# Patient Record
Sex: FEMALE | Race: WHITE | NOT HISPANIC OR LATINO | Employment: OTHER | URBAN - METROPOLITAN AREA
[De-identification: names, ages, dates, MRNs, and addresses within clinical notes are randomized per-mention and may not be internally consistent; named-entity substitution may affect disease eponyms.]

---

## 2020-01-24 ENCOUNTER — OFFICE VISIT (OUTPATIENT)
Dept: ENDOCRINOLOGY | Facility: CLINIC | Age: 70
End: 2020-01-24

## 2020-01-24 VITALS
SYSTOLIC BLOOD PRESSURE: 110 MMHG | HEIGHT: 61 IN | BODY MASS INDEX: 34.25 KG/M2 | HEART RATE: 72 BPM | OXYGEN SATURATION: 100 % | WEIGHT: 181.4 LBS | DIASTOLIC BLOOD PRESSURE: 74 MMHG

## 2020-01-24 DIAGNOSIS — D35.02 PHEOCHROMOCYTOMA OF LEFT ADRENAL GLAND: ICD-10-CM

## 2020-01-24 DIAGNOSIS — E03.9 HYPOTHYROIDISM, UNSPECIFIED TYPE: ICD-10-CM

## 2020-01-24 DIAGNOSIS — E27.40 ADRENAL INSUFFICIENCY (HCC): Primary | ICD-10-CM

## 2020-01-24 DIAGNOSIS — M81.0 OSTEOPOROSIS, UNSPECIFIED OSTEOPOROSIS TYPE, UNSPECIFIED PATHOLOGICAL FRACTURE PRESENCE: ICD-10-CM

## 2020-01-24 PROCEDURE — 99204 OFFICE O/P NEW MOD 45 MIN: CPT | Performed by: INTERNAL MEDICINE

## 2020-01-24 RX ORDER — LEVOTHYROXINE SODIUM 0.07 MG/1
75 TABLET ORAL DAILY
COMMUNITY
Start: 2020-01-03 | End: 2020-01-24

## 2020-01-24 RX ORDER — PINDOLOL 5 MG/1
TABLET ORAL
COMMUNITY
Start: 2019-11-18 | End: 2020-03-03 | Stop reason: SDUPTHER

## 2020-01-24 RX ORDER — NORTRIPTYLINE HYDROCHLORIDE 50 MG/1
50 CAPSULE ORAL NIGHTLY
COMMUNITY
End: 2021-06-21

## 2020-01-24 RX ORDER — QUINAPRIL 5 1/1
TABLET ORAL
COMMUNITY
Start: 2019-12-27 | End: 2020-03-03 | Stop reason: SDUPTHER

## 2020-01-24 RX ORDER — MORPHINE SULFATE 30 MG/1
30 TABLET, FILM COATED, EXTENDED RELEASE ORAL 2 TIMES DAILY
COMMUNITY
Start: 2020-01-21

## 2020-01-24 RX ORDER — POTASSIUM CHLORIDE 750 MG/1
TABLET, FILM COATED, EXTENDED RELEASE ORAL DAILY
COMMUNITY
Start: 2019-12-27 | End: 2020-05-01

## 2020-01-24 RX ORDER — HYDROCORTISONE 10 MG/1
15 TABLET ORAL 2 TIMES DAILY
Qty: 100 TABLET | Refills: 5 | Status: SHIPPED | OUTPATIENT
Start: 2020-01-24 | End: 2020-05-01 | Stop reason: SDUPTHER

## 2020-01-24 RX ORDER — GABAPENTIN 800 MG/1
600 TABLET ORAL 4 TIMES DAILY
COMMUNITY
Start: 2020-01-21 | End: 2021-12-06 | Stop reason: SDUPTHER

## 2020-01-24 RX ORDER — DOXYCYCLINE HYCLATE 100 MG/1
CAPSULE ORAL
COMMUNITY
Start: 2020-01-20 | End: 2020-03-03

## 2020-01-24 RX ORDER — HYDROCORTISONE 20 MG/1
TABLET ORAL
COMMUNITY
Start: 2019-12-27 | End: 2020-01-24

## 2020-01-24 RX ORDER — LEVOTHYROXINE SODIUM 0.07 MG/1
75 TABLET ORAL DAILY
Qty: 30 TABLET | Refills: 5 | Status: SHIPPED | OUTPATIENT
Start: 2020-01-24 | End: 2020-05-01 | Stop reason: SDUPTHER

## 2020-01-24 NOTE — PROGRESS NOTES
Chief Complaint   Patient presents with   • Adrenal Problem     New patient here today for adrenal insufficiency         New patient who is being seen in consultation regarding adrenal insufficiency at the request of Carolina Espinosa APRN HPI   Altagracia Rothman is a 69 y.o. female who presents for evaluation of adrenal insufficiency.    Patient reports that she had a left adrenalectomy for pheochromocytoma in 2003 and developed adrenal insufficiency following that. Prior to surgery she had uncontrolled hypertension for approximately 30 years.  She was previously Dr. Lauren Scott in [place], [place] for the past 3 years. Prior to that she had seen Dr. Blanco and Merlene, both of whom are now retired.  She had surgery at [facility] in [place]. Patient reports that her average dose of hydrocortisone has been approximately 30 mg. Patient reports that she is taking hydrocortisone 15 mg BID.  She reports that prior attempts to wean her hydrocortisone do a total of less than 25 mg daily have been unsuccessful due to hypotension.  Of note, patient is taking antihypertensive medications.  Patient reports that she was following up approximately every 6 months with her endocrine provider in [place]. They have been routinely doing 24-hour urinary testing every 6 months to 1 year and she does report that she believes catecholamines were abnormal on most recent testing.  Of note, she does take nortriptyline but reports that she typically weans off of medication prior to doing her urinary testing.    Patient has a history of hypothyroidism on stable dose of levothyroxine 75 mcg daily.  She reports he takes this first thing in the morning on an empty stomach and denies any concerns regarding this medication.    Patient also reports a history of osteoporosis.Patient reports that she previously took Actonel which was stopped approximately 1 year ago due to dead bone in her jaw.  She states that her  endocrinologist in [place] had stated she wanted her to be off this medication for at least a year and she is curious whether she should restart it patient is not currently taking calcium or vitamin D supplementation.. She had taken this for approximately 12 years prior to that. She has also taken fosamax in the past. Patient reports that she had DXA last month. Patient reports a history of fall with multiple fractures in 2015. No falls or fractures since then.  Patient not currently taking calcium or vitamin D.    Past Medical History:   Diagnosis Date   • Adrenal insufficiency (CMS/HCC)    • Fibromyalgia    • Hypertension    • Hypothyroidism    • Skin cancer      Past Surgical History:   Procedure Laterality Date   • ADRENAL GLAND SURGERY     • BREAST BIOPSY      x4   • GALLBLADDER SURGERY     • HYSTERECTOMY     • TONSILLECTOMY        Family History   Problem Relation Age of Onset   • Thyroid disease Mother    • Hypertension Mother    • Heart disease Father       Social History     Socioeconomic History   • Marital status:      Spouse name: Not on file   • Number of children: Not on file   • Years of education: Not on file   • Highest education level: Not on file   Tobacco Use   • Smoking status: Current Every Day Smoker     Packs/day: 0.50     Types: Cigarettes   • Smokeless tobacco: Never Used   Substance and Sexual Activity   • Alcohol use: Never     Frequency: Never   • Drug use: Never   • Sexual activity: Defer      Allergies   Allergen Reactions   • Daypro [Oxaprozin] Anaphylaxis   • Fentanyl Other (See Comments)     sweating   • Iodine Swelling   • Keflex [Cephalexin] Hives   • Levaquin [Levofloxacin] Hives   • Lyrica [Pregabalin] Other (See Comments)     Sweating   • Other Swelling     Tranormin   • Penicillins Rash   • Sulfa Antibiotics Hives and Rash      Current Outpatient Medications on File Prior to Visit   Medication Sig Dispense Refill   • doxycycline (VIBRAMYCIN) 100 MG capsule TK 1 C PO BID  "    • gabapentin (NEURONTIN) 800 MG tablet TK 5 TS PO D     • Morphine (MS CONTIN) 30 MG 12 hr tablet Take 30 mg by mouth 2 (Two) Times a Day.     • mupirocin (BACTROBAN) 2 % ointment APPLY TID     • nortriptyline (PAMELOR) 50 MG capsule Take 50 mg by mouth Every Night.     • pindolol (VISKEN) 5 MG tablet TK 1.5 TS PO BID     • potassium chloride (K-DUR) 10 MEQ CR tablet Take  by mouth Daily.     • PROAIR  (90 Base) MCG/ACT inhaler INL 2 PFS PO Q 6 H PRN     • quinapril (ACCUPRIL) 5 MG tablet TK 1.5 TS PO D     • WIXELA INHUB 250-50 MCG/DOSE DISKUS INL 1 PUFF PO Q 12 H     • [DISCONTINUED] hydrocortisone (CORTEF) 20 MG tablet TK UP TO 3 TS PO D INCLUDING STRESS DOSING     • [DISCONTINUED] levothyroxine (SYNTHROID, LEVOTHROID) 75 MCG tablet Take 75 mcg by mouth Daily.       No current facility-administered medications on file prior to visit.         Review of Systems   Constitutional: Negative for fatigue and unexpected weight gain.   HENT: Negative for trouble swallowing and voice change.    Eyes: Negative for itching and visual disturbance.   Cardiovascular: Positive for palpitations. Negative for chest pain.   Gastrointestinal: Negative for abdominal pain and constipation.   Endocrine: Negative for cold intolerance and heat intolerance.   Musculoskeletal: Positive for arthralgias and myalgias.   Skin: Negative for dry skin and rash.   Neurological: Negative for tremors and headache.   Psychiatric/Behavioral: Negative for sleep disturbance. The patient is not nervous/anxious.           Vitals:    01/24/20 1400   BP: 110/74   BP Location: Left arm   Patient Position: Sitting   Cuff Size: Adult   Pulse: 72   SpO2: 100%   Weight: 82.3 kg (181 lb 6.4 oz)   Height: 154.9 cm (61\")   Body mass index is 34.28 kg/m².     Physical Exam   Constitutional: Vital signs are normal. She appears well-developed and well-nourished. She is cooperative. She is obese.  HENT:   Head: Normocephalic and atraumatic.   Mouth/Throat: " Oropharynx is clear and moist and mucous membranes are normal.   Eyes: Conjunctivae and EOM are normal.   Neck: Trachea normal. No thyromegaly present.   Cardiovascular: Normal rate and regular rhythm.   No murmur heard.  Pulmonary/Chest: Effort normal and breath sounds normal. No respiratory distress.   Abdominal: Soft. Bowel sounds are normal. She exhibits no distension. There is no hepatosplenomegaly. There is no tenderness.   Lymphadenopathy:     She has no cervical adenopathy.   Neurological: She is alert. She has normal strength. Gait abnormal.   Skin: Skin is warm, dry and intact. No rash noted.   Psychiatric: She has a normal mood and affect. Her behavior is normal.   Vitals reviewed.       Labs/Imaging  Labs dated 11/21/2019  TSH 1.63, reference range 0.3-4.31  Free T4 1.34, reference range 0.71-1.5    Assessment and Plan    Altagracia was seen today for adrenal problem.    Diagnoses and all orders for this visit:    Adrenal insufficiency (CMS/HCC)  -Due to history of unilateral adrenalectomy for pheochromocytoma  -Currently taking hydrocortisone 15 mg twice daily  -Patient reports she is recovering from bronchitis, but that recent in recent years 15 mg twice daily has been her baseline steroid dose discussed with patient that physiologic dose of replacement hydrocortisone is approximately 15 mg total daily.  Reviewed possible complications of long-term supraphysiologic steroid dosing.  Discussed that once patient finishes her current antibiotics for bronchitis, she should reduce her afternoon dose of hydrocortisone to 10 mg.  We will plan for a slow taper as tolerated to physiologic hydrocortisone.  -Stress dosing and sick day rules were reviewed with the patient, she reports that she has emergency injection kit at home  -Reviewed the need for wearing emergency alert bracelet or necklace  -Reviewed signs and symptoms of adrenal crisis and when to seek care.  -     hydrocortisone (CORTEF) 10 MG tablet; Take 1.5  tablets by mouth 2 (Two) Times a Day.    Hypothyroidism, unspecified type  -Recent TFTs with PCP with normal TSH and free T4  -Continue current dose of levothyroxine  -     levothyroxine (SYNTHROID, LEVOTHROID) 75 MCG tablet; Take 1 tablet by mouth Daily.    Pheochromocytoma of left adrenal gland patient reports   -status post left adrenalectomy  -Some elevation in catecholamines on most recent 24-hour urinary testing, these records are not available  -We will request surgical pathology as well as recent laboratory records from patient's prior endocrinologist.  -Likely due for repeat urinary testing, will determine timing based on records    Osteoporosis, unspecified osteoporosis type, unspecified pathological fracture presence  Patient reports she was previously treated with Actonel for approximately 12 years,stopped 1 year ago due to dead bone in the jaw.  Patient reports recent DEXA scan with PCP, will request these records-.  We will also request records of prior DEXA scans and treatments from patient's prior endocrinologist.    Received most recent labs from pateint's prior endocrinologist. Obtained May 2019 with mild elevation of normetanephrine. Patient will need repeat testing, awaiting further records.    DEXA scan dated 5/9/2013  L1-L4 T score 1.6  Left femoral neck T score 0.6  Left total hip T score 1.4  Right femoral neck T score 0.3  Right total hip T score 1.3   radius T score -0.7    24-hour urine collection dated 5/20/2019  Total volume 1250 mL  Total creatinine 912, reference range 12 100-14 100  Metanephrine 65, reference range   Normetanephrine 509, reference range 109-393  Plasma metanephrine less than 0.2  Plasma normetanephrine 1.5, reference range less than 0.9    Per physician note, patient had elevated norepinephrine levels in 2001, resection of pheochromocytoma in 2003.  Levels richar in 2016 with various imaging obtained with nonspecific physiologic uptake in the right adrenals.   Patient then had normal urine metanephrines after coming off TCA in April 2017.    Received results of DEXA scan dated 11/22/2019  L1-L4 T score 1.4  Right femoral neck T score 1.4  Right total femur T score 1.4  BMD within normal limits, will readdress at next visit, no need for therapy at this time.    Return in about 3 months (around 4/24/2020) for Next scheduled follow up. The patient was instructed to contact the clinic with any interval questions or concerns.    Heather Nelson MD     Please note that portions of this document were completed using a voice recognition program. Efforts were made to edit the dictations, but occasionally words are mis-transcribed.

## 2020-01-27 ENCOUNTER — TELEPHONE (OUTPATIENT)
Dept: ENDOCRINOLOGY | Facility: CLINIC | Age: 70
End: 2020-01-27

## 2020-01-27 NOTE — TELEPHONE ENCOUNTER
Left message on [employer] (from [facility])  voice mail, from the medical records department.  Their phone number is [phone].    Requesting copy of DXA scan records from December 2019.

## 2020-01-27 NOTE — TELEPHONE ENCOUNTER
----- Message from Heather Nelson MD sent at 1/24/2020  3:26 PM EST -----  Please request patient's previous endocrinology records from Dr. Lauren Scott in [facility] [place] associated with [facility][1].  Please request prior surgical reports, surgical pathology from pheochromocytoma surgery, recent laboratory evaluations, recent DEXA scan and most recent clinic note.    Please request copy of patients DXA from Dec 2019 from Dr. Delgado at [facility].    Thank you

## 2020-01-27 NOTE — TELEPHONE ENCOUNTER
----- Message from Heather Nelson MD sent at 1/24/2020  3:26 PM EST -----  Please request patient's previous endocrinology records from Dr. Lauren Scott in [facility], associated with [facility].  Please request prior surgical reports, surgical pathology from pheochromocytoma surgery, recent laboratory evaluations, recent DEXA scan and most recent clinic note.    Please request copy of patients DXA from Dec 2019 from [place]. Delgado [place] [facility].    Thank you

## 2020-01-27 NOTE — TELEPHONE ENCOUNTER
Spoke with Velma, from Dr. Scott's office.  She transferred me to the medical records department at the [facility].    Fax request for records to:  [phone].    Requested the following:    prior surgical reports, surgical pathology from pheochromocytoma surgery  recent laboratory evaluations  recent DEXA scan  most recent clinic note.

## 2020-02-11 ENCOUNTER — TELEPHONE (OUTPATIENT)
Dept: ENDOCRINOLOGY | Facility: CLINIC | Age: 70
End: 2020-02-11

## 2020-02-11 NOTE — TELEPHONE ENCOUNTER
Patient states she went to see her pain doctor (01/20/20), Dr. Garrido in [place], [place].     Dr. Garrido is suggesting to the patient to have a pain pump put in, for the morphin the patient takes.    Patient would like to know what Dr. Nelson thinks of her having a pain pump, because of her adrenal insufficiency.    Patient has a f/u appointment with her Dr. Garrido on 02/17/20.      Dr. Nelson, please advice.  Thank you.

## 2020-02-11 NOTE — TELEPHONE ENCOUNTER
PATIENT IS CALLING WANTING TO SPEAK TO YOU ABOUT POSSIBLY GETTING A PAIN PUMP THAT ANOTHER DOCTOR IS WANTING TO SEE IF PATIENT CAN TOLERATE IT. SHE NEEDS TO SEE IF THIS IS SOMETHING SHE CAN USE AS SHE HAS ADRENAL ISSUES. PATIENTS NUMBER [phone]

## 2020-02-12 NOTE — TELEPHONE ENCOUNTER
I am not aware of any way that the transition to a pain pump would effect the patient's adrenal insufficiency.    However, should she decide to pursue this, she may need stress dosing of steroids for the placement of the pump. If the placement were to require general anesthesia, she should have hydrocortisone 50 mg IV once immediately prior to the procedure. I would also recommend she double her oral steroids the day before, day of, and day after procedure.

## 2020-03-03 ENCOUNTER — OFFICE VISIT (OUTPATIENT)
Dept: INTERNAL MEDICINE | Facility: CLINIC | Age: 70
End: 2020-03-03

## 2020-03-03 VITALS
HEIGHT: 60 IN | HEART RATE: 64 BPM | DIASTOLIC BLOOD PRESSURE: 90 MMHG | WEIGHT: 183 LBS | BODY MASS INDEX: 35.93 KG/M2 | TEMPERATURE: 97.2 F | SYSTOLIC BLOOD PRESSURE: 124 MMHG

## 2020-03-03 DIAGNOSIS — E03.9 ACQUIRED HYPOTHYROIDISM: ICD-10-CM

## 2020-03-03 DIAGNOSIS — E27.40 ADRENAL INSUFFICIENCY (HCC): ICD-10-CM

## 2020-03-03 DIAGNOSIS — G89.29 ENCOUNTER FOR CHRONIC PAIN MANAGEMENT: ICD-10-CM

## 2020-03-03 DIAGNOSIS — I10 ESSENTIAL HYPERTENSION: Primary | ICD-10-CM

## 2020-03-03 DIAGNOSIS — M81.0 OSTEOPOROSIS, UNSPECIFIED OSTEOPOROSIS TYPE, UNSPECIFIED PATHOLOGICAL FRACTURE PRESENCE: ICD-10-CM

## 2020-03-03 DIAGNOSIS — M54.50 CHRONIC BILATERAL LOW BACK PAIN WITHOUT SCIATICA: ICD-10-CM

## 2020-03-03 DIAGNOSIS — G89.29 CHRONIC BILATERAL LOW BACK PAIN WITHOUT SCIATICA: ICD-10-CM

## 2020-03-03 RX ORDER — QUINAPRIL 5 1/1
2.5 TABLET ORAL 3 TIMES DAILY
Qty: 45 TABLET | Refills: 5 | Status: SHIPPED | OUTPATIENT
Start: 2020-03-03 | End: 2020-08-28

## 2020-03-03 RX ORDER — PINDOLOL 5 MG/1
2.5 TABLET ORAL 3 TIMES DAILY
Qty: 45 TABLET | Refills: 5 | Status: SHIPPED | OUTPATIENT
Start: 2020-03-03 | End: 2021-03-30 | Stop reason: SDUPTHER

## 2020-03-03 RX ORDER — OXYCODONE AND ACETAMINOPHEN 10; 325 MG/1; MG/1
1 TABLET ORAL EVERY 8 HOURS
COMMUNITY
End: 2022-10-05 | Stop reason: HOSPADM

## 2020-03-03 NOTE — PROGRESS NOTES
Patient Care Team:  Alvina Lloyd PA-C as PCP - General (Internal Medicine)    Chief Complaint::   Chief Complaint   Patient presents with   • Establish Care     needs RX for Quinipril        Subjective     HPI  Altagracia is a 70 year old, , new patient here to establish care. Recently moved   from [place]- Moved to [place] this past fall to be near daughter.  PMH significant for history of left adrenalectomy due to pheochromocytoma with subsequent adrenal insufficiency- 2003. Prior to this, she had history of uncontrolled HTN for ~ 30 years.   PCP-Dr. Asim Ashton internal medicine. She also seen by Dr. Lauren Scott in [place], [place] for the past 3 years. Prior to that she had seen Dr. Blanco and Merlene, both of whom are now retired.  She had surgery at [facility] in [place]. Since moving to [place], she has been seen Dr. Nelson in January 2020 for treatment of adrenal insufficiency,hypothyroidism, and osteoporosis.    Per patient, multiple fractures of the lumbar spine due to severe osteoporosis.   Was seeing pain management in [place] and has been driving back for her pain medications-currently taking gabapentin 800mg qid,  percocet 10-325mg qid, MS Contin 30mg BID.  Patient reports that she previously took Actonel which was stopped approximately 1 year ago due to dead bone in her jaw.  Patient reports that she had DEXA last month. Patient reports a history of fall with multiple fractures in 2015. No falls or fractures since then.  Patient not currently taking calcium or vitamin D.                       The following portions of the patient's history were reviewed and updated as appropriate: active problem list, medication list, allergies, family history, social history    Review of Systems:   Review of Systems   Constitutional: Negative for activity change, appetite change, chills, fatigue and fever.   HENT: Positive for ear pain. Negative for congestion and sinus  "pressure.         R   Respiratory: Positive for cough. Negative for chest tightness, shortness of breath and wheezing.    Cardiovascular: Negative for chest pain and palpitations.   Gastrointestinal: Negative for abdominal pain, blood in stool and constipation.   Endocrine: Negative for cold intolerance and heat intolerance.   Musculoskeletal: Positive for arthralgias, back pain, gait problem and myalgias.   Skin: Negative for color change.   Allergic/Immunologic: Negative for environmental allergies.   Neurological: Negative for dizziness, speech difficulty and headache.   Psychiatric/Behavioral: Positive for decreased concentration. The patient is nervous/anxious.        Vital Signs  Vitals:    03/03/20 1434   BP: 124/90   BP Location: Left arm   Patient Position: Sitting   Cuff Size: Adult   Pulse: 64   Temp: 97.2 °F (36.2 °C)   TempSrc: Temporal   Weight: 83 kg (183 lb)   Height: 152 cm (59.84\")   PainSc:   3   PainLoc: Back  Comment: legs     Body mass index is 35.93 kg/m².    Labs  No visits with results within 3 Month(s) from this visit.   Latest known visit with results is:   No results found for any previous visit.       Imaging  No radiology results for the last 30 days.      Current Outpatient Medications:   •  gabapentin (NEURONTIN) 800 MG tablet, TK 5 TS PO D, Disp: , Rfl:   •  hydrocortisone (CORTEF) 10 MG tablet, Take 1.5 tablets by mouth 2 (Two) Times a Day., Disp: 100 tablet, Rfl: 5  •  levothyroxine (SYNTHROID, LEVOTHROID) 75 MCG tablet, Take 1 tablet by mouth Daily., Disp: 30 tablet, Rfl: 5  •  Morphine (MS CONTIN) 30 MG 12 hr tablet, Take 30 mg by mouth 2 (Two) Times a Day., Disp: , Rfl:   •  mupirocin (BACTROBAN) 2 % ointment, APPLY TID, Disp: , Rfl:   •  nortriptyline (PAMELOR) 50 MG capsule, Take 50 mg by mouth Every Night., Disp: , Rfl:   •  oxyCODONE-acetaminophen (PERCOCET)  MG per tablet, Take 1 tablet by mouth Every 6 (Six) Hours As Needed for Moderate Pain ., Disp: , Rfl:   •  " pindolol (VISKEN) 5 MG tablet, Take 0.5 tablets by mouth 3 (Three) Times a Day., Disp: 45 tablet, Rfl: 5  •  potassium chloride (K-DUR) 10 MEQ CR tablet, Take  by mouth Daily., Disp: , Rfl:   •  PROAIR  (90 Base) MCG/ACT inhaler, INL 2 PFS PO Q 6 H PRN, Disp: , Rfl:   •  quinapril (ACCUPRIL) 5 MG tablet, Take 0.5 tablets by mouth 3 (Three) Times a Day., Disp: 45 tablet, Rfl: 5  •  WIXELA INHUB 250-50 MCG/DOSE DISKUS, INL 1 PUFF PO Q 12 H, Disp: , Rfl:     Physical Exam:    Physical Exam   Constitutional: She appears well-developed and well-nourished. No distress.   Able to climb onto exam table without difficulty   HENT:   Head: Normocephalic and atraumatic.   Right Ear: External ear normal.   Left Ear: External ear normal.   Nose: Nose normal.   Mouth/Throat: Oropharynx is clear and moist.   Eyes: Pupils are equal, round, and reactive to light. Conjunctivae and EOM are normal.   Neck: Normal range of motion. Neck supple. No JVD present. No thyromegaly present.   Cardiovascular: Normal rate, regular rhythm, normal heart sounds and intact distal pulses.   No murmur heard.  Pulmonary/Chest: Effort normal and breath sounds normal. No respiratory distress. She exhibits no tenderness.   Abdominal: Soft. She exhibits no distension. There is no tenderness.   Musculoskeletal: She exhibits tenderness. She exhibits no edema or deformity.        Right hip: Normal. She exhibits normal range of motion and no tenderness.        Left hip: Normal. She exhibits normal range of motion and no tenderness.        Lumbar back: She exhibits normal range of motion, no tenderness and no bony tenderness.   Lymphadenopathy:     She has no cervical adenopathy.   Neurological: She is alert. She has normal strength. No sensory deficit. She exhibits normal muscle tone. Coordination and gait normal.   Reflex Scores:       Brachioradialis reflexes are 2+ on the right side and 2+ on the left side.       Patellar reflexes are 2+ on the right  side and 2+ on the left side.       Achilles reflexes are 2+ on the right side and 2+ on the left side.  Skin: Skin is warm and dry. No rash noted. She is not diaphoretic. No erythema. No pallor.   Psychiatric: She has a normal mood and affect. Her behavior is normal. Judgment and thought content normal.   Nursing note and vitals reviewed.      Procedures        Assessment/Plan   Problem List Items Addressed This Visit        Cardiovascular and Mediastinum    Essential hypertension - Primary    Current Assessment & Plan     Hypertension is improving with treatment.  Continue current treatment regimen.  Dietary sodium restriction.  Weight loss.  Stop smoking.  Continue current medications.  Ambulatory blood pressure monitoring.  Blood pressure will be reassessed at the next regular appointment.         Relevant Medications    quinapril (ACCUPRIL) 5 MG tablet    pindolol (VISKEN) 5 MG tablet       Endocrine    Adrenal insufficiency (CMS/HCC)    Current Assessment & Plan     Managed by Dr. Nelson.  She is currently taking hydrocortisone 10mg tablet-1.5 tablets BID.         Relevant Medications    hydrocortisone (CORTEF) 10 MG tablet    Hypothyroidism    Current Assessment & Plan     Managed by Dr. Nelson.  She is currently taking levothyroxine 75mcg daily.         Relevant Medications    levothyroxine (SYNTHROID, LEVOTHROID) 75 MCG tablet    hydrocortisone (CORTEF) 10 MG tablet    pindolol (VISKEN) 5 MG tablet       Nervous and Auditory    Chronic bilateral low back pain without sciatica    Current Assessment & Plan     I have no records on this patient.  Medical record request has been sent so that we can assess severity of disease.  Will await referral to pain management until records have been reviewed.  She is encouraged to refill at current physician in the interim.            Musculoskeletal and Integument    Osteoporosis    Overview     Will need records of recent DEXA          Current Assessment & Plan     Will  check Vitamin D at next lab draw.  Will review Dr. Nelson's records also.            Other    Encounter for chronic pain management    Current Assessment & Plan     Request for records have been sent.         BMI 35.0-35.9,adult    Current Assessment & Plan     Labs at next appt.  She is encouraged to cut back on soda, processed foods, and fast foods.               Return in about 6 weeks (around 4/14/2020) for Recheck, Annual physical.    Plan of care reviewed with patient at the conclusion of today's visit. Education was provided regarding diagnosis, management, and any prescribed or recommended OTC medications.Patient verbalizes understanding of and agreement with management plan.       Alvina Lloyd PA-C    Please note that portions of this note were completed with a voice recognition program. Efforts were made to edit the dictations, but occasionally words are mistranscribed.

## 2020-03-04 PROBLEM — E03.9 HYPOTHYROIDISM: Status: ACTIVE | Noted: 2020-03-04

## 2020-03-04 PROBLEM — G89.29 CHRONIC BILATERAL LOW BACK PAIN WITHOUT SCIATICA: Status: ACTIVE | Noted: 2020-03-04

## 2020-03-04 PROBLEM — E27.40 ADRENAL INSUFFICIENCY: Status: ACTIVE | Noted: 2020-03-04

## 2020-03-04 PROBLEM — Z86.018 H/O PHEOCHROMOCYTOMA: Status: ACTIVE | Noted: 2020-03-04

## 2020-03-04 PROBLEM — I10 ESSENTIAL HYPERTENSION: Status: ACTIVE | Noted: 2020-03-04

## 2020-03-04 PROBLEM — M54.50 CHRONIC BILATERAL LOW BACK PAIN WITHOUT SCIATICA: Status: ACTIVE | Noted: 2020-03-04

## 2020-03-04 PROBLEM — G89.29 ENCOUNTER FOR CHRONIC PAIN MANAGEMENT: Status: ACTIVE | Noted: 2020-03-04

## 2020-03-06 ENCOUNTER — TELEPHONE (OUTPATIENT)
Dept: INTERNAL MEDICINE | Facility: CLINIC | Age: 70
End: 2020-03-06

## 2020-03-06 PROBLEM — M81.0 OSTEOPOROSIS: Status: ACTIVE | Noted: 2020-03-06

## 2020-03-06 PROBLEM — Z72.0 TOBACCO ABUSE: Status: ACTIVE | Noted: 2020-03-06

## 2020-03-06 NOTE — ASSESSMENT & PLAN NOTE
I have no records on this patient.  Medical record request has been sent so that we can assess severity of disease.  Will await referral to pain management until records have been reviewed.  She is encouraged to refill at current physician in the interim.

## 2020-03-06 NOTE — ASSESSMENT & PLAN NOTE
Hypertension is improving with treatment.  Continue current treatment regimen.  Dietary sodium restriction.  Weight loss.  Stop smoking.  Continue current medications.  Ambulatory blood pressure monitoring.  Blood pressure will be reassessed at the next regular appointment.

## 2020-03-06 NOTE — PATIENT INSTRUCTIONS

## 2020-03-09 NOTE — TELEPHONE ENCOUNTER
Is there a certain record you need right away I can request, otherwise if you need her whole record I will need a signed release to send. I see she has an appointment in April I can add to her appointment note to sign a release then

## 2020-03-12 ENCOUNTER — OFFICE VISIT (OUTPATIENT)
Dept: INTERNAL MEDICINE | Facility: CLINIC | Age: 70
End: 2020-03-12

## 2020-03-12 VITALS — DIASTOLIC BLOOD PRESSURE: 74 MMHG | SYSTOLIC BLOOD PRESSURE: 126 MMHG | TEMPERATURE: 96.7 F

## 2020-03-12 DIAGNOSIS — I10 ESSENTIAL HYPERTENSION: ICD-10-CM

## 2020-03-12 DIAGNOSIS — E03.9 ACQUIRED HYPOTHYROIDISM: ICD-10-CM

## 2020-03-12 DIAGNOSIS — R51.9 NONINTRACTABLE HEADACHE, UNSPECIFIED CHRONICITY PATTERN, UNSPECIFIED HEADACHE TYPE: Primary | ICD-10-CM

## 2020-03-12 DIAGNOSIS — J40 BRONCHITIS: ICD-10-CM

## 2020-03-12 LAB
EXPIRATION DATE: NORMAL
FLUAV RNA RESP QL NAA+PROBE: NEGATIVE
FLUBV RNA RESP QL NAA+PROBE: NEGATIVE
INTERNAL CONTROL: NORMAL
Lab: NORMAL

## 2020-03-12 PROCEDURE — 99214 OFFICE O/P EST MOD 30 MIN: CPT | Performed by: INTERNAL MEDICINE

## 2020-03-12 PROCEDURE — 87502 INFLUENZA DNA AMP PROBE: CPT | Performed by: INTERNAL MEDICINE

## 2020-03-12 RX ORDER — DOXYCYCLINE HYCLATE 100 MG/1
100 TABLET, DELAYED RELEASE ORAL 2 TIMES DAILY
Qty: 16 TABLET | Refills: 1 | Status: SHIPPED | OUTPATIENT
Start: 2020-03-12 | End: 2020-05-01

## 2020-03-12 NOTE — PROGRESS NOTES
"[facility]     Altagracia Rothman  1950   [id number]      Patient Care Team:  Alvina Lloyd PA-C as PCP - General (Internal Medicine)    Chief Complaint::   Chief Complaint   Patient presents with   • Headache     with insomnia.  \"Pretty sure I had a fever last night\".              HPI  Patient is 70-year-old female relatively new patient in this office complaining of mild headache cough with wheeze fatigue with low-grade temp last p.m. with a history of COPD quit smoking 4 days ago cough is nonproductive does have low-grade wheeze denies chest wall pain history of COPD hypothyroidism chronic degenerative arthritic pain history of essential hypertension neuropathy and COPD.  The patient is unsure of flu exposure but was recently in a local store where the employee was coughing and appeared quite ill.      Patient Active Problem List   Diagnosis   • Adrenal insufficiency (CMS/HCC)   • Hypothyroidism   • Essential hypertension   • H/O pheochromocytoma   • Chronic bilateral low back pain without sciatica   • Encounter for chronic pain management   • Osteoporosis   • Tobacco abuse   • BMI 35.0-35.9,adult   • Bronchitis        Past Medical History:   Diagnosis Date   • Adrenal insufficiency (CMS/HCC)    • Fibromyalgia    • Hypertension    • Hypothyroidism    • Skin cancer        Past Surgical History:   Procedure Laterality Date   • ADRENAL GLAND SURGERY     • BREAST BIOPSY      x4   • GALLBLADDER SURGERY     • HYSTERECTOMY     • TONSILLECTOMY         Family History   Problem Relation Age of Onset   • Thyroid disease Mother    • Hypertension Mother    • Heart disease Father        Social History     Socioeconomic History   • Marital status:      Spouse name: Not on file   • Number of children: Not on file   • Years of education: Not on file   • Highest education level: Not on file   Tobacco Use   • Smoking status: Current Every Day Smoker     Packs/day: 0.50     Types: Cigarettes   • Smokeless " tobacco: Never Used   Substance and Sexual Activity   • Alcohol use: Never     Frequency: Never   • Drug use: Never   • Sexual activity: Defer       Allergies   Allergen Reactions   • Daypro [Oxaprozin] Anaphylaxis   • Fentanyl Other (See Comments)     sweating   • Iodine Swelling   • Keflex [Cephalexin] Hives   • Levaquin [Levofloxacin] Hives   • Lyrica [Pregabalin] Other (See Comments)     Sweating   • Other Swelling     Tranormin   • Penicillins Rash   • Sulfa Antibiotics Hives and Rash       Review of Systems     HEENT: Complains of headache denies vision or hearing change  NECK: Complains of some soreness in her neck denies dysphasia  CHEST: History of smoking quit 4 days ago does have mild cough mild wheeze does not believe she is short of breath denies hemoptysis or significant sputum production  CARDIAC: Denies chest pain or palpitations history of essential hypertension  ABD: Denies nausea vomiting complains of some mild abdominal discomfort  : Denies dysuria frequency  NEURO: Denies syncope concussion  PSYCH: Mild stress anxiety  EXTREM: Moderate severe degenerative changes on MS Contin and Percocet    Vital Signs  Vitals:    03/12/20 1710 03/12/20 1711   BP: 126/74    Temp:  96.7 °F (35.9 °C)     There is no height or weight on file to calculate BMI.  Patient's There is no height or weight on file to calculate BMI.      Advance Care Planning       Current Outpatient Medications:   •  doxycycline (DORYX) 100 MG enteric coated tablet, Take 1 tablet by mouth 2 (Two) Times a Day., Disp: 16 tablet, Rfl: 1  •  gabapentin (NEURONTIN) 800 MG tablet, TK 5 TS PO D, Disp: , Rfl:   •  hydrocortisone (CORTEF) 10 MG tablet, Take 1.5 tablets by mouth 2 (Two) Times a Day., Disp: 100 tablet, Rfl: 5  •  levothyroxine (SYNTHROID, LEVOTHROID) 75 MCG tablet, Take 1 tablet by mouth Daily., Disp: 30 tablet, Rfl: 5  •  Morphine (MS CONTIN) 30 MG 12 hr tablet, Take 30 mg by mouth 2 (Two) Times a Day., Disp: , Rfl:   •   mupirocin (BACTROBAN) 2 % ointment, APPLY TID, Disp: , Rfl:   •  nortriptyline (PAMELOR) 50 MG capsule, Take 50 mg by mouth Every Night., Disp: , Rfl:   •  oxyCODONE-acetaminophen (PERCOCET)  MG per tablet, Take 1 tablet by mouth Every 6 (Six) Hours As Needed for Moderate Pain ., Disp: , Rfl:   •  pindolol (VISKEN) 5 MG tablet, Take 0.5 tablets by mouth 3 (Three) Times a Day., Disp: 45 tablet, Rfl: 5  •  potassium chloride (K-DUR) 10 MEQ CR tablet, Take  by mouth Daily., Disp: , Rfl:   •  PROAIR  (90 Base) MCG/ACT inhaler, INL 2 PFS PO Q 6 H PRN, Disp: , Rfl:   •  quinapril (ACCUPRIL) 5 MG tablet, Take 0.5 tablets by mouth 3 (Three) Times a Day., Disp: 45 tablet, Rfl: 5  •  WIXELA INHUB 250-50 MCG/DOSE DISKUS, INL 1 PUFF PO Q 12 H, Disp: , Rfl:     Physical Exam     ACE III MINI         HEENT: Conjunctiva clear no facial asymmetry  NECK: No masses bruit or thyromegaly  CHEST: Distant breath sounds with few base crackles without significant wheeze  CARDIAC: Regular rhythm without gallop or rub blood pressure stable heart rate stable  ABD: Liver and spleen are normal positive bowel sounds  : Deferred  NEURO: Intact  PSYCH: Mild stress anxiety  EXTREM: Moderate severe degenerative changes  Skin: Clear     Results Review:    Recent Results (from the past 672 hour(s))   POCT Flu A&B, Molecular    Collection Time: 03/12/20  5:16 PM   Result Value Ref Range    POC Influenza A, Molecular Negative Negative    POC Influenza B, Molecular Negative Negative    Internal Control Passed Passed    Lot Number [id number]     Expiration Date 01/17/2021      Procedures    Medication Review: Medications reviewed and noted    Patient wellness counseling  Exercise: Encouraged rest  Diet: Encouraged healthy cardiac diet with reduce carbs  Smoking: Quit smoking 4 days ago  Alcohol:  Screening: Flu A and B- negative    Assessment/Plan:    Problem List Items Addressed This Visit        Cardiovascular and Mediastinum    Essential  hypertension    Overview     History of essential hypertension on Visken 5 mg tablets half tablet 3 times a day Accupril 5 mg tablet half tablet 3 times a day with blood pressure currently stable continue therapy         Current Assessment & Plan     Continue Accupril and visken with current blood pressure 126/74 and heart rate of 66         Relevant Medications    quinapril (ACCUPRIL) 5 MG tablet    pindolol (VISKEN) 5 MG tablet       Respiratory    Bronchitis    Overview     History of COPD and smoking quit smoking 4 days ago chest has a few basilar crackles and rhonchi no significant wheeze         Current Assessment & Plan     Flu a and B are negative, suggest doxycycline 100 twice daily for 8 days and Delsym expectorant 5 mL twice daily and Tylenol as needed  Would like to get the coronavirus 19 but it is not available at this time the patient is in no respiratory distress suggest the patient return home with self quarantine for the next 4 days,  We will try to obtain rhinovirus 19 assessment and will notify patient when and where to go for that test.  In the meantime if the patient develops respiratory distress she is to proceed to [facility] or [facility] ER.         Relevant Medications    PROAIR  (90 Base) MCG/ACT inhaler    WIXELA INHUB 250-50 MCG/DOSE DISKUS       Endocrine    Hypothyroidism    Overview                                                   History of hypothyroidism on levothyroxine 75 MCG daily                           Current Assessment & Plan     Continue levothyroxine 75 mg daily         Relevant Medications    levothyroxine (SYNTHROID, LEVOTHROID) 75 MCG tablet    hydrocortisone (CORTEF) 10 MG tablet    pindolol (VISKEN) 5 MG tablet      Other Visit Diagnoses     Nonintractable headache, unspecified chronicity pattern, unspecified headache type    -  Primary    Relevant Orders    POCT Flu A&B, Molecular (Completed)           Patient Instructions   Flu a and  B-  Treat the bronchitis with doxycycline 100 mg twice daily for 8 days, Delsym expectorant 5 mL twice daily and Tylenol twice daily  Continue all other medications  Encouraged continue discontinuing smoking which he did for the past 4 days  Patient is to quarantine self at home for the next 4 days however if she develops respiratory distress she needs to proceed to  or [facility] ER  We will attempt to obtain coronavirus 19 assessment and will notify patient when that is available.  Return visit April 14 or PRN       Plan of care reviewed with patient at the conclusion of today's visit. Education was provided regarding diagnosis, management, and any prescribed or recommended OTC medications.Patient verbalizes understanding of and agreement with management plan.         Nirmal Paige MD      Note: Part of this note may be an electronic transcription/translation of spoken language to printed text using the Dragon Dictation system.

## 2020-03-12 NOTE — PATIENT INSTRUCTIONS
Flu a and B-  Treat the bronchitis with doxycycline 100 mg twice daily for 8 days, Delsym expectorant 5 mL twice daily and Tylenol twice daily  Continue all other medications  Encouraged continue discontinuing smoking which he did for the past 4 days  Patient is to quarantine self at home for the next 4 days however if she develops respiratory distress she needs to proceed to  or [facility] ER  We will attempt to obtain coronavirus 19 assessment and will notify patient when that is available.  Return visit April 14 or PRN

## 2020-03-12 NOTE — ASSESSMENT & PLAN NOTE
Flu a and B are negative, suggest doxycycline 100 twice daily for 8 days and Delsym expectorant 5 mL twice daily and Tylenol as needed  Would like to get the coronavirus 19 but it is not available at this time the patient is in no respiratory distress suggest the patient return home with self quarantine for the next 4 days,  We will try to obtain rhinovirus 19 assessment and will notify patient when and where to go for that test.  In the meantime if the patient develops respiratory distress she is to proceed to [facility] or [facility].

## 2020-03-13 ENCOUNTER — TELEPHONE (OUTPATIENT)
Dept: INTERNAL MEDICINE | Facility: CLINIC | Age: 70
End: 2020-03-13

## 2020-03-13 NOTE — TELEPHONE ENCOUNTER
PT RETURNING CALL. PT STATES SHE IS FEELING MUCH BETTER AND HER COUGH IS 98% BETTER. SHE IS REQUESTING A CALL BACK.

## 2020-03-17 ENCOUNTER — TELEPHONE (OUTPATIENT)
Dept: INTERNAL MEDICINE | Facility: CLINIC | Age: 70
End: 2020-03-17

## 2020-03-17 NOTE — TELEPHONE ENCOUNTER
Message noted I think it would be safe for the patient to go ahead and schedule her appointment at any time because the 4-day quarantine has passed, with improvement in symptoms.

## 2020-03-17 NOTE — TELEPHONE ENCOUNTER
Patient called stating that she was told to stay in isolation.She was seen 3/12/20/ Your note says 4 day, but she is under the impression that it was 14 days  She missed her pain management appointment yesterday in [place] and needs to reschedule that.  She only has a cough in the morning after waking up. No shortness of breath or fever.   When can she make her appointment?    Please note message sent through [employer]. It appears the patient saw Dr. Nobles back in 2017.

## 2020-05-01 ENCOUNTER — OFFICE VISIT (OUTPATIENT)
Dept: ENDOCRINOLOGY | Facility: CLINIC | Age: 70
End: 2020-05-01

## 2020-05-01 VITALS — BODY MASS INDEX: 35.98 KG/M2 | HEIGHT: 60 IN

## 2020-05-01 DIAGNOSIS — E27.40 ADRENAL INSUFFICIENCY (HCC): Primary | ICD-10-CM

## 2020-05-01 DIAGNOSIS — E03.9 ACQUIRED HYPOTHYROIDISM: ICD-10-CM

## 2020-05-01 DIAGNOSIS — E03.9 HYPOTHYROIDISM, UNSPECIFIED TYPE: ICD-10-CM

## 2020-05-01 DIAGNOSIS — D35.02 PHEOCHROMOCYTOMA OF LEFT ADRENAL GLAND: ICD-10-CM

## 2020-05-01 DIAGNOSIS — M81.0 OSTEOPOROSIS, UNSPECIFIED OSTEOPOROSIS TYPE, UNSPECIFIED PATHOLOGICAL FRACTURE PRESENCE: ICD-10-CM

## 2020-05-01 PROCEDURE — 99442 PR PHYS/QHP TELEPHONE EVALUATION 11-20 MIN: CPT | Performed by: INTERNAL MEDICINE

## 2020-05-01 RX ORDER — HYDROCORTISONE 10 MG/1
15 TABLET ORAL 2 TIMES DAILY
Qty: 100 TABLET | Refills: 5 | Status: SHIPPED | OUTPATIENT
Start: 2020-05-01 | End: 2021-02-09

## 2020-05-01 RX ORDER — LEVOTHYROXINE SODIUM 0.07 MG/1
75 TABLET ORAL DAILY
Qty: 30 TABLET | Refills: 5 | Status: SHIPPED | OUTPATIENT
Start: 2020-05-01 | End: 2020-08-31

## 2020-05-01 NOTE — PROGRESS NOTES
Chief Complaint   Patient presents with   • Follow-up   • Adrenal insufficiency        HPI   Altagracia Rothman is a 70 y.o. female had concerns including Follow-up and Adrenal insufficiency.      This patient has consented to a telehealth visit via telephone. The visit was scheduled as a routine visit to comply with patient safety concerns in accordance with CDC recommendations.  All vitals recorded within this visit are reported by the patient.  I spent 20 minutes in direct conversation with this patient.      Patient reports that she has been doing okay since her last visit. She has a respiratory infection in March from which she has now recovered.      Regarding her adrenal insufficiency she reports she is attempted to decrease hydrocortisone to 15 mg morning and 10 in the afternoon but reports that she developed fatigue when she did this and thus was given 15 mg twice daily.  She reports that she does have an emergency injection kit at home but thinks that it may be expiring soon and requests a refill.    Regarding her history of pheochromocytoma.  Discussed that she is due for repeat laboratory evaluation.  She denies any palpitations, flushing.    Regarding patient's history of osteoporosis.  Patient has not been on therapy for approximately 18 months.  She denies any interval falls or fractures.  She is taking vitamin D 1000 IU daily.  She is not currently taking any calcium supplementation.    Patient continues on levothyroxine 75 mg daily for hypothyroidism.  She takes this for single morning on empty stomach.  Patient denies any changes in weight, changes in bowel habits.    The following portions of the patient's history were reviewed and updated as appropriate: allergies, current medications and past social history.    Review of Systems   Constitutional: Negative for unexpected weight gain and unexpected weight loss.   Gastrointestinal: Negative for abdominal pain, constipation and diarrhea.   Skin: Negative  "for color change.       Ht 151.9 cm (59.8\")   BMI 35.98 kg/m²      Physical Exam     Unable to complete physical exam this visit was completed via telephone.      Labs/Imaging  24-hour urine collection dated 5/20/2019  Total volume 1250 mL  Total creatinine 912, reference range 12 100-14 100  Metanephrine 65, reference range   Normetanephrine 509, reference range 109-393  Plasma metanephrine less than 0.2  Plasma normetanephrine 1.5, reference range less than 0.9    DEXA scan dated 11/22/2019  L1-L4 T score 1.4  Right femoral neck T score 1.4  Right total femur T score 1.4  BMD within normal limits, will readdress at next visit, no need for therapy at this time.    Altagracia was seen today for follow-up and adrenal insufficiency.    Diagnoses and all orders for this visit:    Adrenal insufficiency (CMS/[facility])  -Currently taking hydrocortisone 15 mg twice daily  - reviewed the risks of supraphysiologic steroids  -Discussed need for gradual tapering of steroids.  Patient to continue 15 mg of hydrocortisone every morning.  She will start alternating afternoon dose between 10 and 15 mg.  -Reviewed signs and symptoms of adrenal insufficiency and adrenal crisis  -Reviewed sick day rules and stress dosing  -Reviewed need to wear emergency alert bracelet or necklace.  Patient reports she will obtain a replacement.  -We will send emergency injection kit to pharmacy.    Pheochromocytoma of left adrenal gland  -Patient due for repeat screening catecholamines and metanephrine  -Discussed with patient that she will need to discontinue TCA for a few weeks prior to this testing.  Discussed that she should take medicine for the supervision of her prescribing provider  -We will plan to obtain both 24-hour urine collection and plasma studies.    Acquired hypothyroidism  -Currently taking levothyroxine 75 mcg daily, TSH within normal limits end of the 2019  -Patient clinically euthyroid.    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  -DEXA scan in 2019 with normal BMD.  Would not advise restarting testosterone at this time.  Discussed fall prevention.  Discussed that patient should take approximately 1000 mg of calcium daily and 400-800 IU of vitamin D.         Return in about 6 months (around 11/1/2020) for Next scheduled follow up. The patient was instructed to contact the clinic with any interval questions or concerns.    Heather Nelson MD   Endocrinologist    Please note that portions of this document were completed with a voice recognition program. Efforts were made to edit the dictations, but occasionally words are mis-transcribed.

## 2020-05-04 ENCOUNTER — TELEPHONE (OUTPATIENT)
Dept: INTERNAL MEDICINE | Facility: CLINIC | Age: 70
End: 2020-05-04

## 2020-05-04 NOTE — TELEPHONE ENCOUNTER
----- Message from Heather Nelson MD sent at 5/4/2020 10:18 AM EDT -----  Please contact patient and let her know that our office dose not have the 24 hour urine collection jugs in stock. However, the  office on [facility] dose. Please let patient know that she may  the collection containers at that office.

## 2020-05-04 NOTE — TELEPHONE ENCOUNTER
----- Message from Heather Nelson MD sent at 5/4/2020 10:18 AM EDT -----  Please contact patient and let her know that our office dose not have the 24 hour urine collection jugs in stock. However, the  office on [facility] dose. Please let patient know that she may  the collection containers at that office.

## 2020-05-04 NOTE — TELEPHONE ENCOUNTER
Provided patient with address and phone number for [facility] on [facility].    Patient will go and  urin jug at the above location.

## 2020-05-12 ENCOUNTER — OFFICE VISIT (OUTPATIENT)
Dept: INTERNAL MEDICINE | Facility: CLINIC | Age: 70
End: 2020-05-12

## 2020-05-12 DIAGNOSIS — J45.909 MODERATE ASTHMA WITHOUT COMPLICATION, UNSPECIFIED WHETHER PERSISTENT: ICD-10-CM

## 2020-05-12 DIAGNOSIS — J40 BRONCHITIS: Primary | ICD-10-CM

## 2020-05-12 PROCEDURE — 99443 PR PHYS/QHP TELEPHONE EVALUATION 21-30 MIN: CPT | Performed by: PHYSICIAN ASSISTANT

## 2020-05-12 RX ORDER — DOXYCYCLINE 100 MG/1
100 TABLET ORAL 2 TIMES DAILY
Qty: 20 TABLET | Refills: 0 | Status: SHIPPED | OUTPATIENT
Start: 2020-05-12 | End: 2020-06-02

## 2020-05-12 NOTE — PROGRESS NOTES
Patient Care Team:  Alvina Lloyd PA-C as PCP - General (Internal Medicine)    Chief Complaint::   Chief Complaint   Patient presents with   • Bronchitis   • COPD      You have chosen to receive care through a telephone visit. Do you consent to use a telephone visit for your medical care today? YES    Subjective     HPI  70 year old female complains of productive cough x 3 weeks.  She has history of asthma and regularly uses Wixela (Advair) diskus twice daily and Proair inhaler every 4-6 hours as needed.  She did have ever and body aches ~ 1 week ago, but this has resolved.  She continues to wheeze. Using delsym for cough with relief.        The following portions of the patient's history were reviewed and updated as appropriate: active problem list, medication list, allergies, social history    Review of Systems:   Review of Systems   Constitutional: Negative for activity change, appetite change, chills, diaphoresis, fatigue, fever, unexpected weight gain and unexpected weight loss.   HENT: Negative for hearing loss.    Eyes: Negative for visual disturbance.   Respiratory: Positive for cough, chest tightness and wheezing. Negative for shortness of breath.    Cardiovascular: Negative for chest pain, palpitations and leg swelling.   Gastrointestinal: Negative for abdominal pain, blood in stool, nausea, GERD and indigestion.   Endocrine: Negative for cold intolerance and heat intolerance.   Genitourinary: Negative for dysuria and hematuria.   Musculoskeletal: Negative for arthralgias and myalgias.   Skin: Negative for skin lesions.   Allergic/Immunologic: Negative for environmental allergies and food allergies.   Neurological: Negative for tremors, seizures, syncope, speech difficulty, weakness, headache, memory problem and confusion.   Hematological: Does not bruise/bleed easily.   Psychiatric/Behavioral: Negative for sleep disturbance and depressed mood. The patient is not nervous/anxious.        Vital  Signs  There were no vitals filed for this visit.  There is no height or weight on file to calculate BMI.    Labs  Office Visit on 03/12/2020   Component Date Value Ref Range Status   • POC Influenza A, Molecular 03/12/2020 Negative  Negative Final   • POC Influenza B, Molecular 03/12/2020 Negative  Negative Final   • Internal Control 03/12/2020 Passed  Passed Final   • Lot Number 03/12/2020 [id number]   Final   • Expiration Date 03/12/2020 01/17/2021   Final       Imaging  No radiology results for the last 30 days.      Current Outpatient Medications:   •  doxycycline (ADOXA) 100 MG tablet, Take 1 tablet by mouth 2 (Two) Times a Day., Disp: 20 tablet, Rfl: 0  •  gabapentin (NEURONTIN) 800 MG tablet, TK 5 TS PO D, Disp: , Rfl:   •  hydrocortisone (CORTEF) 10 MG tablet, Take 1.5 tablets by mouth 2 (Two) Times a Day., Disp: 100 tablet, Rfl: 5  •  hydrocortisone sodium succinate (Solu-CORTEF) 100 MG injection, Inject 100 mg into the appropriate muscle as directed by prescriber As Needed (for adrenal crisis)., Disp: 100 mg, Rfl: 0  •  levothyroxine (SYNTHROID, LEVOTHROID) 75 MCG tablet, Take 1 tablet by mouth Daily., Disp: 30 tablet, Rfl: 5  •  Morphine (MS CONTIN) 30 MG 12 hr tablet, Take 30 mg by mouth 2 (Two) Times a Day., Disp: , Rfl:   •  mupirocin (BACTROBAN) 2 % ointment, APPLY TID, Disp: , Rfl:   •  nortriptyline (PAMELOR) 50 MG capsule, Take 50 mg by mouth Every Night., Disp: , Rfl:   •  oxyCODONE-acetaminophen (PERCOCET)  MG per tablet, Take 1 tablet by mouth Every 6 (Six) Hours As Needed for Moderate Pain ., Disp: , Rfl:   •  pindolol (VISKEN) 5 MG tablet, Take 0.5 tablets by mouth 3 (Three) Times a Day., Disp: 45 tablet, Rfl: 5  •  PROAIR  (90 Base) MCG/ACT inhaler, INL 2 PFS PO Q 6 H PRN, Disp: , Rfl:   •  quinapril (ACCUPRIL) 5 MG tablet, Take 0.5 tablets by mouth 3 (Three) Times a Day., Disp: 45 tablet, Rfl: 5  •  WIXELA INHUB 250-50 MCG/DOSE DISKUS, INL 1 PUFF PO Q 12 H, Disp: , Rfl:      Physical Exam:    Physical Exam   Psychiatric: She has a normal mood and affect. Her behavior is normal. Judgment and thought content normal.       Procedures        Assessment/Plan   Problem List Items Addressed This Visit        Respiratory    Bronchitis - Primary    Overview     Pt has quit smoking at the onset of coronavirus pandemic.         Current Assessment & Plan     Continue to use your Proair inhaler as needed.  Continue Wixela inhaler twice daily.  Add Doxycycline 100mg BID x 10 days.  Use Delsym for cough. Continue to drink plenty of water.  Call if symptoms worsen.         Relevant Medications    PROAIR  (90 Base) MCG/ACT inhaler    WIXELA INHUB 250-50 MCG/DOSE DISKUS    doxycycline (ADOXA) 100 MG tablet    Moderate asthma without complication    Overview     Continue Wixella inhaler as directed.  Continue Proair inhaler as directed.           Relevant Medications    PROAIR  (90 Base) MCG/ACT inhaler    WIXELA INHUB 250-50 MCG/DOSE DISKUS        This visit has been rescheduled as a phone visit to comply with patient safety concerns in accordance with CDC recommendations. Total time of discussion was 21 minutes.      Return if symptoms worsen or fail to improve.    Plan of care reviewed with patient at the conclusion of today's visit. Education was provided regarding diagnosis, management, and any prescribed or recommended OTC medications.Patient verbalizes understanding of and agreement with management plan.       Alvina Lloyd PA-C    Please note that portions of this note were completed with a voice recognition program. Efforts were made to edit the dictations, but occasionally words are mistranscribed.

## 2020-05-12 NOTE — ASSESSMENT & PLAN NOTE
Continue to use your Proair inhaler as needed.  Continue Wixela inhaler twice daily.  Add Doxycycline 100mg BID x 10 days.  Use Delsym for cough. Continue to drink plenty of water.  Call if symptoms worsen.

## 2020-06-02 ENCOUNTER — OFFICE VISIT (OUTPATIENT)
Dept: INTERNAL MEDICINE | Facility: CLINIC | Age: 70
End: 2020-06-02

## 2020-06-02 VITALS — SYSTOLIC BLOOD PRESSURE: 128 MMHG | DIASTOLIC BLOOD PRESSURE: 70 MMHG

## 2020-06-02 DIAGNOSIS — J40 BRONCHITIS: ICD-10-CM

## 2020-06-02 DIAGNOSIS — I10 ESSENTIAL HYPERTENSION: Primary | ICD-10-CM

## 2020-06-02 PROCEDURE — 99443 PR PHYS/QHP TELEPHONE EVALUATION 21-30 MIN: CPT | Performed by: PHYSICIAN ASSISTANT

## 2020-06-02 RX ORDER — DOXYCYCLINE 100 MG/1
100 TABLET ORAL 2 TIMES DAILY
Qty: 20 TABLET | Refills: 0 | Status: SHIPPED | OUTPATIENT
Start: 2020-06-02 | End: 2021-07-28 | Stop reason: ALTCHOICE

## 2020-06-02 NOTE — PROGRESS NOTES
"Patient Care Team:  Alvina Lloyd PA-C as PCP - General (Internal Medicine)    Chief Complaint::   Chief Complaint   Patient presents with   • Cough   • Bronchitis      You have chosen to receive care through a telephone visit. Do you consent to use a telephone visit for your medical care today? YES  Subjective     HPI  70 year old female presents for telephone visit to discuss cough and congestion.  Pt was treated for bronchitis 5/12/2020 with doxycycline for 10 days.  Her symptoms did improve on the medication.  When she stopped the antibiotic, the cough returned.  She has green sputum. Pt denies fever or body aches. She has been compliant with her inhalers. No known COVID exposure. She reports with her adrenal insufficiency, she \"historically takes her a long time to get over bronchitis.\"        The following portions of the patient's history were reviewed and updated as appropriate: active problem list, medication list, allergies, social history    Review of Systems:   Review of Systems   Constitutional: Negative for activity change, appetite change, chills, fatigue, fever, unexpected weight gain and unexpected weight loss.   HENT: Positive for congestion. Negative for sinus pressure and sore throat.    Eyes: Negative for blurred vision and double vision.   Respiratory: Positive for cough, chest tightness and wheezing. Negative for shortness of breath.    Cardiovascular: Negative for chest pain.   Gastrointestinal: Negative for nausea.   Endocrine: Positive for cold intolerance. Negative for heat intolerance.   Musculoskeletal: Negative for arthralgias.   Allergic/Immunologic: Negative for environmental allergies and food allergies.   Hematological: Negative for adenopathy. Does not bruise/bleed easily.       Vital Signs  Vitals:    06/02/20 0923   BP: 128/70     There is no height or weight on file to calculate BMI.    Labs  Office Visit on 03/12/2020   Component Date Value Ref Range Status   • POC " Influenza A, Molecular 03/12/2020 Negative  Negative Final   • POC Influenza B, Molecular 03/12/2020 Negative  Negative Final   • Internal Control 03/12/2020 Passed  Passed Final   • Lot Number 03/12/2020 [id number]   Final   • Expiration Date 03/12/2020 01/17/2021   Final       Imaging  No radiology results for the last 30 days.      Current Outpatient Medications:   •  doxycycline (ADOXA) 100 MG tablet, Take 1 tablet by mouth 2 (Two) Times a Day., Disp: 20 tablet, Rfl: 0  •  gabapentin (NEURONTIN) 800 MG tablet, TK 5 TS PO D, Disp: , Rfl:   •  hydrocortisone (CORTEF) 10 MG tablet, Take 1.5 tablets by mouth 2 (Two) Times a Day., Disp: 100 tablet, Rfl: 5  •  hydrocortisone sodium succinate (Solu-CORTEF) 100 MG injection, Inject 100 mg into the appropriate muscle as directed by prescriber As Needed (for adrenal crisis)., Disp: 100 mg, Rfl: 0  •  levothyroxine (SYNTHROID, LEVOTHROID) 75 MCG tablet, Take 1 tablet by mouth Daily., Disp: 30 tablet, Rfl: 5  •  Morphine (MS CONTIN) 30 MG 12 hr tablet, Take 30 mg by mouth 2 (Two) Times a Day., Disp: , Rfl:   •  mupirocin (BACTROBAN) 2 % ointment, APPLY TID, Disp: , Rfl:   •  nortriptyline (PAMELOR) 50 MG capsule, Take 50 mg by mouth Every Night., Disp: , Rfl:   •  oxyCODONE-acetaminophen (PERCOCET)  MG per tablet, Take 1 tablet by mouth Every 6 (Six) Hours As Needed for Moderate Pain ., Disp: , Rfl:   •  pindolol (VISKEN) 5 MG tablet, Take 0.5 tablets by mouth 3 (Three) Times a Day., Disp: 45 tablet, Rfl: 5  •  PROAIR  (90 Base) MCG/ACT inhaler, INL 2 PFS PO Q 6 H PRN, Disp: , Rfl:   •  quinapril (ACCUPRIL) 5 MG tablet, Take 0.5 tablets by mouth 3 (Three) Times a Day., Disp: 45 tablet, Rfl: 5  •  WIXELA INHUB 250-50 MCG/DOSE DISKUS, INL 1 PUFF PO Q 12 H, Disp: , Rfl:     Physical Exam:    Physical Exam   Psychiatric: She has a normal mood and affect. Her behavior is normal. Judgment and thought content normal.       Procedures        Assessment/Plan   Problem List  Items Addressed This Visit        Cardiovascular and Mediastinum    Essential hypertension - Primary    Overview     History of essential hypertension on Visken 5 mg tablets half tablet 3 times a day Accupril 5 mg tablet half tablet 3 times a day with blood pressure currently stable continue therapy         Current Assessment & Plan     Patient reports compliance with blood pressure medications.  She is monitoring blood pressure daily.         Relevant Medications    quinapril (ACCUPRIL) 5 MG tablet    pindolol (VISKEN) 5 MG tablet       Respiratory    Bronchitis    Overview     Pt has quit smoking at the onset of coronavirus pandemic.         Current Assessment & Plan     Repeat course of doxycycline, as patient is allergic to all other types of antibiotics.  She is instructed to call in ~10 days with update.         Relevant Medications    PROAIR  (90 Base) MCG/ACT inhaler    WIXELA INHUB 250-50 MCG/DOSE DISKUS    doxycycline (ADOXA) 100 MG tablet      This visit has been rescheduled as a phone visit to comply with patient safety concerns in accordance with CDC recommendations. Total time of discussion was 21 minutes.    This is the second telephone visit for ongoing cough and congestion.  I explained to patient that due to current restrictions, if symptoms continue, she is directed to the Urgent treatment for evaluation.    Return for pt is instructed to call in 10 days with update.    Plan of care reviewed with patient at the conclusion of today's visit. Education was provided regarding diagnosis, management, and any prescribed or recommended OTC medications.Patient verbalizes understanding of and agreement with management plan.       Alvina Lloyd PA-C    Please note that portions of this note were completed with a voice recognition program. Efforts were made to edit the dictations, but occasionally words are mistranscribed.

## 2020-06-02 NOTE — ASSESSMENT & PLAN NOTE
Patient reports compliance with blood pressure medications.  She is monitoring blood pressure daily.

## 2020-06-02 NOTE — ASSESSMENT & PLAN NOTE
Repeat course of doxycycline, as patient is allergic to all other types of antibiotics.  She is instructed to call in ~10 days with update.

## 2020-06-09 RX ORDER — ALBUTEROL SULFATE 90 UG/1
AEROSOL, METERED RESPIRATORY (INHALATION)
Qty: 8.5 G | Refills: 5 | Status: SHIPPED | OUTPATIENT
Start: 2020-06-09 | End: 2021-01-21 | Stop reason: SDUPTHER

## 2020-06-12 ENCOUNTER — TELEPHONE (OUTPATIENT)
Dept: INTERNAL MEDICINE | Facility: CLINIC | Age: 70
End: 2020-06-12

## 2020-06-12 DIAGNOSIS — J40 BRONCHITIS: ICD-10-CM

## 2020-06-12 RX ORDER — BENZONATATE 200 MG/1
200 CAPSULE ORAL 3 TIMES DAILY PRN
Qty: 30 CAPSULE | Refills: 0 | Status: SHIPPED | OUTPATIENT
Start: 2020-06-12 | End: 2020-06-22

## 2020-06-12 RX ORDER — DOXYCYCLINE 100 MG/1
100 TABLET ORAL 2 TIMES DAILY
Qty: 20 TABLET | Refills: 0 | Status: CANCELLED | OUTPATIENT
Start: 2020-06-12

## 2020-06-12 NOTE — TELEPHONE ENCOUNTER
PT CALLED AND SAID SHE WAS STILL COUGHING AND IT WAS STILL COLORED PHLEM; SHE HAS BEEN TAKING DOXYCYCLINE 100 MG    [facility] [address]    PT WOULD LIKE ALSO TO GET  INTO A PAIN CLINIC    PLEASE ADVISE [phone]

## 2020-06-12 NOTE — TELEPHONE ENCOUNTER
It is likely viral and another antibiotic will not likely help. If worsens needs to go to [facility] and will have to forward Alvina march about pain clinic

## 2020-06-12 NOTE — TELEPHONE ENCOUNTER
PATIENT CALLED AND SHE WAS TOLD DR.. MATTA WOULD NOT GIVE ANTIBIOTIC AND ADVISED TO GO TO [place].  PATIENT STATED SHE DID NOT WANT TO GO.      PAIN CLINIC MESSAGE SENT TO [place] ALVARES

## 2020-06-15 ENCOUNTER — TELEPHONE (OUTPATIENT)
Dept: INTERNAL MEDICINE | Facility: CLINIC | Age: 70
End: 2020-06-15

## 2020-06-15 DIAGNOSIS — G89.29 CHRONIC BILATERAL LOW BACK PAIN WITHOUT SCIATICA: Primary | ICD-10-CM

## 2020-06-15 DIAGNOSIS — M54.50 CHRONIC BILATERAL LOW BACK PAIN WITHOUT SCIATICA: Primary | ICD-10-CM

## 2020-06-17 ENCOUNTER — TELEPHONE (OUTPATIENT)
Dept: INTERNAL MEDICINE | Facility: CLINIC | Age: 70
End: 2020-06-17

## 2020-06-17 NOTE — TELEPHONE ENCOUNTER
Pt stated that she would like a referral to pain treatment facility here in [place] she stated that she's been travelling to [place] to be treated     Please call pt to advise   [phone]

## 2020-07-02 ENCOUNTER — TELEPHONE (OUTPATIENT)
Dept: INTERNAL MEDICINE | Facility: CLINIC | Age: 70
End: 2020-07-02

## 2020-07-02 NOTE — TELEPHONE ENCOUNTER
Per Dr Burton referral note:  ATTENDS PAIN CLINIC IN [place]. Explain philosophy, IF OK: GET RECORDS, DIAGNOSTIC STUDIES, SEND TO DR KATHLEEN FOR FULL EVAL AND CLEARANCE. WILL REVIEW AFTER, Dr BURTON

## 2020-07-02 NOTE — TELEPHONE ENCOUNTER
PATIENT CALLING FOR STATUS OF REFERRAL TO PAIN CLINIC.  SENT REFERRAL NOTE TO [place] TO SEE IF SHE NEEDS TO BE REFERRED TO ANOTHER DOCTOR FIRST OR JUST WHAT TO DO.

## 2020-07-03 DIAGNOSIS — M54.50 CHRONIC BILATERAL LOW BACK PAIN WITHOUT SCIATICA: Primary | ICD-10-CM

## 2020-07-03 DIAGNOSIS — G89.29 CHRONIC BILATERAL LOW BACK PAIN WITHOUT SCIATICA: Primary | ICD-10-CM

## 2020-07-16 NOTE — TELEPHONE ENCOUNTER
Unfortunately there is no way around this.  This has been requested by Dr. De La Cruz's office.  She will have to continue pain management in [place] until this is complete.

## 2020-08-28 DIAGNOSIS — I10 ESSENTIAL HYPERTENSION: ICD-10-CM

## 2020-08-28 DIAGNOSIS — E03.9 HYPOTHYROIDISM, UNSPECIFIED TYPE: ICD-10-CM

## 2020-08-28 RX ORDER — QUINAPRIL 5 1/1
TABLET ORAL
Qty: 135 TABLET | Refills: 1 | Status: SHIPPED | OUTPATIENT
Start: 2020-08-28 | End: 2021-03-30

## 2020-08-31 RX ORDER — LEVOTHYROXINE SODIUM 0.07 MG/1
75 TABLET ORAL DAILY
Qty: 30 TABLET | Refills: 5 | Status: SHIPPED | OUTPATIENT
Start: 2020-08-31 | End: 2020-09-24 | Stop reason: SDUPTHER

## 2020-09-02 ENCOUNTER — TELEPHONE (OUTPATIENT)
Dept: INTERNAL MEDICINE | Facility: CLINIC | Age: 70
End: 2020-09-02

## 2020-09-02 NOTE — TELEPHONE ENCOUNTER
KIRAN FROM [facility] IS REQUESTING THE PATIENTS ALLERGY LIST AND TO KNOW WHO SHE WAS REFFERED TO FOR PAIN MANAGEMENT. PLEASE PUT MARIZOL NAME ON FAX    FAX NUMBER [phone]  CONTACT NUMBER [phone]

## 2020-09-09 DIAGNOSIS — Z00.8 PRE-SURGICAL PSYCHOLOGICAL ASSESSMENT, ENCOUNTER FOR: Primary | ICD-10-CM

## 2020-09-24 DIAGNOSIS — E03.9 HYPOTHYROIDISM, UNSPECIFIED TYPE: ICD-10-CM

## 2020-09-24 RX ORDER — LEVOTHYROXINE SODIUM 0.07 MG/1
75 TABLET ORAL DAILY
Qty: 90 TABLET | Refills: 3 | Status: SHIPPED | OUTPATIENT
Start: 2020-09-24 | End: 2021-10-25

## 2020-09-24 NOTE — TELEPHONE ENCOUNTER
Received a fax from [facility], requesting a 90 day supply of Levothyroxine 75 mcg tablet.    Sent refill

## 2020-12-17 ENCOUNTER — TELEPHONE (OUTPATIENT)
Dept: INTERNAL MEDICINE | Facility: CLINIC | Age: 70
End: 2020-12-17

## 2020-12-17 NOTE — TELEPHONE ENCOUNTER
PATIENT HAS HAD COLD SYMPTOMS FOR SEVERAL WEEKS.  PAIN CLINIC WOULD LIKE HER TESTED FOR COVID BEFORE THEY WILL SEE HER.  PATIENT WOULD LIKE ORDER FOR COVID PUT IN Epic.     PLEASE CALL  [phone]

## 2020-12-18 DIAGNOSIS — J40 BRONCHITIS: Primary | ICD-10-CM

## 2020-12-18 NOTE — TELEPHONE ENCOUNTER
PATIENT RETURNED PHONE CALL.  THE NUMBER SHE CAN BE REACHED AT [phone]    DISREGARD OLD  NUMBER IN THE CHART.

## 2021-01-21 NOTE — TELEPHONE ENCOUNTER
Caller: Altagracia Rothman    Relationship: Self    Best call back number: [phone]    Medication needed:   Requested Prescriptions     Pending Prescriptions Disp Refills   • Wixela Inhub 250-50 MCG/DOSE DISKUS 60 each    • albuterol sulfate  (90 Base) MCG/ACT inhaler 8.5 g 5       When do you need the refill by:  FIRST OF WEEK     What details did the patient provide when requesting the medication: PATIENT IS OUT OF ONE MEDICATION AND HAS 5 DAYS LEFT OF OTHER. BUT DOES NOT TAKE THE ONE EVERYDAY THAT SHE IS OUT OF.     Does the patient have less than a 3 day supply:  [x] Yes  [] No    What is the patient's preferred pharmacy:    [facility] #[id number] [place], [place] - [address] - [phone] PH

## 2021-01-24 RX ORDER — ALBUTEROL SULFATE 90 UG/1
1 AEROSOL, METERED RESPIRATORY (INHALATION) EVERY 4 HOURS PRN
Qty: 8.5 G | Refills: 5 | Status: SHIPPED | OUTPATIENT
Start: 2021-01-24 | End: 2021-09-16 | Stop reason: SDUPTHER

## 2021-02-09 DIAGNOSIS — E27.40 ADRENAL INSUFFICIENCY (HCC): ICD-10-CM

## 2021-02-09 RX ORDER — HYDROCORTISONE 10 MG/1
TABLET ORAL
Qty: 100 TABLET | Refills: 5 | Status: SHIPPED | OUTPATIENT
Start: 2021-02-09 | End: 2021-09-27

## 2021-02-09 NOTE — TELEPHONE ENCOUNTER
Refill request.    LOV:  05/01/20  No future visit    Last refill:  05/01/20  Q.  100 tabs  R.  5  Sig:    Take 1.5 tablets by mouth 2 (Two) Times a Day.            Mili.

## 2021-03-30 DIAGNOSIS — I10 ESSENTIAL HYPERTENSION: ICD-10-CM

## 2021-03-30 RX ORDER — PINDOLOL 5 MG/1
2.5 TABLET ORAL 3 TIMES DAILY
Qty: 45 TABLET | Refills: 5 | Status: SHIPPED | OUTPATIENT
Start: 2021-03-30 | End: 2021-08-27 | Stop reason: SDUPTHER

## 2021-03-30 RX ORDER — QUINAPRIL 5 1/1
TABLET ORAL
Qty: 135 TABLET | Refills: 1 | Status: SHIPPED | OUTPATIENT
Start: 2021-03-30 | End: 2021-09-16 | Stop reason: SDUPTHER

## 2021-03-30 NOTE — TELEPHONE ENCOUNTER
Caller: Altagracia Rothman    Relationship: Self    Best call back number: [phone]     Medication needed:   Requested Prescriptions     Pending Prescriptions Disp Refills   • pindolol (VISKEN) 5 MG tablet 45 tablet 5     Sig: Take 0.5 tablets by mouth 3 (Three) Times a Day.       When do you need the refill by: TOMORROW    What additional details did the patient provide when requesting the medication: PATIENT HAS TWO DAYS LEFT    Does the patient have less than a 3 day supply:  [x] Yes  [] No    What is the patient's preferred pharmacy: [facility] #[id number] [place], [place] - [facility] [address] - [phone] [facility] [phone] FX

## 2021-06-21 RX ORDER — NORTRIPTYLINE HYDROCHLORIDE 50 MG/1
CAPSULE ORAL
Qty: 60 CAPSULE | Refills: 0 | Status: SHIPPED | OUTPATIENT
Start: 2021-06-21 | End: 2021-07-28 | Stop reason: SDUPTHER

## 2021-07-27 RX ORDER — NORTRIPTYLINE HYDROCHLORIDE 50 MG/1
CAPSULE ORAL
Qty: 60 CAPSULE | Refills: 0 | OUTPATIENT
Start: 2021-07-27

## 2021-07-28 ENCOUNTER — TELEMEDICINE (OUTPATIENT)
Dept: INTERNAL MEDICINE | Facility: CLINIC | Age: 71
End: 2021-07-28

## 2021-07-28 ENCOUNTER — TELEPHONE (OUTPATIENT)
Dept: INTERNAL MEDICINE | Facility: CLINIC | Age: 71
End: 2021-07-28

## 2021-07-28 DIAGNOSIS — I10 ESSENTIAL HYPERTENSION: Primary | ICD-10-CM

## 2021-07-28 DIAGNOSIS — E27.40 ADRENAL INSUFFICIENCY (HCC): ICD-10-CM

## 2021-07-28 PROCEDURE — 99213 OFFICE O/P EST LOW 20 MIN: CPT | Performed by: NURSE PRACTITIONER

## 2021-07-28 RX ORDER — NORTRIPTYLINE HYDROCHLORIDE 50 MG/1
50 CAPSULE ORAL 2 TIMES DAILY
Qty: 60 CAPSULE | Refills: 0 | Status: SHIPPED | OUTPATIENT
Start: 2021-07-28 | End: 2021-09-16 | Stop reason: SDUPTHER

## 2021-07-28 RX ORDER — TETRACYCLINE HYDROCHLORIDE 500 MG/1
500 CAPSULE ORAL 2 TIMES DAILY
COMMUNITY
End: 2022-08-23

## 2021-08-04 ENCOUNTER — TELEMEDICINE (OUTPATIENT)
Dept: INTERNAL MEDICINE | Facility: CLINIC | Age: 71
End: 2021-08-04

## 2021-08-04 VITALS — SYSTOLIC BLOOD PRESSURE: 124 MMHG | DIASTOLIC BLOOD PRESSURE: 67 MMHG

## 2021-08-04 DIAGNOSIS — I10 ESSENTIAL HYPERTENSION: Primary | ICD-10-CM

## 2021-08-04 DIAGNOSIS — E03.9 ACQUIRED HYPOTHYROIDISM: ICD-10-CM

## 2021-08-04 PROCEDURE — 99213 OFFICE O/P EST LOW 20 MIN: CPT | Performed by: NURSE PRACTITIONER

## 2021-08-04 NOTE — PROGRESS NOTES
Follow Up Office Visit      Patient Name: Altagracia Rothman  : 1950   MRN: [id number]   Care Team: Patient Care Team:  Alvina Lloyd PA-C as PCP - General (Internal Medicine)  Heather Nelson MD as Consulting Physician (Endocrinology)    Chief Complaint:    Chief Complaint   Patient presents with   • Hypertension     follow up BP readings     You have chosen to receive care through a telehealth visit.  Do you consent to use a video/audio connection for your medical care today?     YES    History of Present Illness: Altagracia Rothman is a 71 y.o. female with pertinent medical history significant for hypothyroidism, hypertension, adrenal insufficiency, tobacco abuse and chronic pain syndrome.  She presents today for follow-up on blood pressure readings at home.  This is a video visit.    She was seen via video visit approximately 1 week ago with concerns about her blood pressure being elevated on several occasions to her specialty providers.  However, she also noted some low readings with one noted at 83/48.  Associated feeling of fatigue.    Reports that since last week, she has tried to increase her fluid intake.  Additionally, she has decreased her quinapril dose to 1 tablet daily (was previously 1-1/2 daily).  She feels this has helped very much.  BP readings have been more stable: 124/67, 130/69, 105/54.  Denies any ongoing fatigue, no lightheadedness, dizziness, syncope or near syncopal episodes.    Admits she continues to have some emotional stress which she feels is related to BP going up and down.  Notes that her daughter is ill with Guillain-Barré syndrome, daughter-in-law recently diagnosed with late stage ovarian cancer.    Reports she thinks that TSH was last checked at her rheumatology visit in  (data deficit).    Subjective      Review of Systems:   Review of Systems   Constitutional: Positive for fatigue. Negative for chills and fever.   Eyes: Negative for visual disturbance.   Respiratory:  Negative for chest tightness and shortness of breath.    Neurological: Negative for dizziness, syncope and headache.   Psychiatric/Behavioral: Positive for stress.       I have reviewed and the following portions of the patient's history were updated as appropriate: past family history, past medical history, past social history, past surgical history and problem list.    Medications:     Current Outpatient Medications:   •  albuterol sulfate  (90 Base) MCG/ACT inhaler, Inhale 1 puff Every 4 (Four) Hours As Needed for Wheezing., Disp: 8.5 g, Rfl: 5  •  gabapentin (NEURONTIN) 800 MG tablet, Take 600 mg by mouth 4 (Four) Times a Day., Disp: , Rfl:   •  hydrocortisone (CORTEF) 10 MG tablet, TAKE 1 AND 1/2 TABLETS BY MOUTH TWICE DAILY, Disp: 100 tablet, Rfl: 5  •  hydrocortisone sodium succinate (Solu-CORTEF) 100 MG injection, Inject 100 mg into the appropriate muscle as directed by prescriber As Needed (for adrenal crisis)., Disp: 100 mg, Rfl: 0  •  levothyroxine (SYNTHROID, LEVOTHROID) 75 MCG tablet, Take 1 tablet by mouth Daily., Disp: 90 tablet, Rfl: 3  •  Morphine (MS CONTIN) 30 MG 12 hr tablet, Take 30 mg by mouth 2 (Two) Times a Day., Disp: , Rfl:   •  mupirocin (BACTROBAN) 2 % ointment, APPLY TID, Disp: , Rfl:   •  nortriptyline (PAMELOR) 50 MG capsule, Take 1 capsule by mouth 2 (Two) Times a Day., Disp: 60 capsule, Rfl: 0  •  oxyCODONE-acetaminophen (PERCOCET)  MG per tablet, Take 1 tablet by mouth Every 6 (Six) Hours As Needed for Moderate Pain ., Disp: , Rfl:   •  pindolol (VISKEN) 5 MG tablet, Take 0.5 tablets by mouth 3 (Three) Times a Day., Disp: 45 tablet, Rfl: 5  •  quinapril (ACCUPRIL) 5 MG tablet, TAKE 1/2 TABLET BY MOUTH THREE TIMES DAILY (Patient taking differently: Take 5 mg by mouth Daily.), Disp: 135 tablet, Rfl: 1  •  tetracycline (ACHROMYCIN,SUMYCIN) 500 MG capsule, Take 500 mg by mouth 2 (Two) Times a Day., Disp: , Rfl:   •  Wixela Inhub 250-50 MCG/DOSE DISKUS, INHALE 1 PUFF BY  MOUTH TWICE DAILY, Disp: 60 each, Rfl: 1    Allergies:   Allergies   Allergen Reactions   • Daypro [Oxaprozin] Anaphylaxis   • Fentanyl Other (See Comments)     sweating   • Iodine Swelling   • Keflex [Cephalexin] Hives   • Levaquin [Levofloxacin] Hives   • Lyrica [Pregabalin] Other (See Comments)     Sweating   • Other Swelling     Tranormin   • Penicillins Rash   • Sulfa Antibiotics Hives and Rash       Objective     Physical Exam:  Vital Signs:   Vitals:    08/04/21 1620   BP: 124/67     There is no height or weight on file to calculate BMI.     Physical Exam  With patient's consent, physical exam was conducted via visual telemedicine encounter due to patient's current isolation requirements in the interest of PPE conservation.    Constitutional: No acute distress, awake, alert, nontoxic, normal body habitus  HENT: NCAT, MMM, no conjunctival injection  Respiratory: Good effort, nonlabored respirations   Psychiatric: Appropriate affect, good insight and judgement, cooperative  Neurologic: Oriented x 3, clear and fluent  Skin: No visible rashes, no jaundice seen on exposed skin through video window    Assessment / Plan      Assessment/Plan:   Problems Addressed This Visit    ICD-10-CM ICD-9-CM   1. Essential hypertension  I10 401.9   2. Acquired hypothyroidism  E03.9 244.9       Hypertension  -Continue medications of pindolol 2.5 mg 3 times daily, quinapril 5 mg daily.  -Continue to monitor blood pressure closely, if remains stable will confirm change of quinapril to 5 mg daily  -Advised to hydrate thoroughly each day    Hypothyroidism  -Patient believes TSH was checked with rheumatology recently.  -Advised patient to call for results and report these to our office   -If no TSH check performed, will need to order for further evaluation      Plan of care reviewed with patient at the conclusion of today's visit. Education was provided regarding diagnosis and management.  Patient verbalizes understanding of and  agreement with management plan.    Follow Up:   Return in about 6 months (around 2/4/2022).        MICHELE Ashby  [facility] [address]    Please note that portions of this note may have been completed with a voice recognition program. Efforts were made to edit the dictations, but occasionally words are mistranscribed.

## 2021-08-05 ENCOUNTER — TELEPHONE (OUTPATIENT)
Dept: INTERNAL MEDICINE | Facility: CLINIC | Age: 71
End: 2021-08-05

## 2021-08-05 NOTE — TELEPHONE ENCOUNTER
Provider: [place] ALVARES   Caller: CASSIE STEELE  Relationship to Patient: SELF  Phone Number: [phone]  Reason for Call: PATIENT CALLED AND WANTED  TO GET A CALL TO SPEAK ABOUT THE ISSUES WITH HER HIGH BLOOD PRESSURES POSSIBLY DO TO HER THYROID LEVELS. PLEASE CALL AND LET HER KNOW WHAT YOU THINK OF THESE LEVELS      THYROID LVELS    PTH   42    TSH   1.830

## 2021-08-06 NOTE — TELEPHONE ENCOUNTER
Called pt and advised video visit would not be possible for blood pressure management. Pt is very reluctant to come into the office due to Covid. Pt decided to schedule an appointment but 3 weeks out. I scheduled her at the end of the day and for 30 minute appointment.

## 2021-08-27 DIAGNOSIS — I10 ESSENTIAL HYPERTENSION: ICD-10-CM

## 2021-08-27 RX ORDER — PINDOLOL 5 MG/1
2.5 TABLET ORAL 3 TIMES DAILY
Qty: 45 TABLET | Refills: 5 | Status: SHIPPED | OUTPATIENT
Start: 2021-08-27 | End: 2021-09-16 | Stop reason: SDUPTHER

## 2021-08-27 NOTE — TELEPHONE ENCOUNTER
Caller: Altagracia Rothman    Relationship: Self    Best call back number:     Medication needed:   Requested Prescriptions     Pending Prescriptions Disp Refills   • pindolol (VISKEN) 5 MG tablet 45 tablet 5     Sig: Take 0.5 tablets by mouth 3 (Three) Times a Day.       When do you need the refill by: ASAP    What additional details did the patient provide when requesting the medication: PATIENT WILL BE OUT BEFORE NEXT APPOINTMENT    Does the patient have less than a 3 day supply:  [] Yes  [x] No    What is the patient's preferred pharmacy: [facility] #[id number] [place], [place] - [address] - [phone] [facility] [phone] FX

## 2021-09-09 ENCOUNTER — TELEPHONE (OUTPATIENT)
Dept: INTERNAL MEDICINE | Facility: CLINIC | Age: 71
End: 2021-09-09

## 2021-09-09 NOTE — TELEPHONE ENCOUNTER
THis patient cannot do a video or telephone visit  today.  SHe is on high risk meds and the last time she was physically seen in the office was as a new patient 3/2020.  No video visit or telephone visit- HAS to be here for an in person physical exam before I will refill any medications.

## 2021-09-09 NOTE — TELEPHONE ENCOUNTER
Called pt to advise this has to be an in person visit due to her type of medications. Pt is scared to come into the office. I suggested she calls us when she is here so we could check her in and she could wait in her car until we were ready to start the visit. Pt stated she has to calls us back because she has to make arrangements for her dog. When she calls back, the appointment type needs to be for a subsequent wellness visit.

## 2021-09-16 ENCOUNTER — TELEMEDICINE (OUTPATIENT)
Dept: INTERNAL MEDICINE | Facility: CLINIC | Age: 71
End: 2021-09-16

## 2021-09-16 ENCOUNTER — TELEPHONE (OUTPATIENT)
Dept: INTERNAL MEDICINE | Facility: CLINIC | Age: 71
End: 2021-09-16

## 2021-09-16 VITALS
DIASTOLIC BLOOD PRESSURE: 80 MMHG | HEART RATE: 75 BPM | BODY MASS INDEX: 32.86 KG/M2 | HEIGHT: 60 IN | WEIGHT: 167.4 LBS | SYSTOLIC BLOOD PRESSURE: 130 MMHG | TEMPERATURE: 97.4 F

## 2021-09-16 DIAGNOSIS — Z00.00 MEDICARE ANNUAL WELLNESS VISIT, SUBSEQUENT: Primary | ICD-10-CM

## 2021-09-16 DIAGNOSIS — E27.40 ADRENAL INSUFFICIENCY (HCC): ICD-10-CM

## 2021-09-16 DIAGNOSIS — E03.9 ACQUIRED HYPOTHYROIDISM: ICD-10-CM

## 2021-09-16 DIAGNOSIS — R20.2 PARESTHESIA: ICD-10-CM

## 2021-09-16 DIAGNOSIS — J45.909 MODERATE ASTHMA WITHOUT COMPLICATION, UNSPECIFIED WHETHER PERSISTENT: ICD-10-CM

## 2021-09-16 DIAGNOSIS — M54.50 CHRONIC BILATERAL LOW BACK PAIN WITHOUT SCIATICA: ICD-10-CM

## 2021-09-16 DIAGNOSIS — I10 ESSENTIAL HYPERTENSION: ICD-10-CM

## 2021-09-16 DIAGNOSIS — M81.0 OSTEOPOROSIS, UNSPECIFIED OSTEOPOROSIS TYPE, UNSPECIFIED PATHOLOGICAL FRACTURE PRESENCE: ICD-10-CM

## 2021-09-16 DIAGNOSIS — G89.29 CHRONIC BILATERAL LOW BACK PAIN WITHOUT SCIATICA: ICD-10-CM

## 2021-09-16 DIAGNOSIS — Z00.00 ROUTINE MEDICAL EXAM: ICD-10-CM

## 2021-09-16 PROCEDURE — G0439 PPPS, SUBSEQ VISIT: HCPCS | Performed by: PHYSICIAN ASSISTANT

## 2021-09-16 RX ORDER — PINDOLOL 5 MG/1
2.5 TABLET ORAL 3 TIMES DAILY
Qty: 45 TABLET | Refills: 5 | Status: SHIPPED | OUTPATIENT
Start: 2021-09-16 | End: 2022-07-25

## 2021-09-16 RX ORDER — ALBUTEROL SULFATE 90 UG/1
1 AEROSOL, METERED RESPIRATORY (INHALATION) EVERY 4 HOURS PRN
Qty: 8.5 G | Refills: 5 | Status: SHIPPED | OUTPATIENT
Start: 2021-09-16

## 2021-09-16 RX ORDER — NORTRIPTYLINE HYDROCHLORIDE 50 MG/1
50 CAPSULE ORAL 2 TIMES DAILY
Qty: 60 CAPSULE | Refills: 5 | Status: SHIPPED | OUTPATIENT
Start: 2021-09-16 | End: 2021-10-28

## 2021-09-16 RX ORDER — QUINAPRIL 5 1/1
5 TABLET ORAL DAILY
Qty: 30 TABLET | Refills: 5 | Status: SHIPPED | OUTPATIENT
Start: 2021-09-16 | End: 2021-12-22

## 2021-09-16 NOTE — TELEPHONE ENCOUNTER
Just to make sure we are all on the same page, Altagracia Rothman arelisfadia has to be in person if she wants me to write any medications. It has the little video symbol next to it.    Thanks

## 2021-09-16 NOTE — PATIENT INSTRUCTIONS
"BMI for Adults  What is BMI?  Body mass index (BMI) is a number that is calculated from a person's weight and height. BMI can help estimate how much of a person's weight is composed of fat. BMI does not measure body fat directly. Rather, it is an alternative to procedures that directly measure body fat, which can be difficult and expensive.  BMI can help identify people who may be at higher risk for certain medical problems.  What are BMI measurements used for?  BMI is used as a screening tool to identify possible weight problems. It helps determine whether a person is obese, overweight, a healthy weight, or underweight.  BMI is useful for:  · Identifying a weight problem that may be related to a medical condition or may increase the risk for medical problems.  · Promoting changes, such as changes in diet and exercise, to help reach a healthy weight. BMI screening can be repeated to see if these changes are working.  How is BMI calculated?  BMI involves measuring your weight in relation to your height. Both height and weight are measured, and the BMI is calculated from those numbers. This can be done either in English ([place] or metric measurements. Note that charts and online BMI calculators are available to help you find your BMI quickly and easily without having to do these calculations yourself.  To calculate your BMI in English ([place] measurements:    1. Measure your weight in pounds (lb).  2. Multiply the number of pounds by 703.  ? For example, for a person who weighs 180 lb, multiply that number by 703, which equals 126,540.  3. Measure your height in inches. Then multiply that number by itself to get a measurement called \"inches squared.\"  ? For example, for a person who is 70 inches tall, the \"inches squared\" measurement is 70 inches x 70 inches, which equals 4,900 inches squared.  4. Divide the total from step 2 (number of lb x 703) by the total from step 3 (inches squared): 126,540 ÷ 4,900 = 25.8. This is " "your BMI.    To calculate your BMI in metric measurements:  1. Measure your weight in kilograms (kg).  2. Measure your height in meters (m). Then multiply that number by itself to get a measurement called \"meters squared.\"  ? For example, for a person who is 1.75 m tall, the \"meters squared\" measurement is 1.75 m x 1.75 m, which is equal to 3.1 meters squared.  3. Divide the number of kilograms (your weight) by the meters squared number. In this example: 70 ÷ 3.1 = 22.6. This is your BMI.  What do the results mean?  BMI charts are used to identify whether you are underweight, normal weight, overweight, or obese. The following guidelines will be used:  · Underweight: BMI less than 18.5.  · Normal weight: BMI between 18.5 and 24.9.  · Overweight: BMI between 25 and 29.9.  · Obese: BMI of 30 or above.  Keep these notes in mind:  · Weight includes both fat and muscle, so someone with a muscular build, such as an athlete, may have a BMI that is higher than 24.9. In cases like these, BMI is not an accurate measure of body fat.  · To determine if excess body fat is the cause of a BMI of 25 or higher, further assessments may need to be done by a health care provider.  · BMI is usually interpreted in the same way for men and women.  Where to find more information  For more information about BMI, including tools to quickly calculate your BMI, go to these websites:  · Centers for Disease Control and Prevention: www.cdc.gov  · American Heart Association: www.heart.org  · National Heart, Lung, and Blood [place]: www.nhlbi.nih.gov  Summary  · Body mass index (BMI) is a number that is calculated from a person's weight and height.  · BMI may help estimate how much of a person's weight is composed of fat. BMI can help identify those who may be at higher risk for certain medical problems.  · BMI can be measured using English measurements or metric measurements.  · BMI charts are used to identify whether you are underweight, normal " weight, overweight, or obese.  This information is not intended to replace advice given to you by your health care provider. Make sure you discuss any questions you have with your health care provider.  Document Revised: 2020 Document Reviewed: 2020  Elsevier Patient Education ©  [employer]      Medicare Wellness  Personal Prevention Plan of Service     Date of Office Visit:  2021  Encounter Provider:  Alvina Lloyd PA-C  Place of Service:  [facility]  Patient Name: Altagracia Rothman  :  1950    As part of the Medicare Wellness portion of your visit today, we are providing you with this personalized preventive plan of services (PPPS). This plan is based upon recommendations of the [place] Preventive Services Task Force (USPSTF) and the Advisory Committee on Immunization Practices (ACIP).    This lists the preventive care services that should be considered, and provides dates of when you are due. Items listed as completed are up-to-date and do not require any further intervention.    Health Maintenance   Topic Date Due   • DXA SCAN  Never done   • COLORECTAL CANCER SCREENING  Never done   • Pneumococcal Vaccine 65+ (1 of 2 - PPSV23) Never done   • TDAP/TD VACCINES (1 - Tdap) Never done   • ZOSTER VACCINE (1 of 2) Never done   • MAMMOGRAM  2019   • HEPATITIS C SCREENING  Never done   • ANNUAL WELLNESS VISIT  Never done   • INFLUENZA VACCINE  10/01/2021   • COVID-19 Vaccine  Completed       Orders Placed This Encounter   Procedures   • Ambulatory Referral to Neurology     Referral Priority:   Routine     Referral Type:   Consultation     Referral Reason:   Specialty Services Required     Referred to Provider:   Carter Toure MD     Requested Specialty:   Neurology     Number of Visits Requested:   1       Return in about 6 months (around 3/16/2022).          Advance Directive    Advance directives are legal documents that let you make  choices ahead of time about your health care and medical treatment in case you become unable to communicate for yourself. Advance directives are a way for you to make known your wishes to family, friends, and health care providers. This can let others know about your end-of-life care if you become unable to communicate.  Discussing and writing advance directives should happen over time rather than all at once. Advance directives can be changed depending on your situation and what you want, even after you have signed the advance directives.  There are different types of advance directives, such as:  · Medical power of .  · Living will.  · Do not resuscitate (DNR) or do not attempt resuscitation (DNAR) order.  Health care proxy and medical power of   A health care proxy is also called a health care agent. This is a person who is appointed to make medical decisions for you in cases where you are unable to make the decisions yourself. Generally, people choose someone they know well and trust to represent their preferences. Make sure to ask this person for an agreement to act as your proxy. A proxy may have to exercise judgment in the event of a medical decision for which your wishes are not known.  A medical power of  is a legal document that names your health care proxy. Depending on the laws in your state, after the document is written, it may also need to be:  · Signed.  · Notarized.  · Dated.  · Copied.  · Witnessed.  · Incorporated into your medical record.  You may also want to appoint someone to manage your money in a situation in which you are unable to do so. This is called a durable power of  for finances. It is a separate legal document from the durable power of  for health care. You may choose the same person or someone different from your health care proxy to act as your agent in money matters.  If you do not appoint a proxy, or if there is a concern that the proxy is  not acting in your best interests, a court may appoint a guardian to act on your behalf.  Living will  A living will is a set of instructions that state your wishes about medical care when you cannot express them yourself. Health care providers should keep a copy of your living will in your medical record. You may want to give a copy to family members or friends. To alert caregivers in case of an emergency, you can place a card in your wallet to let them know that you have a living will and where they can find it. A living will is used if you become:  · Terminally ill.  · Disabled.  · Unable to communicate or make decisions.  Items to consider in your living will include:  · To use or not to use life-support equipment, such as dialysis machines and breathing machines (ventilators).  · A DNR or DNAR order. This tells health care providers not to use cardiopulmonary resuscitation (CPR) if breathing or heartbeat stops.  · To use or not to use tube feeding.  · To be given or not to be given food and fluids.  · Comfort (palliative) care when the goal becomes comfort rather than a cure.  · Donation of organs and tissues.  A living will does not give instructions for distributing your money and property if you should pass away.  DNR or DNAR  A DNR or DNAR order is a request not to have CPR in the event that your heart stops beating or you stop breathing. If a DNR or DNAR order has not been made and shared, a health care provider will try to help any patient whose heart has stopped or who has stopped breathing. If you plan to have surgery, talk with your health care provider about how your DNR or DNAR order will be followed if problems occur.  What if I do not have an advance directive?  If you do not have an advance directive, some states assign family decision makers to act on your behalf based on how closely you are related to them. Each state has its own laws about advance directives. You may want to check with your  health care provider, , or state representative about the laws in your state.  Summary  · Advance directives are the legal documents that allow you to make choices ahead of time about your health care and medical treatment in case you become unable to tell others about your care.  · The process of discussing and writing advance directives should happen over time. You can change the advance directives, even after you have signed them.  · Advance directives include DNR or DNAR orders, living gracia, and designating an agent as your medical power of .  This information is not intended to replace advice given to you by your health care provider. Make sure you discuss any questions you have with your health care provider.  Document Revised: 01/28/2021 Document Reviewed: 07/16/2020  Elsevier Patient Education © 2021 [employer]      Fall Prevention in the Home, Adult  Falls can cause injuries. They can happen to people of all ages. There are many things you can do to make your home safe and to help prevent falls. Ask for help when making these changes, if needed.  What actions can I take to prevent falls?  General Instructions  · Use good lighting in all rooms. Replace any light bulbs that burn out.  · Turn on the lights when you go into a dark area. Use night-lights.  · Keep items that you use often in easy-to-reach places. Lower the shelves around your home if necessary.  · Set up your furniture so you have a clear path. Avoid moving your furniture around.  · Do not have throw rugs and other things on the floor that can make you trip.  · Avoid walking on wet floors.  · If any of your floors are uneven, fix them.  · Add color or contrast paint or tape to clearly [place] and help you see:  ? Any grab bars or handrails.  ? First and last steps of stairways.  ? Where the edge of each step is.  · If you use a stepladder:  ? Make sure that it is fully opened. Do not climb a closed stepladder.  ? Make sure that  both sides of the stepladder are locked into place.  ? Ask someone to hold the stepladder for you while you use it.  · If there are any pets around you, be aware of where they are.  What can I do in the bathroom?         · Keep the floor dry. Clean up any water that spills onto the floor as soon as it happens.  · Remove soap buildup in the tub or shower regularly.  · Use non-skid mats or decals on the floor of the tub or shower.  · Attach bath mats securely with double-sided, non-slip rug tape.  · If you need to sit down in the shower, use a plastic, non-slip stool.  · Install grab bars by the toilet and in the tub and shower. Do not use towel bars as grab bars.  What can I do in the bedroom?  · Make sure that you have a light by your bed that is easy to reach.  · Do not use any sheets or blankets that are too big for your bed. They should not hang down onto the floor.  · Have a firm chair that has side arms. You can use this for support while you get dressed.  What can I do in the kitchen?  · Clean up any spills right away.  · If you need to reach something above you, use a strong step stool that has a grab bar.  · Keep electrical cords out of the way.  · Do not use floor polish or wax that makes floors slippery. If you must use wax, use non-skid floor wax.  What can I do with my stairs?  · Do not leave any items on the stairs.  · Make sure that you have a light switch at the top of the stairs and the bottom of the stairs. If you do not have them, ask someone to add them for you.  · Make sure that there are handrails on both sides of the stairs, and use them. Fix handrails that are broken or loose. Make sure that handrails are as long as the stairways.  · Install non-slip stair treads on all stairs in your home.  · Avoid having throw rugs at the top or bottom of the stairs. If you do have throw rugs, attach them to the floor with carpet tape.  · Choose a carpet that does not hide the edge of the steps on the  stairway.  · Check any carpeting to make sure that it is firmly attached to the stairs. Fix any carpet that is loose or worn.  What can I do on the outside of my home?  · Use bright outdoor lighting.  · Regularly fix the edges of walkways and driveways and fix any cracks.  · Remove anything that might make you trip as you walk through a door, such as a raised step or threshold.  · Trim any bushes or trees on the path to your home.  · Regularly check to see if handrails are loose or broken. Make sure that both sides of any steps have handrails.  · Install guardrails along the edges of any raised decks and porches.  · Clear walking paths of anything that might make someone trip, such as tools or rocks.  · Have any leaves, snow, or ice cleared regularly.  · Use sand or salt on walking paths during winter.  · Clean up any spills in your garage right away. This includes grease or oil spills.  What other actions can I take?  · Wear shoes that:  ? Have a low heel. Do not wear high heels.  ? Have rubber bottoms.  ? Are comfortable and fit you well.  ? Are closed at the toe. Do not wear open-toe sandals.  · Use tools that help you move around (mobility aids) if they are needed. These include:  ? Canes.  ? Walkers.  ? Scooters.  ? Crutches.  · Review your medicines with your doctor. Some medicines can make you feel dizzy. This can increase your chance of falling.  Ask your doctor what other things you can do to help prevent falls.  Where to find more information  · Centers for Disease Control and Prevention, [place]: https://cdc.gov  · National [place]: https://fv1ksld.bro.nih.gov  Contact a doctor if:  · You are afraid of falling at home.  · You feel weak, drowsy, or dizzy at home.  · You fall at home.  Summary  · There are many simple things that you can do to make your home safe and to help prevent falls.  · Ways to make your home safe include removing tripping hazards and installing grab bars in the  bathroom.  · Ask for help when making these changes in your home.  This information is not intended to replace advice given to you by your health care provider. Make sure you discuss any questions you have with your health care provider.  Document Revised: 04/09/2020 Document Reviewed: 08/02/2018  Elsevier Patient Education © 2021 [employer]vier Inc.

## 2021-09-16 NOTE — PROGRESS NOTES
QUICK REFERENCE INFORMATION:  The ABCs of the Annual Wellness Visit    Subsequent Medicare Wellness Visit    HEALTH RISK ASSESSMENT    1950    Recent Hospitalizations:  No hospitalization(s) within the last year..        Current Medical Providers:  Patient Care Team:  Alvina Lloyd PA-C as PCP - General (Internal Medicine)  Heather Nelson MD as Consulting Physician (Endocrinology)  Lashawn Michele MD as Consulting Physician (Rheumatology)        Smoking Status:  Social History     Tobacco Use   Smoking Status Current Every Day Smoker   • Packs/day: 0.50   • Types: Cigarettes   Smokeless Tobacco Never Used       Alcohol Consumption:  Social History     Substance and Sexual Activity   Alcohol Use Never       Depression Screen:   PHQ-2/PHQ-9 Depression Screening 9/16/2021   Little interest or pleasure in doing things 0   Feeling down, depressed, or hopeless 0   Total Score 0       Health Habits and Functional and Cognitive Screening:  Functional & Cognitive Status 9/16/2021   Do you have difficulty preparing food and eating? Yes   Do you have difficulty bathing yourself, getting dressed or grooming yourself? Yes   Do you have difficulty using the toilet? Yes   Do you have difficulty moving around from place to place? Yes   Do you have trouble with steps or getting out of a bed or a chair? Yes   Current Diet Unhealthy Diet   Dental Exam Not up to date   Eye Exam Up to date   Exercise (times per week) 3 times per week   Current Exercises Include House Cleaning   Do you need help using the phone?  No   Are you deaf or do you have serious difficulty hearing?  No   Do you need help with transportation? Yes   Do you need help shopping? Yes   Do you need help preparing meals?  Yes   Do you need help with housework?  No   Do you need help with laundry? No   Do you need help taking your medications? No   Do you need help managing money? No   Do you ever drive or ride in a car without wearing a seat belt? No    Have you felt unusual stress, anger or loneliness in the last month? No   Who do you live with? Alone   If you need help, do you have trouble finding someone available to you? No   Have you been bothered in the last four weeks by sexual problems? No   Do you have difficulty concentrating, remembering or making decisions? No       Fall Risk Screen:  JOSE JLUAN Fall Risk Assessment has not been completed.    ACE III MINI   ATTENTION  What is the year: correct  What is the month of the year: incorrect  What is the day of the week?: correct  What is the date?: correct  MEMORY  Repeat address three times, only score third attempt: [address], [place]: 7  HOW MANY ANIMALS DID THE PATIENT NAME  Verbal Fluency -- Animal Names (0-25): 22+  CLOCK DRAWING  Clock Drawing: All Correct  MEMORY RECALL  Tell me what you remember about that name and address we were repeating at the beginnin  ACE TOTAL SCORE  Total ACE Score - <25/30 strongly suggests cognitive impairment; <21/30 almost certainly shows dementia: 29    Does the patient have evidence of cognitive impairment? No    Aspirin use counseling: Does not need ASA (and currently is not on it)    Recent Lab Results:  CMP:     HbA1c:  No results found for: HGBA1C  Microalbumin:  No results found for: MICROALBUR, POCMALB, POCCREAT  Lipid Panel  No results found for: CHOL, TRIG, HDL, LDL, AST, ALT    Visual Acuity:  No exam data present    Age-appropriate Screening Schedule:  Refer to the list below for future screening recommendations based on patient's age, sex and/or medical conditions. Orders for these recommended tests are listed in the plan section. The patient has been provided with a written plan.    Health Maintenance   Topic Date Due   • DXA SCAN  Never done   • TDAP/TD VACCINES (1 - Tdap) Never done   • ZOSTER VACCINE (1 of 2) Never done   • MAMMOGRAM  2019   • INFLUENZA VACCINE  10/01/2021        Subjective   History of Present  Illness    Altagracia Rothman is a 71 y.o. female who presents for a Subsequent Wellness Visit.Altagracia lives alone, with one dog. Her daughter lives in [place].    Altagracia is a 70 year old, .  from [place]- Moved to [place] this past fall to be near daughter.  PMH significant for history of left adrenalectomy due to pheochromocytoma with subsequent adrenal insufficiency- 2003.   Since moving to [place], she has been seen Dr. Nelson in January 2020 for treatment of adrenal insufficiency,hypothyroidism, and osteoporosis.  Dr. Michele is managing her with osteoporosis, has been referred to Dr. Brown.  She is taking tetracycline for management of pain.  She was seeing Dr. Alex for pain management.   Was seeing pain management in [place] and has been driving back for her pain medications-currently taking gabapentin 800mg qid,  percocet 10-325mg qid, MS Contin 30mg BID.  Is now taking 2400 total gabapentin, percocet 40mg day, now percocet 30mg total.  She continues to take this dose.   Patient reports that she previously took Actonel which was stopped approximately 1 year ago due to dead bone in her jaw.  Patient reports that she had DEXA last month. Patient reports a history of fall with multiple fractures in 2015. No falls or fractures since then.  Patient not currently taking calcium or vitamin D.                CHRONIC CONDITIONS    The following portions of the patient's history were reviewed and updated as appropriate: allergies, current medications, past family history, past medical history, past social history, past surgical history and problem list.    Outpatient Medications Prior to Visit   Medication Sig Dispense Refill   • gabapentin (NEURONTIN) 800 MG tablet Take 600 mg by mouth 4 (Four) Times a Day.     • hydrocortisone (CORTEF) 10 MG tablet TAKE 1 AND 1/2 TABLETS BY MOUTH TWICE DAILY 100 tablet 5   • hydrocortisone sodium succinate (Solu-CORTEF) 100 MG injection Inject 100 mg into the appropriate muscle  as directed by prescriber As Needed (for adrenal crisis). 100 mg 0   • levothyroxine (SYNTHROID, LEVOTHROID) 75 MCG tablet Take 1 tablet by mouth Daily. 90 tablet 3   • Morphine (MS CONTIN) 30 MG 12 hr tablet Take 30 mg by mouth 2 (Two) Times a Day.     • mupirocin (BACTROBAN) 2 % ointment APPLY TID     • oxyCODONE-acetaminophen (PERCOCET)  MG per tablet Take 1 tablet by mouth Every 6 (Six) Hours As Needed for Moderate Pain .     • tetracycline (ACHROMYCIN,SUMYCIN) 500 MG capsule Take 500 mg by mouth 2 (Two) Times a Day.     • albuterol sulfate  (90 Base) MCG/ACT inhaler Inhale 1 puff Every 4 (Four) Hours As Needed for Wheezing. 8.5 g 5   • nortriptyline (PAMELOR) 50 MG capsule Take 1 capsule by mouth 2 (Two) Times a Day. 60 capsule 0   • pindolol (VISKEN) 5 MG tablet Take 0.5 tablets by mouth 3 (Three) Times a Day. 45 tablet 5   • quinapril (ACCUPRIL) 5 MG tablet TAKE 1/2 TABLET BY MOUTH THREE TIMES DAILY (Patient taking differently: Take 5 mg by mouth Daily.) 135 tablet 1   • Wixela Inhub 250-50 MCG/DOSE DISKUS INHALE 1 PUFF BY MOUTH TWICE DAILY 60 each 1     No facility-administered medications prior to visit.       Patient Active Problem List   Diagnosis   • Adrenal insufficiency (CMS/HCC)   • Hypothyroidism   • Essential hypertension   • H/O pheochromocytoma   • Chronic bilateral low back pain without sciatica   • Encounter for chronic pain management   • Osteoporosis   • Tobacco abuse   • BMI 35.0-35.9,adult   • Bronchitis   • Moderate asthma without complication   • Paresthesia   • Medicare annual wellness visit, subsequent   • Routine medical exam       Advance Care Planning:  ACP discussion was held with the patient during this visit. Patient does not have an advance directive, information provided.    Identification of Risk Factors:  Risk factors include: Osteoporosis Risk.    Review of Systems   Constitutional: Positive for unexpected weight change. Negative for activity change, chills,  fatigue and fever.   HENT: Negative for congestion, ear pain and sinus pressure.    Eyes: Negative for pain, discharge and itching.   Respiratory: Negative for cough, chest tightness, shortness of breath and wheezing.    Cardiovascular: Negative for chest pain and palpitations.   Gastrointestinal: Negative for abdominal pain, blood in stool and constipation.   Endocrine: Negative for cold intolerance and heat intolerance.   Genitourinary: Negative for difficulty urinating and dysuria.   Musculoskeletal: Positive for back pain and gait problem.   Skin: Negative for color change and pallor.   Allergic/Immunologic: Negative for environmental allergies and food allergies.   Neurological: Positive for numbness. Negative for dizziness, speech difficulty, weakness and headaches.   Hematological: Negative for adenopathy. Does not bruise/bleed easily.   Psychiatric/Behavioral: Negative for confusion. The patient is not nervous/anxious.        Compared to one year ago, the patient feels her physical health is the same.  Compared to one year ago, the patient feels her mental health is the same.    Objective     Physical Exam  Vitals reviewed.   Constitutional:       Appearance: Normal appearance. She is well-developed and normal weight.   HENT:      Head: Normocephalic and atraumatic.      Right Ear: Hearing, tympanic membrane, ear canal and external ear normal.      Left Ear: Hearing, tympanic membrane, ear canal and external ear normal.      Nose: Nose normal.      Mouth/Throat:      Pharynx: Uvula midline.   Eyes:      General: Lids are normal.      Conjunctiva/sclera: Conjunctivae normal.      Pupils: Pupils are equal, round, and reactive to light.   Cardiovascular:      Rate and Rhythm: Normal rate and regular rhythm.      Heart sounds: Normal heart sounds.   Pulmonary:      Effort: Pulmonary effort is normal.      Breath sounds: Normal breath sounds.   Abdominal:      General: Bowel sounds are normal.      Palpations:  "Abdomen is soft.   Musculoskeletal:      Cervical back: Full passive range of motion without pain, normal range of motion and neck supple.      Thoracic back: Decreased range of motion.      Lumbar back: Tenderness present. Decreased range of motion.      Right lower leg: No edema.      Left lower leg: No edema.   Skin:     General: Skin is warm and dry.   Neurological:      General: No focal deficit present.      Mental Status: She is alert and oriented to person, place, and time. Mental status is at baseline.      Deep Tendon Reflexes: Reflexes are normal and symmetric.   Psychiatric:         Speech: Speech normal.         Behavior: Behavior normal.         Thought Content: Thought content normal.         Judgment: Judgment normal.            ECG 12 Lead    Date/Time: 9/19/2021 9:53 AM  Performed by: Alvina Lloyd PA-C  Authorized by: Alvina Lloyd PA-C   Comparison: not compared with previous ECG   Rhythm: sinus rhythm  BPM: 74    Clinical impression: normal ECG  Comments: Normal sinus rhythm              Vitals:    09/16/21 1437   BP: 130/80   BP Location: Right arm   Patient Position: Sitting   Cuff Size: Adult   Pulse: 75   Temp: 97.4 °F (36.3 °C)   TempSrc: Temporal   Weight: 75.9 kg (167 lb 6.4 oz)   Height: 151.9 cm (59.8\")   PainSc: 6  Comment: when moving       Patient's Body mass index is 32.91 kg/m². indicating that she is obese (BMI >30). Obesity-related health conditions include the following: osteoarthritis. Obesity is unchanged. BMI is is above average; BMI management plan is completed. We discussed low calorie, low carb based diet program, portion control and increasing exercise..      Assessment/Plan   Problem List Items Addressed This Visit        Cardiac and Vasculature    Essential hypertension    Overview     History of essential hypertension on Visken 5 mg tablets half tablet 3 times a day Accupril 5 mg tablet half tablet 3 times a day with blood pressure currently stable " continue therapy         Relevant Medications    pindolol (VISKEN) 5 MG tablet    quinapril (ACCUPRIL) 5 MG tablet       Endocrine and Metabolic    Adrenal insufficiency (CMS/HCC)    Overview     Continue hydrocortisone 10mg tablets daily.  Follow up with Dr. Nelson as directed.         Relevant Medications    hydrocortisone sodium succinate (Solu-CORTEF) 100 MG injection    hydrocortisone (CORTEF) 10 MG tablet    Hypothyroidism    Overview       Continue levothyroxine 75 mcg daily.  Follow up with Dr. Nelson         Relevant Medications    hydrocortisone sodium succinate (Solu-CORTEF) 100 MG injection    levothyroxine (SYNTHROID, LEVOTHROID) 75 MCG tablet    hydrocortisone (CORTEF) 10 MG tablet    pindolol (VISKEN) 5 MG tablet       Health Encounters    Medicare annual wellness visit, subsequent - Primary    Overview     Current on immunizations.  Current on screenings.  ACE III MINI 29/30.  Fall risk elevated due to chronic pain.         Routine medical exam    Overview     Weight loss with low fat, low calorie diet, now 167lbs.   Reviewed labs from rheumatology.              Musculoskeletal and Injuries    Chronic bilateral low back pain without sciatica    Overview     She was seeing Dr. Alex for pain management.   Was seeing pain management in [place] and has been driving back for her pain medications-was taking gabapentin 800mg qid,  percocet 10-325mg qid, MS Contin 30mg BID.  Pain management trying to cut her back- now taking 2400 total gabapentin, and percocet 30-325mg tid.           Osteoporosis    Overview     Will need records of recent DEXA   Has been referred to Dr. Brown.            Pulmonary and Pneumonias    Moderate asthma without complication    Overview     Continue Wixella inhaler as directed.  Continue Proair inhaler as directed.           Relevant Medications    albuterol sulfate  (90 Base) MCG/ACT inhaler    fluticasone-salmeterol (Wixela Inhub) 250-50 MCG/DOSE DISKUS        Symptoms and Signs    Paresthesia    Overview     Ongoing in [place].  She would like referral to neurology.  Reviewed recent labs from rheumatology.         Relevant Orders    Ambulatory Referral to Neurology        Patient Wellness Counseling:   Plan of care reviewed with patient at the conclusion of today's visit. Education was provided in regards to diagnosis, diet and exercise,  self breast exams, breast cancer screening, and the importance of yearly mammograms.   Nutrition,  physical activity, healthy weight,ways to reduce stress, adequate sleep, injury prevention, misuse of tobacco,  dental health, mental health, and immunizations.    Management and any prescribed or recommended OTC medications.  Patient verbalizes understanding of and agreement with management plan.      Patient Self-Management and Personalized Health Advice  The patient has been provided with information about: fall prevention and preventive services including:   · Annual Wellness Visit (AWV).    Outpatient Encounter Medications as of 9/16/2021   Medication Sig Dispense Refill   • albuterol sulfate  (90 Base) MCG/ACT inhaler Inhale 1 puff Every 4 (Four) Hours As Needed for Wheezing. 8.5 g 5   • fluticasone-salmeterol (Wixela Inhub) 250-50 MCG/DOSE DISKUS Inhale 1 puff 2 (Two) Times a Day. 60 each 1   • gabapentin (NEURONTIN) 800 MG tablet Take 600 mg by mouth 4 (Four) Times a Day.     • hydrocortisone (CORTEF) 10 MG tablet TAKE 1 AND 1/2 TABLETS BY MOUTH TWICE DAILY 100 tablet 5   • hydrocortisone sodium succinate (Solu-CORTEF) 100 MG injection Inject 100 mg into the appropriate muscle as directed by prescriber As Needed (for adrenal crisis). 100 mg 0   • levothyroxine (SYNTHROID, LEVOTHROID) 75 MCG tablet Take 1 tablet by mouth Daily. 90 tablet 3   • Morphine (MS CONTIN) 30 MG 12 hr tablet Take 30 mg by mouth 2 (Two) Times a Day.     • mupirocin (BACTROBAN) 2 % ointment APPLY TID     • nortriptyline (PAMELOR) 50 MG capsule Take 1  capsule by mouth 2 (Two) Times a Day. 60 capsule 5   • oxyCODONE-acetaminophen (PERCOCET)  MG per tablet Take 1 tablet by mouth Every 6 (Six) Hours As Needed for Moderate Pain .     • pindolol (VISKEN) 5 MG tablet Take 0.5 tablets by mouth 3 (Three) Times a Day. 45 tablet 5   • quinapril (ACCUPRIL) 5 MG tablet Take 1 tablet by mouth Daily. 30 tablet 5   • tetracycline (ACHROMYCIN,SUMYCIN) 500 MG capsule Take 500 mg by mouth 2 (Two) Times a Day.     • [DISCONTINUED] albuterol sulfate  (90 Base) MCG/ACT inhaler Inhale 1 puff Every 4 (Four) Hours As Needed for Wheezing. 8.5 g 5   • [DISCONTINUED] nortriptyline (PAMELOR) 50 MG capsule Take 1 capsule by mouth 2 (Two) Times a Day. 60 capsule 0   • [DISCONTINUED] pindolol (VISKEN) 5 MG tablet Take 0.5 tablets by mouth 3 (Three) Times a Day. 45 tablet 5   • [DISCONTINUED] quinapril (ACCUPRIL) 5 MG tablet TAKE 1/2 TABLET BY MOUTH THREE TIMES DAILY (Patient taking differently: Take 5 mg by mouth Daily.) 135 tablet 1   • [DISCONTINUED] Wixela Inhub 250-50 MCG/DOSE DISKUS INHALE 1 PUFF BY MOUTH TWICE DAILY 60 each 1     No facility-administered encounter medications on file as of 9/16/2021.       Reviewed use of high risk medication in the elderly: yes  Reviewed for potential of harmful drug interactions in the elderly: yes    Follow Up:  Return in about 6 months (around 3/16/2022) for RECHECK 30MIN.     Patient Instructions     BMI for Adults  What is BMI?  Body mass index (BMI) is a number that is calculated from a person's weight and height. BMI can help estimate how much of a person's weight is composed of fat. BMI does not measure body fat directly. Rather, it is an alternative to procedures that directly measure body fat, which can be difficult and expensive.  BMI can help identify people who may be at higher risk for certain medical problems.  What are BMI measurements used for?  BMI is used as a screening tool to identify possible weight problems. It helps  "determine whether a person is obese, overweight, a healthy weight, or underweight.  BMI is useful for:  · Identifying a weight problem that may be related to a medical condition or may increase the risk for medical problems.  · Promoting changes, such as changes in diet and exercise, to help reach a healthy weight. BMI screening can be repeated to see if these changes are working.  How is BMI calculated?  BMI involves measuring your weight in relation to your height. Both height and weight are measured, and the BMI is calculated from those numbers. This can be done either in English (U.S[place] or metric measurements. Note that charts and online BMI calculators are available to help you find your BMI quickly and easily without having to do these calculations yourself.  To calculate your BMI in English (U.S[place] measurements:    1. Measure your weight in pounds (lb).  2. Multiply the number of pounds by 703.  ? For example, for a person who weighs 180 lb, multiply that number by 703, which equals 126,540.  3. Measure your height in inches. Then multiply that number by itself to get a measurement called \"inches squared.\"  ? For example, for a person who is 70 inches tall, the \"inches squared\" measurement is 70 inches x 70 inches, which equals 4,900 inches squared.  4. Divide the total from step 2 (number of lb x 703) by the total from step 3 (inches squared): 126,540 ÷ 4,900 = 25.8. This is your BMI.    To calculate your BMI in metric measurements:  1. Measure your weight in kilograms (kg).  2. Measure your height in meters (m). Then multiply that number by itself to get a measurement called \"meters squared.\"  ? For example, for a person who is 1.75 m tall, the \"meters squared\" measurement is 1.75 m x 1.75 m, which is equal to 3.1 meters squared.  3. Divide the number of kilograms (your weight) by the meters squared number. In this example: 70 ÷ 3.1 = 22.6. This is your BMI.  What do the results mean?  BMI charts are used to " identify whether you are underweight, normal weight, overweight, or obese. The following guidelines will be used:  · Underweight: BMI less than 18.5.  · Normal weight: BMI between 18.5 and 24.9.  · Overweight: BMI between 25 and 29.9.  · Obese: BMI of 30 or above.  Keep these notes in mind:  · Weight includes both fat and muscle, so someone with a muscular build, such as an athlete, may have a BMI that is higher than 24.9. In cases like these, BMI is not an accurate measure of body fat.  · To determine if excess body fat is the cause of a BMI of 25 or higher, further assessments may need to be done by a health care provider.  · BMI is usually interpreted in the same way for men and women.  Where to find more information  For more information about BMI, including tools to quickly calculate your BMI, go to these websites:  · Centers for Disease Control and Prevention: www.cdc.gov  · American Heart Association: www.heart.org  · National Heart, Lung, and Blood [place]: www.nhlbi.nih.gov  Summary  · Body mass index (BMI) is a number that is calculated from a person's weight and height.  · BMI may help estimate how much of a person's weight is composed of fat. BMI can help identify those who may be at higher risk for certain medical problems.  · BMI can be measured using English measurements or metric measurements.  · BMI charts are used to identify whether you are underweight, normal weight, overweight, or obese.  This information is not intended to replace advice given to you by your health care provider. Make sure you discuss any questions you have with your health care provider.  Document Revised: 09/09/2020 Document Reviewed: 07/17/2020  [employer] Patient Education © 2021 [employer]      Medicare Wellness  Personal Prevention Plan of Service     Date of Office Visit:  09/16/2021  Encounter Provider:  Alvina Lloyd PA-C  Place of Service:  [facility] INTERNAL MEDICINE  Patient Name: Altagracia  Rothman  :  1950    As part of the Medicare Wellness portion of your visit today, we are providing you with this personalized preventive plan of services (PPPS). This plan is based upon recommendations of the [place] Preventive Services Task Force (USPSTF) and the Advisory Committee on Immunization Practices (ACIP).    This lists the preventive care services that should be considered, and provides dates of when you are due. Items listed as completed are up-to-date and do not require any further intervention.    Health Maintenance   Topic Date Due   • DXA SCAN  Never done   • COLORECTAL CANCER SCREENING  Never done   • Pneumococcal Vaccine 65+ (1 of 2 - PPSV23) Never done   • TDAP/TD VACCINES (1 - Tdap) Never done   • ZOSTER VACCINE (1 of 2) Never done   • MAMMOGRAM  2019   • HEPATITIS C SCREENING  Never done   • ANNUAL WELLNESS VISIT  Never done   • INFLUENZA VACCINE  10/01/2021   • COVID-19 Vaccine  Completed       Orders Placed This Encounter   Procedures   • Ambulatory Referral to Neurology     Referral Priority:   Routine     Referral Type:   Consultation     Referral Reason:   Specialty Services Required     Referred to Provider:   Carter Toure MD     Requested Specialty:   Neurology     Number of Visits Requested:   1       Return in about 6 months (around 3/16/2022).          Advance Directive    Advance directives are legal documents that let you make choices ahead of time about your health care and medical treatment in case you become unable to communicate for yourself. Advance directives are a way for you to make known your wishes to family, friends, and health care providers. This can let others know about your end-of-life care if you become unable to communicate.  Discussing and writing advance directives should happen over time rather than all at once. Advance directives can be changed depending on your situation and what you want, even after you have signed the advance  directives.  There are different types of advance directives, such as:  · Medical power of .  · Living will.  · Do not resuscitate (DNR) or do not attempt resuscitation (DNAR) order.  Health care proxy and medical power of   A health care proxy is also called a health care agent. This is a person who is appointed to make medical decisions for you in cases where you are unable to make the decisions yourself. Generally, people choose someone they know well and trust to represent their preferences. Make sure to ask this person for an agreement to act as your proxy. A proxy may have to exercise judgment in the event of a medical decision for which your wishes are not known.  A medical power of  is a legal document that names your health care proxy. Depending on the laws in your state, after the document is written, it may also need to be:  · Signed.  · Notarized.  · Dated.  · Copied.  · Witnessed.  · Incorporated into your medical record.  You may also want to appoint someone to manage your money in a situation in which you are unable to do so. This is called a durable power of  for finances. It is a separate legal document from the durable power of  for health care. You may choose the same person or someone different from your health care proxy to act as your agent in money matters.  If you do not appoint a proxy, or if there is a concern that the proxy is not acting in your best interests, a court may appoint a guardian to act on your behalf.  Living will  A living will is a set of instructions that state your wishes about medical care when you cannot express them yourself. Health care providers should keep a copy of your living will in your medical record. You may want to give a copy to family members or friends. To alert caregivers in case of an emergency, you can place a card in your wallet to let them know that you have a living will and where they can find it. A living will  is used if you become:  · Terminally ill.  · Disabled.  · Unable to communicate or make decisions.  Items to consider in your living will include:  · To use or not to use life-support equipment, such as dialysis machines and breathing machines (ventilators).  · A DNR or DNAR order. This tells health care providers not to use cardiopulmonary resuscitation (CPR) if breathing or heartbeat stops.  · To use or not to use tube feeding.  · To be given or not to be given food and fluids.  · Comfort (palliative) care when the goal becomes comfort rather than a cure.  · Donation of organs and tissues.  A living will does not give instructions for distributing your money and property if you should pass away.  DNR or DNAR  A DNR or DNAR order is a request not to have CPR in the event that your heart stops beating or you stop breathing. If a DNR or DNAR order has not been made and shared, a health care provider will try to help any patient whose heart has stopped or who has stopped breathing. If you plan to have surgery, talk with your health care provider about how your DNR or DNAR order will be followed if problems occur.  What if I do not have an advance directive?  If you do not have an advance directive, some states assign family decision makers to act on your behalf based on how closely you are related to them. Each state has its own laws about advance directives. You may want to check with your health care provider, , or state representative about the laws in your state.  Summary  · Advance directives are the legal documents that allow you to make choices ahead of time about your health care and medical treatment in case you become unable to tell others about your care.  · The process of discussing and writing advance directives should happen over time. You can change the advance directives, even after you have signed them.  · Advance directives include DNR or DNAR orders, living gracia, and designating an agent as  your medical power of .  This information is not intended to replace advice given to you by your health care provider. Make sure you discuss any questions you have with your health care provider.  Document Revised: 01/28/2021 Document Reviewed: 07/16/2020  [employer]vier Patient Education © 2021 [employer]      Fall Prevention in the Home, Adult  Falls can cause injuries. They can happen to people of all ages. There are many things you can do to make your home safe and to help prevent falls. Ask for help when making these changes, if needed.  What actions can I take to prevent falls?  General Instructions  · Use good lighting in all rooms. Replace any light bulbs that burn out.  · Turn on the lights when you go into a dark area. Use night-lights.  · Keep items that you use often in easy-to-reach places. Lower the shelves around your home if necessary.  · Set up your furniture so you have a clear path. Avoid moving your furniture around.  · Do not have throw rugs and other things on the floor that can make you trip.  · Avoid walking on wet floors.  · If any of your floors are uneven, fix them.  · Add color or contrast paint or tape to clearly whit[place] and help you see:  ? Any grab bars or handrails.  ? First and last steps of stairways.  ? Where the edge of each step is.  · If you use a stepladder:  ? Make sure that it is fully opened. Do not climb a closed stepladder.  ? Make sure that both sides of the stepladder are locked into place.  ? Ask someone to hold the stepladder for you while you use it.  · If there are any pets around you, be aware of where they are.  What can I do in the bathroom?         · Keep the floor dry. Clean up any water that spills onto the floor as soon as it happens.  · Remove soap buildup in the tub or shower regularly.  · Use non-skid mats or decals on the floor of the tub or shower.  · Attach bath mats securely with double-sided, non-slip rug tape.  · If you need to sit down in the  shower, use a plastic, non-slip stool.  · Install grab bars by the toilet and in the tub and shower. Do not use towel bars as grab bars.  What can I do in the bedroom?  · Make sure that you have a light by your bed that is easy to reach.  · Do not use any sheets or blankets that are too big for your bed. They should not hang down onto the floor.  · Have a firm chair that has side arms. You can use this for support while you get dressed.  What can I do in the kitchen?  · Clean up any spills right away.  · If you need to reach something above you, use a strong step stool that has a grab bar.  · Keep electrical cords out of the way.  · Do not use floor polish or wax that makes floors slippery. If you must use wax, use non-skid floor wax.  What can I do with my stairs?  · Do not leave any items on the stairs.  · Make sure that you have a light switch at the top of the stairs and the bottom of the stairs. If you do not have them, ask someone to add them for you.  · Make sure that there are handrails on both sides of the stairs, and use them. Fix handrails that are broken or loose. Make sure that handrails are as long as the stairways.  · Install non-slip stair treads on all stairs in your home.  · Avoid having throw rugs at the top or bottom of the stairs. If you do have throw rugs, attach them to the floor with carpet tape.  · Choose a carpet that does not hide the edge of the steps on the stairway.  · Check any carpeting to make sure that it is firmly attached to the stairs. Fix any carpet that is loose or worn.  What can I do on the outside of my home?  · Use bright outdoor lighting.  · Regularly fix the edges of walkways and driveways and fix any cracks.  · Remove anything that might make you trip as you walk through a door, such as a raised step or threshold.  · Trim any bushes or trees on the path to your home.  · Regularly check to see if handrails are loose or broken. Make sure that both sides of any steps have  handrails.  · Install guardrails along the edges of any raised decks and porches.  · Clear walking paths of anything that might make someone trip, such as tools or rocks.  · Have any leaves, snow, or ice cleared regularly.  · Use sand or salt on walking paths during winter.  · Clean up any spills in your garage right away. This includes grease or oil spills.  What other actions can I take?  · Wear shoes that:  ? Have a low heel. Do not wear high heels.  ? Have rubber bottoms.  ? Are comfortable and fit you well.  ? Are closed at the toe. Do not wear open-toe sandals.  · Use tools that help you move around (mobility aids) if they are needed. These include:  ? Canes.  ? Walkers.  ? Scooters.  ? Crutches.  · Review your medicines with your doctor. Some medicines can make you feel dizzy. This can increase your chance of falling.  Ask your doctor what other things you can do to help prevent falls.  Where to find more information  · Centers for Disease Control and Prevention, [place]: https://cdc.gov  · National [place] on Aging: https://ko1hmue.bro.nih.gov  Contact a doctor if:  · You are afraid of falling at home.  · You feel weak, drowsy, or dizzy at home.  · You fall at home.  Summary  · There are many simple things that you can do to make your home safe and to help prevent falls.  · Ways to make your home safe include removing tripping hazards and installing grab bars in the bathroom.  · Ask for help when making these changes in your home.  This information is not intended to replace advice given to you by your health care provider. Make sure you discuss any questions you have with your health care provider.  Document Revised: 04/09/2020 Document Reviewed: 08/02/2018  [employer] Patient Education © 2021 [employer]          An After Visit Summary and PPPS with all of these plans were given to the patient.

## 2021-09-19 PROBLEM — R20.2 PARESTHESIA: Status: ACTIVE | Noted: 2021-09-19

## 2021-09-19 PROBLEM — Z00.00 ROUTINE MEDICAL EXAM: Status: ACTIVE | Noted: 2021-09-19

## 2021-09-19 PROBLEM — Z00.00 MEDICARE ANNUAL WELLNESS VISIT, SUBSEQUENT: Status: ACTIVE | Noted: 2021-09-19

## 2021-09-19 PROCEDURE — 93000 ELECTROCARDIOGRAM COMPLETE: CPT | Performed by: PHYSICIAN ASSISTANT

## 2021-09-25 DIAGNOSIS — E27.40 ADRENAL INSUFFICIENCY (HCC): ICD-10-CM

## 2021-09-27 RX ORDER — HYDROCORTISONE 10 MG/1
TABLET ORAL
Qty: 100 TABLET | Refills: 5 | Status: SHIPPED | OUTPATIENT
Start: 2021-09-27 | End: 2021-12-06 | Stop reason: SDUPTHER

## 2021-10-19 ENCOUNTER — TELEPHONE (OUTPATIENT)
Dept: INTERNAL MEDICINE | Facility: CLINIC | Age: 71
End: 2021-10-19

## 2021-10-19 RX ORDER — NORTRIPTYLINE HYDROCHLORIDE 50 MG/1
50 CAPSULE ORAL 2 TIMES DAILY
Qty: 60 CAPSULE | Refills: 5 | Status: CANCELLED | OUTPATIENT
Start: 2021-10-19

## 2021-10-19 NOTE — TELEPHONE ENCOUNTER
Caller: Altagracia Rothman    Relationship: Self    Best call back number: [phone]     What was the call regarding: PATIENT STATES THAT SHE IS HAVING HARD TIMES DUE TO HER DOGS DEATH, SHE WOULD LIKE HER nortriptyline (PAMELOR) 50 MG capsule MEDICATION CHANGED TO A HIGH DOSAGE.    Do you require a callback: YES

## 2021-10-25 DIAGNOSIS — E03.9 HYPOTHYROIDISM, UNSPECIFIED TYPE: ICD-10-CM

## 2021-10-25 RX ORDER — LEVOTHYROXINE SODIUM 0.07 MG/1
75 TABLET ORAL DAILY
Qty: 90 TABLET | Refills: 0 | Status: SHIPPED | OUTPATIENT
Start: 2021-10-25 | End: 2021-12-06

## 2021-10-25 NOTE — TELEPHONE ENCOUNTER
Last seen on 5-1-20, no showed on 11-2-20 and no appt scheduled. Called the patient told her she needs appt for refills. She is scheduled for 12-6-21

## 2021-10-25 NOTE — TELEPHONE ENCOUNTER
Called patient advised per note she wants too know if there is anything else you can do 2 isn't working but 3 helps or if you can change the medication for awhile  she can't eat or sleep

## 2021-10-28 ENCOUNTER — TELEPHONE (OUTPATIENT)
Dept: INTERNAL MEDICINE | Facility: CLINIC | Age: 71
End: 2021-10-28

## 2021-10-28 DIAGNOSIS — Z20.822 CLOSE EXPOSURE TO COVID-19 VIRUS: Primary | ICD-10-CM

## 2021-10-28 RX ORDER — NORTRIPTYLINE HYDROCHLORIDE 50 MG/1
50 CAPSULE ORAL 3 TIMES DAILY
Qty: 90 CAPSULE | Refills: 0 | Status: SHIPPED | OUTPATIENT
Start: 2021-10-28 | End: 2022-03-21

## 2021-10-28 NOTE — TELEPHONE ENCOUNTER
Caller: Altagracia Rothman    Relationship to patient: Self    Best call back number: [phone]    Patient is needing: PATIENT WOULD LIKE TO KNOW HOW TO GET ANTIBODY TEST FOR COVID DONE    PLEASE ADVISE

## 2021-10-28 NOTE — TELEPHONE ENCOUNTER
Ok to change the medication for awhile.  She may take a total of 150mg daily, divided into three doses.  She will need to wean down off of this in a month.  New prescription sent.

## 2021-12-06 ENCOUNTER — TELEPHONE (OUTPATIENT)
Dept: ENDOCRINOLOGY | Facility: CLINIC | Age: 71
End: 2021-12-06

## 2021-12-06 ENCOUNTER — OFFICE VISIT (OUTPATIENT)
Dept: ENDOCRINOLOGY | Facility: CLINIC | Age: 71
End: 2021-12-06

## 2021-12-06 VITALS
OXYGEN SATURATION: 93 % | HEART RATE: 84 BPM | BODY MASS INDEX: 32.2 KG/M2 | HEIGHT: 60 IN | WEIGHT: 164 LBS | SYSTOLIC BLOOD PRESSURE: 128 MMHG | DIASTOLIC BLOOD PRESSURE: 74 MMHG

## 2021-12-06 DIAGNOSIS — E27.40 ADRENAL INSUFFICIENCY (HCC): Primary | ICD-10-CM

## 2021-12-06 DIAGNOSIS — M81.0 OSTEOPOROSIS, UNSPECIFIED OSTEOPOROSIS TYPE, UNSPECIFIED PATHOLOGICAL FRACTURE PRESENCE: ICD-10-CM

## 2021-12-06 DIAGNOSIS — D35.02 PHEOCHROMOCYTOMA OF LEFT ADRENAL GLAND: ICD-10-CM

## 2021-12-06 DIAGNOSIS — E03.9 HYPOTHYROIDISM, UNSPECIFIED TYPE: ICD-10-CM

## 2021-12-06 PROCEDURE — 99214 OFFICE O/P EST MOD 30 MIN: CPT | Performed by: INTERNAL MEDICINE

## 2021-12-06 RX ORDER — HYDROCORTISONE 10 MG/1
15 TABLET ORAL 2 TIMES DAILY
Qty: 100 TABLET | Refills: 5 | Status: SHIPPED | OUTPATIENT
Start: 2021-12-06 | End: 2022-04-29

## 2021-12-06 RX ORDER — LEVOTHYROXINE SODIUM 0.07 MG/1
75 TABLET ORAL DAILY
Qty: 90 TABLET | Refills: 3 | Status: SHIPPED | OUTPATIENT
Start: 2021-12-06 | End: 2022-06-07

## 2021-12-06 RX ORDER — GABAPENTIN 600 MG/1
800 TABLET ORAL EVERY 6 HOURS
COMMUNITY
Start: 2021-11-28 | End: 2022-08-23

## 2021-12-06 NOTE — TELEPHONE ENCOUNTER
----- Message from Heather Nelsno MD sent at 12/6/2021 12:23 PM EST -----  Can you request recent labs (TSH, BMP) and Dxa from Dr. RIO Michele's office (Rheumatology)?

## 2021-12-06 NOTE — PROGRESS NOTES
Chief Complaint   Patient presents with   • Adrenal insufficiency        HPI   Altagracia Rothman is a 71 y.o. female had concerns including Adrenal insufficiency.      Patient denies significant health changes in the interim since her last visit in May 2020 . She does report recent emotional stressors including the death of her daughter-in-law and the loss of a pet.  She does report that she utilize stress dosing of hydrocortisone in the time surrounding the death of her daughter-in-law due to hypotension which resolved with increased dose.  She denies any other need for recent stress dosing.  She is currently taking hydrocortisone 15 mg twice daily.  She reports that she has tried on multiple occasions to reduce this in the past and has developed nausea or hypotension.  Patient does report that dose of nortriptyline was increased around the time of the above events.  She did not complete repeat screening for follow-up of pheochromocytoma following last visit.  She reports that she is working to wean back down nortriptyline under the supervision of her PCP and when able will repeat screening labs.  She denies any palpitations, flushing, hypertension.  She does note that even if she had recurrence of pheochromocytoma she likely would not opt for repeat surgery.    Regarding hypothyroidism, she continues on levothyroxine 75 mcg daily. She denies missed doses of medication.  She reports that Dr. Michele checked a TSH with recent labs but states this was normal.  She reports some fatigue but denies significant weight changes, changes in bowel habits.    Patient does report that she also completed a DEXA scan once Dr. Michele's office and has been referred to the bone clinic at .    The following portions of the patient's history were reviewed and updated as appropriate: allergies, current medications and past social history.    Review of Systems   Constitutional: Positive for fatigue. Negative for unexpected weight gain and  "unexpected weight loss.   Eyes: Negative for visual disturbance.   Gastrointestinal: Negative for abdominal pain, nausea and vomiting.   Skin: Negative for color change.   Neurological: Negative for headache.   Psychiatric/Behavioral: Positive for stress.      /74 (BP Location: Right arm, Patient Position: Sitting, Cuff Size: Adult)   Pulse 84   Ht 152.4 cm (60\")   Wt 74.4 kg (164 lb)   SpO2 93%   BMI 32.03 kg/m²      Physical Exam      Constitutional:  well developed; well nourished  no acute distress  obese - Body mass index is 32.03 kg/m².   ENT/Thyroid: not examined   Eyes: Conjunctiva: clear   Respiratory:  breathing is unlabored  clear to auscultation bilaterally   Cardiovascular:  regular rate and rhythm   Chest:  Not performed.   Abdomen: soft, non-tender; no masses   : Not performed.   Musculoskeletal: Not performed   Skin: dry and warm   Neuro: mental status, speech normal   Psych: mood and affect are within normal limits       Labs/Imaging  No recent labs available, requesting interval labs/DEXA from rheumatology    Diagnoses and all orders for this visit:    1. Adrenal insufficiency (HCC) (Primary)  Currently taking hydrocortisone 15 mg twice daily  Discussed with patient's current dosing is supraphysiologic but patient reports prior to development of nausea and hypertension on multiple previous occasions when she has tried to reduce steroid dose.  She does not wish to taper at this time.  Reviewed signs and symptoms of adrenal insufficiency and adrenal crisis.  Reviewed sick day rules and stress dosing.  Discussed recommendation for wearing emergency alert bracelet/necklace.  -     hydrocortisone (CORTEF) 10 MG tablet; Take 1.5 tablets by mouth 2 (Two) Times a Day.  Dispense: 100 tablet; Refill: 5    2. Pheochromocytoma of left adrenal gland  Patient did not complete repeat screening for catecholamines and metanephrines following last visit.  She is aware of need to taper off TCA for a few " weeks prior to completion of this testing.  She is working to do this under the direction of her PCP.  Repeat lab orders were placed for both serum and urinary studies.  -     Metanephrines, Frac. Free, Plasma; Future  -     Metanephrines, Urine, 24 Hour - Urine, Clean Catch; Future  -     Cortisol, Urine, Free 24Hr - Urine, Clean Catch; Future  -     Catecholamines, Fractionated, Plasma; Future  -     Catecholamines, Fractionated, Urine, 24 Hour - Urine, Clean Catch; Future    3. Hypothyroidism, unspecified type  Currently taking levothyroxine 75 mcg daily, requesting recent labs from rheumatology.  -     levothyroxine (SYNTHROID, LEVOTHROID) 75 MCG tablet; Take 1 tablet by mouth Daily.  Dispense: 90 tablet; Refill: 3    4. 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  DEXA in 2019 with normal BMD, requesting repeat scan from rheumatology.  Patient reports that she has been referred to the bone clinic at  for management, will defer management to the clinic.       Return in about 6 months (around 6/6/2022) for Next scheduled follow up. The patient was instructed to contact the clinic with any interval questions or concerns.    Heather Nelson MD   Endocrinologist    Dictated Utilizing Dragon Dictation

## 2021-12-22 DIAGNOSIS — I10 ESSENTIAL HYPERTENSION: ICD-10-CM

## 2021-12-22 RX ORDER — QUINAPRIL 5 1/1
5 TABLET ORAL DAILY
Qty: 30 TABLET | Refills: 5 | Status: SHIPPED | OUTPATIENT
Start: 2021-12-22 | End: 2022-10-10

## 2022-01-20 NOTE — TELEPHONE ENCOUNTER
Left message and fax number to have labs faxed.   Left msg for patient callback to schedule voice eval.

## 2022-03-21 RX ORDER — NORTRIPTYLINE HYDROCHLORIDE 50 MG/1
CAPSULE ORAL
Qty: 60 CAPSULE | Refills: 1 | Status: SHIPPED | OUTPATIENT
Start: 2022-03-21 | End: 2022-05-09

## 2022-03-21 NOTE — TELEPHONE ENCOUNTER
Rx Refill Note  Requested Prescriptions     Pending Prescriptions Disp Refills   • nortriptyline (PAMELOR) 50 MG capsule [Pharmacy Med Name: NORTRIPTYLINE 50MG CAPSULES] 60 capsule      Sig: TAKE 1 CAPSULE BY MOUTH TWICE DAILY      Last office visit with prescribing clinician: 6/2/2020      Next office visit with prescribing clinician: Visit date not found            Leanna Reddy LPN  03/21/22, 08:41 EDT

## 2022-03-21 NOTE — TELEPHONE ENCOUNTER
Patient verbalized understanding. She said she has been out of it since Saturday. She complains of headache, fatigue, and depression.  Her last appt was 09/16/21.

## 2022-03-22 ENCOUNTER — TELEMEDICINE (OUTPATIENT)
Dept: INTERNAL MEDICINE | Facility: CLINIC | Age: 72
End: 2022-03-22

## 2022-03-22 DIAGNOSIS — E27.40 ADRENAL INSUFFICIENCY: ICD-10-CM

## 2022-03-22 DIAGNOSIS — Z13.220 LIPID SCREENING: ICD-10-CM

## 2022-03-22 DIAGNOSIS — L65.9 HAIR LOSS: ICD-10-CM

## 2022-03-22 DIAGNOSIS — M54.50 CHRONIC BILATERAL LOW BACK PAIN WITHOUT SCIATICA: ICD-10-CM

## 2022-03-22 DIAGNOSIS — I10 ESSENTIAL HYPERTENSION: Primary | ICD-10-CM

## 2022-03-22 DIAGNOSIS — G89.29 CHRONIC BILATERAL LOW BACK PAIN WITHOUT SCIATICA: ICD-10-CM

## 2022-03-22 DIAGNOSIS — J45.909 MODERATE ASTHMA WITHOUT COMPLICATION, UNSPECIFIED WHETHER PERSISTENT: ICD-10-CM

## 2022-03-22 DIAGNOSIS — E03.9 ACQUIRED HYPOTHYROIDISM: ICD-10-CM

## 2022-03-22 PROCEDURE — 99214 OFFICE O/P EST MOD 30 MIN: CPT | Performed by: PHYSICIAN ASSISTANT

## 2022-03-22 NOTE — PROGRESS NOTES
[facility] Internal Medicine    Altagracia Rothman  1950   [id number]      Patient Care Team:  Alvina Lloyd PA-C as PCP - General (Internal Medicine)  Heather Nelson MD as Consulting Physician (Endocrinology)  Lashawn Michele MD as Consulting Physician (Rheumatology)    Chief Complaint::   Chief Complaint   Patient presents with   • Med Refill   • Hypothyroidism   • Depression   You have chosen to receive care through a video visit. Do you consent to use a video visit for your medical care today? Yes  HPI    Altagracia Rothman is a 72 y.o. female who presents for 6-month follow-up.  She is  and lives alone.  Had several deaths this year, noted increase in depression.  She has increased her nortriptyline to 3 times a day, which she states helps.    Continues to see Dr. Lashawn Michele for management of adrenal insufficiency.  Currently taking hydrocortisone daily.    Also sees pain management, was seeing Dr. Alex, but now shuffled to different provider each time she visits.  She is currently taking gabapentin 800 mg 4 times a day, Percocet 10/3/2025 3 times a day, and extended morphine 30 mg twice daily.    She leaves her home rarely.  She is overdue for labs.  History of hypothyroidism, currently taking 75 mcg daily.  She reports hair loss over the past several months.  Would like to have this checked.   Dr. Michele is managing her with osteoporosis, has been referred to Dr. Brown.    History of asthma does take Wixela and albuterol as directed.  She does admit, however she has forgotten to take Wixela for the past couple days and is noted a productive cough.  Otherwise, she denies chest pain, headaches,      Patient Active Problem List   Diagnosis   • Adrenal insufficiency (HCC)   • Hypothyroidism   • Essential hypertension   • H/O pheochromocytoma   • Chronic bilateral low back pain without sciatica   • Encounter for chronic pain management   • Osteoporosis   • Tobacco abuse   • BMI 35.0-35.9,adult   • Bronchitis    • Moderate asthma without complication   • Paresthesia   • Medicare annual wellness visit, subsequent   • Routine medical exam   • Lipid screening   • Hair loss        Past Medical History:   Diagnosis Date   • Adrenal insufficiency (HCC)    • Fibromyalgia    • Hypertension    • Hypothyroidism    • Skin cancer        Past Surgical History:   Procedure Laterality Date   • ADRENAL GLAND SURGERY     • BREAST BIOPSY      x4   • GALLBLADDER SURGERY     • HYSTERECTOMY     • MRI ANGIOGRAM LOWER EXTREMITY UNILATERAL W CONTRAST     • TONSILLECTOMY         Family History   Problem Relation Age of Onset   • Thyroid disease Mother    • Hypertension Mother    • Heart disease Father        Social History     Socioeconomic History   • Marital status:    Tobacco Use   • Smoking status: Current Every Day Smoker     Packs/day: 0.50     Types: Cigarettes   • Smokeless tobacco: Never Used   Vaping Use   • Vaping Use: Never used   Substance and Sexual Activity   • Alcohol use: Never   • Drug use: Never   • Sexual activity: Defer       Allergies   Allergen Reactions   • Daypro [Oxaprozin] Anaphylaxis   • Fentanyl Other (See Comments)     sweating   • Iodine Swelling   • Keflex [Cephalexin] Hives   • Levaquin [Levofloxacin] Hives   • Lyrica [Pregabalin] Other (See Comments)     Sweating   • Other Swelling     Tranormin   • Penicillins Rash   • Sulfa Antibiotics Hives and Rash       Review of Systems   Constitutional: Negative for activity change, appetite change, diaphoresis, fatigue, unexpected weight gain and unexpected weight loss.   HENT: Negative for hearing loss.    Eyes: Negative for visual disturbance.   Respiratory: Positive for cough and shortness of breath. Negative for chest tightness.    Cardiovascular: Negative for chest pain, palpitations and leg swelling.   Gastrointestinal: Negative for abdominal pain, blood in stool, GERD and indigestion.   Endocrine: Negative for cold intolerance and heat intolerance.    Genitourinary: Negative for dysuria and hematuria.   Musculoskeletal: Positive for arthralgias and back pain. Negative for myalgias.   Skin: Negative for skin lesions.   Neurological: Negative for tremors, seizures, syncope, speech difficulty, weakness, headache, memory problem and confusion.   Hematological: Does not bruise/bleed easily.   Psychiatric/Behavioral: Positive for depressed mood. Negative for sleep disturbance. The patient is not nervous/anxious.         Vital Signs        Current Outpatient Medications:   •  albuterol sulfate  (90 Base) MCG/ACT inhaler, Inhale 1 puff Every 4 (Four) Hours As Needed for Wheezing., Disp: 8.5 g, Rfl: 5  •  gabapentin (NEURONTIN) 600 MG tablet, Take 600 mg by mouth Every 6 (Six) Hours., Disp: , Rfl:   •  hydrocortisone (CORTEF) 10 MG tablet, Take 1.5 tablets by mouth 2 (Two) Times a Day., Disp: 100 tablet, Rfl: 5  •  hydrocortisone sodium succinate (Solu-CORTEF) 100 MG injection, Inject 100 mg into the appropriate muscle as directed by prescriber As Needed (for adrenal crisis)., Disp: 100 mg, Rfl: 0  •  levothyroxine (SYNTHROID, LEVOTHROID) 75 MCG tablet, Take 1 tablet by mouth Daily., Disp: 90 tablet, Rfl: 3  •  Morphine (MS CONTIN) 30 MG 12 hr tablet, Take 30 mg by mouth 2 (Two) Times a Day., Disp: , Rfl:   •  mupirocin (BACTROBAN) 2 % ointment, APPLY TID, Disp: , Rfl:   •  nortriptyline (PAMELOR) 50 MG capsule, TAKE 1 CAPSULE BY MOUTH TWICE DAILY, Disp: 60 capsule, Rfl: 1  •  oxyCODONE-acetaminophen (PERCOCET)  MG per tablet, Take 1 tablet by mouth Every 8 (Eight) Hours., Disp: , Rfl:   •  pindolol (VISKEN) 5 MG tablet, Take 0.5 tablets by mouth 3 (Three) Times a Day., Disp: 45 tablet, Rfl: 5  •  quinapril (ACCUPRIL) 5 MG tablet, TAKE 1 TABLET BY MOUTH DAILY, Disp: 30 tablet, Rfl: 5  •  tetracycline (ACHROMYCIN,SUMYCIN) 500 MG capsule, Take 500 mg by mouth 2 (Two) Times a Day., Disp: , Rfl:   •  Wixela Inhub 250-50 MCG/DOSE DISKUS, INHALE 1 PUFF BY MOUTH  TWICE DAILY, Disp: 60 each, Rfl: 1    Physical Exam  Vitals and nursing note reviewed.   Constitutional:       Appearance: Normal appearance.   HENT:      Head: Normocephalic and atraumatic.      Nose: Nose normal.   Eyes:      Conjunctiva/sclera: Conjunctivae normal.      Pupils: Pupils are equal, round, and reactive to light.   Skin:     Coloration: Skin is not jaundiced or pale.   Neurological:      General: No focal deficit present.      Mental Status: Mental status is at baseline.   Psychiatric:         Mood and Affect: Mood normal.         Behavior: Behavior normal.         Thought Content: Thought content normal.         Judgment: Judgment normal.          ACE III MINI            Results Review:    No results found for this or any previous visit (from the past 672 hour(s)).  Procedures    Medication Review: Medications reviewed and noted    Social History     Socioeconomic History   • Marital status:    Tobacco Use   • Smoking status: Current Every Day Smoker     Packs/day: 0.50     Types: Cigarettes   • Smokeless tobacco: Never Used   Vaping Use   • Vaping Use: Never used   Substance and Sexual Activity   • Alcohol use: Never   • Drug use: Never   • Sexual activity: Defer        Assessment/Plan:    Problem List Items Addressed This Visit        Cardiac and Vasculature    Essential hypertension - Primary    Overview     History of essential hypertension on Visken 5 mg tablets half tablet 3 times a day Accupril 5 mg tablet half tablet 3 times a day with blood pressure currently stable continue therapy           Relevant Medications    pindolol (VISKEN) 5 MG tablet    quinapril (ACCUPRIL) 5 MG tablet    Other Relevant Orders    CBC & Differential    Comprehensive Metabolic Panel    Lipid screening    Relevant Orders    Lipid Panel    MicroAlbumin, Urine, Random - Urine, Clean Catch       Endocrine and Metabolic    Adrenal insufficiency (HCC)    Overview     Continue hydrocortisone 10mg tablets  daily.  Follow up with Dr. Nelson as directed.           Relevant Medications    hydrocortisone sodium succinate (Solu-CORTEF) 100 MG injection    hydrocortisone (CORTEF) 10 MG tablet    Hypothyroidism    Overview       Continue levothyroxine 75 mcg daily.  Follow up with Dr. Nelson           Relevant Medications    hydrocortisone sodium succinate (Solu-CORTEF) 100 MG injection    pindolol (VISKEN) 5 MG tablet    hydrocortisone (CORTEF) 10 MG tablet    levothyroxine (SYNTHROID, LEVOTHROID) 75 MCG tablet    Other Relevant Orders    TSH    T4, Free    T3, Free       Musculoskeletal and Injuries    Chronic bilateral low back pain without sciatica    Overview     She is seeing Dr. Alex for pain management. Sees whoever is in the clinic-  Was seeing pain management in [place] and has been driving back for her pain medications-was taking gabapentin 800mg qid,  percocet 10-325mg tid, MS Contin 30mg BID.                   Pulmonary and Pneumonias    Moderate asthma without complication    Overview     Continue Wixella inhaler as directed.  Continue Proair inhaler as directed.             Relevant Medications    albuterol sulfate  (90 Base) MCG/ACT inhaler    Wixela Inhub 250-50 MCG/DOSE DISKUS       Skin    Hair loss    Overview     Check labs.           Relevant Orders    Vitamin B12         6 month follow up IN THE OFFICE FOR MEDICARE WELLNESS    There are no Patient Instructions on file for this visit.     Plan of care reviewed with patient at the conclusion of today's visit. Education was provided regarding diagnosis, management, and any prescribed or recommended OTC medications.Patient verbalizes understanding of and agreement with management plan.       This visit has been rescheduled as a video visit to comply with patient safety concerns in accordance with CDC recommendations. Total time of discussion was 43 minutes.    I spent 43 minutes caring for Altagracia on this date of service. This time includes time  spent by me in the following activities:preparing for the visit, reviewing tests, obtaining and/or reviewing a separately obtained history, performing a medically appropriate examination and/or evaluation , counseling and educating the patient/family/caregiver, ordering medications, tests, or procedures, referring and communicating with other health care professionals  and documenting information in the medical record    Alvina Lloyd PA-C      Note: Part of this note may be an electronic transcription/translation of spoken language to printed text using the Dragon Dictation system.

## 2022-04-29 DIAGNOSIS — E27.40 ADRENAL INSUFFICIENCY: ICD-10-CM

## 2022-04-29 RX ORDER — HYDROCORTISONE 10 MG/1
TABLET ORAL
Qty: 135 TABLET | Refills: 0 | Status: SHIPPED | OUTPATIENT
Start: 2022-04-29 | End: 2022-05-31

## 2022-05-02 ENCOUNTER — TELEPHONE (OUTPATIENT)
Dept: INTERNAL MEDICINE | Facility: CLINIC | Age: 72
End: 2022-05-02

## 2022-05-02 DIAGNOSIS — T63.301A SPIDER BITE WOUND, ACCIDENTAL OR UNINTENTIONAL, INITIAL ENCOUNTER: Primary | ICD-10-CM

## 2022-05-02 NOTE — TELEPHONE ENCOUNTER
Appfadia made tomorrow at 4:15pm. She is going to go complete her blood work you ordered prior to her appt. She wanted to make sure there is nothing else you wanted to add to the existing order?

## 2022-05-02 NOTE — TELEPHONE ENCOUNTER
Caller: Altagracia Rothman    Relationship to patient: Self    Best call back number: [phone]    Patient is needing: PATIENT STATED THAT SHE HAD SPIDER BITE ABOUT 2 WEEKS AGO AND HAS BEEN VERY SICK BECAUSE OF THIS ISSUE    PATIENT STATED THAT SHE HAS BEEN HAVING SOME PAIN IN HER JAW AND DOES NOT KNOW IF THIS IS FROM THE BITE OR BROKEN TOOTH AND ALSO HAS BEEN HAVING SOME SWELLING IN FEET AND LEGS NOT DUE TO THE SPIDER BITE BUT WANTED TO SEE WHAT SHE WOULD NEED TO DO FOR ALL THESE ISSUES GOING ON     PLEASE ADVISE

## 2022-05-03 ENCOUNTER — OFFICE VISIT (OUTPATIENT)
Dept: INTERNAL MEDICINE | Facility: CLINIC | Age: 72
End: 2022-05-03

## 2022-05-03 VITALS
HEART RATE: 73 BPM | TEMPERATURE: 97.3 F | WEIGHT: 186 LBS | HEIGHT: 60 IN | SYSTOLIC BLOOD PRESSURE: 148 MMHG | BODY MASS INDEX: 36.52 KG/M2 | DIASTOLIC BLOOD PRESSURE: 92 MMHG

## 2022-05-03 DIAGNOSIS — R63.5 WEIGHT GAIN: ICD-10-CM

## 2022-05-03 DIAGNOSIS — M81.0 OSTEOPOROSIS, UNSPECIFIED OSTEOPOROSIS TYPE, UNSPECIFIED PATHOLOGICAL FRACTURE PRESENCE: ICD-10-CM

## 2022-05-03 DIAGNOSIS — E27.40 ADRENAL INSUFFICIENCY: ICD-10-CM

## 2022-05-03 DIAGNOSIS — M27.8 MAXILLARY BONE LOSS: ICD-10-CM

## 2022-05-03 DIAGNOSIS — Z13.21 ENCOUNTER FOR VITAMIN DEFICIENCY SCREENING: ICD-10-CM

## 2022-05-03 DIAGNOSIS — R60.0 LOCALIZED EDEMA: ICD-10-CM

## 2022-05-03 DIAGNOSIS — E03.9 ACQUIRED HYPOTHYROIDISM: ICD-10-CM

## 2022-05-03 DIAGNOSIS — K08.409 HISTORY OF TOOTH EXTRACTION, UNSPECIFIED EDENTULISM CLASS: ICD-10-CM

## 2022-05-03 DIAGNOSIS — K08.89 PAIN, DENTAL: ICD-10-CM

## 2022-05-03 DIAGNOSIS — R73.09 ABNORMAL GLUCOSE: ICD-10-CM

## 2022-05-03 DIAGNOSIS — I10 ESSENTIAL HYPERTENSION: Primary | ICD-10-CM

## 2022-05-03 PROCEDURE — 99214 OFFICE O/P EST MOD 30 MIN: CPT | Performed by: PHYSICIAN ASSISTANT

## 2022-05-03 NOTE — PROGRESS NOTES
"[facility] Internal Medicine    Altagracia Rothman  1950   [id number]      Patient Care Team:  Alvina Lloyd PA-C as PCP - General (Internal Medicine)  Heather Nelson MD as Consulting Physician (Endocrinology)  Lashawn Michele MD as Consulting Physician (Rheumatology)    Chief Complaint::   Chief Complaint   Patient presents with   • Jaw Pain      HPI  Altagracia Rothman is a 72-year-old female who presents today for evaluation of jaw pain. She consents to having a student present today.    The patient states that she was in to see me just before her dog and daughter-in-law . She reports they  within 3 weeks of each other. Her daughter-in-law  of ovarian cancer. She confirms that it has been a lot for her to deal with.    The patient recalls that we had a visit during which I confirmed that she could increase her nortriptyline 50 mg to three times a day, but her prescription has not yet been changed. She reports that we discussed switching her to Cymbalta, but she had tried that before and it increased her blood pressure.    The patient reports that she has a molar on the left that has been dead since she lived in [place]. She states that her jaw bone was dying and they tested it and discontinued her \"bone medication.\" She states that she was on Actonel for 12 years and her bone densities were good, but someone switched her to Fosamax and that is when she began having jaw and tooth problems. She states that the remaining molar she has on the left broke recently. The patient reports that she takes tetracycline for her arthritis because she cannot take arthritis medication. She ran out of tetracycline on 2022, and two days later her jaw got really sore. The patient states that she resumed her tetracycline on 2022, and her jaw does not hurt anymore.    The patient states that she was bitten by a spider on her left foot on 2022 and she is still itching because of it. She confirms that she saw " the spider. She also states she needs a tetanus update because of the bite. She reports that by 04/23/2022 she was really sick. The patient states that she had pain and burning, body aches, fever, and chills. She reports that she does not have an adrenal gland, and she was told to take extra hydrocortisone 5 mg if she gets bitten. The patient reports that she cannot take clindamycin because it causes her to bloat. She states it took 2 months for the bloating to subside.     The patient states she is taking her blood pressure medicine, and does not skip doses unless her systolic is under 100 mmHg. She confirms she is taking gabapentin 800 mg 4 times a day, MS Contin 30 mg twice daily, nortriptyline 50 mg 3 times daily, oxycodone-acetaminophen  mg three times daily, Hydrocortisone 7.5 mg twice daily with a little extra if she has inflammation.    The patient states that she was told as a child that her hand get red because she has thin skin.    The patient is aware that she has gained 20 pounds. She states that she believes some of it is her swelling.     The patient denies shortness of breath, or chest pain.    The patient admits that she is smoking again after quitting for a year.    The patient states that she was on Aldactone for 20 years.      Patient Active Problem List   Diagnosis   • Adrenal insufficiency (HCC)   • Hypothyroidism   • Essential hypertension   • H/O pheochromocytoma   • Chronic bilateral low back pain without sciatica   • Encounter for chronic pain management   • Osteoporosis   • Tobacco abuse   • BMI 35.0-35.9,adult   • Bronchitis   • Moderate asthma without complication   • Paresthesia   • Medicare annual wellness visit, subsequent   • Routine medical exam   • Lipid screening   • Hair loss        Past Medical History:   Diagnosis Date   • Adrenal insufficiency (HCC)    • Fibromyalgia    • Hypertension    • Hypothyroidism    • Skin cancer        Past Surgical History:   Procedure  "Laterality Date   • ADRENAL GLAND SURGERY     • BREAST BIOPSY      x4   • GALLBLADDER SURGERY     • HYSTERECTOMY     • MRI ANGIOGRAM LOWER EXTREMITY UNILATERAL W CONTRAST     • TONSILLECTOMY         Family History   Problem Relation Age of Onset   • Thyroid disease Mother    • Hypertension Mother    • Heart disease Father        Social History     Socioeconomic History   • Marital status:    Tobacco Use   • Smoking status: Current Every Day Smoker     Packs/day: 0.50     Types: Cigarettes   • Smokeless tobacco: Never Used   Vaping Use   • Vaping Use: Never used   Substance and Sexual Activity   • Alcohol use: Never   • Drug use: Never   • Sexual activity: Defer       Allergies   Allergen Reactions   • Daypro [Oxaprozin] Anaphylaxis   • Fentanyl Other (See Comments)     sweating   • Iodine Swelling   • Keflex [Cephalexin] Hives   • Levaquin [Levofloxacin] Hives   • Lyrica [Pregabalin] Other (See Comments)     Sweating   • Other Swelling     Tranormin   • Penicillins Rash   • Sulfa Antibiotics Hives and Rash       Review of Systems   Constitutional: Positive for chills, fever and unexpected weight gain. Negative for activity change.   HENT: Positive for dental problem.    Respiratory: Negative for cough and shortness of breath.    Cardiovascular: Negative for chest pain.   Gastrointestinal: Negative for constipation, diarrhea, nausea and vomiting.   Musculoskeletal: Positive for myalgias and neck pain. Negative for arthralgias.   Skin: Negative for color change and rash.   Neurological: Negative for weakness.   Hematological: Does not bruise/bleed easily.   Psychiatric/Behavioral: Positive for stress.        Vital Signs  Vitals:    05/03/22 1623   BP: 148/92   Pulse: 73   Temp: 97.3 °F (36.3 °C)   Weight: 84.4 kg (186 lb)   Height: 152.4 cm (60\")     Body mass index is 36.33 kg/m².  Class 2 Severe Obesity (BMI >=35 and <=39.9). Obesity-related health conditions include the following: hypertension. Obesity is " worsening. BMI is is above average; BMI management plan is completed. We discussed low calorie, low carb based diet program, portion control and increasing exercise.     Advance Care Planning   ACP discussion was held with the patient during this visit. Patient does not have an advance directive, information provided.       Current Outpatient Medications:   •  albuterol sulfate  (90 Base) MCG/ACT inhaler, Inhale 1 puff Every 4 (Four) Hours As Needed for Wheezing., Disp: 8.5 g, Rfl: 5  •  gabapentin (NEURONTIN) 600 MG tablet, Take 800 mg by mouth Every 6 (Six) Hours. 800 qid, Disp: , Rfl:   •  hydrocortisone (CORTEF) 10 MG tablet, TAKE 1 AND 1/2 TABLETS BY MOUTH TWICE DAILY, Disp: 135 tablet, Rfl: 0  •  hydrocortisone sodium succinate (Solu-CORTEF) 100 MG injection, Inject 100 mg into the appropriate muscle as directed by prescriber As Needed (for adrenal crisis)., Disp: 100 mg, Rfl: 0  •  levothyroxine (SYNTHROID, LEVOTHROID) 75 MCG tablet, Take 1 tablet by mouth Daily., Disp: 90 tablet, Rfl: 3  •  Morphine (MS CONTIN) 30 MG 12 hr tablet, Take 30 mg by mouth 2 (Two) Times a Day., Disp: , Rfl:   •  mupirocin (BACTROBAN) 2 % ointment, APPLY TID, Disp: , Rfl:   •  nortriptyline (PAMELOR) 50 MG capsule, TAKE 1 CAPSULE BY MOUTH TWICE DAILY, Disp: 60 capsule, Rfl: 1  •  oxyCODONE-acetaminophen (PERCOCET)  MG per tablet, Take 1 tablet by mouth Every 8 (Eight) Hours., Disp: , Rfl:   •  pindolol (VISKEN) 5 MG tablet, Take 0.5 tablets by mouth 3 (Three) Times a Day., Disp: 45 tablet, Rfl: 5  •  quinapril (ACCUPRIL) 5 MG tablet, TAKE 1 TABLET BY MOUTH DAILY, Disp: 30 tablet, Rfl: 5  •  tetracycline (ACHROMYCIN,SUMYCIN) 500 MG capsule, Take 500 mg by mouth 2 (Two) Times a Day., Disp: , Rfl:   •  Wixela Inhub 250-50 MCG/DOSE DISKUS, INHALE 1 PUFF BY MOUTH TWICE DAILY, Disp: 60 each, Rfl: 1    Physical Exam  Vitals and nursing note reviewed.   Constitutional:       General: She is not in acute distress.      Appearance: She is well-developed. She is obese. She is not diaphoretic.   HENT:      Head: Normocephalic and atraumatic.      Nose: Nose normal.   Eyes:      General:         Right eye: No discharge.         Left eye: No discharge.      Conjunctiva/sclera: Conjunctivae normal.   Neck:      Thyroid: No thyromegaly.      Trachea: No tracheal deviation.   Cardiovascular:      Rate and Rhythm: Normal rate and regular rhythm.      Heart sounds: Normal heart sounds.      Comments: Bilateral pedal pitting edema, left greater than right.  Blood pressure 132/81 mmHg.   Pulmonary:      Effort: Pulmonary effort is normal. No respiratory distress.      Breath sounds: Normal breath sounds. No stridor. No wheezing.   Chest:      Chest wall: No tenderness.   Abdominal:      General: Bowel sounds are normal. There is no distension.      Palpations: Abdomen is soft.      Tenderness: There is no abdominal tenderness.   Musculoskeletal:         General: Normal range of motion.      Cervical back: Normal range of motion.   Lymphadenopathy:      Cervical: No cervical adenopathy.   Skin:     General: Skin is warm and dry.      Comments: Hands are erythematous.  Bite whitley noted to left foot.   Neurological:      Mental Status: She is alert and oriented to person, place, and time.      Motor: No abnormal muscle tone.      Coordination: Coordination normal.   Psychiatric:         Behavior: Behavior normal.         Thought Content: Thought content normal.         Judgment: Judgment normal.          ACE III MINI            Results Review:    Recent Results (from the past 672 hour(s))   VITAMIN D 25 HYDROXY    Collection Time: 04/11/22  9:44 AM    Specimen: Blood, Venous Line   Result Value Ref Range    25 Hydroxy, Vitamin D 56.0 20.0 - 80.0 ng/mL   Renal Function Panel, Plasma    Collection Time: 04/11/22  9:46 AM    Specimen: Blood, Venous Line   Result Value Ref Range    Glucose 96 74 - 99 mg/dL    BUN 23 8 - 23 mg/dL    Creatinine 0.79  0.60 - 1.10 mg/dL    BUN/Creatinine Ratio 29     Sodium 140 136 - 145 mmol/L    Potassium 5.3 (H) 3.7 - 4.8 mmol/L    Chloride 102 97 - 107 mmol/L    Total CO2 32 (H) 22 - 29 mmol/L    Anion Gap 6 6 - 16 mmol/L    Calcium 9.0 8.9 - 10.2 mg/dL    Phosphorus 3.9 2.5 - 4.5 mg/dL    Albumin 3.8 3.5 - 5.2 g/dL    eGFR Non African Am >60 >60 mL/min/1.73m*2    eGFR African Am >60 >60 mL/min/1.73m*2   Comprehensive Metabolic Panel    Collection Time: 05/05/22 10:37 AM    Specimen: Blood   Result Value Ref Range    Glucose 94 65 - 99 mg/dL    BUN 22 8 - 23 mg/dL    Creatinine 0.75 0.57 - 1.00 mg/dL    Sodium 140 136 - 145 mmol/L    Potassium 4.8 3.5 - 5.2 mmol/L    Chloride 100 98 - 107 mmol/L    CO2 28.7 22.0 - 29.0 mmol/L    Calcium 8.8 8.6 - 10.5 mg/dL    Total Protein 6.6 6.0 - 8.5 g/dL    Albumin 3.50 3.50 - 5.20 g/dL    ALT (SGPT) 27 1 - 33 U/L    AST (SGOT) 21 1 - 32 U/L    Alkaline Phosphatase 144 (H) 39 - 117 U/L    Total Bilirubin 0.2 0.0 - 1.2 mg/dL    Globulin 3.1 gm/dL    A/G Ratio 1.1 g/dL    BUN/Creatinine Ratio 29.3 (H) 7.0 - 25.0    Anion Gap 11.3 5.0 - 15.0 mmol/L    eGFR 84.2 >60.0 mL/min/1.73   TSH    Collection Time: 05/05/22 10:37 AM    Specimen: Blood   Result Value Ref Range    TSH 4.540 (H) 0.270 - 4.200 uIU/mL   T4, Free    Collection Time: 05/05/22 10:37 AM    Specimen: Blood   Result Value Ref Range    Free T4 1.12 0.93 - 1.70 ng/dL   T3, Free    Collection Time: 05/05/22 10:37 AM    Specimen: Blood   Result Value Ref Range    T3, Free 3.24 2.00 - 4.40 pg/mL   Lipid Panel    Collection Time: 05/05/22 10:37 AM    Specimen: Blood   Result Value Ref Range    Total Cholesterol 204 (H) 0 - 200 mg/dL    Triglycerides 170 (H) 0 - 150 mg/dL    HDL Cholesterol 90 (H) 40 - 60 mg/dL    LDL Cholesterol  86 0 - 100 mg/dL    VLDL Cholesterol 28 5 - 40 mg/dL    LDL/HDL Ratio 0.89    MicroAlbumin, Urine, Random - Urine, Clean Catch    Collection Time: 05/05/22 10:37 AM    Specimen: Urine, Clean Catch   Result  Value Ref Range    Microalbumin, Urine <1.2 mg/dL   Vitamin B12    Collection Time: 05/05/22 10:37 AM    Specimen: Blood   Result Value Ref Range    Vitamin B-12 428 211 - 946 pg/mL   Hemoglobin A1c    Collection Time: 05/05/22 10:37 AM    Specimen: Blood   Result Value Ref Range    Hemoglobin A1C 5.60 4.80 - 5.60 %   Vitamin D 25 Hydroxy    Collection Time: 05/05/22 10:37 AM    Specimen: Blood   Result Value Ref Range    25 Hydroxy, Vitamin D 59.8 30.0 - 100.0 ng/ml   CBC Auto Differential    Collection Time: 05/05/22 10:37 AM    Specimen: Blood   Result Value Ref Range    WBC 8.28 3.40 - 10.80 10*3/mm3    RBC 4.26 3.77 - 5.28 10*6/mm3    Hemoglobin 14.0 12.0 - 15.9 g/dL    Hematocrit 41.7 34.0 - 46.6 %    MCV 97.9 (H) 79.0 - 97.0 fL    MCH 32.9 26.6 - 33.0 pg    MCHC 33.6 31.5 - 35.7 g/dL    RDW 12.6 12.3 - 15.4 %    RDW-SD 44.3 37.0 - 54.0 fl    MPV 11.3 6.0 - 12.0 fL    Platelets 297 140 - 450 10*3/mm3    Neutrophil % 73.1 42.7 - 76.0 %    Lymphocyte % 17.9 (L) 19.6 - 45.3 %    Monocyte % 6.9 5.0 - 12.0 %    Eosinophil % 1.2 0.3 - 6.2 %    Basophil % 0.5 0.0 - 1.5 %    Immature Grans % 0.4 0.0 - 0.5 %    Neutrophils, Absolute 6.06 1.70 - 7.00 10*3/mm3    Lymphocytes, Absolute 1.48 0.70 - 3.10 10*3/mm3    Monocytes, Absolute 0.57 0.10 - 0.90 10*3/mm3    Eosinophils, Absolute 0.10 0.00 - 0.40 10*3/mm3    Basophils, Absolute 0.04 0.00 - 0.20 10*3/mm3    Immature Grans, Absolute 0.03 0.00 - 0.05 10*3/mm3    nRBC 0.0 0.0 - 0.2 /100 WBC     Procedures    Medication Review: Medications reviewed and noted    Social History     Socioeconomic History   • Marital status:    Tobacco Use   • Smoking status: Current Every Day Smoker     Packs/day: 0.50     Types: Cigarettes   • Smokeless tobacco: Never Used   Vaping Use   • Vaping Use: Never used   Substance and Sexual Activity   • Alcohol use: Never   • Drug use: Never   • Sexual activity: Defer        Assessment/Plan:    Problem List Items Addressed This Visit         Cardiac and Vasculature    Essential hypertension - Primary    Overview     History of essential hypertension on Visken 5 mg tablets half tablet 3 times a day Accupril 5 mg tablet half tablet 3 times a day with blood pressure currently stable continue therapy           Relevant Medications    pindolol (VISKEN) 5 MG tablet    quinapril (ACCUPRIL) 5 MG tablet    Other Relevant Orders    Comprehensive Metabolic Panel    T3, Free       Endocrine and Metabolic    Adrenal insufficiency (HCC)    Overview     Continue hydrocortisone 10mg tablets daily.  Follow up with Dr. Nelson as directed.           Relevant Medications    hydrocortisone sodium succinate (Solu-CORTEF) 100 MG injection    hydrocortisone (CORTEF) 10 MG tablet    Other Relevant Orders    T3, Free    Hypothyroidism    Overview       Continue levothyroxine 75 mcg daily.  Follow up with Dr. Nelson           Relevant Medications    hydrocortisone sodium succinate (Solu-CORTEF) 100 MG injection    pindolol (VISKEN) 5 MG tablet    levothyroxine (SYNTHROID, LEVOTHROID) 75 MCG tablet    hydrocortisone (CORTEF) 10 MG tablet    Other Relevant Orders    TSH    T4, Free    T3, Free       Musculoskeletal and Injuries    Osteoporosis    Overview     Will need records of recent DEXA   Has been referred to Dr. Brown.           Relevant Orders    T3, Free    Vitamin D 25 Hydroxy (Completed)      Other Visit Diagnoses     Localized edema        - CMP ordered today.  - Consider adding a diuretic after reviewing lab results.  - Patient to prop feet up when sitting.    Relevant Orders    CBC & Differential    Comprehensive Metabolic Panel    T3, Free    Encounter for vitamin deficiency screening        - Vitamin B12, Vitamin D 25 Hydroxy labs ordered today.    Relevant Orders    T3, Free    Vitamin B12    Weight gain        - CBC and CMP ordered today.    Relevant Orders    T3, Free    Abnormal glucose        - HbA1c ordered today.    Relevant Orders    Hemoglobin A1c  "(Completed)    T3, Free    Pain, dental        - Referral to oral surgery.         The importance of having in-person visits was stressed with the patient.    Return for follow-up in 2 months.      Patient Instructions   Critical care medicine: Principles of diagnosis and management in the adult (4th ed., pp. 6291-1610). Ch.\"> Cooper's anesthesia (8th ed., pp. 232-250). Ch.\">   Advance Directive    Advance directives are legal documents that allow you to make decisions about your health care and medical treatment in case you become unable to communicate for yourself. Advance directives let your wishes be known to family, friends, and health care providers.  Discussing and writing advance directives should happen over time rather than all at once. Advance directives can be changed and updated at any time. There are different types of advance directives, such as:  · Medical power of .  · Living will.  · Do not resuscitate (DNR) order or do not attempt resuscitation (DNAR) order.  Health care proxy and medical power of   A health care proxy is also called a health care agent. This person is appointed to make medical decisions for you when you are unable to make decisions for yourself. Generally, people ask a trusted friend or family member to act as their proxy and represent their preferences. Make sure you have an agreement with your trusted person to act as your proxy. A proxy may have to make a medical decision on your behalf if your wishes are not known.  A medical power of , also called a durable power of  for health care, is a legal document that names your health care proxy. Depending on the laws in your state, the document may need to be:  · Signed.  · Notarized.  · Dated.  · Copied.  · Witnessed.  · Incorporated into your medical record.  You may also want to appoint a trusted person to manage your money in the event you are unable to do so. This is called a durable " power of  for finances. It is a separate legal document from the durable power of  for health care. You may choose your health care proxy or someone different to act as your agent in money matters.  If you do not appoint a proxy, or there is a concern that the proxy is not acting in your best interest, a court may appoint a guardian to act on your behalf.  Living will  A living will is a set of instructions that state your wishes about medical care when you cannot express them yourself. Health care providers should keep a copy of your living will in your medical record. You may want to give a copy to family members or friends. To alert caregivers in case of an emergency, you can place a card in your wallet to let them know that you have a living will and where they can find it. A living will is used if you become:  · Terminally ill.  · Disabled.  · Unable to communicate or make decisions.  The following decisions should be included in your living will:  · To use or not to use life support equipment, such as dialysis machines and breathing machines (ventilators).  · Whether you want a DNR or DNAR order. This tells health care providers not to use cardiopulmonary resuscitation (CPR) if breathing or heartbeat stops.  · To use or not to use tube feeding.  · To be given or not to be given food and fluids.  · Whether you want comfort (palliative) care when the goal becomes comfort rather than a cure.  · Whether you want to donate your organs and tissues.  A living will does not give instructions for distributing your money and property if you should pass away.  DNR or DNAR  A DNR or DNAR order is a request not to have CPR in the event that your heart stops beating or you stop breathing. If a DNR or DNAR order has not been made and shared, a health care provider will try to help any patient whose heart has stopped or who has stopped breathing. If you plan to have surgery, talk with your health care provider  about how your DNR or DNAR order will be followed if problems occur.  What if I do not have an advance directive?  Some states assign family decision makers to act on your behalf if you do not have an advance directive. Each state has its own laws about advance directives. You may want to check with your health care provider, , or state representative about the laws in your state.  Summary  · Advance directives are legal documents that allow you to make decisions about your health care and medical treatment in case you become unable to communicate for yourself.  · The process of discussing and writing advance directives should happen over time. You can change and update advance directives at any time.  · Advance directives may include a medical power of , a living will, and a DNR or DNAR order.  This information is not intended to replace advice given to you by your health care provider. Make sure you discuss any questions you have with your health care provider.  Document Revised: 09/21/2021 Document Reviewed: 09/21/2021  [employer] Patient Education © 2021 [employer]         Plan of care reviewed with patient at the conclusion of today's visit. Education was provided regarding diagnosis, management, and any prescribed or recommended OTC medications.Patient verbalizes understanding of and agreement with management plan.       I spent 33 minutes caring for Altagracia on this date of service. This time includes time spent by me in the following activities:preparing for the visit, reviewing tests, obtaining and/or reviewing a separately obtained history, performing a medically appropriate examination and/or evaluation , counseling and educating the patient/family/caregiver, ordering medications, tests, or procedures and referring and communicating with other health care professionals       Note: Part of this note may be an electronic transcription/translation of spoken language to printed text using the  Dragon Dictation system.      Transcribed from ambient dictation for Alvina Lloyd PA-C by Fatou White.  05/03/22   19:36 EDT    Patient verbalized consent to the visit recording.

## 2022-05-05 ENCOUNTER — LAB (OUTPATIENT)
Dept: LAB | Facility: HOSPITAL | Age: 72
End: 2022-05-05

## 2022-05-05 DIAGNOSIS — I10 ESSENTIAL HYPERTENSION: ICD-10-CM

## 2022-05-05 DIAGNOSIS — L65.9 HAIR LOSS: ICD-10-CM

## 2022-05-05 DIAGNOSIS — Z13.220 LIPID SCREENING: ICD-10-CM

## 2022-05-05 DIAGNOSIS — R60.0 LOCALIZED EDEMA: ICD-10-CM

## 2022-05-05 DIAGNOSIS — E03.9 ACQUIRED HYPOTHYROIDISM: ICD-10-CM

## 2022-05-05 DIAGNOSIS — T63.301A SPIDER BITE WOUND, ACCIDENTAL OR UNINTENTIONAL, INITIAL ENCOUNTER: ICD-10-CM

## 2022-05-05 DIAGNOSIS — R63.5 WEIGHT GAIN: ICD-10-CM

## 2022-05-05 DIAGNOSIS — R73.09 ABNORMAL GLUCOSE: ICD-10-CM

## 2022-05-05 DIAGNOSIS — E27.40 ADRENAL INSUFFICIENCY: ICD-10-CM

## 2022-05-05 DIAGNOSIS — Z13.21 ENCOUNTER FOR VITAMIN DEFICIENCY SCREENING: ICD-10-CM

## 2022-05-05 DIAGNOSIS — M81.0 OSTEOPOROSIS, UNSPECIFIED OSTEOPOROSIS TYPE, UNSPECIFIED PATHOLOGICAL FRACTURE PRESENCE: ICD-10-CM

## 2022-05-05 LAB
25(OH)D3 SERPL-MCNC: 59.8 NG/ML (ref 30–100)
ALBUMIN SERPL-MCNC: 3.5 G/DL (ref 3.5–5.2)
ALBUMIN UR-MCNC: <1.2 MG/DL
ALBUMIN/GLOB SERPL: 1.1 G/DL
ALP SERPL-CCNC: 144 U/L (ref 39–117)
ALT SERPL W P-5'-P-CCNC: 27 U/L (ref 1–33)
ANION GAP SERPL CALCULATED.3IONS-SCNC: 11.3 MMOL/L (ref 5–15)
AST SERPL-CCNC: 21 U/L (ref 1–32)
BASOPHILS # BLD AUTO: 0.04 10*3/MM3 (ref 0–0.2)
BASOPHILS NFR BLD AUTO: 0.5 % (ref 0–1.5)
BILIRUB SERPL-MCNC: 0.2 MG/DL (ref 0–1.2)
BUN SERPL-MCNC: 22 MG/DL (ref 8–23)
BUN/CREAT SERPL: 29.3 (ref 7–25)
CALCIUM SPEC-SCNC: 8.8 MG/DL (ref 8.6–10.5)
CHLORIDE SERPL-SCNC: 100 MMOL/L (ref 98–107)
CHOLEST SERPL-MCNC: 204 MG/DL (ref 0–200)
CO2 SERPL-SCNC: 28.7 MMOL/L (ref 22–29)
CREAT SERPL-MCNC: 0.75 MG/DL (ref 0.57–1)
DEPRECATED RDW RBC AUTO: 44.3 FL (ref 37–54)
EGFRCR SERPLBLD CKD-EPI 2021: 84.2 ML/MIN/1.73
EOSINOPHIL # BLD AUTO: 0.1 10*3/MM3 (ref 0–0.4)
EOSINOPHIL NFR BLD AUTO: 1.2 % (ref 0.3–6.2)
ERYTHROCYTE [DISTWIDTH] IN BLOOD BY AUTOMATED COUNT: 12.6 % (ref 12.3–15.4)
GLOBULIN UR ELPH-MCNC: 3.1 GM/DL
GLUCOSE SERPL-MCNC: 94 MG/DL (ref 65–99)
HBA1C MFR BLD: 5.6 % (ref 4.8–5.6)
HCT VFR BLD AUTO: 41.7 % (ref 34–46.6)
HDLC SERPL-MCNC: 90 MG/DL (ref 40–60)
HGB BLD-MCNC: 14 G/DL (ref 12–15.9)
IMM GRANULOCYTES # BLD AUTO: 0.03 10*3/MM3 (ref 0–0.05)
IMM GRANULOCYTES NFR BLD AUTO: 0.4 % (ref 0–0.5)
LDLC SERPL CALC-MCNC: 86 MG/DL (ref 0–100)
LDLC/HDLC SERPL: 0.89 {RATIO}
LYMPHOCYTES # BLD AUTO: 1.48 10*3/MM3 (ref 0.7–3.1)
LYMPHOCYTES NFR BLD AUTO: 17.9 % (ref 19.6–45.3)
MCH RBC QN AUTO: 32.9 PG (ref 26.6–33)
MCHC RBC AUTO-ENTMCNC: 33.6 G/DL (ref 31.5–35.7)
MCV RBC AUTO: 97.9 FL (ref 79–97)
MONOCYTES # BLD AUTO: 0.57 10*3/MM3 (ref 0.1–0.9)
MONOCYTES NFR BLD AUTO: 6.9 % (ref 5–12)
NEUTROPHILS NFR BLD AUTO: 6.06 10*3/MM3 (ref 1.7–7)
NEUTROPHILS NFR BLD AUTO: 73.1 % (ref 42.7–76)
NRBC BLD AUTO-RTO: 0 /100 WBC (ref 0–0.2)
PLATELET # BLD AUTO: 297 10*3/MM3 (ref 140–450)
PMV BLD AUTO: 11.3 FL (ref 6–12)
POTASSIUM SERPL-SCNC: 4.8 MMOL/L (ref 3.5–5.2)
PROT SERPL-MCNC: 6.6 G/DL (ref 6–8.5)
RBC # BLD AUTO: 4.26 10*6/MM3 (ref 3.77–5.28)
SODIUM SERPL-SCNC: 140 MMOL/L (ref 136–145)
T3FREE SERPL-MCNC: 3.24 PG/ML (ref 2–4.4)
T4 FREE SERPL-MCNC: 1.12 NG/DL (ref 0.93–1.7)
TRIGL SERPL-MCNC: 170 MG/DL (ref 0–150)
TSH SERPL DL<=0.05 MIU/L-ACNC: 4.54 UIU/ML (ref 0.27–4.2)
VIT B12 BLD-MCNC: 428 PG/ML (ref 211–946)
VLDLC SERPL-MCNC: 28 MG/DL (ref 5–40)
WBC NRBC COR # BLD: 8.28 10*3/MM3 (ref 3.4–10.8)

## 2022-05-05 PROCEDURE — 80053 COMPREHEN METABOLIC PANEL: CPT

## 2022-05-05 PROCEDURE — 83036 HEMOGLOBIN GLYCOSYLATED A1C: CPT

## 2022-05-05 PROCEDURE — 84439 ASSAY OF FREE THYROXINE: CPT

## 2022-05-05 PROCEDURE — 82043 UR ALBUMIN QUANTITATIVE: CPT

## 2022-05-05 PROCEDURE — 85025 COMPLETE CBC W/AUTO DIFF WBC: CPT

## 2022-05-05 PROCEDURE — 82607 VITAMIN B-12: CPT

## 2022-05-05 PROCEDURE — 86618 LYME DISEASE ANTIBODY: CPT

## 2022-05-05 PROCEDURE — 84443 ASSAY THYROID STIM HORMONE: CPT

## 2022-05-05 PROCEDURE — 36415 COLL VENOUS BLD VENIPUNCTURE: CPT

## 2022-05-05 PROCEDURE — 82306 VITAMIN D 25 HYDROXY: CPT

## 2022-05-05 PROCEDURE — 84481 FREE ASSAY (FT-3): CPT

## 2022-05-05 PROCEDURE — 80061 LIPID PANEL: CPT

## 2022-05-06 LAB
B BURGDOR IGG SER QL: NEGATIVE
B BURGDOR IGM SER QL: NEGATIVE

## 2022-05-06 NOTE — PATIENT INSTRUCTIONS
"Critical care medicine: Principles of diagnosis and management in the adult (4th ed., pp. 2263-6774). Ch.\"> Cooper's anesthesia (8th ed., pp. 232-250). Ch.\">   Advance Directive    Advance directives are legal documents that allow you to make decisions about your health care and medical treatment in case you become unable to communicate for yourself. Advance directives let your wishes be known to family, friends, and health care providers.  Discussing and writing advance directives should happen over time rather than all at once. Advance directives can be changed and updated at any time. There are different types of advance directives, such as:  Medical power of .  Living will.  Do not resuscitate (DNR) order or do not attempt resuscitation (DNAR) order.  Health care proxy and medical power of   A health care proxy is also called a health care agent. This person is appointed to make medical decisions for you when you are unable to make decisions for yourself. Generally, people ask a trusted friend or family member to act as their proxy and represent their preferences. Make sure you have an agreement with your trusted person to act as your proxy. A proxy may have to make a medical decision on your behalf if your wishes are not known.  A medical power of , also called a durable power of  for health care, is a legal document that names your health care proxy. Depending on the laws in your state, the document may need to be:  Signed.  Notarized.  Dated.  Copied.  Witnessed.  Incorporated into your medical record.  You may also want to appoint a trusted person to manage your money in the event you are unable to do so. This is called a durable power of  for finances. It is a separate legal document from the durable power of  for health care. You may choose your health care proxy or someone different to act as your agent in money matters.  If you do not appoint a " proxy, or there is a concern that the proxy is not acting in your best interest, a court may appoint a guardian to act on your behalf.  Living will  A living will is a set of instructions that state your wishes about medical care when you cannot express them yourself. Health care providers should keep a copy of your living will in your medical record. You may want to give a copy to family members or friends. To alert caregivers in case of an emergency, you can place a card in your wallet to let them know that you have a living will and where they can find it. A living will is used if you become:  Terminally ill.  Disabled.  Unable to communicate or make decisions.  The following decisions should be included in your living will:  To use or not to use life support equipment, such as dialysis machines and breathing machines (ventilators).  Whether you want a DNR or DNAR order. This tells health care providers not to use cardiopulmonary resuscitation (CPR) if breathing or heartbeat stops.  To use or not to use tube feeding.  To be given or not to be given food and fluids.  Whether you want comfort (palliative) care when the goal becomes comfort rather than a cure.  Whether you want to donate your organs and tissues.  A living will does not give instructions for distributing your money and property if you should pass away.  DNR or DNAR  A DNR or DNAR order is a request not to have CPR in the event that your heart stops beating or you stop breathing. If a DNR or DNAR order has not been made and shared, a health care provider will try to help any patient whose heart has stopped or who has stopped breathing. If you plan to have surgery, talk with your health care provider about how your DNR or DNAR order will be followed if problems occur.  What if I do not have an advance directive?  Some states assign family decision makers to act on your behalf if you do not have an advance directive. Each state has its own laws about  advance directives. You may want to check with your health care provider, , or state representative about the laws in your state.  Summary  Advance directives are legal documents that allow you to make decisions about your health care and medical treatment in case you become unable to communicate for yourself.  The process of discussing and writing advance directives should happen over time. You can change and update advance directives at any time.  Advance directives may include a medical power of , a living will, and a DNR or DNAR order.  This information is not intended to replace advice given to you by your health care provider. Make sure you discuss any questions you have with your health care provider.  Document Revised: 09/21/2021 Document Reviewed: 09/21/2021  Elsevier Patient Education © 2021 [employer]

## 2022-05-09 RX ORDER — NORTRIPTYLINE HYDROCHLORIDE 50 MG/1
CAPSULE ORAL
Qty: 60 CAPSULE | Refills: 1 | Status: SHIPPED | OUTPATIENT
Start: 2022-05-09 | End: 2022-05-10 | Stop reason: SDUPTHER

## 2022-05-09 NOTE — TELEPHONE ENCOUNTER
Rx Refill Note  Requested Prescriptions     Pending Prescriptions Disp Refills   • nortriptyline (PAMELOR) 50 MG capsule [Pharmacy Med Name: NORTRIPTYLINE 50MG CAPSULES] 60 capsule 1     Sig: TAKE 1 CAPSULE BY MOUTH TWICE DAILY      Last office visit with prescribing clinician: 5/3/2022      Next office visit with prescribing clinician: 6/28/2022            Laya Garcias RN  05/09/22, 11:36 EDT

## 2022-05-10 RX ORDER — NORTRIPTYLINE HYDROCHLORIDE 50 MG/1
50 CAPSULE ORAL 2 TIMES DAILY
Qty: 10 CAPSULE | Refills: 0 | Status: SHIPPED | OUTPATIENT
Start: 2022-05-10 | End: 2022-05-31 | Stop reason: SDUPTHER

## 2022-05-11 ENCOUNTER — TELEPHONE (OUTPATIENT)
Dept: INTERNAL MEDICINE | Facility: CLINIC | Age: 72
End: 2022-05-11

## 2022-05-11 NOTE — TELEPHONE ENCOUNTER
Caller: Altagracia Rothman    Relationship: Self    Best call back number: [phone]    What medication are you requesting: DIURETIC YOU HAD SPOKEN ABOUT WHILE IN OFFICE - LASIX OR ALDECTONE    What are your current symptoms: SWELLING    How long have you been experiencing symptoms: LONG TIME    Have you had these symptoms before:    [x] Yes  [] No    Have you been treated for these symptoms before:   [x] Yes  [] No    If a prescription is needed, what is your preferred pharmacy and phone number: [id number] - [place], [place] - [address] - [phone]  - [phone] FX     Additional notes: PATIENT WAS EXPECTING A PRESCRIPTION TO BE SENT FOR HER.

## 2022-05-12 RX ORDER — FUROSEMIDE 20 MG/1
20 TABLET ORAL 2 TIMES DAILY
Qty: 10 TABLET | Refills: 1 | Status: SHIPPED | OUTPATIENT
Start: 2022-05-12 | End: 2022-09-16 | Stop reason: SDUPTHER

## 2022-05-13 ENCOUNTER — TELEPHONE (OUTPATIENT)
Dept: INTERNAL MEDICINE | Facility: CLINIC | Age: 72
End: 2022-05-13

## 2022-05-13 NOTE — TELEPHONE ENCOUNTER
Patient called to verify her recently prescribed medications because she was given an odd medication from [facility]. Apparently they gave her another patients medication and she only caught it because she didn't recognize the name of it and saw that bleeding was a side effect, which she has to stay away from.    I informed her that she was prescribed Lasix and also read her the past two medications from her chart (Pamelor and Cortlaura), she checked the name on the prescription bottle and confirmed that it was not hers, I also informed her not to flush the medication but to take it back to "[facility]s next time she goes so that it can be disposed of properly and the patient verbalized understanding.

## 2022-05-13 NOTE — TELEPHONE ENCOUNTER
Patient called [facility] and did verify they gave her the wrong persons medication.     Patient called to ask about her oral surgeon referral. I see in Alvina's note from 05/03 she would put in a referral but I don't see where it was done. Can you place a referral ?

## 2022-05-18 ENCOUNTER — TELEPHONE (OUTPATIENT)
Dept: INTERNAL MEDICINE | Facility: CLINIC | Age: 72
End: 2022-05-18

## 2022-05-18 NOTE — TELEPHONE ENCOUNTER
Looks like omari called in furosemide last week.  This needs to wait for Omari, as I don't feel comfortable adding another diuretic this soon not knowing her.

## 2022-05-18 NOTE — TELEPHONE ENCOUNTER
Caller: Altagracia Rothman    Relationship: Self    Best call back number:     [phone]    What medication are you requesting:     SPIRONOLACTONE - ALDACTONE    What are your current symptoms:     N/A    How long have you been experiencing symptoms:     N/A    Have you had these symptoms before:    [] Yes  [] No    N/A    Have you been treated for these symptoms before:   [] Yes  [] No    N/A    If a prescription is needed, what is your preferred pharmacy and phone number:      [place], [place]    TELEPHONE CONTACT:    [phone]    Additional notes:    PATIENT STATED A PRESCRIPTION FOR MEDICATION LISTED ABOVE WAS TO BE SENT TO HER PHARMACY AS OF Monday, 5/16/22    PHARMACY STATED AS OF YESTERDAY, 5/17/22 THEY HAD NOT RECEIVED THE PRESCRIPTION FROM [place] [facility]

## 2022-05-19 DIAGNOSIS — I10 ESSENTIAL HYPERTENSION: Primary | ICD-10-CM

## 2022-05-19 RX ORDER — SPIRONOLACTONE 50 MG/1
50 TABLET, FILM COATED ORAL DAILY
Qty: 30 TABLET | Refills: 1 | Status: SHIPPED | OUTPATIENT
Start: 2022-05-19 | End: 2022-07-20

## 2022-05-31 DIAGNOSIS — E27.40 ADRENAL INSUFFICIENCY: ICD-10-CM

## 2022-05-31 RX ORDER — HYDROCORTISONE 10 MG/1
TABLET ORAL
Qty: 135 TABLET | Refills: 0 | Status: SHIPPED | OUTPATIENT
Start: 2022-05-31 | End: 2022-06-07

## 2022-05-31 RX ORDER — NORTRIPTYLINE HYDROCHLORIDE 50 MG/1
50 CAPSULE ORAL 2 TIMES DAILY
Qty: 10 CAPSULE | Refills: 0 | Status: SHIPPED | OUTPATIENT
Start: 2022-05-31 | End: 2022-06-02 | Stop reason: SDUPTHER

## 2022-05-31 NOTE — TELEPHONE ENCOUNTER
Caller: Altagracia Rothman    Relationship: Self    Best call back number: [phone]    Requested Prescriptions:   Requested Prescriptions     Pending Prescriptions Disp Refills   • nortriptyline (PAMELOR) 50 MG capsule 10 capsule 0     Sig: Take 1 capsule by mouth 2 (Two) Times a Day.        Pharmacy where request should be sent: [id number] [place] [id number] - [phone] [facility] [phone] FX     Additional details provided by patient: PATIENT IS OUT OF THE MEDICATION. PATIENT STATES THAT SHE IS TO TAKE THE MEDICATION 3 TIMES A DAY     Does the patient have less than a 3 day supply:  [x] Yes  [] No    Allan Ervin Rep   05/31/22 11:43 EDT

## 2022-06-02 RX ORDER — NORTRIPTYLINE HYDROCHLORIDE 50 MG/1
50 CAPSULE ORAL 3 TIMES DAILY
Qty: 90 CAPSULE | Refills: 0 | Status: SHIPPED | OUTPATIENT
Start: 2022-06-02 | End: 2022-09-14 | Stop reason: SDUPTHER

## 2022-06-03 ENCOUNTER — TELEPHONE (OUTPATIENT)
Dept: INTERNAL MEDICINE | Facility: CLINIC | Age: 72
End: 2022-06-03

## 2022-06-03 DIAGNOSIS — K08.89 PAIN, DENTAL: ICD-10-CM

## 2022-06-03 DIAGNOSIS — M27.8 MAXILLARY BONE LOSS: Primary | ICD-10-CM

## 2022-06-03 NOTE — TELEPHONE ENCOUNTER
Patient called stating that she would like her oral surgery referral to be sent to a Dr Cintron, a maxillofacial surgeon here in [place].    Patient said her daughter gave her an address of [address] [place] [address] and a phone number of [phone]. She stated she was not sure if this was the surgeons current address and phone number or not and asked us to look into it for her.    Patient also stated that she is aware that she does not have dental insurance and that she can have a conversation with the facility if she needs to about it

## 2022-06-07 ENCOUNTER — OFFICE VISIT (OUTPATIENT)
Dept: ENDOCRINOLOGY | Facility: CLINIC | Age: 72
End: 2022-06-07

## 2022-06-07 VITALS
DIASTOLIC BLOOD PRESSURE: 60 MMHG | HEART RATE: 67 BPM | BODY MASS INDEX: 35.53 KG/M2 | HEIGHT: 60 IN | WEIGHT: 181 LBS | SYSTOLIC BLOOD PRESSURE: 100 MMHG

## 2022-06-07 DIAGNOSIS — Z86.018 H/O PHEOCHROMOCYTOMA: ICD-10-CM

## 2022-06-07 DIAGNOSIS — E27.40 ADRENAL INSUFFICIENCY: Primary | ICD-10-CM

## 2022-06-07 DIAGNOSIS — E03.9 HYPOTHYROIDISM, UNSPECIFIED TYPE: ICD-10-CM

## 2022-06-07 PROCEDURE — 99442 PR PHYS/QHP TELEPHONE EVALUATION 11-20 MIN: CPT | Performed by: INTERNAL MEDICINE

## 2022-06-07 RX ORDER — LEVOTHYROXINE SODIUM 88 UG/1
88 TABLET ORAL DAILY
Qty: 30 TABLET | Refills: 3 | Status: SHIPPED | OUTPATIENT
Start: 2022-06-07 | End: 2022-09-19

## 2022-06-07 RX ORDER — HYDROCORTISONE 10 MG/1
15 TABLET ORAL 2 TIMES DAILY
Qty: 135 TABLET | Refills: 1 | Status: SHIPPED | OUTPATIENT
Start: 2022-06-07 | End: 2022-10-11

## 2022-06-07 NOTE — PROGRESS NOTES
"Chief Complaint   Patient presents with   • Hypothyroidism   • Adrenal Problem        HPI   Altagracia Rothman is a 72 y.o. female had concerns including Hypothyroidism and Adrenal Problem.      This patient has consented to a telehealth visit via telephone. The visit was scheduled as a routine visit to comply with patient safety concerns in accordance with CDC recommendations.  All vitals recorded within this visit are reported by the patient.  I spent 11 minutes in direct conversation with this patient      Patient denies any significant health changes in the interim since her last visit.  She does report that she has not been able to wean TCA to repeat testing for pheochromocytoma screening.  She does not believe she will be able to do this in the near future.    Patient continues on hydrocortisone 15 mg twice daily for adrenal insufficiency, she denies any recent stress dosing.    Patient continues on levothyroxine 75 mcg daily for hypothyroidism.  She did have recent repeat testing with PCP and labs were reviewed.  Patient does report fatigue.  She denies missed doses of medication.    The following portions of the patient's history were reviewed and updated as appropriate: allergies, current medications and past social history.    Review of Systems   Constitutional: Positive for fatigue. Negative for unexpected weight gain and unexpected weight loss.   Gastrointestinal: Negative for constipation and diarrhea.      /60   Pulse 67   Ht 152.4 cm (60\")   Wt 82.1 kg (181 lb)   BMI 35.35 kg/m²      Physical Exam   Unable to perform this visit was completed via telephone    Labs/Imaging   Latest Reference Range & Units 05/05/22 10:37   Glucose 65 - 99 mg/dL 94   Sodium 136 - 145 mmol/L 140   Potassium 3.5 - 5.2 mmol/L 4.8   CO2 22.0 - 29.0 mmol/L 28.7   Chloride 98 - 107 mmol/L 100   Anion Gap 5.0 - 15.0 mmol/L 11.3   Creatinine 0.57 - 1.00 mg/dL 0.75   BUN 8 - 23 mg/dL 22   BUN/Creatinine Ratio 7.0 - 25.0  29.3 " (H)   Calcium 8.6 - 10.5 mg/dL 8.8   EGFR >60.0 mL/min/1.73 84.2 [1]   Alkaline Phosphatase 39 - 117 U/L 144 (H)   Total Protein 6.0 - 8.5 g/dL 6.6   ALT (SGPT) 1 - 33 U/L 27   AST (SGOT) 1 - 32 U/L 21   Total Bilirubin 0.0 - 1.2 mg/dL 0.2   Albumin 3.50 - 5.20 g/dL 3.50   Globulin gm/dL 3.1   A/G Ratio g/dL 1.1   Hemoglobin A1C 4.80 - 5.60 % 5.60   TSH Baseline 0.270 - 4.200 uIU/mL 4.540 (H)   Free T4 0.93 - 1.70 ng/dL 1.12   T3, Free 2.00 - 4.40 pg/mL 3.24   (H): Data is abnormally high  [1] National Kidney Foundation and American Society of Nephrology (ASN) Task Force recommended calculation based on the Chronic Kidney Disease Epidemiology Collaboration (CKD-EPI) equation refit without adjustment for race.    Diagnoses and all orders for this visit:    1. Adrenal insufficiency (HCC) (Primary)  Patient is currently taking hydrocortisone 15 mg twice daily  Again discussed that patient's dose of hydrocortisone is supraphysiologic but patient reports adverse effects with prior attempts to wean steroids and declines repeat attempt at this.  We did review potential long-term adverse effects of supraphysiologic steroids including but not limited to impacts to body weight, blood pressure, blood sugar, weakening of bones.  Patient voiced understanding.  We reviewed the signs and symptoms of adrenal insufficiency and adrenal crisis.  Received sick day rules and stress dosing.  -     hydrocortisone (CORTEF) 10 MG tablet; Take 1.5 tablets by mouth 2 (Two) Times a Day.  Dispense: 135 tablet; Refill: 1    2. Hypothyroidism, unspecified type  Reviewed recent labs with mild elevation of TSH.  Patient does report fatigue and is interested in increasing dose.  Increase levothyroxine to 88 mcg daily  -     levothyroxine (SYNTHROID, LEVOTHROID) 88 MCG tablet; Take 1 tablet by mouth Daily.  Dispense: 30 tablet; Refill: 3    3. H/O pheochromocytoma  Patient has not repeated screening for catecholamines and metanephrines.  She remains  on tricyclic antidepressant.  She reports that she does not want to repeat screening while taking this medication as she knows it will cause a false positive.  She reports she does not believe she can taper medication at this time.  Patient is aware of potential life-threatening danger of elevation of catecholamines and metanephrines if not addressed and declines repeat screening at this time.    Addendum dated 6/30/22  Contacted by hospitalist regarding patient who is currently admitted with fracture. Anesthesia with concerns regarding possible surgery given history of pheochromocytoma. Reviewed available history regarding pheochromocytoma, resection completed in approximately 2003. Patient did have abnormal follow up testing with an outside endocrinologist, most recently in 2019 prior to establishing care with this provider. Patient has not completed recommended follow up evaluation despite discussion of risks. She most recently declined in 6/2022. Given this, I cannot comment on disease status. Hospitalist considering repeat testing while inpatient, we did discuss potential for interference with labs by TCA.      Return in about 3 months (around 9/7/2022) for Next scheduled follow up. The patient was instructed to contact the clinic with any interval questions or concerns.    Heather Nelson MD   Endocrinologist    Dictated Utilizing Dragon Dictation

## 2022-06-17 ENCOUNTER — TELEPHONE (OUTPATIENT)
Dept: INTERNAL MEDICINE | Facility: CLINIC | Age: 72
End: 2022-06-17

## 2022-06-17 NOTE — TELEPHONE ENCOUNTER
Hub staff attempted to follow warm transfer process and was unsuccessful     Caller: Altagracia Rothman    Relationship to patient: Self    Best call back number: [phone]     Patient is needing: PATIENT CALLED TO ASK WHEN SHE HAD HER LAST TETANUS SHOT.     Problem: Pressure Injury, Risk for  Goal: # Skin remains intact  Outcome: Outcome Met, Continue evaluating goal progress toward completion  Skin remains intact, continue to monitor.     Problem: At Risk for Falls  Goal: # Patient does not fall  Outcome: Outcome Met, Continue evaluating goal progress toward completion  Patient did not fall, continue to monitor.

## 2022-06-17 NOTE — TELEPHONE ENCOUNTER
Pt notified that we did not have any on record and to call the office in [place] where she believed she had one done.

## 2022-06-24 ENCOUNTER — APPOINTMENT (OUTPATIENT)
Dept: GENERAL RADIOLOGY | Facility: HOSPITAL | Age: 72
End: 2022-06-24

## 2022-06-24 ENCOUNTER — HOSPITAL ENCOUNTER (INPATIENT)
Facility: HOSPITAL | Age: 72
LOS: 25 days | Discharge: NURSING, REHAB OR LONG-TERM CARE | End: 2022-07-19
Attending: EMERGENCY MEDICINE | Admitting: INTERNAL MEDICINE
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
## 2022-06-24 DIAGNOSIS — M80.00XA OSTEOPOROSIS WITH CURRENT PATHOLOGICAL FRACTURE, UNSPECIFIED OSTEOPOROSIS TYPE, INITIAL ENCOUNTER: ICD-10-CM

## 2022-06-24 DIAGNOSIS — S42.291A OTHER CLOSED DISPLACED FRACTURE OF PROXIMAL END OF RIGHT HUMERUS, INITIAL ENCOUNTER: Primary | ICD-10-CM

## 2022-06-24 DIAGNOSIS — S42.409A CLOSED FRACTURE OF DISTAL END OF HUMERUS, UNSPECIFIED FRACTURE MORPHOLOGY, INITIAL ENCOUNTER: ICD-10-CM

## 2022-06-24 LAB
ABO GROUP BLD: NORMAL
ALBUMIN SERPL-MCNC: 3 G/DL (ref 3.5–5.2)
ALBUMIN/GLOB SERPL: 1.2 G/DL
ALP SERPL-CCNC: 148 U/L (ref 39–117)
ALT SERPL W P-5'-P-CCNC: 16 U/L (ref 1–33)
ANION GAP SERPL CALCULATED.3IONS-SCNC: 7 MMOL/L (ref 5–15)
AST SERPL-CCNC: 16 U/L (ref 1–32)
BASOPHILS # BLD AUTO: 0.02 10*3/MM3 (ref 0–0.2)
BASOPHILS NFR BLD AUTO: 0.2 % (ref 0–1.5)
BILIRUB SERPL-MCNC: 0.4 MG/DL (ref 0–1.2)
BLD GP AB SCN SERPL QL: NEGATIVE
BUN SERPL-MCNC: 26 MG/DL (ref 8–23)
BUN/CREAT SERPL: 34.7 (ref 7–25)
CALCIUM SPEC-SCNC: 8.6 MG/DL (ref 8.6–10.5)
CHLORIDE SERPL-SCNC: 95 MMOL/L (ref 98–107)
CO2 SERPL-SCNC: 26 MMOL/L (ref 22–29)
CREAT SERPL-MCNC: 0.75 MG/DL (ref 0.57–1)
DEPRECATED RDW RBC AUTO: 48.5 FL (ref 37–54)
EGFRCR SERPLBLD CKD-EPI 2021: 84.2 ML/MIN/1.73
EOSINOPHIL # BLD AUTO: 0.01 10*3/MM3 (ref 0–0.4)
EOSINOPHIL NFR BLD AUTO: 0.1 % (ref 0.3–6.2)
ERYTHROCYTE [DISTWIDTH] IN BLOOD BY AUTOMATED COUNT: 13.2 % (ref 12.3–15.4)
FLUAV RNA RESP QL NAA+PROBE: NOT DETECTED
FLUBV RNA RESP QL NAA+PROBE: NOT DETECTED
GLOBULIN UR ELPH-MCNC: 2.5 GM/DL
GLUCOSE SERPL-MCNC: 114 MG/DL (ref 65–99)
HCT VFR BLD AUTO: 38.3 % (ref 34–46.6)
HGB BLD-MCNC: 12.8 G/DL (ref 12–15.9)
IMM GRANULOCYTES # BLD AUTO: 0.05 10*3/MM3 (ref 0–0.05)
IMM GRANULOCYTES NFR BLD AUTO: 0.4 % (ref 0–0.5)
LYMPHOCYTES # BLD AUTO: 1.2 10*3/MM3 (ref 0.7–3.1)
LYMPHOCYTES NFR BLD AUTO: 10.7 % (ref 19.6–45.3)
MCH RBC QN AUTO: 33.3 PG (ref 26.6–33)
MCHC RBC AUTO-ENTMCNC: 33.4 G/DL (ref 31.5–35.7)
MCV RBC AUTO: 99.7 FL (ref 79–97)
MONOCYTES # BLD AUTO: 0.83 10*3/MM3 (ref 0.1–0.9)
MONOCYTES NFR BLD AUTO: 7.4 % (ref 5–12)
NEUTROPHILS NFR BLD AUTO: 81.2 % (ref 42.7–76)
NEUTROPHILS NFR BLD AUTO: 9.1 10*3/MM3 (ref 1.7–7)
NRBC BLD AUTO-RTO: 0 /100 WBC (ref 0–0.2)
PLATELET # BLD AUTO: 214 10*3/MM3 (ref 140–450)
PMV BLD AUTO: 10.6 FL (ref 6–12)
POTASSIUM SERPL-SCNC: 4.2 MMOL/L (ref 3.5–5.2)
PROT SERPL-MCNC: 5.5 G/DL (ref 6–8.5)
RBC # BLD AUTO: 3.84 10*6/MM3 (ref 3.77–5.28)
RH BLD: POSITIVE
RSV RNA NPH QL NAA+NON-PROBE: NOT DETECTED
SARS-COV-2 RNA RESP QL NAA+PROBE: NOT DETECTED
SODIUM SERPL-SCNC: 128 MMOL/L (ref 136–145)
T&S EXPIRATION DATE: NORMAL
WBC NRBC COR # BLD: 11.21 10*3/MM3 (ref 3.4–10.8)

## 2022-06-24 PROCEDURE — 85025 COMPLETE CBC W/AUTO DIFF WBC: CPT | Performed by: PHYSICIAN ASSISTANT

## 2022-06-24 PROCEDURE — 81003 URINALYSIS AUTO W/O SCOPE: CPT | Performed by: FAMILY MEDICINE

## 2022-06-24 PROCEDURE — 71045 X-RAY EXAM CHEST 1 VIEW: CPT

## 2022-06-24 PROCEDURE — 99223 1ST HOSP IP/OBS HIGH 75: CPT | Performed by: FAMILY MEDICINE

## 2022-06-24 PROCEDURE — 86900 BLOOD TYPING SEROLOGIC ABO: CPT | Performed by: PHYSICIAN ASSISTANT

## 2022-06-24 PROCEDURE — 80053 COMPREHEN METABOLIC PANEL: CPT | Performed by: PHYSICIAN ASSISTANT

## 2022-06-24 PROCEDURE — 73030 X-RAY EXAM OF SHOULDER: CPT

## 2022-06-24 PROCEDURE — 25010000002 MORPHINE PER 10 MG: Performed by: FAMILY MEDICINE

## 2022-06-24 PROCEDURE — 86901 BLOOD TYPING SEROLOGIC RH(D): CPT

## 2022-06-24 PROCEDURE — 72040 X-RAY EXAM NECK SPINE 2-3 VW: CPT

## 2022-06-24 PROCEDURE — 73070 X-RAY EXAM OF ELBOW: CPT

## 2022-06-24 PROCEDURE — 99284 EMERGENCY DEPT VISIT MOD MDM: CPT

## 2022-06-24 PROCEDURE — 25010000002 HYDROCORTISONE SODIUM SUCCINATE 100 MG RECONSTITUTED SOLUTION: Performed by: FAMILY MEDICINE

## 2022-06-24 PROCEDURE — 86850 RBC ANTIBODY SCREEN: CPT | Performed by: PHYSICIAN ASSISTANT

## 2022-06-24 PROCEDURE — 73060 X-RAY EXAM OF HUMERUS: CPT

## 2022-06-24 PROCEDURE — 87637 SARSCOV2&INF A&B&RSV AMP PRB: CPT | Performed by: PHYSICIAN ASSISTANT

## 2022-06-24 PROCEDURE — 86901 BLOOD TYPING SEROLOGIC RH(D): CPT | Performed by: PHYSICIAN ASSISTANT

## 2022-06-24 PROCEDURE — 86900 BLOOD TYPING SEROLOGIC ABO: CPT

## 2022-06-24 PROCEDURE — 25010000002 FENTANYL CITRATE (PF) 50 MCG/ML SOLUTION: Performed by: EMERGENCY MEDICINE

## 2022-06-24 RX ORDER — LEVOTHYROXINE SODIUM 88 UG/1
88 TABLET ORAL
Status: DISCONTINUED | OUTPATIENT
Start: 2022-06-25 | End: 2022-07-19 | Stop reason: HOSPADM

## 2022-06-24 RX ORDER — SODIUM CHLORIDE 0.9 % (FLUSH) 0.9 %
10 SYRINGE (ML) INJECTION AS NEEDED
Status: DISCONTINUED | OUTPATIENT
Start: 2022-06-24 | End: 2022-07-19 | Stop reason: HOSPADM

## 2022-06-24 RX ORDER — NORTRIPTYLINE HYDROCHLORIDE 10 MG/1
50 CAPSULE ORAL 3 TIMES DAILY
Status: DISCONTINUED | OUTPATIENT
Start: 2022-06-25 | End: 2022-07-19 | Stop reason: HOSPADM

## 2022-06-24 RX ORDER — GABAPENTIN 400 MG/1
800 CAPSULE ORAL 4 TIMES DAILY
Status: DISCONTINUED | OUTPATIENT
Start: 2022-06-25 | End: 2022-07-15

## 2022-06-24 RX ORDER — ACETAMINOPHEN 160 MG/5ML
650 SOLUTION ORAL EVERY 4 HOURS PRN
Status: DISCONTINUED | OUTPATIENT
Start: 2022-06-24 | End: 2022-07-19 | Stop reason: HOSPADM

## 2022-06-24 RX ORDER — SODIUM CHLORIDE 0.9 % (FLUSH) 0.9 %
10 SYRINGE (ML) INJECTION EVERY 12 HOURS SCHEDULED
Status: DISCONTINUED | OUTPATIENT
Start: 2022-06-24 | End: 2022-07-19 | Stop reason: HOSPADM

## 2022-06-24 RX ORDER — ALBUTEROL SULFATE 2.5 MG/3ML
2.5 SOLUTION RESPIRATORY (INHALATION) EVERY 6 HOURS PRN
Refills: 5 | Status: DISCONTINUED | OUTPATIENT
Start: 2022-06-24 | End: 2022-07-19 | Stop reason: HOSPADM

## 2022-06-24 RX ORDER — MORPHINE SULFATE 30 MG/1
30 TABLET, FILM COATED, EXTENDED RELEASE ORAL 2 TIMES DAILY
Status: DISCONTINUED | OUTPATIENT
Start: 2022-06-25 | End: 2022-06-25

## 2022-06-24 RX ORDER — FENTANYL CITRATE 50 UG/ML
50 INJECTION, SOLUTION INTRAMUSCULAR; INTRAVENOUS ONCE
Status: COMPLETED | OUTPATIENT
Start: 2022-06-24 | End: 2022-06-24

## 2022-06-24 RX ORDER — MORPHINE SULFATE 2 MG/ML
2 INJECTION, SOLUTION INTRAMUSCULAR; INTRAVENOUS
Status: DISPENSED | OUTPATIENT
Start: 2022-06-24 | End: 2022-07-01

## 2022-06-24 RX ORDER — FENTANYL CITRATE 50 UG/ML
25 INJECTION, SOLUTION INTRAMUSCULAR; INTRAVENOUS ONCE
Status: COMPLETED | OUTPATIENT
Start: 2022-06-24 | End: 2022-06-24

## 2022-06-24 RX ORDER — SODIUM CHLORIDE 9 MG/ML
75 INJECTION, SOLUTION INTRAVENOUS CONTINUOUS
Status: DISCONTINUED | OUTPATIENT
Start: 2022-06-24 | End: 2022-07-19 | Stop reason: HOSPADM

## 2022-06-24 RX ORDER — ACETAMINOPHEN 325 MG/1
650 TABLET ORAL EVERY 4 HOURS PRN
Status: DISCONTINUED | OUTPATIENT
Start: 2022-06-24 | End: 2022-07-19 | Stop reason: HOSPADM

## 2022-06-24 RX ORDER — ONDANSETRON 2 MG/ML
4 INJECTION INTRAMUSCULAR; INTRAVENOUS EVERY 6 HOURS PRN
Status: DISCONTINUED | OUTPATIENT
Start: 2022-06-24 | End: 2022-07-19 | Stop reason: HOSPADM

## 2022-06-24 RX ORDER — ONDANSETRON 4 MG/1
4 TABLET, FILM COATED ORAL EVERY 6 HOURS PRN
Status: DISCONTINUED | OUTPATIENT
Start: 2022-06-24 | End: 2022-07-19 | Stop reason: HOSPADM

## 2022-06-24 RX ORDER — HYDROCODONE BITARTRATE AND ACETAMINOPHEN 5; 325 MG/1; MG/1
1 TABLET ORAL EVERY 4 HOURS PRN
Status: DISCONTINUED | OUTPATIENT
Start: 2022-06-24 | End: 2022-06-24

## 2022-06-24 RX ORDER — ACETAMINOPHEN 650 MG/1
650 SUPPOSITORY RECTAL EVERY 4 HOURS PRN
Status: DISCONTINUED | OUTPATIENT
Start: 2022-06-24 | End: 2022-07-19 | Stop reason: HOSPADM

## 2022-06-24 RX ORDER — SODIUM CHLORIDE 9 MG/ML
100 INJECTION, SOLUTION INTRAVENOUS CONTINUOUS
Status: ACTIVE | OUTPATIENT
Start: 2022-06-24 | End: 2022-06-25

## 2022-06-24 RX ORDER — NALOXONE HCL 0.4 MG/ML
0.4 VIAL (ML) INJECTION
Status: DISCONTINUED | OUTPATIENT
Start: 2022-06-24 | End: 2022-07-19 | Stop reason: HOSPADM

## 2022-06-24 RX ORDER — OXYCODONE AND ACETAMINOPHEN 10; 325 MG/1; MG/1
1 TABLET ORAL EVERY 8 HOURS PRN
Status: DISCONTINUED | OUTPATIENT
Start: 2022-06-24 | End: 2022-06-28

## 2022-06-24 RX ORDER — OXYCODONE AND ACETAMINOPHEN 10; 325 MG/1; MG/1
1 TABLET ORAL EVERY 4 HOURS PRN
Status: DISCONTINUED | OUTPATIENT
Start: 2022-06-24 | End: 2022-06-24

## 2022-06-24 RX ADMIN — FENTANYL CITRATE 25 MCG: 50 INJECTION INTRAMUSCULAR; INTRAVENOUS at 17:33

## 2022-06-24 RX ADMIN — OXYCODONE HYDROCHLORIDE AND ACETAMINOPHEN 1 TABLET: 10; 325 TABLET ORAL at 21:33

## 2022-06-24 RX ADMIN — FENTANYL CITRATE 50 MCG: 50 INJECTION INTRAMUSCULAR; INTRAVENOUS at 16:14

## 2022-06-24 RX ADMIN — FENTANYL CITRATE 25 MCG: 50 INJECTION, SOLUTION INTRAMUSCULAR; INTRAVENOUS at 20:17

## 2022-06-24 RX ADMIN — HYDROCORTISONE SODIUM SUCCINATE 100 MG: 100 INJECTION, POWDER, FOR SOLUTION INTRAMUSCULAR; INTRAVENOUS at 21:33

## 2022-06-24 RX ADMIN — MORPHINE SULFATE 2 MG: 2 INJECTION, SOLUTION INTRAMUSCULAR; INTRAVENOUS at 21:34

## 2022-06-24 RX ADMIN — SODIUM CHLORIDE 100 ML/HR: 9 INJECTION, SOLUTION INTRAVENOUS at 21:34

## 2022-06-25 LAB
ABO GROUP BLD: NORMAL
ANION GAP SERPL CALCULATED.3IONS-SCNC: 10 MMOL/L (ref 5–15)
BILIRUB UR QL STRIP: NEGATIVE
BUN SERPL-MCNC: 20 MG/DL (ref 8–23)
BUN/CREAT SERPL: 22.2 (ref 7–25)
CALCIUM SPEC-SCNC: 8.1 MG/DL (ref 8.6–10.5)
CHLORIDE SERPL-SCNC: 101 MMOL/L (ref 98–107)
CLARITY UR: CLEAR
CO2 SERPL-SCNC: 25 MMOL/L (ref 22–29)
COLOR UR: YELLOW
CREAT SERPL-MCNC: 0.9 MG/DL (ref 0.57–1)
DEPRECATED RDW RBC AUTO: 49.9 FL (ref 37–54)
EGFRCR SERPLBLD CKD-EPI 2021: 67.6 ML/MIN/1.73
ERYTHROCYTE [DISTWIDTH] IN BLOOD BY AUTOMATED COUNT: 13.3 % (ref 12.3–15.4)
GLUCOSE BLDC GLUCOMTR-MCNC: 128 MG/DL (ref 70–130)
GLUCOSE BLDC GLUCOMTR-MCNC: 141 MG/DL (ref 70–130)
GLUCOSE BLDC GLUCOMTR-MCNC: 84 MG/DL (ref 70–130)
GLUCOSE BLDC GLUCOMTR-MCNC: 90 MG/DL (ref 70–130)
GLUCOSE SERPL-MCNC: 154 MG/DL (ref 65–99)
GLUCOSE UR STRIP-MCNC: NEGATIVE MG/DL
HCT VFR BLD AUTO: 37.8 % (ref 34–46.6)
HGB BLD-MCNC: 12.3 G/DL (ref 12–15.9)
HGB UR QL STRIP.AUTO: NEGATIVE
KETONES UR QL STRIP: NEGATIVE
LEUKOCYTE ESTERASE UR QL STRIP.AUTO: NEGATIVE
MCH RBC QN AUTO: 33.1 PG (ref 26.6–33)
MCHC RBC AUTO-ENTMCNC: 32.5 G/DL (ref 31.5–35.7)
MCV RBC AUTO: 101.6 FL (ref 79–97)
NITRITE UR QL STRIP: NEGATIVE
PH UR STRIP.AUTO: 6.5 [PH] (ref 5–8)
PLATELET # BLD AUTO: 194 10*3/MM3 (ref 140–450)
PMV BLD AUTO: 10.7 FL (ref 6–12)
POTASSIUM SERPL-SCNC: 4 MMOL/L (ref 3.5–5.2)
PROT UR QL STRIP: NEGATIVE
RBC # BLD AUTO: 3.72 10*6/MM3 (ref 3.77–5.28)
RH BLD: POSITIVE
SODIUM SERPL-SCNC: 136 MMOL/L (ref 136–145)
SP GR UR STRIP: 1.01 (ref 1–1.03)
UROBILINOGEN UR QL STRIP: NORMAL
WBC NRBC COR # BLD: 6.48 10*3/MM3 (ref 3.4–10.8)

## 2022-06-25 PROCEDURE — 82962 GLUCOSE BLOOD TEST: CPT

## 2022-06-25 PROCEDURE — 80048 BASIC METABOLIC PNL TOTAL CA: CPT | Performed by: FAMILY MEDICINE

## 2022-06-25 PROCEDURE — 25010000002 HYDROCORTISONE SODIUM SUCCINATE 100 MG RECONSTITUTED SOLUTION: Performed by: FAMILY MEDICINE

## 2022-06-25 PROCEDURE — 25010000002 MORPHINE PER 10 MG: Performed by: FAMILY MEDICINE

## 2022-06-25 PROCEDURE — 85027 COMPLETE CBC AUTOMATED: CPT | Performed by: FAMILY MEDICINE

## 2022-06-25 RX ORDER — PANTOPRAZOLE SODIUM 40 MG/1
40 TABLET, DELAYED RELEASE ORAL
Status: DISCONTINUED | OUTPATIENT
Start: 2022-06-25 | End: 2022-07-19 | Stop reason: HOSPADM

## 2022-06-25 RX ORDER — DOXYCYCLINE 100 MG/1
100 CAPSULE ORAL EVERY 12 HOURS SCHEDULED
Status: COMPLETED | OUTPATIENT
Start: 2022-06-25 | End: 2022-06-29

## 2022-06-25 RX ORDER — MORPHINE SULFATE 30 MG/1
30 TABLET, FILM COATED, EXTENDED RELEASE ORAL EVERY 12 HOURS SCHEDULED
Status: COMPLETED | OUTPATIENT
Start: 2022-06-25 | End: 2022-07-07

## 2022-06-25 RX ADMIN — HYDROCORTISONE SODIUM SUCCINATE 100 MG: 100 INJECTION, POWDER, FOR SOLUTION INTRAMUSCULAR; INTRAVENOUS at 13:00

## 2022-06-25 RX ADMIN — HYDROCORTISONE SODIUM SUCCINATE 100 MG: 100 INJECTION, POWDER, FOR SOLUTION INTRAMUSCULAR; INTRAVENOUS at 05:12

## 2022-06-25 RX ADMIN — NORTRIPTYLINE HYDROCHLORIDE 50 MG: 10 CAPSULE ORAL at 20:37

## 2022-06-25 RX ADMIN — MORPHINE SULFATE 30 MG: 30 TABLET, FILM COATED, EXTENDED RELEASE ORAL at 10:06

## 2022-06-25 RX ADMIN — DOXYCYCLINE 100 MG: 100 CAPSULE ORAL at 20:38

## 2022-06-25 RX ADMIN — GABAPENTIN 800 MG: 400 CAPSULE ORAL at 10:06

## 2022-06-25 RX ADMIN — NORTRIPTYLINE HYDROCHLORIDE 50 MG: 10 CAPSULE ORAL at 15:16

## 2022-06-25 RX ADMIN — DOXYCYCLINE 100 MG: 100 CAPSULE ORAL at 13:00

## 2022-06-25 RX ADMIN — SODIUM CHLORIDE 75 ML/HR: 9 INJECTION, SOLUTION INTRAVENOUS at 10:20

## 2022-06-25 RX ADMIN — PANTOPRAZOLE SODIUM 40 MG: 40 TABLET, DELAYED RELEASE ORAL at 13:00

## 2022-06-25 RX ADMIN — SODIUM CHLORIDE 75 ML/HR: 9 INJECTION, SOLUTION INTRAVENOUS at 22:42

## 2022-06-25 RX ADMIN — NORTRIPTYLINE HYDROCHLORIDE 50 MG: 10 CAPSULE ORAL at 10:56

## 2022-06-25 RX ADMIN — OXYCODONE HYDROCHLORIDE AND ACETAMINOPHEN 1 TABLET: 10; 325 TABLET ORAL at 20:52

## 2022-06-25 RX ADMIN — LEVOTHYROXINE SODIUM 88 MCG: 88 TABLET ORAL at 05:12

## 2022-06-25 RX ADMIN — GABAPENTIN 800 MG: 400 CAPSULE ORAL at 13:00

## 2022-06-25 RX ADMIN — OXYCODONE HYDROCHLORIDE AND ACETAMINOPHEN 1 TABLET: 10; 325 TABLET ORAL at 05:15

## 2022-06-25 RX ADMIN — MORPHINE SULFATE 2 MG: 2 INJECTION, SOLUTION INTRAMUSCULAR; INTRAVENOUS at 05:34

## 2022-06-25 RX ADMIN — HYDROCORTISONE SODIUM SUCCINATE 100 MG: 100 INJECTION, POWDER, FOR SOLUTION INTRAMUSCULAR; INTRAVENOUS at 20:37

## 2022-06-25 RX ADMIN — MORPHINE SULFATE 2 MG: 2 INJECTION, SOLUTION INTRAMUSCULAR; INTRAVENOUS at 22:54

## 2022-06-25 RX ADMIN — MORPHINE SULFATE 30 MG: 30 TABLET, FILM COATED, EXTENDED RELEASE ORAL at 23:35

## 2022-06-25 RX ADMIN — GABAPENTIN 800 MG: 400 CAPSULE ORAL at 20:38

## 2022-06-25 RX ADMIN — GABAPENTIN 800 MG: 400 CAPSULE ORAL at 17:26

## 2022-06-26 PROCEDURE — 25010000002 HYDROCORTISONE SODIUM SUCCINATE 100 MG RECONSTITUTED SOLUTION: Performed by: FAMILY MEDICINE

## 2022-06-26 PROCEDURE — 25010000002 HYDROCORTISONE SODIUM SUCCINATE 100 MG RECONSTITUTED SOLUTION: Performed by: INTERNAL MEDICINE

## 2022-06-26 PROCEDURE — 97535 SELF CARE MNGMENT TRAINING: CPT

## 2022-06-26 PROCEDURE — 97530 THERAPEUTIC ACTIVITIES: CPT

## 2022-06-26 PROCEDURE — 99232 SBSQ HOSP IP/OBS MODERATE 35: CPT | Performed by: INTERNAL MEDICINE

## 2022-06-26 PROCEDURE — 97165 OT EVAL LOW COMPLEX 30 MIN: CPT

## 2022-06-26 PROCEDURE — 97162 PT EVAL MOD COMPLEX 30 MIN: CPT

## 2022-06-26 RX ORDER — CYCLOBENZAPRINE HCL 10 MG
5 TABLET ORAL ONCE
Status: COMPLETED | OUTPATIENT
Start: 2022-06-26 | End: 2022-06-26

## 2022-06-26 RX ORDER — MAGNESIUM SULFATE 1 G/100ML
1 INJECTION INTRAVENOUS AS NEEDED
Status: DISCONTINUED | OUTPATIENT
Start: 2022-06-26 | End: 2022-07-19 | Stop reason: HOSPADM

## 2022-06-26 RX ORDER — MAGNESIUM SULFATE HEPTAHYDRATE 40 MG/ML
4 INJECTION, SOLUTION INTRAVENOUS AS NEEDED
Status: DISCONTINUED | OUTPATIENT
Start: 2022-06-26 | End: 2022-07-19 | Stop reason: HOSPADM

## 2022-06-26 RX ORDER — MAGNESIUM SULFATE HEPTAHYDRATE 40 MG/ML
2 INJECTION, SOLUTION INTRAVENOUS AS NEEDED
Status: DISCONTINUED | OUTPATIENT
Start: 2022-06-26 | End: 2022-07-19 | Stop reason: HOSPADM

## 2022-06-26 RX ADMIN — NORTRIPTYLINE HYDROCHLORIDE 50 MG: 10 CAPSULE ORAL at 08:27

## 2022-06-26 RX ADMIN — MORPHINE SULFATE 30 MG: 30 TABLET, FILM COATED, EXTENDED RELEASE ORAL at 22:21

## 2022-06-26 RX ADMIN — GABAPENTIN 800 MG: 400 CAPSULE ORAL at 12:25

## 2022-06-26 RX ADMIN — OXYCODONE HYDROCHLORIDE AND ACETAMINOPHEN 1 TABLET: 10; 325 TABLET ORAL at 05:14

## 2022-06-26 RX ADMIN — HYDROCORTISONE SODIUM SUCCINATE 100 MG: 100 INJECTION, POWDER, FOR SOLUTION INTRAMUSCULAR; INTRAVENOUS at 05:06

## 2022-06-26 RX ADMIN — PANTOPRAZOLE SODIUM 40 MG: 40 TABLET, DELAYED RELEASE ORAL at 05:06

## 2022-06-26 RX ADMIN — MORPHINE SULFATE 30 MG: 30 TABLET, FILM COATED, EXTENDED RELEASE ORAL at 08:27

## 2022-06-26 RX ADMIN — Medication 10 ML: at 16:15

## 2022-06-26 RX ADMIN — NORTRIPTYLINE HYDROCHLORIDE 50 MG: 10 CAPSULE ORAL at 16:01

## 2022-06-26 RX ADMIN — GABAPENTIN 800 MG: 400 CAPSULE ORAL at 08:27

## 2022-06-26 RX ADMIN — GABAPENTIN 800 MG: 400 CAPSULE ORAL at 21:01

## 2022-06-26 RX ADMIN — CYCLOBENZAPRINE 5 MG: 10 TABLET, FILM COATED ORAL at 06:13

## 2022-06-26 RX ADMIN — DOXYCYCLINE 100 MG: 100 CAPSULE ORAL at 21:01

## 2022-06-26 RX ADMIN — NORTRIPTYLINE HYDROCHLORIDE 50 MG: 10 CAPSULE ORAL at 21:03

## 2022-06-26 RX ADMIN — DOXYCYCLINE 100 MG: 100 CAPSULE ORAL at 08:27

## 2022-06-26 RX ADMIN — HYDROCORTISONE SODIUM SUCCINATE 100 MG: 100 INJECTION, POWDER, FOR SOLUTION INTRAMUSCULAR; INTRAVENOUS at 16:14

## 2022-06-26 RX ADMIN — SODIUM CHLORIDE 75 ML/HR: 9 INJECTION, SOLUTION INTRAVENOUS at 20:09

## 2022-06-26 RX ADMIN — OXYCODONE HYDROCHLORIDE AND ACETAMINOPHEN 1 TABLET: 10; 325 TABLET ORAL at 15:19

## 2022-06-26 RX ADMIN — GABAPENTIN 800 MG: 400 CAPSULE ORAL at 17:35

## 2022-06-26 RX ADMIN — LEVOTHYROXINE SODIUM 88 MCG: 88 TABLET ORAL at 05:06

## 2022-06-27 PROBLEM — N39.0 ACUTE UTI (URINARY TRACT INFECTION): Status: ACTIVE | Noted: 2022-06-27

## 2022-06-27 LAB
ANION GAP SERPL CALCULATED.3IONS-SCNC: 6 MMOL/L (ref 5–15)
BACTERIA UR QL AUTO: ABNORMAL /HPF
BILIRUB UR QL STRIP: NEGATIVE
BUN SERPL-MCNC: 18 MG/DL (ref 8–23)
BUN/CREAT SERPL: 29 (ref 7–25)
CALCIUM SPEC-SCNC: 8.2 MG/DL (ref 8.6–10.5)
CHLORIDE SERPL-SCNC: 108 MMOL/L (ref 98–107)
CLARITY UR: ABNORMAL
CO2 SERPL-SCNC: 27 MMOL/L (ref 22–29)
COLOR UR: YELLOW
CREAT SERPL-MCNC: 0.62 MG/DL (ref 0.57–1)
DEPRECATED RDW RBC AUTO: 52.2 FL (ref 37–54)
EGFRCR SERPLBLD CKD-EPI 2021: 94.2 ML/MIN/1.73
ERYTHROCYTE [DISTWIDTH] IN BLOOD BY AUTOMATED COUNT: 13.7 % (ref 12.3–15.4)
GLUCOSE BLDC GLUCOMTR-MCNC: 144 MG/DL (ref 70–130)
GLUCOSE BLDC GLUCOMTR-MCNC: 86 MG/DL (ref 70–130)
GLUCOSE BLDC GLUCOMTR-MCNC: 91 MG/DL (ref 70–130)
GLUCOSE SERPL-MCNC: 86 MG/DL (ref 65–99)
GLUCOSE UR STRIP-MCNC: NEGATIVE MG/DL
HBA1C MFR BLD: 5.3 % (ref 4.8–5.6)
HCT VFR BLD AUTO: 34.1 % (ref 34–46.6)
HGB BLD-MCNC: 11 G/DL (ref 12–15.9)
HGB UR QL STRIP.AUTO: ABNORMAL
HYALINE CASTS UR QL AUTO: ABNORMAL /LPF
KETONES UR QL STRIP: NEGATIVE
LEUKOCYTE ESTERASE UR QL STRIP.AUTO: ABNORMAL
MAGNESIUM SERPL-MCNC: 2.1 MG/DL (ref 1.6–2.4)
MCH RBC QN AUTO: 33 PG (ref 26.6–33)
MCHC RBC AUTO-ENTMCNC: 32.3 G/DL (ref 31.5–35.7)
MCV RBC AUTO: 102.4 FL (ref 79–97)
NITRITE UR QL STRIP: POSITIVE
PH UR STRIP.AUTO: >=9 [PH] (ref 5–8)
PLATELET # BLD AUTO: 179 10*3/MM3 (ref 140–450)
PMV BLD AUTO: 10.8 FL (ref 6–12)
POTASSIUM SERPL-SCNC: 3.4 MMOL/L (ref 3.5–5.2)
PROT UR QL STRIP: ABNORMAL
RBC # BLD AUTO: 3.33 10*6/MM3 (ref 3.77–5.28)
RBC # UR STRIP: ABNORMAL /HPF
REF LAB TEST METHOD: ABNORMAL
SODIUM SERPL-SCNC: 141 MMOL/L (ref 136–145)
SP GR UR STRIP: 1.02 (ref 1–1.03)
SQUAMOUS #/AREA URNS HPF: ABNORMAL /HPF
UROBILINOGEN UR QL STRIP: ABNORMAL
WBC # UR STRIP: ABNORMAL /HPF
WBC NRBC COR # BLD: 10.33 10*3/MM3 (ref 3.4–10.8)

## 2022-06-27 PROCEDURE — 87181 SC STD AGAR DILUTION PER AGT: CPT | Performed by: PHYSICIAN ASSISTANT

## 2022-06-27 PROCEDURE — 85027 COMPLETE CBC AUTOMATED: CPT | Performed by: INTERNAL MEDICINE

## 2022-06-27 PROCEDURE — 82962 GLUCOSE BLOOD TEST: CPT

## 2022-06-27 PROCEDURE — 81001 URINALYSIS AUTO W/SCOPE: CPT | Performed by: PHYSICIAN ASSISTANT

## 2022-06-27 PROCEDURE — 87186 SC STD MICRODIL/AGAR DIL: CPT | Performed by: PHYSICIAN ASSISTANT

## 2022-06-27 PROCEDURE — 97530 THERAPEUTIC ACTIVITIES: CPT

## 2022-06-27 PROCEDURE — 87086 URINE CULTURE/COLONY COUNT: CPT | Performed by: PHYSICIAN ASSISTANT

## 2022-06-27 PROCEDURE — 97535 SELF CARE MNGMENT TRAINING: CPT

## 2022-06-27 PROCEDURE — 87077 CULTURE AEROBIC IDENTIFY: CPT | Performed by: PHYSICIAN ASSISTANT

## 2022-06-27 PROCEDURE — 83735 ASSAY OF MAGNESIUM: CPT | Performed by: INTERNAL MEDICINE

## 2022-06-27 PROCEDURE — 80048 BASIC METABOLIC PNL TOTAL CA: CPT | Performed by: INTERNAL MEDICINE

## 2022-06-27 PROCEDURE — 97110 THERAPEUTIC EXERCISES: CPT

## 2022-06-27 PROCEDURE — 99232 SBSQ HOSP IP/OBS MODERATE 35: CPT | Performed by: INTERNAL MEDICINE

## 2022-06-27 PROCEDURE — 25010000002 HYDROCORTISONE SODIUM SUCCINATE 100 MG RECONSTITUTED SOLUTION: Performed by: INTERNAL MEDICINE

## 2022-06-27 PROCEDURE — 94799 UNLISTED PULMONARY SVC/PX: CPT

## 2022-06-27 PROCEDURE — 83036 HEMOGLOBIN GLYCOSYLATED A1C: CPT | Performed by: INTERNAL MEDICINE

## 2022-06-27 PROCEDURE — 94640 AIRWAY INHALATION TREATMENT: CPT

## 2022-06-27 RX ORDER — GRANULES FOR ORAL 3 G/1
3 POWDER ORAL ONCE
Status: COMPLETED | OUTPATIENT
Start: 2022-06-27 | End: 2022-06-27

## 2022-06-27 RX ORDER — GRANULES FOR ORAL 3 G/1
3 POWDER ORAL ONCE
Status: COMPLETED | OUTPATIENT
Start: 2022-06-30 | End: 2022-06-30

## 2022-06-27 RX ORDER — LISINOPRIL 5 MG/1
5 TABLET ORAL
Status: DISCONTINUED | OUTPATIENT
Start: 2022-06-27 | End: 2022-07-11

## 2022-06-27 RX ADMIN — ALBUTEROL SULFATE 2.5 MG: 2.5 SOLUTION RESPIRATORY (INHALATION) at 04:52

## 2022-06-27 RX ADMIN — MORPHINE SULFATE 30 MG: 30 TABLET, FILM COATED, EXTENDED RELEASE ORAL at 09:01

## 2022-06-27 RX ADMIN — PANTOPRAZOLE SODIUM 40 MG: 40 TABLET, DELAYED RELEASE ORAL at 05:58

## 2022-06-27 RX ADMIN — OXYCODONE HYDROCHLORIDE AND ACETAMINOPHEN 1 TABLET: 10; 325 TABLET ORAL at 20:46

## 2022-06-27 RX ADMIN — HYDROCORTISONE SODIUM SUCCINATE 50 MG: 100 INJECTION, POWDER, FOR SOLUTION INTRAMUSCULAR; INTRAVENOUS at 20:48

## 2022-06-27 RX ADMIN — OXYCODONE HYDROCHLORIDE AND ACETAMINOPHEN 1 TABLET: 10; 325 TABLET ORAL at 12:34

## 2022-06-27 RX ADMIN — MORPHINE SULFATE 30 MG: 30 TABLET, FILM COATED, EXTENDED RELEASE ORAL at 20:47

## 2022-06-27 RX ADMIN — DOXYCYCLINE 100 MG: 100 CAPSULE ORAL at 20:47

## 2022-06-27 RX ADMIN — DOXYCYCLINE 100 MG: 100 CAPSULE ORAL at 09:01

## 2022-06-27 RX ADMIN — LEVOTHYROXINE SODIUM 88 MCG: 88 TABLET ORAL at 05:58

## 2022-06-27 RX ADMIN — NORTRIPTYLINE HYDROCHLORIDE 50 MG: 10 CAPSULE ORAL at 09:01

## 2022-06-27 RX ADMIN — GRANULES FOR ORAL SOLUTION 3 G: 3 POWDER ORAL at 04:34

## 2022-06-27 RX ADMIN — GABAPENTIN 800 MG: 400 CAPSULE ORAL at 09:01

## 2022-06-27 RX ADMIN — GABAPENTIN 800 MG: 400 CAPSULE ORAL at 11:58

## 2022-06-27 RX ADMIN — LISINOPRIL 5 MG: 5 TABLET ORAL at 13:14

## 2022-06-27 RX ADMIN — GABAPENTIN 800 MG: 400 CAPSULE ORAL at 17:28

## 2022-06-27 RX ADMIN — GABAPENTIN 800 MG: 400 CAPSULE ORAL at 20:47

## 2022-06-27 RX ADMIN — OXYCODONE HYDROCHLORIDE AND ACETAMINOPHEN 1 TABLET: 10; 325 TABLET ORAL at 00:30

## 2022-06-27 RX ADMIN — SODIUM CHLORIDE 75 ML/HR: 9 INJECTION, SOLUTION INTRAVENOUS at 09:01

## 2022-06-27 RX ADMIN — NORTRIPTYLINE HYDROCHLORIDE 50 MG: 10 CAPSULE ORAL at 15:52

## 2022-06-27 RX ADMIN — HYDROCORTISONE SODIUM SUCCINATE 50 MG: 100 INJECTION, POWDER, FOR SOLUTION INTRAMUSCULAR; INTRAVENOUS at 13:14

## 2022-06-27 RX ADMIN — NORTRIPTYLINE HYDROCHLORIDE 50 MG: 10 CAPSULE ORAL at 20:46

## 2022-06-27 RX ADMIN — HYDROCORTISONE SODIUM SUCCINATE 75 MG: 100 INJECTION, POWDER, FOR SOLUTION INTRAMUSCULAR; INTRAVENOUS at 05:57

## 2022-06-28 LAB
ANION GAP SERPL CALCULATED.3IONS-SCNC: 5 MMOL/L (ref 5–15)
BUN SERPL-MCNC: 12 MG/DL (ref 8–23)
BUN/CREAT SERPL: 25 (ref 7–25)
CALCIUM SPEC-SCNC: 8.4 MG/DL (ref 8.6–10.5)
CHLORIDE SERPL-SCNC: 104 MMOL/L (ref 98–107)
CO2 SERPL-SCNC: 28 MMOL/L (ref 22–29)
CREAT SERPL-MCNC: 0.48 MG/DL (ref 0.57–1)
DEPRECATED RDW RBC AUTO: 52.8 FL (ref 37–54)
EGFRCR SERPLBLD CKD-EPI 2021: 100.2 ML/MIN/1.73
ERYTHROCYTE [DISTWIDTH] IN BLOOD BY AUTOMATED COUNT: 13.9 % (ref 12.3–15.4)
GLUCOSE BLDC GLUCOMTR-MCNC: 129 MG/DL (ref 70–130)
GLUCOSE BLDC GLUCOMTR-MCNC: 90 MG/DL (ref 70–130)
GLUCOSE SERPL-MCNC: 106 MG/DL (ref 65–99)
HCT VFR BLD AUTO: 34.2 % (ref 34–46.6)
HGB BLD-MCNC: 11 G/DL (ref 12–15.9)
MAGNESIUM SERPL-MCNC: 2 MG/DL (ref 1.6–2.4)
MCH RBC QN AUTO: 32.9 PG (ref 26.6–33)
MCHC RBC AUTO-ENTMCNC: 32.2 G/DL (ref 31.5–35.7)
MCV RBC AUTO: 102.4 FL (ref 79–97)
PLATELET # BLD AUTO: 178 10*3/MM3 (ref 140–450)
PMV BLD AUTO: 10.2 FL (ref 6–12)
POTASSIUM SERPL-SCNC: 3.4 MMOL/L (ref 3.5–5.2)
RBC # BLD AUTO: 3.34 10*6/MM3 (ref 3.77–5.28)
SODIUM SERPL-SCNC: 137 MMOL/L (ref 136–145)
WBC NRBC COR # BLD: 8.19 10*3/MM3 (ref 3.4–10.8)

## 2022-06-28 PROCEDURE — 82962 GLUCOSE BLOOD TEST: CPT

## 2022-06-28 PROCEDURE — 80048 BASIC METABOLIC PNL TOTAL CA: CPT | Performed by: INTERNAL MEDICINE

## 2022-06-28 PROCEDURE — 97110 THERAPEUTIC EXERCISES: CPT

## 2022-06-28 PROCEDURE — 99232 SBSQ HOSP IP/OBS MODERATE 35: CPT | Performed by: INTERNAL MEDICINE

## 2022-06-28 PROCEDURE — 83735 ASSAY OF MAGNESIUM: CPT | Performed by: INTERNAL MEDICINE

## 2022-06-28 PROCEDURE — 97535 SELF CARE MNGMENT TRAINING: CPT | Performed by: OCCUPATIONAL THERAPIST

## 2022-06-28 PROCEDURE — 25010000002 HYDROCORTISONE SODIUM SUCCINATE 100 MG RECONSTITUTED SOLUTION: Performed by: INTERNAL MEDICINE

## 2022-06-28 PROCEDURE — 97530 THERAPEUTIC ACTIVITIES: CPT

## 2022-06-28 PROCEDURE — 85027 COMPLETE CBC AUTOMATED: CPT | Performed by: INTERNAL MEDICINE

## 2022-06-28 RX ORDER — OXYCODONE AND ACETAMINOPHEN 10; 325 MG/1; MG/1
1 TABLET ORAL EVERY 6 HOURS PRN
Status: DISCONTINUED | OUTPATIENT
Start: 2022-06-28 | End: 2022-07-03

## 2022-06-28 RX ORDER — POTASSIUM CHLORIDE 7.45 MG/ML
10 INJECTION INTRAVENOUS
Status: DISCONTINUED | OUTPATIENT
Start: 2022-06-28 | End: 2022-07-19 | Stop reason: HOSPADM

## 2022-06-28 RX ORDER — POTASSIUM CHLORIDE 1.5 G/1.77G
40 POWDER, FOR SOLUTION ORAL AS NEEDED
Status: DISCONTINUED | OUTPATIENT
Start: 2022-06-28 | End: 2022-07-19 | Stop reason: HOSPADM

## 2022-06-28 RX ORDER — POTASSIUM CHLORIDE 750 MG/1
40 CAPSULE, EXTENDED RELEASE ORAL AS NEEDED
Status: DISCONTINUED | OUTPATIENT
Start: 2022-06-28 | End: 2022-07-19 | Stop reason: HOSPADM

## 2022-06-28 RX ADMIN — MORPHINE SULFATE 30 MG: 30 TABLET, FILM COATED, EXTENDED RELEASE ORAL at 08:01

## 2022-06-28 RX ADMIN — PANTOPRAZOLE SODIUM 40 MG: 40 TABLET, DELAYED RELEASE ORAL at 05:45

## 2022-06-28 RX ADMIN — LISINOPRIL 5 MG: 5 TABLET ORAL at 08:01

## 2022-06-28 RX ADMIN — HYDROCORTISONE SODIUM SUCCINATE 50 MG: 100 INJECTION, POWDER, FOR SOLUTION INTRAMUSCULAR; INTRAVENOUS at 13:49

## 2022-06-28 RX ADMIN — DOXYCYCLINE 100 MG: 100 CAPSULE ORAL at 20:55

## 2022-06-28 RX ADMIN — NORTRIPTYLINE HYDROCHLORIDE 50 MG: 10 CAPSULE ORAL at 20:54

## 2022-06-28 RX ADMIN — DOXYCYCLINE 100 MG: 100 CAPSULE ORAL at 08:01

## 2022-06-28 RX ADMIN — HYDROCORTISONE SODIUM SUCCINATE 50 MG: 100 INJECTION, POWDER, FOR SOLUTION INTRAMUSCULAR; INTRAVENOUS at 05:44

## 2022-06-28 RX ADMIN — OXYCODONE HYDROCHLORIDE AND ACETAMINOPHEN 1 TABLET: 10; 325 TABLET ORAL at 07:55

## 2022-06-28 RX ADMIN — LEVOTHYROXINE SODIUM 88 MCG: 88 TABLET ORAL at 05:44

## 2022-06-28 RX ADMIN — GABAPENTIN 800 MG: 400 CAPSULE ORAL at 18:51

## 2022-06-28 RX ADMIN — GABAPENTIN 800 MG: 400 CAPSULE ORAL at 20:54

## 2022-06-28 RX ADMIN — MORPHINE SULFATE 30 MG: 30 TABLET, FILM COATED, EXTENDED RELEASE ORAL at 20:55

## 2022-06-28 RX ADMIN — POTASSIUM CHLORIDE 40 MEQ: 750 CAPSULE, EXTENDED RELEASE ORAL at 21:06

## 2022-06-28 RX ADMIN — GABAPENTIN 800 MG: 400 CAPSULE ORAL at 13:49

## 2022-06-28 RX ADMIN — HYDROCORTISONE SODIUM SUCCINATE 50 MG: 100 INJECTION, POWDER, FOR SOLUTION INTRAMUSCULAR; INTRAVENOUS at 20:54

## 2022-06-28 RX ADMIN — NORTRIPTYLINE HYDROCHLORIDE 50 MG: 10 CAPSULE ORAL at 18:51

## 2022-06-28 RX ADMIN — GABAPENTIN 800 MG: 400 CAPSULE ORAL at 08:01

## 2022-06-28 RX ADMIN — OXYCODONE HYDROCHLORIDE AND ACETAMINOPHEN 1 TABLET: 10; 325 TABLET ORAL at 18:51

## 2022-06-28 RX ADMIN — POTASSIUM CHLORIDE 40 MEQ: 750 CAPSULE, EXTENDED RELEASE ORAL at 14:00

## 2022-06-28 RX ADMIN — NORTRIPTYLINE HYDROCHLORIDE 50 MG: 10 CAPSULE ORAL at 08:01

## 2022-06-29 ENCOUNTER — ANESTHESIA EVENT (OUTPATIENT)
Dept: TELEMETRY | Facility: HOSPITAL | Age: 72
End: 2022-06-29

## 2022-06-29 LAB
ANION GAP SERPL CALCULATED.3IONS-SCNC: 8 MMOL/L (ref 5–15)
BUN SERPL-MCNC: 10 MG/DL (ref 8–23)
BUN/CREAT SERPL: 18.2 (ref 7–25)
CALCIUM SPEC-SCNC: 8.3 MG/DL (ref 8.6–10.5)
CHLORIDE SERPL-SCNC: 108 MMOL/L (ref 98–107)
CO2 SERPL-SCNC: 27 MMOL/L (ref 22–29)
CREAT SERPL-MCNC: 0.55 MG/DL (ref 0.57–1)
EGFRCR SERPLBLD CKD-EPI 2021: 96.9 ML/MIN/1.73
GLUCOSE BLDC GLUCOMTR-MCNC: 114 MG/DL (ref 70–130)
GLUCOSE SERPL-MCNC: 110 MG/DL (ref 65–99)
POTASSIUM SERPL-SCNC: 4.2 MMOL/L (ref 3.5–5.2)
SODIUM SERPL-SCNC: 143 MMOL/L (ref 136–145)

## 2022-06-29 PROCEDURE — 99233 SBSQ HOSP IP/OBS HIGH 50: CPT | Performed by: INTERNAL MEDICINE

## 2022-06-29 PROCEDURE — 94799 UNLISTED PULMONARY SVC/PX: CPT

## 2022-06-29 PROCEDURE — 82962 GLUCOSE BLOOD TEST: CPT

## 2022-06-29 PROCEDURE — 94761 N-INVAS EAR/PLS OXIMETRY MLT: CPT

## 2022-06-29 PROCEDURE — 94664 DEMO&/EVAL PT USE INHALER: CPT

## 2022-06-29 PROCEDURE — 25010000002 HYDROCORTISONE SODIUM SUCCINATE 100 MG RECONSTITUTED SOLUTION: Performed by: INTERNAL MEDICINE

## 2022-06-29 PROCEDURE — 80048 BASIC METABOLIC PNL TOTAL CA: CPT | Performed by: INTERNAL MEDICINE

## 2022-06-29 PROCEDURE — 97530 THERAPEUTIC ACTIVITIES: CPT

## 2022-06-29 RX ORDER — BUDESONIDE AND FORMOTEROL FUMARATE DIHYDRATE 160; 4.5 UG/1; UG/1
2 AEROSOL RESPIRATORY (INHALATION)
Status: DISCONTINUED | OUTPATIENT
Start: 2022-06-29 | End: 2022-07-19 | Stop reason: HOSPADM

## 2022-06-29 RX ORDER — PHENAZOPYRIDINE HYDROCHLORIDE 100 MG/1
100 TABLET, FILM COATED ORAL
Status: DISCONTINUED | OUTPATIENT
Start: 2022-06-29 | End: 2022-06-30

## 2022-06-29 RX ORDER — SPIRONOLACTONE 25 MG/1
50 TABLET ORAL DAILY
Status: DISCONTINUED | OUTPATIENT
Start: 2022-06-29 | End: 2022-07-19 | Stop reason: HOSPADM

## 2022-06-29 RX ORDER — FUROSEMIDE 20 MG/1
20 TABLET ORAL ONCE
Status: COMPLETED | OUTPATIENT
Start: 2022-06-29 | End: 2022-06-29

## 2022-06-29 RX ADMIN — PHENAZOPYRIDINE HYDROCHLORIDE 100 MG: 100 TABLET ORAL at 11:31

## 2022-06-29 RX ADMIN — NORTRIPTYLINE HYDROCHLORIDE 50 MG: 10 CAPSULE ORAL at 18:48

## 2022-06-29 RX ADMIN — FUROSEMIDE 20 MG: 20 TABLET ORAL at 11:31

## 2022-06-29 RX ADMIN — GABAPENTIN 800 MG: 400 CAPSULE ORAL at 18:48

## 2022-06-29 RX ADMIN — MORPHINE SULFATE 30 MG: 30 TABLET, FILM COATED, EXTENDED RELEASE ORAL at 20:46

## 2022-06-29 RX ADMIN — SODIUM CHLORIDE 75 ML/HR: 9 INJECTION, SOLUTION INTRAVENOUS at 01:05

## 2022-06-29 RX ADMIN — GABAPENTIN 800 MG: 400 CAPSULE ORAL at 11:31

## 2022-06-29 RX ADMIN — PHENAZOPYRIDINE HYDROCHLORIDE 100 MG: 100 TABLET ORAL at 18:48

## 2022-06-29 RX ADMIN — DOXYCYCLINE 100 MG: 100 CAPSULE ORAL at 20:47

## 2022-06-29 RX ADMIN — OXYCODONE HYDROCHLORIDE AND ACETAMINOPHEN 1 TABLET: 10; 325 TABLET ORAL at 08:09

## 2022-06-29 RX ADMIN — LISINOPRIL 5 MG: 5 TABLET ORAL at 08:21

## 2022-06-29 RX ADMIN — HYDROCORTISONE SODIUM SUCCINATE 50 MG: 100 INJECTION, POWDER, FOR SOLUTION INTRAMUSCULAR; INTRAVENOUS at 05:52

## 2022-06-29 RX ADMIN — DOXYCYCLINE 100 MG: 100 CAPSULE ORAL at 08:09

## 2022-06-29 RX ADMIN — PANTOPRAZOLE SODIUM 40 MG: 40 TABLET, DELAYED RELEASE ORAL at 05:52

## 2022-06-29 RX ADMIN — OXYCODONE HYDROCHLORIDE AND ACETAMINOPHEN 1 TABLET: 10; 325 TABLET ORAL at 14:44

## 2022-06-29 RX ADMIN — MORPHINE SULFATE 30 MG: 30 TABLET, FILM COATED, EXTENDED RELEASE ORAL at 08:09

## 2022-06-29 RX ADMIN — NORTRIPTYLINE HYDROCHLORIDE 50 MG: 10 CAPSULE ORAL at 20:46

## 2022-06-29 RX ADMIN — NORTRIPTYLINE HYDROCHLORIDE 50 MG: 10 CAPSULE ORAL at 08:18

## 2022-06-29 RX ADMIN — GABAPENTIN 800 MG: 400 CAPSULE ORAL at 08:09

## 2022-06-29 RX ADMIN — LEVOTHYROXINE SODIUM 88 MCG: 88 TABLET ORAL at 05:52

## 2022-06-29 RX ADMIN — HYDROCORTISONE SODIUM SUCCINATE 50 MG: 100 INJECTION, POWDER, FOR SOLUTION INTRAMUSCULAR; INTRAVENOUS at 20:50

## 2022-06-29 RX ADMIN — BUDESONIDE AND FORMOTEROL FUMARATE DIHYDRATE 2 PUFF: 160; 4.5 AEROSOL RESPIRATORY (INHALATION) at 13:05

## 2022-06-29 RX ADMIN — BUDESONIDE AND FORMOTEROL FUMARATE DIHYDRATE 2 PUFF: 160; 4.5 AEROSOL RESPIRATORY (INHALATION) at 20:36

## 2022-06-29 RX ADMIN — Medication 10 ML: at 20:53

## 2022-06-29 RX ADMIN — OXYCODONE HYDROCHLORIDE AND ACETAMINOPHEN 1 TABLET: 10; 325 TABLET ORAL at 20:45

## 2022-06-29 RX ADMIN — HYDROCORTISONE SODIUM SUCCINATE 50 MG: 100 INJECTION, POWDER, FOR SOLUTION INTRAMUSCULAR; INTRAVENOUS at 13:11

## 2022-06-29 RX ADMIN — GABAPENTIN 800 MG: 400 CAPSULE ORAL at 20:47

## 2022-06-29 RX ADMIN — SPIRONOLACTONE 50 MG: 50 TABLET ORAL at 18:48

## 2022-06-29 RX ADMIN — OXYCODONE HYDROCHLORIDE AND ACETAMINOPHEN 1 TABLET: 10; 325 TABLET ORAL at 02:10

## 2022-06-30 ENCOUNTER — ANESTHESIA EVENT (OUTPATIENT)
Dept: TELEMETRY | Facility: HOSPITAL | Age: 72
End: 2022-06-30

## 2022-06-30 ENCOUNTER — APPOINTMENT (OUTPATIENT)
Dept: GENERAL RADIOLOGY | Facility: HOSPITAL | Age: 72
End: 2022-06-30

## 2022-06-30 ENCOUNTER — ANESTHESIA EVENT CONVERTED (OUTPATIENT)
Dept: ANESTHESIOLOGY | Facility: HOSPITAL | Age: 72
End: 2022-06-30

## 2022-06-30 ENCOUNTER — ANESTHESIA (OUTPATIENT)
Dept: TELEMETRY | Facility: HOSPITAL | Age: 72
End: 2022-06-30

## 2022-06-30 ENCOUNTER — ANESTHESIA (OUTPATIENT)
Dept: ANESTHESIOLOGY | Facility: HOSPITAL | Age: 72
End: 2022-06-30

## 2022-06-30 LAB
ALBUMIN SERPL-MCNC: 3.3 G/DL (ref 3.5–5.2)
ALBUMIN/GLOB SERPL: 1.4 G/DL
ALP SERPL-CCNC: 131 U/L (ref 39–117)
ALT SERPL W P-5'-P-CCNC: 42 U/L (ref 1–33)
ANION GAP SERPL CALCULATED.3IONS-SCNC: 11 MMOL/L (ref 5–15)
ARTERIAL PATENCY WRIST A: ABNORMAL
AST SERPL-CCNC: 43 U/L (ref 1–32)
ATMOSPHERIC PRESS: ABNORMAL MM[HG]
BACTERIA SPEC AEROBE CULT: ABNORMAL
BASE EXCESS BLDA CALC-SCNC: 3 MMOL/L (ref 0–2)
BASOPHILS # BLD AUTO: 0.04 10*3/MM3 (ref 0–0.2)
BASOPHILS NFR BLD AUTO: 0.3 % (ref 0–1.5)
BDY SITE: ABNORMAL
BILIRUB SERPL-MCNC: 0.5 MG/DL (ref 0–1.2)
BODY TEMPERATURE: 37 C
BUN SERPL-MCNC: 11 MG/DL (ref 8–23)
BUN/CREAT SERPL: 14.9 (ref 7–25)
CALCIUM SPEC-SCNC: 8.9 MG/DL (ref 8.6–10.5)
CHLORIDE SERPL-SCNC: 103 MMOL/L (ref 98–107)
CO2 BLDA-SCNC: 30.4 MMOL/L (ref 22–33)
CO2 SERPL-SCNC: 28 MMOL/L (ref 22–29)
COHGB MFR BLD: 0.9 % (ref 0–2)
CREAT SERPL-MCNC: 0.74 MG/DL (ref 0.57–1)
DEPRECATED RDW RBC AUTO: 55.7 FL (ref 37–54)
EGFRCR SERPLBLD CKD-EPI 2021: 85.6 ML/MIN/1.73
EOSINOPHIL # BLD AUTO: 0.08 10*3/MM3 (ref 0–0.4)
EOSINOPHIL NFR BLD AUTO: 0.6 % (ref 0.3–6.2)
ERYTHROCYTE [DISTWIDTH] IN BLOOD BY AUTOMATED COUNT: 14.1 % (ref 12.3–15.4)
GLOBULIN UR ELPH-MCNC: 2.3 GM/DL
GLUCOSE BLDC GLUCOMTR-MCNC: 111 MG/DL (ref 70–130)
GLUCOSE BLDC GLUCOMTR-MCNC: 129 MG/DL (ref 70–130)
GLUCOSE BLDC GLUCOMTR-MCNC: 92 MG/DL (ref 70–130)
GLUCOSE SERPL-MCNC: 151 MG/DL (ref 65–99)
HCO3 BLDA-SCNC: 28.9 MMOL/L (ref 20–26)
HCT VFR BLD AUTO: 39.5 % (ref 34–46.6)
HCT VFR BLD CALC: 41.3 % (ref 38–51)
HGB BLD-MCNC: 12.8 G/DL (ref 12–15.9)
HGB BLDA-MCNC: 13.5 G/DL (ref 14–18)
IMM GRANULOCYTES # BLD AUTO: 0.15 10*3/MM3 (ref 0–0.05)
IMM GRANULOCYTES NFR BLD AUTO: 1.2 % (ref 0–0.5)
INHALED O2 CONCENTRATION: 70 %
LYMPHOCYTES # BLD AUTO: 2.56 10*3/MM3 (ref 0.7–3.1)
LYMPHOCYTES NFR BLD AUTO: 20.2 % (ref 19.6–45.3)
MAGNESIUM SERPL-MCNC: 2.3 MG/DL (ref 1.6–2.4)
MCH RBC QN AUTO: 34.3 PG (ref 26.6–33)
MCHC RBC AUTO-ENTMCNC: 32.4 G/DL (ref 31.5–35.7)
MCV RBC AUTO: 105.9 FL (ref 79–97)
METHGB BLD QL: 0.7 % (ref 0–1.5)
MODALITY: ABNORMAL
MONOCYTES # BLD AUTO: 1.22 10*3/MM3 (ref 0.1–0.9)
MONOCYTES NFR BLD AUTO: 9.6 % (ref 5–12)
NEUTROPHILS NFR BLD AUTO: 68.1 % (ref 42.7–76)
NEUTROPHILS NFR BLD AUTO: 8.64 10*3/MM3 (ref 1.7–7)
NOTE: ABNORMAL
NRBC BLD AUTO-RTO: 0.2 /100 WBC (ref 0–0.2)
OXYHGB MFR BLDV: 97.7 % (ref 94–99)
PCO2 BLDA: 48.7 MM HG (ref 35–45)
PCO2 TEMP ADJ BLD: 48.7 MM HG (ref 35–45)
PH BLDA: 7.38 PH UNITS (ref 7.35–7.45)
PH, TEMP CORRECTED: 7.38 PH UNITS
PLATELET # BLD AUTO: 281 10*3/MM3 (ref 140–450)
PMV BLD AUTO: 10.2 FL (ref 6–12)
PO2 BLDA: 142 MM HG (ref 83–108)
PO2 TEMP ADJ BLD: 142 MM HG (ref 83–108)
POTASSIUM SERPL-SCNC: 4.7 MMOL/L (ref 3.5–5.2)
PROT SERPL-MCNC: 5.6 G/DL (ref 6–8.5)
QT INTERVAL: 342 MS
QT INTERVAL: 370 MS
QTC INTERVAL: 409 MS
QTC INTERVAL: 447 MS
RBC # BLD AUTO: 3.73 10*6/MM3 (ref 3.77–5.28)
SODIUM SERPL-SCNC: 142 MMOL/L (ref 136–145)
TROPONIN T SERPL-MCNC: <0.01 NG/ML (ref 0–0.03)
VENTILATOR MODE: ABNORMAL
WBC NRBC COR # BLD: 12.69 10*3/MM3 (ref 3.4–10.8)

## 2022-06-30 PROCEDURE — 99233 SBSQ HOSP IP/OBS HIGH 50: CPT | Performed by: INTERNAL MEDICINE

## 2022-06-30 PROCEDURE — 99291 CRITICAL CARE FIRST HOUR: CPT | Performed by: NURSE PRACTITIONER

## 2022-06-30 PROCEDURE — 82962 GLUCOSE BLOOD TEST: CPT

## 2022-06-30 PROCEDURE — 94799 UNLISTED PULMONARY SVC/PX: CPT

## 2022-06-30 PROCEDURE — 25010000002 HYDROCORTISONE SODIUM SUCCINATE 100 MG RECONSTITUTED SOLUTION: Performed by: INTERNAL MEDICINE

## 2022-06-30 PROCEDURE — 85025 COMPLETE CBC W/AUTO DIFF WBC: CPT | Performed by: INTERNAL MEDICINE

## 2022-06-30 PROCEDURE — 93005 ELECTROCARDIOGRAM TRACING: CPT | Performed by: INTERNAL MEDICINE

## 2022-06-30 PROCEDURE — 73560 X-RAY EXAM OF KNEE 1 OR 2: CPT

## 2022-06-30 PROCEDURE — 82375 ASSAY CARBOXYHB QUANT: CPT

## 2022-06-30 PROCEDURE — 84484 ASSAY OF TROPONIN QUANT: CPT | Performed by: NURSE PRACTITIONER

## 2022-06-30 PROCEDURE — 36600 WITHDRAWAL OF ARTERIAL BLOOD: CPT

## 2022-06-30 PROCEDURE — 25010000002 MORPHINE PER 10 MG: Performed by: FAMILY MEDICINE

## 2022-06-30 PROCEDURE — 82805 BLOOD GASES W/O2 SATURATION: CPT

## 2022-06-30 PROCEDURE — 83735 ASSAY OF MAGNESIUM: CPT | Performed by: NURSE PRACTITIONER

## 2022-06-30 PROCEDURE — 83050 HGB METHEMOGLOBIN QUAN: CPT

## 2022-06-30 PROCEDURE — 80053 COMPREHEN METABOLIC PANEL: CPT | Performed by: INTERNAL MEDICINE

## 2022-06-30 PROCEDURE — 71045 X-RAY EXAM CHEST 1 VIEW: CPT

## 2022-06-30 PROCEDURE — 94664 DEMO&/EVAL PT USE INHALER: CPT

## 2022-06-30 PROCEDURE — 93010 ELECTROCARDIOGRAM REPORT: CPT | Performed by: INTERNAL MEDICINE

## 2022-06-30 RX ORDER — BUPIVACAINE HYDROCHLORIDE 2.5 MG/ML
INJECTION, SOLUTION EPIDURAL; INFILTRATION; INTRACAUDAL
Status: COMPLETED | OUTPATIENT
Start: 2022-06-30 | End: 2022-06-30

## 2022-06-30 RX ORDER — HYDRALAZINE HYDROCHLORIDE 20 MG/ML
10 INJECTION INTRAMUSCULAR; INTRAVENOUS EVERY 6 HOURS PRN
Status: DISCONTINUED | OUTPATIENT
Start: 2022-06-30 | End: 2022-07-11

## 2022-06-30 RX ORDER — PINDOLOL 5 MG/1
2.5 TABLET ORAL 3 TIMES DAILY
Status: DISCONTINUED | OUTPATIENT
Start: 2022-06-30 | End: 2022-07-06 | Stop reason: SDUPTHER

## 2022-06-30 RX ORDER — LABETALOL HYDROCHLORIDE 5 MG/ML
20 INJECTION, SOLUTION INTRAVENOUS EVERY 6 HOURS
Status: DISCONTINUED | OUTPATIENT
Start: 2022-06-30 | End: 2022-06-30

## 2022-06-30 RX ORDER — LABETALOL HYDROCHLORIDE 5 MG/ML
20 INJECTION, SOLUTION INTRAVENOUS EVERY 6 HOURS PRN
Status: DISCONTINUED | OUTPATIENT
Start: 2022-06-30 | End: 2022-06-30

## 2022-06-30 RX ORDER — ROPIVACAINE HYDROCHLORIDE 2 MG/ML
INJECTION, SOLUTION EPIDURAL; INFILTRATION; PERINEURAL CONTINUOUS
Status: DISCONTINUED | OUTPATIENT
Start: 2022-06-30 | End: 2022-06-30

## 2022-06-30 RX ORDER — HYDROXYZINE HYDROCHLORIDE 25 MG/1
25 TABLET, FILM COATED ORAL 3 TIMES DAILY PRN
Status: DISCONTINUED | OUTPATIENT
Start: 2022-06-30 | End: 2022-07-19 | Stop reason: HOSPADM

## 2022-06-30 RX ORDER — LABETALOL HYDROCHLORIDE 5 MG/ML
10 INJECTION, SOLUTION INTRAVENOUS EVERY 6 HOURS PRN
Status: DISCONTINUED | OUTPATIENT
Start: 2022-06-30 | End: 2022-07-19 | Stop reason: HOSPADM

## 2022-06-30 RX ORDER — LORAZEPAM 0.5 MG/1
0.5 TABLET ORAL ONCE
Status: COMPLETED | OUTPATIENT
Start: 2022-06-30 | End: 2022-06-30

## 2022-06-30 RX ORDER — ROPIVACAINE HYDROCHLORIDE 2 MG/ML
INJECTION, SOLUTION EPIDURAL; INFILTRATION; PERINEURAL CONTINUOUS
Status: DISCONTINUED | OUTPATIENT
Start: 2022-06-30 | End: 2022-07-08

## 2022-06-30 RX ORDER — PHENAZOPYRIDINE HYDROCHLORIDE 100 MG/1
100 TABLET, FILM COATED ORAL
Status: COMPLETED | OUTPATIENT
Start: 2022-07-01 | End: 2022-07-03

## 2022-06-30 RX ADMIN — HYDROCORTISONE SODIUM SUCCINATE 50 MG: 100 INJECTION, POWDER, FOR SOLUTION INTRAMUSCULAR; INTRAVENOUS at 05:51

## 2022-06-30 RX ADMIN — PHENAZOPYRIDINE HYDROCHLORIDE 100 MG: 100 TABLET ORAL at 11:24

## 2022-06-30 RX ADMIN — PINDOLOL 2.5 MG: 5 TABLET ORAL at 11:15

## 2022-06-30 RX ADMIN — BUDESONIDE AND FORMOTEROL FUMARATE DIHYDRATE 2 PUFF: 160; 4.5 AEROSOL RESPIRATORY (INHALATION) at 07:37

## 2022-06-30 RX ADMIN — Medication 10 ML: at 22:20

## 2022-06-30 RX ADMIN — MORPHINE SULFATE 2 MG: 2 INJECTION, SOLUTION INTRAMUSCULAR; INTRAVENOUS at 01:16

## 2022-06-30 RX ADMIN — OXYCODONE HYDROCHLORIDE AND ACETAMINOPHEN 1 TABLET: 10; 325 TABLET ORAL at 02:21

## 2022-06-30 RX ADMIN — GRANULES FOR ORAL SOLUTION 3 G: 3 POWDER ORAL at 11:15

## 2022-06-30 RX ADMIN — LEVOTHYROXINE SODIUM 88 MCG: 88 TABLET ORAL at 05:51

## 2022-06-30 RX ADMIN — PINDOLOL 2.5 MG: 5 TABLET ORAL at 22:19

## 2022-06-30 RX ADMIN — NORTRIPTYLINE HYDROCHLORIDE 50 MG: 10 CAPSULE ORAL at 18:30

## 2022-06-30 RX ADMIN — HYDROCORTISONE SODIUM SUCCINATE 25 MG: 100 INJECTION, POWDER, FOR SOLUTION INTRAMUSCULAR; INTRAVENOUS at 18:30

## 2022-06-30 RX ADMIN — PANTOPRAZOLE SODIUM 40 MG: 40 TABLET, DELAYED RELEASE ORAL at 05:51

## 2022-06-30 RX ADMIN — PINDOLOL 2.5 MG: 5 TABLET ORAL at 18:35

## 2022-06-30 RX ADMIN — SPIRONOLACTONE 50 MG: 50 TABLET ORAL at 11:15

## 2022-06-30 RX ADMIN — MORPHINE SULFATE 30 MG: 30 TABLET, FILM COATED, EXTENDED RELEASE ORAL at 09:29

## 2022-06-30 RX ADMIN — MORPHINE SULFATE 30 MG: 30 TABLET, FILM COATED, EXTENDED RELEASE ORAL at 22:19

## 2022-06-30 RX ADMIN — GABAPENTIN 800 MG: 400 CAPSULE ORAL at 11:24

## 2022-06-30 RX ADMIN — NORTRIPTYLINE HYDROCHLORIDE 50 MG: 10 CAPSULE ORAL at 22:19

## 2022-06-30 RX ADMIN — BUPIVACAINE HYDROCHLORIDE 15 ML: 2.5 INJECTION, SOLUTION EPIDURAL; INFILTRATION; INTRACAUDAL; PERINEURAL at 13:06

## 2022-06-30 RX ADMIN — LORAZEPAM 0.5 MG: 0.5 TABLET ORAL at 18:30

## 2022-06-30 RX ADMIN — GABAPENTIN 800 MG: 400 CAPSULE ORAL at 22:19

## 2022-06-30 RX ADMIN — BUDESONIDE AND FORMOTEROL FUMARATE DIHYDRATE 2 PUFF: 160; 4.5 AEROSOL RESPIRATORY (INHALATION) at 20:08

## 2022-06-30 RX ADMIN — MORPHINE SULFATE 2 MG: 2 INJECTION, SOLUTION INTRAMUSCULAR; INTRAVENOUS at 06:02

## 2022-06-30 RX ADMIN — LISINOPRIL 5 MG: 5 TABLET ORAL at 09:29

## 2022-06-30 RX ADMIN — ALBUTEROL SULFATE 2.5 MG: 2.5 SOLUTION RESPIRATORY (INHALATION) at 15:28

## 2022-06-30 RX ADMIN — GABAPENTIN 800 MG: 400 CAPSULE ORAL at 18:30

## 2022-06-30 RX ADMIN — NORTRIPTYLINE HYDROCHLORIDE 50 MG: 10 CAPSULE ORAL at 09:29

## 2022-06-30 RX ADMIN — GABAPENTIN 800 MG: 400 CAPSULE ORAL at 09:28

## 2022-07-01 LAB
GLUCOSE BLDC GLUCOMTR-MCNC: 109 MG/DL (ref 70–130)
GLUCOSE BLDC GLUCOMTR-MCNC: 116 MG/DL (ref 70–130)
GLUCOSE BLDC GLUCOMTR-MCNC: 136 MG/DL (ref 70–130)
GLUCOSE BLDC GLUCOMTR-MCNC: 92 MG/DL (ref 70–130)

## 2022-07-01 PROCEDURE — 25010000002 HYDROCORTISONE SODIUM SUCCINATE 100 MG RECONSTITUTED SOLUTION: Performed by: INTERNAL MEDICINE

## 2022-07-01 PROCEDURE — 83835 ASSAY OF METANEPHRINES: CPT | Performed by: INTERNAL MEDICINE

## 2022-07-01 PROCEDURE — 99232 SBSQ HOSP IP/OBS MODERATE 35: CPT | Performed by: INTERNAL MEDICINE

## 2022-07-01 PROCEDURE — 97530 THERAPEUTIC ACTIVITIES: CPT

## 2022-07-01 PROCEDURE — 94799 UNLISTED PULMONARY SVC/PX: CPT

## 2022-07-01 PROCEDURE — 82962 GLUCOSE BLOOD TEST: CPT

## 2022-07-01 PROCEDURE — 81050 URINALYSIS VOLUME MEASURE: CPT | Performed by: INTERNAL MEDICINE

## 2022-07-01 PROCEDURE — 94664 DEMO&/EVAL PT USE INHALER: CPT

## 2022-07-01 PROCEDURE — 82384 ASSAY THREE CATECHOLAMINES: CPT | Performed by: INTERNAL MEDICINE

## 2022-07-01 RX ORDER — DOXYCYCLINE 100 MG/1
100 CAPSULE ORAL EVERY 12 HOURS SCHEDULED
Status: COMPLETED | OUTPATIENT
Start: 2022-07-01 | End: 2022-07-08

## 2022-07-01 RX ORDER — HYDROCORTISONE 10 MG/1
10 TABLET ORAL EVERY EVENING
Status: DISCONTINUED | OUTPATIENT
Start: 2022-07-01 | End: 2022-07-01

## 2022-07-01 RX ORDER — HYDROCORTISONE 10 MG/1
10 TABLET ORAL
Status: DISCONTINUED | OUTPATIENT
Start: 2022-07-01 | End: 2022-07-15

## 2022-07-01 RX ORDER — HYDROCORTISONE 20 MG/1
20 TABLET ORAL EVERY MORNING
Status: DISCONTINUED | OUTPATIENT
Start: 2022-07-02 | End: 2022-07-15

## 2022-07-01 RX ADMIN — OXYCODONE HYDROCHLORIDE AND ACETAMINOPHEN 1 TABLET: 10; 325 TABLET ORAL at 05:27

## 2022-07-01 RX ADMIN — LISINOPRIL 2.5 MG: 5 TABLET ORAL at 09:15

## 2022-07-01 RX ADMIN — OXYCODONE HYDROCHLORIDE AND ACETAMINOPHEN 1 TABLET: 10; 325 TABLET ORAL at 15:11

## 2022-07-01 RX ADMIN — Medication 10 ML: at 22:05

## 2022-07-01 RX ADMIN — GABAPENTIN 800 MG: 400 CAPSULE ORAL at 13:30

## 2022-07-01 RX ADMIN — MORPHINE SULFATE 30 MG: 30 TABLET, FILM COATED, EXTENDED RELEASE ORAL at 09:15

## 2022-07-01 RX ADMIN — NORTRIPTYLINE HYDROCHLORIDE 50 MG: 10 CAPSULE ORAL at 22:03

## 2022-07-01 RX ADMIN — HYDROCORTISONE SODIUM SUCCINATE 25 MG: 100 INJECTION, POWDER, FOR SOLUTION INTRAMUSCULAR; INTRAVENOUS at 05:06

## 2022-07-01 RX ADMIN — LEVOTHYROXINE SODIUM 88 MCG: 88 TABLET ORAL at 05:06

## 2022-07-01 RX ADMIN — HYDROCORTISONE 10 MG: 10 TABLET ORAL at 13:27

## 2022-07-01 RX ADMIN — BUDESONIDE AND FORMOTEROL FUMARATE DIHYDRATE 2 PUFF: 160; 4.5 AEROSOL RESPIRATORY (INHALATION) at 12:17

## 2022-07-01 RX ADMIN — GABAPENTIN 800 MG: 400 CAPSULE ORAL at 23:30

## 2022-07-01 RX ADMIN — NORTRIPTYLINE HYDROCHLORIDE 50 MG: 10 CAPSULE ORAL at 09:16

## 2022-07-01 RX ADMIN — HYDROXYZINE HYDROCHLORIDE 25 MG: 25 TABLET ORAL at 09:16

## 2022-07-01 RX ADMIN — OXYCODONE HYDROCHLORIDE AND ACETAMINOPHEN 1 TABLET: 10; 325 TABLET ORAL at 23:40

## 2022-07-01 RX ADMIN — GABAPENTIN 800 MG: 400 CAPSULE ORAL at 18:37

## 2022-07-01 RX ADMIN — PHENAZOPYRIDINE HYDROCHLORIDE 100 MG: 100 TABLET ORAL at 15:54

## 2022-07-01 RX ADMIN — LABETALOL HYDROCHLORIDE 10 MG: 5 INJECTION, SOLUTION INTRAVENOUS at 05:12

## 2022-07-01 RX ADMIN — PINDOLOL 2.5 MG: 5 TABLET ORAL at 15:54

## 2022-07-01 RX ADMIN — PINDOLOL 2.5 MG: 5 TABLET ORAL at 22:04

## 2022-07-01 RX ADMIN — MORPHINE SULFATE 30 MG: 30 TABLET, FILM COATED, EXTENDED RELEASE ORAL at 22:03

## 2022-07-01 RX ADMIN — PANTOPRAZOLE SODIUM 40 MG: 40 TABLET, DELAYED RELEASE ORAL at 05:06

## 2022-07-01 RX ADMIN — PHENAZOPYRIDINE HYDROCHLORIDE 100 MG: 100 TABLET ORAL at 18:37

## 2022-07-01 RX ADMIN — SPIRONOLACTONE 50 MG: 50 TABLET ORAL at 13:27

## 2022-07-01 RX ADMIN — PINDOLOL 2.5 MG: 5 TABLET ORAL at 09:15

## 2022-07-01 RX ADMIN — DOXYCYCLINE 100 MG: 100 CAPSULE ORAL at 22:03

## 2022-07-01 RX ADMIN — GABAPENTIN 800 MG: 400 CAPSULE ORAL at 09:16

## 2022-07-01 RX ADMIN — Medication 10 ML: at 09:20

## 2022-07-01 RX ADMIN — NORTRIPTYLINE HYDROCHLORIDE 50 MG: 10 CAPSULE ORAL at 15:54

## 2022-07-01 NOTE — PROGRESS NOTES
[facility]    Acute pain service Inpatient Progress Note    Patient Name: Altagracia Rothman  :  1950  MRN:  [id number]        Acute Pain  Service Inpatient Progress Note:    Analgesia:Good  Pain Score:7/10  LOC: alert and awake  Resp Status: room air  Cardiac: VS stable  Side Effects:None  Catheter Site:clean, dressing intact and dry  Cath type: peripheral nerve cath with ON Q  Volume: 4mL basal rate, 4 mL PCA dose.  Catheter Plan:Catheter to remain Insitu and Continue catheter infusion rate unchanged  Comments: I saw patient on rounds this morning, her respiratory function has come returned completely to normal.  She says her pain level is higher than she would like at this time.  I discussed with her the ability to turn the infusion pump back on to a rate of 4 mL/h but did not bolus the nerve catheter.  Often times the large bolus of local anesthetic will cause phrenic nerve dysfunction which is what I believe we saw on her yesterday.  By just turning the nerve catheter on at 4 mL/h we would hope to avoid the phrenic nerve involvement.  The patient agreed for us to turn the pump back on, we will check back on her frequently today to make sure that her respiratory function stays appropriate.  The patient is in agreement with this.  I also discussed this with the nursing staff.

## 2022-07-01 NOTE — PLAN OF CARE
Goal Outcome Evaluation:      Maintained on 2L NC overnight. Pt mostly refusing to ambulate, be repositioned, and is holding urine until painful. PRN percocet administered per protocol. 24 hr urine collection being continued. Hypertensive overnight, PRN labetalol administered. WCTM.

## 2022-07-01 NOTE — THERAPY TREATMENT NOTE
Patient Name: Altagracia Rothman  : 1950    MRN: [id number]                              Today's Date: 2022       Admit Date: 2022    Visit Dx:     ICD-10-CM ICD-9-CM   1. Other closed displaced fracture of proximal end of right humerus, initial encounter  S42.291A 812.09   2. Closed fracture of distal end of humerus, unspecified fracture morphology, initial encounter  S42.409A 812.40   3. Fall, initial encounter  W19.XXXA E888.9   4. Osteoporosis with current pathological fracture, unspecified osteoporosis type, initial encounter  M80.00XA 733.00     733.10     Patient Active Problem List   Diagnosis   • Adrenal insufficiency (HCC)   • Hypothyroidism   • Essential hypertension   • H/O pheochromocytoma   • Chronic bilateral low back pain without sciatica   • Encounter for chronic pain management   • Osteoporosis   • Tobacco abuse   • BMI 35.0-35.9,adult   • Bronchitis   • Moderate asthma without complication   • Paresthesia   • Medicare annual wellness visit, subsequent   • Routine medical exam   • Lipid screening   • Hair loss   • Other closed displaced fracture of proximal end of right humerus, initial encounter   • Acute UTI (urinary tract infection)     Past Medical History:   Diagnosis Date   • Adrenal insufficiency (HCC)    • Fibromyalgia    • Hypertension    • Hypothyroidism    • Skin cancer      Past Surgical History:   Procedure Laterality Date   • ADRENAL GLAND SURGERY     • BREAST BIOPSY      x4   • GALLBLADDER SURGERY     • HYSTERECTOMY     • MRI ANGIOGRAM LOWER EXTREMITY UNILATERAL W CONTRAST     • TONSILLECTOMY        General Information     Row Name 22 1554          Physical Therapy Time and Intention    Document Type therapy note (daily note)  -KG     Mode of Treatment physical therapy  -KG     Row Name 22 1554          General Information    Patient Profile Reviewed yes  -KG     Existing Precautions/Restrictions fall;non-weight bearing;right;shoulder  standard sling AAT,  non-operative mgmt of R proximal/distal humeral FX  -KG     Row Name 07/01/22 1554          Cognition    Orientation Status (Cognition) oriented x 4  -KG     Row Name 07/01/22 1554          Safety Issues, Functional Mobility    Impairments Affecting Function (Mobility) balance;coordination;pain;strength;endurance/activity tolerance;postural/trunk control;range of motion (ROM)  -KG           User Key  (r) = Recorded By, (t) = Taken By, (c) = Cosigned By    Initials Name Provider Type    GAVI Antoinette Swift Physical Therapist               Mobility     Row Name 07/01/22 1554          Bed Mobility    Bed Mobility scooting/bridging;rolling left;supine-sit  -KG     Supine-Sit Weber (Bed Mobility) verbal cues;moderate assist (50% patient effort);2 person assist  -KG     Assistive Device (Bed Mobility) draw sheet;head of bed elevated  -KG     Comment, (Bed Mobility) extra time and effort needed  -KG     Row Name 07/01/22 1554          Transfers    Comment, (Transfers) Pt only agreeable to t/f to chair with using crutch in [facility].  Educated on stability deficits with crutch, but pt able to t/f min-A x2 bed to chair stepping a few feet to chair. Pt with tendancy to cross feet  -KG     Row Name 07/01/22 1554          Bed-Chair Transfer    Bed-Chair Weber (Transfers) verbal cues;minimum assist (75% patient effort);2 person assist  -KG     Assistive Device (Bed-Chair Transfers) crutches, axillary  -KG     Row Name 07/01/22 1554          Sit-Stand Transfer    Sit-Stand Weber (Transfers) minimum assist (75% patient effort);2 person assist  -KG     Assistive Device (Sit-Stand Transfers) crutches, axillary  -KG     Row Name 07/01/22 1554          Gait/Stairs (Locomotion)    Weber Level (Gait) minimum assist (75% patient effort);2 person assist  -KG     Assistive Device (Gait) crutches, axillary  -KG     Distance in Feet (Gait) 3  -KG     Deviations/Abnormal Patterns (Gait) bilateral deviations;base of  support, narrow;stride length decreased;steppage;scissoring;gait speed decreased;carol ann decreased  -KG     Bilateral Gait Deviations forward flexed posture  -KG     Row Name 07/01/22 1554          Mobility    Extremity Weight-bearing Status right upper extremity  -KG     Right Upper Extremity (Weight-bearing Status) non weight-bearing (NWB)  -KG           User Key  (r) = Recorded By, (t) = Taken By, (c) = Cosigned By    Initials Name Provider Type    Antoinette Mack Physical Therapist               Obj/Interventions     Row Name 07/01/22 1557          Balance    Static Standing Balance minimal assist;2-person assist  -KG     Dynamic Standing Balance minimal assist;moderate assist;2-person assist  -KG     Position/Device Used, Standing Balance supported  -KG     Balance Interventions standing;sit to stand;weight shifting activity  -KG           User Key  (r) = Recorded By, (t) = Taken By, (c) = Cosigned By    Initials Name Provider Type    Antoinette Mack Physical Therapist               Goals/Plan    No documentation.                Clinical Impression     Row Name 07/01/22 1558          Pain    Pretreatment Pain Rating 3/10  -KG     Posttreatment Pain Rating 3/10  -KG     Pain Location - Side/Orientation Right  -KG     Pain Location upper  -KG     Pain Location - extremity  -KG     Pain Intervention(s) Ambulation/increased activity;Repositioned  -KG     Row Name 07/01/22 1551          Plan of Care Review    Plan of Care Reviewed With patient  -KG     Progress improving  -KG     Outcome Evaluation Pt only agreeable to t/f to chair with using crutch in LUE, not agreeable to other AD.  Educated on stability deficits with crutch, but pt able to t/f min-A x2 bed to chair stepping a few feet to chair. Pt reports and demonstrates she cannot uncross legs prior to standing.  Extra time and effort required  -KG     Row Name 07/01/22 1558          Positioning and Restraints    Pre-Treatment Position in bed  -KG      Post Treatment Position chair  -KG     In Chair notified nsg;call light within reach;encouraged to call for assist;exit alarm on;legs elevated;waffle cushion  -KG           User Key  (r) = Recorded By, (t) = Taken By, (c) = Cosigned By    Initials Name Provider Type    GAVI Antoinette Swift Physical Therapist               Outcome Measures     Row Name 07/01/22 1600 07/01/22 0800       How much help from another person do you currently need...    Turning from your back to your side while in flat bed without using bedrails? 2  -KG 2  -TS    Moving from lying on back to sitting on the side of a flat bed without bedrails? 2  -KG 2  -TS    Moving to and from a bed to a chair (including a wheelchair)? 2  -KG 2  -TS    Standing up from a chair using your arms (e.g., wheelchair, bedside chair)? 2  -KG 2  -TS    Climbing 3-5 steps with a railing? 1  -KG 2  -TS    To walk in hospital room? 2  -KG 2  -TS    AM-PAC 6 Clicks Score (PT) 11  -KG 12  -TS    Highest level of mobility 4 --> Transferred to chair/commode  -KG 4 --> Transferred to chair/commode  -TS    Row Name 07/01/22 1600          Functional Assessment    Outcome Measure Options AM-PAC 6 Clicks Basic Mobility (PT)  -KG           User Key  (r) = Recorded By, (t) = Taken By, (c) = Cosigned By    Initials Name Provider Type    GAVI Antoinette Swift Physical Therapist    TS Rosalia Hay, RN Registered Nurse                             Physical Therapy Education                 Title: PT OT SLP Therapies (In Progress)     Topic: Physical Therapy (In Progress)     Point: Mobility training (In Progress)     Learning Progress Summary           Patient Acceptance, E,TB, NR by KG at 7/1/2022 1601    Acceptance, E, NR by FW at 6/28/2022 1512    PETR Gallardo VU,DU by SC at 6/27/2022 1151    Comment: Reviewed safety with mobility    Acceptance, E, VU,NR by  at 6/26/2022 0825    Comment: Patient education regarding PT POC, sequencing for bed mobility, transfers, and ambulation                    Point: Home exercise program (In Progress)     Learning Progress Summary           Patient Acceptance, E,TB, NR by KG at 7/1/2022 1601    Acceptance, E, VU,NR by  at 6/29/2022 1450    Comment: Benefits of activity    Acceptance, E, NR by FW at 6/28/2022 1512    Eager, E, VU,DU by SC at 6/27/2022 1151    Comment: Reviewed safety with mobility    Acceptance, E, VU,NR by  at 6/26/2022 0825    Comment: Patient education regarding PT POC, sequencing for bed mobility, transfers, and ambulation                   Point: Body mechanics (In Progress)     Learning Progress Summary           Patient Acceptance, E,TB, NR by KG at 7/1/2022 1601    Acceptance, E, NR by FW at 6/28/2022 1512    Eager, E, VU,DU by SC at 6/27/2022 1151    Comment: Reviewed safety with mobility    Acceptance, E, VU,NR by  at 6/26/2022 0825    Comment: Patient education regarding PT POC, sequencing for bed mobility, transfers, and ambulation                   Point: Precautions (In Progress)     Learning Progress Summary           Patient Acceptance, E,TB, NR by KG at 7/1/2022 1601    Acceptance, E, NR by FW at 6/28/2022 1512    Eager, E, VU,DU by SC at 6/27/2022 1151    Comment: Reviewed safety with mobility    Acceptance, E, VU,NR by  at 6/26/2022 0825    Comment: Patient education regarding PT POC, sequencing for bed mobility, transfers, and ambulation                               User Key     Initials Effective Dates Name Provider Type Discipline    SC 06/16/21 -  Juan Montez, PT Physical Therapist PT    LS 06/16/21 -  Baylee Pillai, PT Physical Therapist PT    KG 06/16/21 -  Antoinette Swift Physical Therapist PT    FW 05/05/22 -  Eyal Delgado, PT Physical Therapist PT    LO 06/16/21 -  Asia Sosa, PT Physical Therapist PT              PT Recommendation and Plan     Plan of Care Reviewed With: patient  Progress: improving  Outcome Evaluation: Pt only agreeable to t/f to chair with using crutch in LUE,  not agreeable to other AD.  Educated on stability deficits with crutch, but pt able to t/f min-A x2 bed to chair stepping a few feet to chair. Pt reports and demonstrates she cannot uncross legs prior to standing.  Extra time and effort required     Time Calculation:    PT Charges     Row Name 07/01/22 1601             Time Calculation    Start Time 1509  -KG      PT Received On 07/01/22  -KG      PT Goal Re-Cert Due Date 07/06/22  -KG              Time Calculation- PT    Total Timed Code Minutes- PT 38 minute(s)  -KG              Timed Charges    06443 - PT Therapeutic Activity Minutes 38  -KG              Total Minutes    Timed Charges Total Minutes 38  -KG       Total Minutes 38  -KG            User Key  (r) = Recorded By, (t) = Taken By, (c) = Cosigned By    Initials Name Provider Type    GAVI Antoinette Swift Physical Therapist              Therapy Charges for Today     Code Description Service Date Service Provider Modifiers Qty    71072351698 HC PT THERAPEUTIC ACT EA 15 MIN 7/1/2022 Antoinette Swift GP 3    83158253286 HC PT THER SUPP EA 15 MIN 7/1/2022 Antoinette Swift GP 2          PT G-Codes  Outcome Measure Options: AM-PAC 6 Clicks Basic Mobility (PT)  AM-PAC 6 Clicks Score (PT): 11  AM-PAC 6 Clicks Score (OT): 10    Antoinette Swift  7/1/2022

## 2022-07-01 NOTE — PLAN OF CARE
Goal Outcome Evaluation:  Plan of Care Reviewed With: patient        Progress: improving  Outcome Evaluation: Pt only agreeable to t/f to chair with using crutch in LUE, not agreeable to other AD.  Educated on stability deficits with crutch, but pt able to t/f min-A x2 bed to chair stepping a few feet to chair. Pt reports and demonstrates she cannot uncross legs prior to standing.  Extra time and effort required

## 2022-07-01 NOTE — PROGRESS NOTES
Psychiatric    Acute pain service Inpatient Progress Note    Patient Name: Altagracia Rothman  :  1950  MRN:  [id number]        Acute Pain  Service Inpatient Progress Note:    Analgesia:Excellent  Pain Score:2/10  LOC: alert and awake  Resp Status: room air  Cardiac: VS stable  Side Effects:None  Catheter Site:clean, dressing intact and dry  Cath type: peripheral nerve cath with ON Q  Volume: 4mL per hour basal rate.  Catheter Plan:Catheter to remain Insitu and Continue catheter infusion rate unchanged  Comments: Patient seen again this afternoon on pain rounds.  She complains of minimal pain now, she has no shortness of breath or difficulty breathing.  Her oxygen saturations are 98%.  We will leave her infusion pump running at 4 mL/h with no programmed intermittent bolus.  The patient is very comfortable with this plan and understands that if she becomes short of breath at any point we can stop the infusion pump.  I believe she will tolerate this program very well.

## 2022-07-01 NOTE — PLAN OF CARE
Problem: Adult Inpatient Plan of Care  Goal: Plan of Care Review  Outcome: Ongoing, Not Progressing  Flowsheets (Taken 7/1/2022 1820)  Progress: no change  Plan of Care Reviewed With: patient  Outcome Evaluation: Patient VSS, RA, NSR on tele, A&Ox4. C/o pain, PO pain meds given. Infublock restarted today, no breathing/O2 issues. RUE in sling and bruised. Bottom red/blanchable. Up w/ max assist, sat up in chair for a while today. 24 hr urine completed and sent to lab today. C/o numbness in RUE. Perwik in place. Continue to monitor.  Goal: Patient-Specific Goal (Individualized)  Outcome: Ongoing, Not Progressing  Goal: Absence of Hospital-Acquired Illness or Injury  Outcome: Ongoing, Not Progressing  Intervention: Identify and Manage Fall Risk  Recent Flowsheet Documentation  Taken 7/1/2022 1600 by Rosalia Hay, RN  Safety Promotion/Fall Prevention:   activity supervised   assistive device/personal items within reach   clutter free environment maintained   fall prevention program maintained   gait belt   toileting scheduled   safety round/check completed   room organization consistent   nonskid shoes/slippers when out of bed  Taken 7/1/2022 1430 by Rosalia Hay, RN  Safety Promotion/Fall Prevention:   activity supervised   assistive device/personal items within reach   clutter free environment maintained   gait belt   fall prevention program maintained   toileting scheduled   safety round/check completed   room organization consistent   nonskid shoes/slippers when out of bed  Taken 7/1/2022 1250 by Rosalia Hay, RN  Safety Promotion/Fall Prevention:   activity supervised   assistive device/personal items within reach   clutter free environment maintained   gait belt   fall prevention program maintained   toileting scheduled   safety round/check completed   room organization consistent   nonskid shoes/slippers when out of bed  Taken 7/1/2022 1045 by Rosalia Hay, RN  Safety Promotion/Fall  Prevention:   activity supervised   assistive device/personal items within [facility]   fall prevention program maintained   gait belt   toileting scheduled   safety round/check completed   room organization consistent   nonskid shoes/slippers when out of bed  Taken 7/1/2022 0800 by Rosalia Hay RN  Safety Promotion/Fall Prevention:   activity supervised   clutter free environment maintained   assistive device/personal items within [facility]   fall prevention program maintained   gait belt   toileting scheduled   safety round/check completed   room organization consistent   nonskid shoes/slippers when out of bed  Intervention: Prevent Skin Injury  Recent Flowsheet Documentation  Taken 7/1/2022 1600 by Rosalia Hay RN  Body Position: (up in chair) other (see comments)  Skin Protection:   adhesive use limited   transparent dressing maintained   tubing/devices free from skin contact  Taken 7/1/2022 1430 by Rosalia Hay RN  Body Position: sitting up in bed  Skin Protection:   adhesive use limited   transparent dressing maintained   tubing/devices free from skin contact  Taken 7/1/2022 1250 by Rosalia Hay RN  Body Position: sitting up in bed  Skin Protection:   adhesive use limited   transparent dressing maintained   tubing/devices free from skin contact  Taken 7/1/2022 1045 by Rosalia Hay RN  Body Position: supine  Skin Protection:   adhesive use limited   tubing/devices free from skin contact   transparent dressing maintained  Taken 7/1/2022 0800 by Rosalia Hay RN  Body Position: sitting up in bed  Skin Protection:   adhesive use limited   transparent dressing maintained   tubing/devices free from skin contact  Intervention: Prevent and Manage VTE (Venous Thromboembolism) Risk  Recent Flowsheet Documentation  Taken 7/1/2022 1600 by Rosalia Hay RN  VTE Prevention/Management:   bilateral   sequential compression devices off  Taken 7/1/2022 1430 by Rosalia Hay RN  VTE  Prevention/Management:   bilateral   sequential compression devices off  Taken 7/1/2022 1250 by Rosalia Hay RN  VTE Prevention/Management:   bilateral   sequential compression devices off  Taken 7/1/2022 1045 by Rosalia Hay RN  VTE Prevention/Management:   bilateral   sequential compression devices off  Taken 7/1/2022 0800 by Rosalia Hay RN  VTE Prevention/Management:   bilateral   sequential compression devices off  Intervention: Prevent Infection  Recent Flowsheet Documentation  Taken 7/1/2022 1600 by Rosalia Hay RN  Infection Prevention: environmental surveillance performed  Taken 7/1/2022 1430 by Rosalia Hay RN  Infection Prevention: environmental surveillance performed  Taken 7/1/2022 1250 by Rosalia Hay RN  Infection Prevention: environmental surveillance performed  Taken 7/1/2022 1045 by Rosalia Hay RN  Infection Prevention: environmental surveillance performed  Taken 7/1/2022 0800 by Rosalia Hay RN  Infection Prevention: environmental surveillance performed  Goal: Optimal Comfort and Wellbeing  Outcome: Ongoing, Not Progressing  Intervention: Provide Person-Centered Care  Recent Flowsheet Documentation  Taken 7/1/2022 1600 by Rosalia Hay RN  Trust Relationship/Rapport: care explained  Taken 7/1/2022 1430 by Rosalia Hay RN  Trust Relationship/Rapport:   care explained   choices provided  Taken 7/1/2022 1250 by Rosalia Hay RN  Trust Relationship/Rapport:   care explained   choices provided  Taken 7/1/2022 1045 by Rosalia Hay RN  Trust Relationship/Rapport: care explained  Taken 7/1/2022 0800 by Rosalia Hay RN  Trust Relationship/Rapport:   care explained   choices provided   questions encouraged   questions answered   reassurance provided   thoughts/feelings acknowledged  Goal: Readiness for Transition of Care  Outcome: Ongoing, Not Progressing     Problem: Skin Injury Risk Increased  Goal: Skin Health and  Integrity  Outcome: Ongoing, Not Progressing  Intervention: Optimize Skin Protection  Recent Flowsheet Documentation  Taken 7/1/2022 1600 by Rosalia Hay RN  Pressure Reduction Techniques:   frequent weight shift encouraged   weight shift assistance provided  Head of Bed (HOB) Positioning: HOB at 60 degrees  Pressure Reduction Devices: pressure-redistributing mattress utilized  Skin Protection:   adhesive use limited   transparent dressing maintained   tubing/devices free from skin contact  Taken 7/1/2022 1430 by Rosalia Hay RN  Pressure Reduction Techniques:   frequent weight shift encouraged   weight shift assistance provided  Head of Bed (HOB) Positioning: HOB at 30-45 degrees  Pressure Reduction Devices: pressure-redistributing mattress utilized  Skin Protection:   adhesive use limited   transparent dressing maintained   tubing/devices free from skin contact  Taken 7/1/2022 1250 by Rosalia Hay RN  Pressure Reduction Techniques: frequent weight shift encouraged  Head of Bed (HOB) Positioning: HOB at 60-90 degrees  Pressure Reduction Devices:   pressure-redistributing mattress utilized   positioning supports utilized  Skin Protection:   adhesive use limited   transparent dressing maintained   tubing/devices free from skin contact  Taken 7/1/2022 1045 by Rosalia Hay RN  Pressure Reduction Techniques:   frequent weight shift encouraged   weight shift assistance provided  Head of Bed (HOB) Positioning: HOB at 30-45 degrees  Pressure Reduction Devices:   pressure-redistributing mattress utilized   positioning supports utilized  Skin Protection:   adhesive use limited   tubing/devices free from skin contact   transparent dressing maintained  Taken 7/1/2022 0800 by Rosalia Hay RN  Pressure Reduction Techniques:   frequent weight shift encouraged   weight shift assistance provided  Head of Bed (HOB) Positioning: HOB at 60 degrees  Pressure Reduction Devices:   pressure-redistributing  mattress utilized   positioning supports utilized  Skin Protection:   adhesive use limited   transparent dressing maintained   tubing/devices free from skin contact     Problem: Fall Injury Risk  Goal: Absence of Fall and Fall-Related Injury  Outcome: Ongoing, Not Progressing  Intervention: Identify and Manage Contributors  Recent Flowsheet Documentation  Taken 7/1/2022 0800 by Rosalia Hay, RN  Medication Review/Management: medications reviewed  Intervention: Promote Injury-Free Environment  Recent Flowsheet Documentation  Taken 7/1/2022 1600 by Rosalia Hay, RN  Safety Promotion/Fall Prevention:   activity supervised   assistive device/personal items within reach   clutter free environment maintained   fall prevention program maintained   gait belt   toileting scheduled   safety round/check completed   room organization consistent   nonskid shoes/slippers when out of bed  Taken 7/1/2022 1430 by Rosalia Hay, RN  Safety Promotion/Fall Prevention:   activity supervised   assistive device/personal items within reach   clutter free environment maintained   gait belt   fall prevention program maintained   toileting scheduled   safety round/check completed   room organization consistent   nonskid shoes/slippers when out of bed  Taken 7/1/2022 1250 by Rosalia Hay, RN  Safety Promotion/Fall Prevention:   activity supervised   assistive device/personal items within reach   clutter free environment maintained   gait belt   fall prevention program maintained   toileting scheduled   safety round/check completed   room organization consistent   nonskid shoes/slippers when out of bed  Taken 7/1/2022 1045 by Rosalia Hay, RN  Safety Promotion/Fall Prevention:   activity supervised   assistive device/personal items within reach   fall prevention program maintained   gait belt   toileting scheduled   safety round/check completed   room organization consistent   nonskid shoes/slippers when out of  bed  Taken 7/1/2022 0800 by Rosalia Hay RN  Safety Promotion/Fall Prevention:   activity supervised   clutter free environment maintained   assistive device/personal items within reach   fall prevention program maintained   gait belt   toileting scheduled   safety round/check completed   room organization consistent   nonskid shoes/slippers when out of bed     Problem: Bleeding (Orthopaedic Fracture)  Goal: Absence of Bleeding  Outcome: Ongoing, Not Progressing     Problem: Embolism (Orthopaedic Fracture)  Goal: Absence of Embolism Signs and Symptoms  Outcome: Ongoing, Not Progressing  Intervention: Prevent or Manage Embolism Risk  Recent Flowsheet Documentation  Taken 7/1/2022 1600 by Rosalia Hay RN  VTE Prevention/Management:   bilateral   sequential compression devices off  Taken 7/1/2022 1430 by Rosalia Hay RN  VTE Prevention/Management:   bilateral   sequential compression devices off  Taken 7/1/2022 1250 by Rosalia Hay RN  VTE Prevention/Management:   bilateral   sequential compression devices off  Taken 7/1/2022 1045 by Rosalia aHy RN  VTE Prevention/Management:   bilateral   sequential compression devices off  Taken 7/1/2022 0800 by Rosalia Hay RN  VTE Prevention/Management:   bilateral   sequential compression devices off     Problem: Fracture Stabilization and Management (Orthopaedic Fracture)  Goal: Fracture Stability  Outcome: Ongoing, Not Progressing     Problem: Functional Ability Impaired (Orthopaedic Fracture)  Goal: Optimal Functional Ability  Outcome: Ongoing, Not Progressing  Intervention: Optimize Functional Ability  Recent Flowsheet Documentation  Taken 7/1/2022 1600 by Rosalia Hay RN  Positioning/Transfer Devices:   pillows   in use  Taken 7/1/2022 1430 by Rosalia Hay RN  Positioning/Transfer Devices:   pillows   in use  Taken 7/1/2022 1250 by Rosalia Hay RN  Positioning/Transfer Devices:   pillows   in use  Taken 7/1/2022 1045 by  Rosalia Hay RN  Positioning/Transfer Devices:   pillows   in use  Taken 7/1/2022 0800 by Rosalia Hay RN  Positioning/Transfer Devices:   pillows   in use     Problem: Infection (Orthopaedic Fracture)  Goal: Absence of Infection Signs and Symptoms  Outcome: Ongoing, Not Progressing  Intervention: Prevent or Manage Infection  Recent Flowsheet Documentation  Taken 7/1/2022 1600 by Rosalia Hay RN  Infection Prevention: environmental surveillance performed  Taken 7/1/2022 1430 by Rosalia Hay RN  Infection Prevention: environmental surveillance performed  Taken 7/1/2022 1250 by Rosalia Hay RN  Infection Prevention: environmental surveillance performed  Taken 7/1/2022 1045 by Rosalia Hay RN  Infection Prevention: environmental surveillance performed  Taken 7/1/2022 0800 by Rosalia Hay RN  Infection Prevention: environmental surveillance performed     Problem: Neurovascular Compromise (Orthopaedic Fracture)  Goal: Effective Tissue Perfusion  Outcome: Ongoing, Not Progressing     Problem: Pain (Orthopaedic Fracture)  Goal: Acceptable Pain Control  Outcome: Ongoing, Not Progressing  Intervention: Manage Acute Orthopaedic-Related Pain  Recent Flowsheet Documentation  Taken 7/1/2022 1600 by Rosalia Hay RN  Diversional Activities: television  Taken 7/1/2022 1430 by Rosalia Hay RN  Diversional Activities: television  Taken 7/1/2022 1250 by Rosalia Hay RN  Diversional Activities: television  Taken 7/1/2022 1045 by Rosalia Hay RN  Diversional Activities: television  Taken 7/1/2022 0800 by Rosalia Hay RN  Diversional Activities: television     Problem: Respiratory Compromise (Orthopaedic Fracture)  Goal: Effective Oxygenation and Ventilation  Outcome: Ongoing, Not Progressing  Intervention: Promote Airway Secretion Clearance  Recent Flowsheet Documentation  Taken 7/1/2022 1600 by Rosalia Hay RN  Cough And Deep Breathing: done independently per  patient  Taken 7/1/2022 1430 by Rosalia Hay, RN  Cough And Deep Breathing: done independently per patient  Taken 7/1/2022 1250 by Rosalia Hay, RN  Cough And Deep Breathing: done independently per patient  Taken 7/1/2022 1045 by Rosalia Hay, RN  Cough And Deep Breathing: done independently per patient  Taken 7/1/2022 0800 by Rosalia Hay, RN  Cough And Deep Breathing: done independently per patient   Goal Outcome Evaluation:  Plan of Care Reviewed With: patient        Progress: no change  Outcome Evaluation: Patient VSS, RA, NSR on tele, A&Ox4. C/o pain, PO pain meds given. Infublock restarted today, no breathing/O2 issues. RUE in sling and bruised. Bottom red/blanchable. Up w/ max assist, sat up in chair for a while today. 24 hr urine completed and sent to lab today. C/o numbness in RUE. Perwik in place. Continue to monitor.

## 2022-07-01 NOTE — PROGRESS NOTES
Due to work up for pheochromocytoma, surgery was cancelled today. Will plan on fixation potentially on 7/5, pending work up.

## 2022-07-01 NOTE — PROGRESS NOTES
Orthopedic Daily Progress Note      CC: INDIRA overnight, nerve catheter working better today    Pain fairly well controlled  General: no fevers, chills  Abdomen: no nausea, vomiting, or diarrhea    No other complaints    Physical Exam:  I have reviewed the vital signs.  Temp:  [97.5 °F (36.4 °C)-98.5 °F (36.9 °C)] 97.9 °F (36.6 °C)  Heart Rate:  [] 69  Resp:  [16-20] 16  BP: (123-168)/() 139/79    Objective  General Appearance:    Alert, cooperative, no distress  Extremities: No clubbing, cyanosis, or edema to lower extremities  Pulses:  2+ in distal surgical extremity  Skin: +ecchymoses about RUE. Radial/median/ulnar nerves intact      Results Review:    I have reviewed the labs, radiology results and diagnostic studies: hgb 12.69 from yesterday    Results from last 7 days   Lab Units 06/30/22  1558   WBC 10*3/mm3 12.69*   HEMOGLOBIN g/dL 12.8   PLATELETS 10*3/mm3 281     Results from last 7 days   Lab Units 06/30/22  1558   SODIUM mmol/L 142   POTASSIUM mmol/L 4.7   CO2 mmol/L 28.0   CREATININE mg/dL 0.74   GLUCOSE mg/dL 151*       I have reviewed the medications.    Assessment/Problem List  R proximal and distal humerus fractures  Awaiting surgery ORIF/IMN R humerus fractures pending endocrine workup    Plan  NWB RUPETR  NPO at midnight on Monday, 7/4  Pending endocrine workup, we will plan for surgery on 7/5 for ORIF/IMN R humerus fractures       Nancy Zuñiga PA-C  07/01/22  15:25 EDT

## 2022-07-01 NOTE — PROGRESS NOTES
[facility] Medicine Services  PROGRESS NOTE    Patient Name: Altagracia Rothman  : 1950  MRN: [id number]    Date of Admission: 2022  Primary Care Physician: Alvina Lloyd PA-C    Subjective   Subjective     CC:  Right humerus fracture after fall.    HPI:  Patient remains anxious, RRT yesterday for hypoxia and chest pain, work-up negative. She feels much better this morning, blood pressure remains elevated.    ROS:  Gen- No fevers, chills  CV- No chest pain, palpitations  Resp- No cough, dyspnea  GI- No N/V/D, abd pain    Objective   Objective     Vital Signs:   Temp:  [97.5 °F (36.4 °C)-98.5 °F (36.9 °C)] 98.2 °F (36.8 °C)  Heart Rate:  [] 64  Resp:  [16-20] 16  BP: (123-168)/() 123/76  Flow (L/min):  [2] 2     Physical Exam:  Constitutional: No acute distress, awake, alert  HENT: NCAT, mucous membranes moist  Respiratory: Clear to auscultation bilaterally, respiratory effort normal   Cardiovascular: RRR, no murmurs, rubs, or gallops  Gastrointestinal: Positive bowel sounds, soft, nontender, nondistended  Musculoskeletal: No bilateral ankle edema, RUE in sling  Psychiatric: Anxious, cooperative  Neurologic: Oriented x 3, strength symmetric in all extremities, speech clear  Skin: No rashes    Results Reviewed:  LAB RESULTS:      Lab 22  1558 22  0717 22  0557 22  1040 22  1902   WBC 12.69* 8.19 10.33 6.48 11.21*   HEMOGLOBIN 12.8 11.0* 11.0* 12.3 12.8   HEMATOCRIT 39.5 34.2 34.1 37.8 38.3   PLATELETS 281 178 179 194 214   NEUTROS ABS 8.64*  --   --   --  9.10*   IMMATURE GRANS (ABS) 0.15*  --   --   --  0.05   LYMPHS ABS 2.56  --   --   --  1.20   MONOS ABS 1.22*  --   --   --  0.83   EOS ABS 0.08  --   --   --  0.01   .9* 102.4* 102.4* 101.6* 99.7*         Lab 22  1558 22  1647 22  0717 22  0557 22  1040   SODIUM 142 143 137 141 136   POTASSIUM 4.7 4.2 3.4* 3.4* 4.0   CHLORIDE 103 108* 104 108* 101    CO2 28.0 27.0 28.0 27.0 25.0   ANION GAP 11.0 8.0 5.0 6.0 10.0   BUN 11 10 12 18 20   CREATININE 0.74 0.55* 0.48* 0.62 0.90   EGFR 85.6 96.9 100.2 94.2 67.6   GLUCOSE 151* 110* 106* 86 154*   CALCIUM 8.9 8.3* 8.4* 8.2* 8.1*   MAGNESIUM 2.3  --  2.0 2.1  --    HEMOGLOBIN A1C  --   --   --  5.30  --          Lab 06/30/22  1558 06/24/22  1902   TOTAL PROTEIN 5.6* 5.5*   ALBUMIN 3.30* 3.00*   GLOBULIN 2.3 2.5   ALT (SGPT) 42* 16   AST (SGOT) 43* 16   BILIRUBIN 0.5 0.4   ALK PHOS 131* 148*         Lab 06/30/22  1558   TROPONIN T <0.010             Lab 06/24/22  2204   ABO TYPING A   RH TYPING Positive   ANTIBODY SCREEN Negative         Lab 06/30/22  1601   PH, ARTERIAL 7.382   PCO2, ARTERIAL 48.7*   PO2 .0*   FIO2 70   HCO3 ART 28.9*   BASE EXCESS ART 3.0*   CARBOXYHEMOGLOBIN 0.9     Brief Urine Lab Results  (Last result in the past 365 days)      Color   Clarity   Blood   Leuk Est   Nitrite   Protein   CREAT   Urine HCG        06/27/22 0224 Yellow   Turbid   Small (1+)   Large (3+)   Positive   30 mg/dL (1+)                 Microbiology Results Abnormal     Procedure Component Value - Date/Time    COVID PRE-OP / PRE-PROCEDURE SCREENING ORDER (NO ISOLATION) - Swab, Nasopharynx [[id number]]  (Normal) Collected: 06/24/22 1901    Lab Status: Final result Specimen: Swab from Nasopharynx Updated: 06/24/22 1956    Narrative:      The following orders were created for panel order COVID PRE-OP / PRE-PROCEDURE SCREENING ORDER (NO ISOLATION) - Swab, Nasopharynx.  Procedure                               Abnormality         Status                     ---------                               -----------         ------                     COVID-19, FLU A/B, RSV P...[[id number]]  Normal              Final result                 Please view results for these tests on the individual orders.    COVID-19, FLU A/B, RSV PCR - Swab, Nasopharynx [[id number]]  (Normal) Collected: 06/24/22 1901    Lab Status: Final result Specimen: Swab  from Nasopharynx Updated: 06/24/22 1956     COVID19 Not Detected     Influenza A PCR Not Detected     Influenza B PCR Not Detected     RSV, PCR Not Detected    Narrative:      Fact sheet for providers: https://www.fda.gov/media/398605/download    Fact sheet for patients: https://www.fda.gov/media/913634/download    Test performed by PCR.          XR Knee 1 or 2 View Left    Result Date: 6/30/2022  DATE OF EXAM: 6/30/2022 6:47 PM  PROCEDURE: XR KNEE 1 OR 2 VW LEFT-  INDICATIONS: left knee pain following a fall; S42.291A-Other displaced fracture of upper end of right humerus, initial encounter for closed fracture; S42.409A-Unspecified fracture of lower end of unspecified humerus, initial encounter for closed fracture; W19.XXXA-Unspecified fall, initial encounter; M80.00XA-Age-related osteoporosis with current pathological fracture, unspecified site, initial encounter for  COMPARISON: No comparisons available.  TECHNIQUE: One to two radiologic views of left knee were obtained.  FINDINGS: There is osseous demineralization. There are osteoarthritic changes present. There is chondrocalcinosis. There is a small suprapatellar bursal joint effusion.      Impression: 1.  Tricompartment osteoarthritis. 2.  Chondrocalcinosis 3.  Osseous demineralization. 4.  Small suprapatellar bursal joint effusion.  This report was finalized on 6/30/2022 7:40 PM by Piero Schaefer MD.      XR Chest 1 View    Result Date: 6/30/2022  DATE OF EXAM: 6/30/2022 4:00 PM  PROCEDURE: XR CHEST 1 VW-  INDICATIONS: rrt; S42.291A-Other displaced fracture of upper end of right humerus, initial encounter for closed fracture; S42.409A-Unspecified fracture of lower end of unspecified humerus, initial encounter for closed fracture; W19.XXXA-Unspecified fall, initial encounter; M80.00XA-Age-related osteoporosis with current pathological fracture, unspecified site, initial encounter for fracture  COMPARISON: 6/24/2022  TECHNIQUE: Single radiographic AP view of the  chest was obtained.  FINDINGS: Heart shadow is normal in size. Vasculature appears within normal limits. Mild diffuse upper lung interstitial changes are stable compared to 6/24/2022 exam, particularly allowing for lower lung volumes today. Mild left basilar discoid atelectasis is stable or mildly improved. Elevated right hemidiaphragm is unchanged. No pneumothorax or other new chest disease is seen. Patient spiral right proximal humerus fracture is faintly visible at the edge of the film.      Impression: Stable mild nonspecific interstitial lung changes compared to prior study allowing for lower lung volumes today. Stable to mildly improved left basilar discoid atelectasis. No new chest pathology is seen.  This report was finalized on 6/30/2022 4:20 PM by Dr. Harjinder Angel MD.      Peripheral Block    Result Date: 6/30/2022  Nirmal De La Torre CRNA     6/30/2022  1:18 PM Peripheral Block Patient reassessed immediately prior to procedure Patient location during procedure: pre-op Start time: 6/30/2022 1:00 PM Stop time: 6/30/2022 1:15 PM Reason for block: at surgeon's request and post-op pain management Performed by ZAINAB/CAA: Nirmal De La Torre CRNA Assisted by: Siobhan Moya RN Preanesthetic Checklist Completed: patient identified, IV checked, site marked, risks and benefits discussed, surgical consent, monitors and equipment checked, pre-op evaluation and timeout performed Prep: Pt Position: left lateral decubitus Sterile barriers:cap, gloves, mask and sterile barriers Prep: ChloraPrep Patient monitoring: blood pressure monitoring, continuous pulse oximetry and EKG Procedure Sedation: yes Performed under: local infiltration Guidance:ultrasound guided Images:still images obtained, printed/placed on chart Laterality:right Block Type:interscalene Injection Technique:catheter Needle Type:Tuohy and echogenic Needle Gauge:18 G Resistance on Injection: none Catheter Size:20 G (20g) Cath Depth at skin: 10 cm Medications  Used: bupivacaine PF (MARCAINE) 0.25 % injection, 15 mL Med administered at 6/30/2022 1:06 PM Medications Preservative Free Saline:5ml Post Assessment Injection Assessment: negative aspiration for heme, no paresthesia on injection and incremental injection Patient Tolerance:comfortable throughout block Complications:no Additional Notes Procedure:               The pt was placed in semifowlers position with a slight tilt of the thorax contralateral to the insertion site.  The Insertion Site was prepped and draped in sterile fashion.  The pt was anesthetized with  IV Sedation( see meds) and  Skin and cutaneous tissue was infiltrated and anesthetized with 1% Lidocaine 3 mls via a 25g needle.  Utilizing ultrasound guidance, a BBraun 4 inch 18 g Contiplex echogenic touhy needle was advanced in-plane.  Hydro dissection of tissue was achieved with Normal saline. Major vessels(carotid and Internal Jugular) where visualized as the brachial plexus was approached at the approximate level of C-7/ T-1.  Cervical 5 and Branches of Cervical 6 nerve roots where visualized and the needle tip was placed posterior at the level of C-6 roots.  LA spread was visualized and injection was made incrementally every 5 mls with aspiration. Injection pressure was normal or little, there was no intraneural injection, no vascular injection.    The BBraun 20 g wire stylet catheter was then placed under US guidance on the posterior aspect of the Brachial Plexus. The tuohy was removed and the location of catheter was confirmed with NS injection visualized with US . The skin was sealed with exofin tissue adhesive at catheter insertion site.  Skin was prepped with benzoin and the catheter was secured with steristrips and a CHG tegaderm. Appropriate labels applied. Thank You.           I have reviewed the medications:  Scheduled Meds:budesonide-formoterol, 2 puff, Inhalation, BID - RT  gabapentin, 800 mg, Oral, 4x Daily  hydrocortisone, 10 mg, Oral,  Daily With Lunch  [START ON 7/2/2022] hydrocortisone, 20 mg, Oral, QAM  levothyroxine, 88 mcg, Oral, Q AM  lisinopril, 5 mg, Oral, Q24H  Morphine, 30 mg, Oral, Q12H  nortriptyline, 50 mg, Oral, TID  pantoprazole, 40 mg, Oral, Q AM  phenazopyridine, 100 mg, Oral, TID With Meals  pindolol, 2.5 mg, Oral, TID  sodium chloride, 10 mL, Intravenous, Q12H  spironolactone, 50 mg, Oral, Daily      Continuous Infusions:Pharmacy Consult,   ropivacaine,   sodium chloride, 75 mL/hr, Last Rate: 75 mL/hr (06/29/22 0105)      PRN Meds:.acetaminophen **OR** acetaminophen **OR** acetaminophen  •  albuterol  •  hydrALAZINE  •  hydrOXYzine  •  labetalol  •  magnesium sulfate **OR** magnesium sulfate in D5W 1g/100mL (PREMIX) **OR** magnesium sulfate  •  Morphine **AND** naloxone  •  ondansetron **OR** ondansetron  •  oxyCODONE-acetaminophen  •  potassium chloride **OR** potassium chloride **OR** potassium chloride  •  sodium chloride    Assessment & Plan   Assessment & Plan     Active Hospital Problems    Diagnosis  POA   • Acute UTI (urinary tract infection) [N39.0]  Yes   • Other closed displaced fracture of proximal end of right humerus, initial encounter [S42.291A]  Yes   • Moderate asthma without complication [J45.909]  Yes   • Osteoporosis [M81.0]  Yes   • Adrenal insufficiency (HCC) [E27.40]  Yes   • Hypothyroidism [E03.9]  Yes   • Essential hypertension [I10]  Yes   • H/O pheochromocytoma [Z86.018]  Yes      Resolved Hospital Problems   No resolved problems to display.        Brief Hospital Course to date:  Altagracia Rothman is a 72 y.o. female with past medical history significant for hypertension, history of pheochromocytoma, s/p left adrenal resection, adrenal insufficiency, hypothyroidism, moderate asthma.  Patient was admitted for right humerus fracture after a fall.    Right proximal and distal humerus fracture after mechanical fall.    -plan for ORIF when cleared. Currently needs further endocrine work-up per below.      Bronchitis - POA, now resolved  -CXR without infiltrate  -resume home inhalers, continue symbicort  -s/p 5 days of empiric doxy. Doing well on room air    Hypoxia/Chest Pain  - RRT yesterday, likely related to combination of nerve block and anxiety  - CXR, EKG, troponin and ABG are WNL  - improved this morning    Hx adrenal insufficiency s/p adrenalectomy, s/p adrenal crisis (hypotension on admission) - possibly precipitated by bronchitis, humerus fracture, and UTI  History of pheochromocytoma - resected in 2003   - I have discussed this with patient's endocrinologist Dr. Nelson via telephone on 6/30. Patient had adrenalectomy in 2003 with removal of pheo, she has declined further screening tests since that time. Per Dr. Nelson, cathecholamine/metanphrine testing would be helpful if it is negative, although if positive (may be false positive) could be related toTricyclic which patient has refused to taper and would need to be off of for 4-6 weeks for accurate testing. I discussed this with the patient and with Dr. Pope from anesthesia. Patient is now agreeable to testing and will order 24hr urine metanephrines/catecholamines, this started on 6/30. Unclear how long results will take (as this is a holiday weekend)  -home dose of hydrocortisone 20mg qam, 10mg nightly, wean back to home dose. Will need to be stress dosed at timing of surgery.    Hx HTN  -initally was hypotensive due to Adrenal crisis, so bp meds were held  -started back lisinopril 5mg on 6/27/22 (on quinapril 5mg at home)  -wean hydrocortisone back to home dose  -resume aldactone, lasix x1 dose 6/29  - resume home pindolol, PRN hydralazine and PRN labetalol     Clifford catheter placed at time of admission  -clifford d/c'd on 6/26/22    UTI, proteus species  -clifford has been removed  -urine cultures growing proteus species   - completed course of fosfomycin  -Pyridium for dysuria    Hyperglycemia, improved  -not diabetic, A1c 5.3    Hypothyroidism   -on home  dose Synthroid.    DVT prophylaxis:  Mechanical DVT prophylaxis orders are present.       AM-PAC 6 Clicks Score (PT): 12 (07/01/22 0800)    Disposition: TBD, pending timing of surgical intervention if patient agreable and testing results.    CODE STATUS:   Code Status and Medical Interventions:   Ordered at: 06/24/22 2121     Level Of Support Discussed With:    Patient     Code Status (Patient has no pulse and is not breathing):    CPR (Attempt to Resuscitate)     Medical Interventions (Patient has pulse or is breathing):    Full Support       Galina Morales,   07/01/22

## 2022-07-02 PROCEDURE — 94799 UNLISTED PULMONARY SVC/PX: CPT

## 2022-07-02 PROCEDURE — 94761 N-INVAS EAR/PLS OXIMETRY MLT: CPT

## 2022-07-02 PROCEDURE — 97116 GAIT TRAINING THERAPY: CPT

## 2022-07-02 PROCEDURE — 99231 SBSQ HOSP IP/OBS SF/LOW 25: CPT | Performed by: ORTHOPAEDIC SURGERY

## 2022-07-02 PROCEDURE — 99232 SBSQ HOSP IP/OBS MODERATE 35: CPT | Performed by: INTERNAL MEDICINE

## 2022-07-02 PROCEDURE — 97530 THERAPEUTIC ACTIVITIES: CPT

## 2022-07-02 RX ADMIN — PHENAZOPYRIDINE HYDROCHLORIDE 100 MG: 100 TABLET ORAL at 10:16

## 2022-07-02 RX ADMIN — GABAPENTIN 800 MG: 400 CAPSULE ORAL at 10:15

## 2022-07-02 RX ADMIN — LISINOPRIL 5 MG: 5 TABLET ORAL at 09:09

## 2022-07-02 RX ADMIN — Medication 10 ML: at 19:59

## 2022-07-02 RX ADMIN — MICONAZOLE NITRATE 200 MG: 200 SUPPOSITORY VAGINAL at 20:07

## 2022-07-02 RX ADMIN — DOXYCYCLINE 100 MG: 100 CAPSULE ORAL at 09:10

## 2022-07-02 RX ADMIN — PANTOPRAZOLE SODIUM 40 MG: 40 TABLET, DELAYED RELEASE ORAL at 06:40

## 2022-07-02 RX ADMIN — BUDESONIDE AND FORMOTEROL FUMARATE DIHYDRATE 2 PUFF: 160; 4.5 AEROSOL RESPIRATORY (INHALATION) at 19:55

## 2022-07-02 RX ADMIN — OXYCODONE HYDROCHLORIDE AND ACETAMINOPHEN 1 TABLET: 10; 325 TABLET ORAL at 06:40

## 2022-07-02 RX ADMIN — PHENAZOPYRIDINE HYDROCHLORIDE 100 MG: 100 TABLET ORAL at 18:48

## 2022-07-02 RX ADMIN — MORPHINE SULFATE 30 MG: 30 TABLET, FILM COATED, EXTENDED RELEASE ORAL at 21:29

## 2022-07-02 RX ADMIN — GABAPENTIN 800 MG: 400 CAPSULE ORAL at 18:48

## 2022-07-02 RX ADMIN — PHENAZOPYRIDINE HYDROCHLORIDE 100 MG: 100 TABLET ORAL at 12:55

## 2022-07-02 RX ADMIN — HYDROCORTISONE 10 MG: 10 TABLET ORAL at 12:55

## 2022-07-02 RX ADMIN — SPIRONOLACTONE 50 MG: 50 TABLET ORAL at 10:16

## 2022-07-02 RX ADMIN — DOXYCYCLINE 100 MG: 100 CAPSULE ORAL at 20:05

## 2022-07-02 RX ADMIN — MORPHINE SULFATE 30 MG: 30 TABLET, FILM COATED, EXTENDED RELEASE ORAL at 10:15

## 2022-07-02 RX ADMIN — BUDESONIDE AND FORMOTEROL FUMARATE DIHYDRATE 2 PUFF: 160; 4.5 AEROSOL RESPIRATORY (INHALATION) at 09:04

## 2022-07-02 RX ADMIN — PINDOLOL 2.5 MG: 5 TABLET ORAL at 16:28

## 2022-07-02 RX ADMIN — NORTRIPTYLINE HYDROCHLORIDE 50 MG: 10 CAPSULE ORAL at 20:04

## 2022-07-02 RX ADMIN — PINDOLOL 2.5 MG: 5 TABLET ORAL at 10:15

## 2022-07-02 RX ADMIN — GABAPENTIN 800 MG: 400 CAPSULE ORAL at 12:55

## 2022-07-02 RX ADMIN — HYDROCORTISONE 20 MG: 10 TABLET ORAL at 10:16

## 2022-07-02 RX ADMIN — OXYCODONE HYDROCHLORIDE AND ACETAMINOPHEN 1 TABLET: 10; 325 TABLET ORAL at 20:05

## 2022-07-02 RX ADMIN — NORTRIPTYLINE HYDROCHLORIDE 50 MG: 10 CAPSULE ORAL at 16:27

## 2022-07-02 RX ADMIN — PINDOLOL 2.5 MG: 5 TABLET ORAL at 20:01

## 2022-07-02 RX ADMIN — LEVOTHYROXINE SODIUM 88 MCG: 88 TABLET ORAL at 06:40

## 2022-07-02 RX ADMIN — OXYCODONE HYDROCHLORIDE AND ACETAMINOPHEN 1 TABLET: 10; 325 TABLET ORAL at 12:55

## 2022-07-02 RX ADMIN — NORTRIPTYLINE HYDROCHLORIDE 50 MG: 10 CAPSULE ORAL at 10:16

## 2022-07-02 RX ADMIN — Medication 10 ML: at 09:09

## 2022-07-02 RX ADMIN — GABAPENTIN 800 MG: 400 CAPSULE ORAL at 20:04

## 2022-07-02 NOTE — PROGRESS NOTES
"Orthopedic Daily Progress Note      CC: Right shoulder pain    Pain fairly well controlled  General: no fevers, chills  Abdomen: no nausea, vomiting, or diarrhea    She is complaining about the pure wick bothering her, and would like to be able to get up to the bedside commode frequently    Physical Exam:  I have reviewed the vital signs.  Temp:  [97.8 °F (36.6 °C)-98.3 °F (36.8 °C)] 98 °F (36.7 °C)  Heart Rate:  [64-80] 80  Resp:  [16-18] 18  BP: (111-167)/() 167/106    Objective:  Vital signs: (most recent): Blood pressure (!) 167/106, pulse 80, temperature 98 °F (36.7 °C), temperature source Oral, resp. rate 18, height 154.9 cm (61\"), weight 82 kg (180 lb 12.8 oz), SpO2 96 %, not currently breastfeeding.            General Appearance:    Alert, cooperative, no distress  Extremities: No clubbing, cyanosis, or edema to lower extremities  Pulses:  2+ in distal surgical extremity  Skin: +ecchymoses about RUE. Radial/median/ulnar nerves intact      Results Review:    I have reviewed the labs, radiology results and diagnostic studies    Results from last 7 days   Lab Units 06/30/22  1558   WBC 10*3/mm3 12.69*   HEMOGLOBIN g/dL 12.8   PLATELETS 10*3/mm3 281     Results from last 7 days   Lab Units 06/30/22  1558   SODIUM mmol/L 142   POTASSIUM mmol/L 4.7   CO2 mmol/L 28.0   CREATININE mg/dL 0.74   GLUCOSE mg/dL 151*       I have reviewed the medications.    Assessment/Problem List  R proximal and distal humerus fractures  Awaiting surgery ORIF/IMN R humerus fractures pending endocrine workup    Plan (per Dr. Matute):  NWB RUE  NPO at midnight on Monday, 7/4  Pending endocrine workup, we will plan for surgery on 7/5 for ORIF/IMN R humerus fractures       Phong Campo MD   Covering for Dr. Matute  07/02/22  10:11 EDT            "

## 2022-07-02 NOTE — PROGRESS NOTES
[facility] Medicine Services  PROGRESS NOTE    Patient Name: Altagracia Rothman  : 1950  MRN: [id number]    Date of Admission: 2022  Primary Care Physician: Alvina Lloyd PA-C    Subjective   Subjective     CC: f/u shoulder fracture    HPI: Up in bed. Pain controlled. Asking to move around more.     ROS:  Gen- No fevers, chills  CV- No chest pain, palpitations  Resp- No cough, dyspnea  GI- No N/V/D, abd pain     Objective   Objective     Vital Signs:   Temp:  [97.8 °F (36.6 °C)-98.3 °F (36.8 °C)] 98 °F (36.7 °C)  Heart Rate:  [66-80] 76  Resp:  [16-18] 16  BP: (111-167)/() 151/88  Flow (L/min):  [2] 2     Physical Exam:  Constitutional: No acute distress, awake, alert, chronically ill appearing  HENT: NCAT, mucous membranes moist  Respiratory: Clear to auscultation bilaterally, respiratory effort normal   Cardiovascular: RRR, no murmurs, rubs, or gallops  Gastrointestinal: Positive bowel sounds, soft, nontender, nondistended, obese  Musculoskeletal: Right shoulder in sling with nerve catheter in place  Psychiatric: Appropriate affect, cooperative  Neurologic: Oriented x 3, strength symmetric in all extremities, Cranial Nerves grossly intact to confrontation, speech clear  Skin: No rashes    Results Reviewed:  LAB RESULTS:      Lab 22  1558 22  0717 22  0557   WBC 12.69* 8.19 10.33   HEMOGLOBIN 12.8 11.0* 11.0*   HEMATOCRIT 39.5 34.2 34.1   PLATELETS 281 178 179   NEUTROS ABS 8.64*  --   --    IMMATURE GRANS (ABS) 0.15*  --   --    LYMPHS ABS 2.56  --   --    MONOS ABS 1.22*  --   --    EOS ABS 0.08  --   --    .9* 102.4* 102.4*         Lab 22  1558 22  1647 22  0717 22  0557   SODIUM 142 143 137 141   POTASSIUM 4.7 4.2 3.4* 3.4*   CHLORIDE 103 108* 104 108*   CO2 28.0 27.0 28.0 27.0   ANION GAP 11.0 8.0 5.0 6.0   BUN 11 10 12 18   CREATININE 0.74 0.55* 0.48* 0.62   EGFR 85.6 96.9 100.2 94.2   GLUCOSE 151* 110* 106* 86    CALCIUM 8.9 8.3* 8.4* 8.2*   MAGNESIUM 2.3  --  2.0 2.1   HEMOGLOBIN A1C  --   --   --  5.30         Lab 06/30/22  1558   TOTAL PROTEIN 5.6*   ALBUMIN 3.30*   GLOBULIN 2.3   ALT (SGPT) 42*   AST (SGOT) 43*   BILIRUBIN 0.5   ALK PHOS 131*         Lab 06/30/22  1558   TROPONIN T <0.010                 Lab 06/30/22  1601   PH, ARTERIAL 7.382   PCO2, ARTERIAL 48.7*   PO2 .0*   FIO2 70   HCO3 ART 28.9*   BASE EXCESS ART 3.0*   CARBOXYHEMOGLOBIN 0.9     Brief Urine Lab Results  (Last result in the past 365 days)      Color   Clarity   Blood   Leuk Est   Nitrite   Protein   CREAT   Urine HCG        06/27/22 0224 Yellow   Turbid   Small (1+)   Large (3+)   Positive   30 mg/dL (1+)                 Microbiology Results Abnormal     Procedure Component Value - Date/Time    COVID PRE-OP / PRE-PROCEDURE SCREENING ORDER (NO ISOLATION) - Swab, Nasopharynx [[id number]]  (Normal) Collected: 06/24/22 1901    Lab Status: Final result Specimen: Swab from Nasopharynx Updated: 06/24/22 1956    Narrative:      The following orders were created for panel order COVID PRE-OP / PRE-PROCEDURE SCREENING ORDER (NO ISOLATION) - Swab, Nasopharynx.  Procedure                               Abnormality         Status                     ---------                               -----------         ------                     COVID-19, FLU A/B, RSV P...[[id number]]  Normal              Final result                 Please view results for these tests on the individual orders.    COVID-19, FLU A/B, RSV PCR - Swab, Nasopharynx [[id number]]  (Normal) Collected: 06/24/22 1901    Lab Status: Final result Specimen: Swab from Nasopharynx Updated: 06/24/22 1956     COVID19 Not Detected     Influenza A PCR Not Detected     Influenza B PCR Not Detected     RSV, PCR Not Detected    Narrative:      Fact sheet for providers: https://www.fda.gov/media/528949/download    Fact sheet for patients: https://www.fda.gov/media/719660/download    Test performed by PCR.           XR Knee 1 or 2 View Left    Result Date: 6/30/2022  DATE OF EXAM: 6/30/2022 6:47 PM  PROCEDURE: XR KNEE 1 OR 2 VW LEFT-  INDICATIONS: left knee pain following a fall; S42.291A-Other displaced fracture of upper end of right humerus, initial encounter for closed fracture; S42.409A-Unspecified fracture of lower end of unspecified humerus, initial encounter for closed fracture; W19.XXXA-Unspecified fall, initial encounter; M80.00XA-Age-related osteoporosis with current pathological fracture, unspecified site, initial encounter for  COMPARISON: No comparisons available.  TECHNIQUE: One to two radiologic views of left knee were obtained.  FINDINGS: There is osseous demineralization. There are osteoarthritic changes present. There is chondrocalcinosis. There is a small suprapatellar bursal joint effusion.      Impression: 1.  Tricompartment osteoarthritis. 2.  Chondrocalcinosis 3.  Osseous demineralization. 4.  Small suprapatellar bursal joint effusion.  This report was finalized on 6/30/2022 7:40 PM by Piero Schaefer MD.      XR Chest 1 View    Result Date: 6/30/2022  DATE OF EXAM: 6/30/2022 4:00 PM  PROCEDURE: XR CHEST 1 VW-  INDICATIONS: rrt; S42.291A-Other displaced fracture of upper end of right humerus, initial encounter for closed fracture; S42.409A-Unspecified fracture of lower end of unspecified humerus, initial encounter for closed fracture; W19.XXXA-Unspecified fall, initial encounter; M80.00XA-Age-related osteoporosis with current pathological fracture, unspecified site, initial encounter for fracture  COMPARISON: 6/24/2022  TECHNIQUE: Single radiographic AP view of the chest was obtained.  FINDINGS: Heart shadow is normal in size. Vasculature appears within normal limits. Mild diffuse upper lung interstitial changes are stable compared to 6/24/2022 exam, particularly allowing for lower lung volumes today. Mild left basilar discoid atelectasis is stable or mildly improved. Elevated right hemidiaphragm is  unchanged. No pneumothorax or other new chest disease is seen. Patient spiral right proximal humerus fracture is faintly visible at the edge of the film.      Impression: Stable mild nonspecific interstitial lung changes compared to prior study allowing for lower lung volumes today. Stable to mildly improved left basilar discoid atelectasis. No new chest pathology is seen.  This report was finalized on 6/30/2022 4:20 PM by Dr. Harjinder Angel MD.      Peripheral Block    Result Date: 6/30/2022  Nirmal De La Torre CRNA     6/30/2022  1:18 PM Peripheral Block Patient reassessed immediately prior to procedure Patient location during procedure: pre-op Start time: 6/30/2022 1:00 PM Stop time: 6/30/2022 1:15 PM Reason for block: at surgeon's request and post-op pain management Performed by ZAINAB/CAA: Nirmal De La Torre CRNA Assisted by: Siobhan Moya RN Preanesthetic Checklist Completed: patient identified, IV checked, site marked, risks and benefits discussed, surgical consent, monitors and equipment checked, pre-op evaluation and timeout performed Prep: Pt Position: left lateral decubitus Sterile barriers:cap, gloves, mask and sterile barriers Prep: ChloraPrep Patient monitoring: blood pressure monitoring, continuous pulse oximetry and EKG Procedure Sedation: yes Performed under: local infiltration Guidance:ultrasound guided Images:still images obtained, printed/placed on chart Laterality:right Block Type:interscalene Injection Technique:catheter Needle Type:Tuohy and echogenic Needle Gauge:18 G Resistance on Injection: none Catheter Size:20 G (20g) Cath Depth at skin: 10 cm Medications Used: bupivacaine PF (MARCAINE) 0.25 % injection, 15 mL Med administered at 6/30/2022 1:06 PM Medications Preservative Free Saline:5ml Post Assessment Injection Assessment: negative aspiration for heme, no paresthesia on injection and incremental injection Patient Tolerance:comfortable throughout block Complications:no Additional Notes  Procedure:               The pt was placed in semifowlers position with a slight tilt of the thorax contralateral to the insertion site.  The Insertion Site was prepped and draped in sterile fashion.  The pt was anesthetized with  IV Sedation( see meds) and  Skin and cutaneous tissue was infiltrated and anesthetized with 1% Lidocaine 3 mls via a 25g needle.  Utilizing ultrasound guidance, a BBraun 4 inch 18 g Contiplex echogenic touhy needle was advanced in-plane.  Hydro dissection of tissue was achieved with Normal saline. Major vessels(carotid and Internal Jugular) where visualized as the brachial plexus was approached at the approximate level of C-7/ T-1.  Cervical 5 and Branches of Cervical 6 nerve roots where visualized and the needle tip was placed posterior at the level of C-6 roots.  LA spread was visualized and injection was made incrementally every 5 mls with aspiration. Injection pressure was normal or little, there was no intraneural injection, no vascular injection.    The BBraun 20 g wire stylet catheter was then placed under US guidance on the posterior aspect of the Brachial Plexus. The tuohy was removed and the location of catheter was confirmed with NS injection visualized with US . The skin was sealed with exofin tissue adhesive at catheter insertion site.  Skin was prepped with benzoin and the catheter was secured with steristrips and a CHG tegaderm. Appropriate labels applied. Thank You.           I have reviewed the medications:  Scheduled Meds:budesonide-formoterol, 2 puff, Inhalation, BID - RT  doxycycline, 100 mg, Oral, Q12H  gabapentin, 800 mg, Oral, 4x Daily  hydrocortisone, 10 mg, Oral, Daily With Lunch  hydrocortisone, 20 mg, Oral, QAM  levothyroxine, 88 mcg, Oral, Q AM  lisinopril, 5 mg, Oral, Q24H  miconazole, 200 mg, Vaginal, Nightly  Morphine, 30 mg, Oral, Q12H  nortriptyline, 50 mg, Oral, TID  pantoprazole, 40 mg, Oral, Q AM  phenazopyridine, 100 mg, Oral, TID With Meals  pindolol,  2.5 mg, Oral, TID  sodium chloride, 10 mL, Intravenous, Q12H  spironolactone, 50 mg, Oral, Daily      Continuous Infusions:Pharmacy Consult,   ropivacaine,   sodium chloride, 75 mL/hr, Last Rate: 75 mL/hr (22 0105)      PRN Meds:.acetaminophen **OR** acetaminophen **OR** acetaminophen  •  albuterol  •  hydrALAZINE  •  hydrOXYzine  •  labetalol  •  magnesium sulfate **OR** magnesium sulfate in D5W 1g/100mL (PREMIX) **OR** magnesium sulfate  •  [] Morphine **AND** naloxone  •  ondansetron **OR** ondansetron  •  oxyCODONE-acetaminophen  •  potassium chloride **OR** potassium chloride **OR** potassium chloride  •  sodium chloride    Assessment & Plan   Assessment & Plan     Active Hospital Problems    Diagnosis  POA   • Acute UTI (urinary tract infection) [N39.0]  Yes   • Other closed displaced fracture of proximal end of right humerus, initial encounter [S42.291A]  Yes   • Moderate asthma without complication [J45.909]  Yes   • Osteoporosis [M81.0]  Yes   • Adrenal insufficiency (HCC) [E27.40]  Yes   • Hypothyroidism [E03.9]  Yes   • Essential hypertension [I10]  Yes   • H/O pheochromocytoma [Z86.018]  Yes      Resolved Hospital Problems   No resolved problems to display.        Brief Hospital Course to date:  Altagracia Rothman is a 72 y.o. female with past medical history significant for hypertension, history of pheochromocytoma, s/p left adrenal resection, adrenal insufficiency, hypothyroidism, moderate asthma.  Patient was admitted for right humerus fracture after a fall.     Right proximal and distal humerus fracture after mechanical fall.    -Plan for ORIF next week when cleared from anesthesia standpoint.     Bronchitis - POA, now resolved  -CXR without infiltrate. S/P 5 days of empiric doxy. Doing well on room air.  -Resume home inhalers, continue symbicort     Hypoxia/Chest Pain  - RRT yesterday, likely related to combination of nerve block and anxiety  - CXR, EKG, troponin and ABG are WNL  - improved  this morning     Hx adrenal insufficiency s/p adrenalectomy, s/p adrenal crisis (hypotension on admission) - possibly precipitated by bronchitis, humerus fracture, and UTI  History of pheochromocytoma - resected in 2003   - My partner has discussed this with patient's endocrinologist Dr. Nelson via telephone on 6/30. Patient had adrenalectomy in 2003 with removal of pheo, she has declined further screening tests since that time. Per Dr. Nelson, cathecholamine/metanphrine testing would be helpful if it is negative, although if positive (may be false positive) could be related toTricyclic which patient has refused to taper and would need to be off of for 4-6 weeks for accurate testing. I discussed this with the patient and with Dr. Pope from anesthesia. Patient is now agreeable to testing and will order 24hr urine metanephrines/catecholamines, this started on 6/30. Unclear how long results will take (as this is a holiday weekend)  -Home dose of hydrocortisone 20mg qam, 10mg nightly, wean back to home dose. Will need to be stress dosed at timing of surgery.     Hx HTN  -initally was hypotensive due to Adrenal crisis, so bp meds were held  -started back lisinopril 5mg on 6/27/22 (on quinapril 5mg at home)  -wean hydrocortisone back to home dose  -resume aldactone, lasix x1 dose 6/29  -resume home pindolol, PRN hydralazine and PRN labetalol      Clifford catheter placed at time of admission  -Clifford d/c'd on 6/26/22  -C/O irritation from purewick and asked to get up to restroom. She however uses the restroom nearly every hour and is currently max assist so this is not feasible at this time. Can use bedpan or bassam.      UTI, proteus species  -clifford has been removed  -urine cultures growing proteus species   - completed course of fosfomycin  -Pyridium for dysuria     Hyperglycemia, improved  -not diabetic, A1c 5.3     Hypothyroidism   -on home dose Synthroid.    This patient's problems and plans were partially entered by my  partner and updated as appropriate by me 07/02/22.    Expected Discharge Location and Transportation: [facility]  Expected Discharge Date: late next week    DVT prophylaxis:  Mechanical DVT prophylaxis orders are present.     AM-PAC 6 Clicks Score (PT): 11 (07/01/22 1600)    CODE STATUS:   Code Status and Medical Interventions:   Ordered at: 06/24/22 2121     Level Of Support Discussed With:    Patient     Code Status (Patient has no pulse and is not breathing):    CPR (Attempt to Resuscitate)     Medical Interventions (Patient has pulse or is breathing):    Full Support       Myrna Okeefe II, DO  07/02/22

## 2022-07-02 NOTE — PROGRESS NOTES
[facility]    Acute pain service Inpatient Progress Note    Patient Name: Altagracia Rothman  :  1950  MRN:  [id number]        Acute Pain  Service Inpatient Progress Note:    Analgesia:Good  Pain Score:3/10  LOC: alert and awake  Resp Status: room air  Cardiac: VS stable  Side Effects:None  Catheter Site:clean, dressing intact and dry  Cath type: peripheral nerve cath with ON Q  Infusion rate: 4ml/hr  Dosing/Volume: ropivacaine 0.2%  Catheter Plan:Catheter to remain Insitu and Continue catheter infusion rate unchanged

## 2022-07-02 NOTE — THERAPY TREATMENT NOTE
Patient Name: Altagracia Rothman  : 1950    MRN: [id number]                              Today's Date: 2022       Admit Date: 2022    Visit Dx:     ICD-10-CM ICD-9-CM   1. Other closed displaced fracture of proximal end of right humerus, initial encounter  S42.291A 812.09   2. Closed fracture of distal end of humerus, unspecified fracture morphology, initial encounter  S42.409A 812.40   3. Fall, initial encounter  W19.XXXA E888.9   4. Osteoporosis with current pathological fracture, unspecified osteoporosis type, initial encounter  M80.00XA 733.00     733.10     Patient Active Problem List   Diagnosis   • Adrenal insufficiency (HCC)   • Hypothyroidism   • Essential hypertension   • H/O pheochromocytoma   • Chronic bilateral low back pain without sciatica   • Encounter for chronic pain management   • Osteoporosis   • Tobacco abuse   • BMI 35.0-35.9,adult   • Bronchitis   • Moderate asthma without complication   • Paresthesia   • Medicare annual wellness visit, subsequent   • Routine medical exam   • Lipid screening   • Hair loss   • Other closed displaced fracture of proximal end of right humerus, initial encounter   • Acute UTI (urinary tract infection)     Past Medical History:   Diagnosis Date   • Adrenal insufficiency (HCC)    • Fibromyalgia    • Hypertension    • Hypothyroidism    • Skin cancer      Past Surgical History:   Procedure Laterality Date   • ADRENAL GLAND SURGERY     • BREAST BIOPSY      x4   • GALLBLADDER SURGERY     • HYSTERECTOMY     • MRI ANGIOGRAM LOWER EXTREMITY UNILATERAL W CONTRAST     • TONSILLECTOMY        General Information     Row Name 22 1608          Physical Therapy Time and Intention    Document Type therapy note (daily note)  -HP     Mode of Treatment physical therapy  -     Row Name 22 1608          General Information    Patient Profile Reviewed yes  -HP     Existing Precautions/Restrictions fall;non-weight bearing;right;shoulder  standard sling AAT   R  proximal/distal humeral FX, surgery scheduled for 7/5  -[facility] Name 07/02/22 1608          Cognition    Orientation Status (Cognition) oriented x 4  -[facility] Name 07/02/22 1608          Safety Issues, Functional Mobility    Impairments Affecting Function (Mobility) balance;coordination;pain;strength;endurance/activity tolerance;postural/trunk control;range of motion (ROM)  -           User Key  (r) = Recorded By, (t) = Taken By, (c) = Cosigned By    Initials Name Provider Type     Fina Landeros, FAIZA Physical Therapist               Mobility     [facility] Name 07/02/22 1609          Bed Mobility    Bed Mobility supine-sit  -     Supine-Sit [place] (Bed Mobility) verbal cues;moderate assist (50% patient effort)  -     Comment, (Bed Mobility) Pt performed bed mobility with Mod A for trunk management. Vc for sequencing.  -[facility] Name 07/02/22 1609          Transfers    Comment, (Transfers) Pt performed STS with Min A and single crutch. Pt able to transfer bed>ARSH>chair with Min A and crutch. Increased time to complete transfer. Assist for stability. Pt requires assistance for placing crutch and removing crutch prior to sitting. Pt encouraged to utilize safer AD but pt declined.  -[facility] Name 07/02/22 1609          Bed-Chair Transfer    Bed-Chair [place] (Transfers) verbal cues;minimum assist (75% patient effort);1 person assist;nonverbal cues (demo/gesture)  -     Assistive Device (Bed-Chair Transfers) crutches, axillary  -[facility] Name 07/02/22 1609          Sit-Stand Transfer    Sit-Stand [place] (Transfers) minimum assist (75% patient effort);1 person assist;verbal cues  -     Assistive Device (Sit-Stand Transfers) crutches, axillary  -HP     Row Name 07/02/22 1609          Mobility    Extremity Weight-bearing Status right upper extremity  -     Right Upper Extremity (Weight-bearing Status) non weight-bearing (NWB)  -           User Key  (r) = Recorded By, (t) = Taken By, (c)  = Cosigned By    Initials Name Provider Type     Fina Landeros PT Physical Therapist               Obj/Interventions     Row Name 07/02/22 1613          Balance    Balance Assessment sitting static balance;sitting dynamic balance;sit to stand dynamic balance;standing static balance;standing dynamic balance  -     Static Sitting Balance standby assist  -     Dynamic Sitting Balance standby assist  -     Position, Sitting Balance sitting edge of bed  -     Static Standing Balance contact guard  -     Dynamic Standing Balance minimal assist  -     Position/Device Used, Standing Balance supported;walker, rolling  -HP     Balance Interventions sitting;sit to stand;occupation based/functional task  -           User Key  (r) = Recorded By, (t) = Taken By, (c) = Cosigned By    Initials Name Provider Type     Fina Landeros PT Physical Therapist               Goals/Plan    No documentation.                Clinical Impression     Row Name 07/02/22 1614          Pain    Pretreatment Pain Rating 6/10  -HP     Posttreatment Pain Rating 6/10  -HP     Pain Location - Side/Orientation Right  -     Pain Location upper  -     Pain Location - extremity  -HP     Row Name 07/02/22 1614          Plan of Care Review    Plan of Care Reviewed With patient  -HP     Progress no change  -HP     Outcome Evaluation Pt performed bed mobility with Mod A. Pt performed STS and SPTx2 with CGA and axillary crutch. Pt encouraged to use safer AD. Pt declined. Continue with PT POC as pt tolerates. Recommend d/c to SNF at discharge due to high risk for falls.  -     Row Name 07/02/22 1614          Vital Signs    Pre Systolic BP Rehab --  VSS  -HP     Pre Patient Position Supine  -HP     Intra Patient Position Standing  -HP     Post Patient Position Sitting  -HP     Row Name 07/02/22 1614          Positioning and Restraints    Pre-Treatment Position in bed  -HP     Post Treatment Position chair  -HP     In Chair notified  nsg;reclined;sitting;call light within reach;encouraged to call for assist;exit alarm on;legs elevated;waffle cushion;on mechanical lift sling  -           User Key  (r) = Recorded By, (t) = Taken By, (c) = Cosigned By    Initials Name Provider Type    Fina Reyna PT Physical Therapist               Outcome Measures     Row Name 07/02/22 1616          How much help from another person do you currently need...    Turning from your back to your side while in flat bed without using bedrails? 3  -HP     Moving from lying on back to sitting on the side of a flat bed without bedrails? 2  -HP     Moving to and from a bed to a chair (including a wheelchair)? 3  -HP     Standing up from a chair using your arms (e.g., wheelchair, bedside chair)? 3  -HP     Climbing 3-5 steps with a railing? 1  -HP     To walk in hospital room? 2  -HP     AM-PAC 6 Clicks Score (PT) 14  -HP     Highest level of mobility 4 --> Transferred to chair/commode  -     Row Name 07/02/22 1616          Functional Assessment    Outcome Measure Options AM-PAC 6 Clicks Basic Mobility (PT)  -           User Key  (r) = Recorded By, (t) = Taken By, (c) = Cosigned By    Initials Name Provider Type     Fina Landeros PT Physical Therapist                             Physical Therapy Education                 Title: PT OT SLP Therapies (Done)     Topic: Physical Therapy (Done)     Point: Mobility training (Done)     Learning Progress Summary           Patient Acceptance, E,D, VU,NR by HP at 7/2/2022 1616    Acceptance, E,TB, NR by KG at 7/1/2022 1601    Acceptance, E, NR by FW at 6/28/2022 1512    Eager, E, VU,DU by SC at 6/27/2022 1151    Comment: Reviewed safety with mobility    Acceptance, E, VU,NR by LO at 6/26/2022 0825    Comment: Patient education regarding PT POC, sequencing for bed mobility, transfers, and ambulation                   Point: Home exercise program (Done)     Learning Progress Summary           Patient Acceptance, E,D,  VU,NR by HP at 7/2/2022 1616    Acceptance, E,TB, NR by KG at 7/1/2022 1601    Acceptance, E, VU,NR by  at 6/29/2022 1450    Comment: Benefits of activity    Acceptance, E, NR by FW at 6/28/2022 1512    Eager, E, VU,DU by SC at 6/27/2022 1151    Comment: Reviewed safety with mobility    Acceptance, E, VU,NR by LO at 6/26/2022 0825    Comment: Patient education regarding PT POC, sequencing for bed mobility, transfers, and ambulation                   Point: Body mechanics (Done)     Learning Progress Summary           Patient Acceptance, E,D, VU,NR by HP at 7/2/2022 1616    Acceptance, E,TB, NR by KG at 7/1/2022 1601    Acceptance, E, NR by FW at 6/28/2022 1512    Eager, E, VU,DU by SC at 6/27/2022 1151    Comment: Reviewed safety with mobility    Acceptance, E, VU,NR by  at 6/26/2022 0825    Comment: Patient education regarding PT POC, sequencing for bed mobility, transfers, and ambulation                   Point: Precautions (Done)     Learning Progress Summary           Patient Acceptance, E,D, VU,NR by HP at 7/2/2022 1616    Acceptance, E,TB, NR by KG at 7/1/2022 1601    Acceptance, E, NR by FW at 6/28/2022 1512    Eager, E, VU,DU by SC at 6/27/2022 1151    Comment: Reviewed safety with mobility    Acceptance, E, VU,NR by  at 6/26/2022 0825    Comment: Patient education regarding PT POC, sequencing for bed mobility, transfers, and ambulation                               User Key     Initials Effective Dates Name Provider Type Discipline    SC 06/16/21 -  Juan Montez, PT Physical Therapist PT    LS 06/16/21 -  Baylee Pillai, PT Physical Therapist PT    KG 06/16/21 -  Antoinette Swift Physical Therapist PT    FW 05/05/22 -  Eyal Delgado, PT Physical Therapist PT    LO 06/16/21 -  Asia Sosa, PT Physical Therapist PT    HP 06/01/21 -  Fina Landeros, PT Physical Therapist PT              PT Recommendation and Plan     Plan of Care Reviewed With: patient  Progress: no change  Outcome  Evaluation: Pt performed bed mobility with Mod A. Pt performed STS and SPTx2 with CGA and axillary crutch. Pt encouraged to use safer AD. Pt declined. Continue with PT POC as pt tolerates. Recommend d/c to SNF at discharge due to high risk for falls.     Time Calculation:    PT Charges     Row Name 07/02/22 1350             Time Calculation    Start Time 1350  -HP      PT Received On 07/02/22  -HP              Time Calculation- PT    Total Timed Code Minutes- PT 40 minute(s)  -HP              Timed Charges    16054 - Gait Training Minutes  15  -HP      82888 - PT Therapeutic Activity Minutes 25  -HP              Total Minutes    Timed Charges Total Minutes 40  -HP       Total Minutes 40  -HP            User Key  (r) = Recorded By, (t) = Taken By, (c) = Cosigned By    Initials Name Provider Type    HP Fina Landeros PT Physical Therapist              Therapy Charges for Today     Code Description Service Date Service Provider Modifiers Qty    89549865710 HC GAIT TRAINING EA 15 MIN 7/2/2022 Fina Landeros, PT GP 1    31518482746 HC PT THERAPEUTIC ACT EA 15 MIN 7/2/2022 Fina Landeros, PT GP 2          PT G-Codes  Outcome Measure Options: AM-PAC 6 Clicks Basic Mobility (PT)  AM-PAC 6 Clicks Score (PT): 14  AM-PAC 6 Clicks Score (OT): 10    Fina Landeros PT  7/2/2022

## 2022-07-02 NOTE — PLAN OF CARE
Goal Outcome Evaluation:  Plan of Care Reviewed With: patient        Progress: no change  Outcome Evaluation: Pt performed bed mobility with Mod A. Pt performed STS and SPTx2 with CGA and axillary crutch. Pt encouraged to use safer AD. Pt declined. Continue with PT POC as pt tolerates. Recommend d/c to SNF at discharge due to high risk for falls.

## 2022-07-03 PROCEDURE — 97530 THERAPEUTIC ACTIVITIES: CPT

## 2022-07-03 PROCEDURE — 94799 UNLISTED PULMONARY SVC/PX: CPT

## 2022-07-03 PROCEDURE — 99232 SBSQ HOSP IP/OBS MODERATE 35: CPT | Performed by: INTERNAL MEDICINE

## 2022-07-03 PROCEDURE — 97110 THERAPEUTIC EXERCISES: CPT

## 2022-07-03 PROCEDURE — 97535 SELF CARE MNGMENT TRAINING: CPT | Performed by: OCCUPATIONAL THERAPIST

## 2022-07-03 PROCEDURE — 94761 N-INVAS EAR/PLS OXIMETRY MLT: CPT

## 2022-07-03 RX ORDER — SACCHAROMYCES BOULARDII 250 MG
250 CAPSULE ORAL 2 TIMES DAILY
Status: DISCONTINUED | OUTPATIENT
Start: 2022-07-03 | End: 2022-07-19 | Stop reason: HOSPADM

## 2022-07-03 RX ORDER — OXYCODONE AND ACETAMINOPHEN 10; 325 MG/1; MG/1
1 TABLET ORAL EVERY 4 HOURS PRN
Status: DISCONTINUED | OUTPATIENT
Start: 2022-07-03 | End: 2022-07-19 | Stop reason: HOSPADM

## 2022-07-03 RX ADMIN — LEVOTHYROXINE SODIUM 88 MCG: 88 TABLET ORAL at 05:36

## 2022-07-03 RX ADMIN — DOXYCYCLINE 100 MG: 100 CAPSULE ORAL at 09:09

## 2022-07-03 RX ADMIN — NORTRIPTYLINE HYDROCHLORIDE 50 MG: 10 CAPSULE ORAL at 09:10

## 2022-07-03 RX ADMIN — PINDOLOL 2.5 MG: 5 TABLET ORAL at 09:10

## 2022-07-03 RX ADMIN — PANTOPRAZOLE SODIUM 40 MG: 40 TABLET, DELAYED RELEASE ORAL at 05:36

## 2022-07-03 RX ADMIN — DOXYCYCLINE 100 MG: 100 CAPSULE ORAL at 21:53

## 2022-07-03 RX ADMIN — OXYCODONE HYDROCHLORIDE AND ACETAMINOPHEN 1 TABLET: 10; 325 TABLET ORAL at 16:43

## 2022-07-03 RX ADMIN — GABAPENTIN 800 MG: 400 CAPSULE ORAL at 12:12

## 2022-07-03 RX ADMIN — NORTRIPTYLINE HYDROCHLORIDE 50 MG: 10 CAPSULE ORAL at 21:52

## 2022-07-03 RX ADMIN — GABAPENTIN 800 MG: 400 CAPSULE ORAL at 18:17

## 2022-07-03 RX ADMIN — GABAPENTIN 800 MG: 400 CAPSULE ORAL at 21:52

## 2022-07-03 RX ADMIN — SPIRONOLACTONE 50 MG: 50 TABLET ORAL at 10:09

## 2022-07-03 RX ADMIN — Medication 250 MG: at 10:09

## 2022-07-03 RX ADMIN — HYDROCORTISONE 10 MG: 10 TABLET ORAL at 13:12

## 2022-07-03 RX ADMIN — HYDROCORTISONE 20 MG: 10 TABLET ORAL at 05:36

## 2022-07-03 RX ADMIN — PHENAZOPYRIDINE HYDROCHLORIDE 100 MG: 100 TABLET ORAL at 10:10

## 2022-07-03 RX ADMIN — BUDESONIDE AND FORMOTEROL FUMARATE DIHYDRATE 2 PUFF: 160; 4.5 AEROSOL RESPIRATORY (INHALATION) at 08:58

## 2022-07-03 RX ADMIN — NORTRIPTYLINE HYDROCHLORIDE 50 MG: 10 CAPSULE ORAL at 16:43

## 2022-07-03 RX ADMIN — LISINOPRIL 5 MG: 5 TABLET ORAL at 09:09

## 2022-07-03 RX ADMIN — OXYCODONE HYDROCHLORIDE AND ACETAMINOPHEN 1 TABLET: 10; 325 TABLET ORAL at 05:37

## 2022-07-03 RX ADMIN — ALBUTEROL SULFATE 2.5 MG: 2.5 SOLUTION RESPIRATORY (INHALATION) at 23:49

## 2022-07-03 RX ADMIN — PINDOLOL 2.5 MG: 5 TABLET ORAL at 16:42

## 2022-07-03 RX ADMIN — Medication 10 ML: at 21:54

## 2022-07-03 RX ADMIN — BUDESONIDE AND FORMOTEROL FUMARATE DIHYDRATE 2 PUFF: 160; 4.5 AEROSOL RESPIRATORY (INHALATION) at 20:26

## 2022-07-03 RX ADMIN — GABAPENTIN 800 MG: 400 CAPSULE ORAL at 09:09

## 2022-07-03 RX ADMIN — OXYCODONE HYDROCHLORIDE AND ACETAMINOPHEN 1 TABLET: 10; 325 TABLET ORAL at 12:12

## 2022-07-03 RX ADMIN — MORPHINE SULFATE 30 MG: 30 TABLET, FILM COATED, EXTENDED RELEASE ORAL at 09:09

## 2022-07-03 RX ADMIN — MICONAZOLE NITRATE 200 MG: 200 SUPPOSITORY VAGINAL at 21:54

## 2022-07-03 RX ADMIN — Medication 10 ML: at 09:13

## 2022-07-03 RX ADMIN — MORPHINE SULFATE 30 MG: 30 TABLET, FILM COATED, EXTENDED RELEASE ORAL at 21:53

## 2022-07-03 RX ADMIN — PINDOLOL 2.5 MG: 5 TABLET ORAL at 21:50

## 2022-07-03 RX ADMIN — Medication 250 MG: at 21:53

## 2022-07-03 NOTE — PLAN OF CARE
Problem: Adult Inpatient Plan of Care  Goal: Plan of Care Review  Recent Flowsheet Documentation  Taken 7/3/2022 1421 by Rosanna Overton, PT  Progress: improving  Plan of Care Reviewed With: patient  Outcome Evaluation: Patient took steps from bed to chair with axillary crutch on L and CGAx2, limited by fatigue and weakness. Min assist x2 to t/f to EOB. CGAx2 to rise in to standing x2. Continues to demonstrate improved balance with OOB mobility. Will continue to progress as able. Recommend SNF at d/c.   Goal Outcome Evaluation:  Plan of Care Reviewed With: patient        Progress: improving  Outcome Evaluation: Patient took steps from bed to chair with axillary crutch on L and CGAx2, limited by fatigue and weakness. Min assist x2 to t/f to EOB. CGAx2 to rise in to standing x2. Continues to demonstrate improved balance with OOB mobility. Will continue to progress as able. Recommend SNF at d/c.

## 2022-07-03 NOTE — THERAPY TREATMENT NOTE
Patient Name: Altagracia Rothman  : 1950    MRN: [id number]                              Today's Date: 7/3/2022       Admit Date: 2022    Visit Dx:     ICD-10-CM ICD-9-CM   1. Other closed displaced fracture of proximal end of right humerus, initial encounter  S42.291A 812.09   2. Closed fracture of distal end of humerus, unspecified fracture morphology, initial encounter  S42.409A 812.40   3. Fall, initial encounter  W19.XXXA E888.9   4. Osteoporosis with current pathological fracture, unspecified osteoporosis type, initial encounter  M80.00XA 733.00     733.10     Patient Active Problem List   Diagnosis   • Adrenal insufficiency (HCC)   • Hypothyroidism   • Essential hypertension   • H/O pheochromocytoma   • Chronic bilateral low back pain without sciatica   • Encounter for chronic pain management   • Osteoporosis   • Tobacco abuse   • BMI 35.0-35.9,adult   • Bronchitis   • Moderate asthma without complication   • Paresthesia   • Medicare annual wellness visit, subsequent   • Routine medical exam   • Lipid screening   • Hair loss   • Other closed displaced fracture of proximal end of right humerus, initial encounter   • Acute UTI (urinary tract infection)     Past Medical History:   Diagnosis Date   • Adrenal insufficiency (HCC)    • Fibromyalgia    • Hypertension    • Hypothyroidism    • Skin cancer      Past Surgical History:   Procedure Laterality Date   • ADRENAL GLAND SURGERY     • BREAST BIOPSY      x4   • GALLBLADDER SURGERY     • HYSTERECTOMY     • MRI ANGIOGRAM LOWER EXTREMITY UNILATERAL W CONTRAST     • TONSILLECTOMY        General Information     Row Name 22 1445          OT Time and Intention    Document Type progress note/recertification  SX was cancelled, attempting again on   -AR     Mode of Treatment occupational therapy;co-treatment  -AR     Row Name 22 1445          General Information    Existing Precautions/Restrictions fall;non-weight bearing;right;shoulder  sling LUE, L  proximal and distal humeral fractures  -AR     Row Name 07/03/22 1445          Cognition    Orientation Status (Cognition) oriented x 4  -AR     Row Name 07/03/22 1445          Safety Issues, Functional Mobility    Safety Issues Affecting Function (Mobility) judgment;problem-solving;safety precaution awareness;safety precautions follow-through/compliance  -AR     Impairments Affecting Function (Mobility) balance;coordination;pain;strength;endurance/activity tolerance;postural/trunk control;range of motion (ROM)  -AR           User Key  (r) = Recorded By, (t) = Taken By, (c) = Cosigned By    Initials Name Provider Type    AR Natalie Tellez, OT Occupational Therapist                 Mobility/ADL's     Row Name 07/03/22 1449          Bed Mobility    Bed Mobility supine-sit;scooting/bridging  -AR     Scooting/Bridging [place] (Bed Mobility) contact guard;verbal cues  -AR     Supine-Sit [place] (Bed Mobility) minimum assist (75% patient effort);verbal cues  -AR     Bed Mobility, Safety Issues decreased use of arms for pushing/pulling  -AR     Row Name 07/03/22 1449          Transfers    Transfers sit-stand transfer;stand-sit transfer;toilet transfer  -AR     Comment, (Transfers) Pt using crutch in LUE, she required cues for hand placement and chair approach. BLE scissoring noted.  -AR     Sit-Stand [place] (Transfers) contact guard;verbal cues  -AR     Stand-Sit [place] (Transfers) contact guard;verbal cues  -AR     [place] Level (Toilet Transfer) contact guard;verbal cues  -AR     Assistive Device (Toilet Transfer) crutches, axillary  -AR     Row Name 07/03/22 1449          Sit-Stand Transfer    Assistive Device (Sit-Stand Transfers) crutches, axillary  -AR     Row Name 07/03/22 1449          Stand-Sit Transfer    Assistive Device (Stand-Sit Transfers) crutches, axillary  -AR     Row Name 07/03/22 1449          Toilet Transfer    Type (Toilet Transfer) stand-sit;sit-stand  -AR     Row Name  07/03/22 1449          Activities of Daily Living    BADL Assessment/Intervention upper body dressing;lower body dressing;toileting  -AR     Row Name 07/03/22 1449          Mobility    Extremity Weight-bearing Status right upper extremity  -AR     Right Upper Extremity (Weight-bearing Status) non weight-bearing (NWB)   -AR     Row Name 07/03/22 1449          Upper Body Dressing Assessment/Training    [place] Level (Upper Body Dressing) don;pajama/robe;maximum assist (25% patient effort)  -AR     Position (Upper Body Dressing) edge of bed sitting  -AR     Row Name 07/03/22 1449          Toileting Assessment/Training    [place] Level (Toileting) toileting skills;perform perineal hygiene;adjust/manage clothing;dependent (less than 25% patient effort)  -AR     Position (Toileting) supported standing  -AR     Row Name 07/03/22 1449          Lower Body Dressing Assessment/Training    [place] Level (Lower Body Dressing) don;socks;maximum assist (25% patient effort)  -AR     Position (Lower Body Dressing) supported sitting  -AR           User Key  (r) = Recorded By, (t) = Taken By, (c) = Cosigned By    Initials Name Provider Type    Natalie Garibay OT Occupational Therapist               Obj/Interventions     Row Name 07/03/22 1452          Sensory Assessment (Somatosensory)    Sensory Assessment (Somatosensory) UE sensation intact  -[facility] Name 07/03/22 1452          Range of Motion Comprehensive    Comment, General Range of Motion LUE WFL, RUE deferred  -AR     [facility] Name 07/03/22 1452          Strength Comprehensive (MMT)    Comment, General Manual Muscle Testing (MMT) Assessment [place] WFL, R deferred  -AR     Row Name 07/03/22 1452          Wrist (Therapeutic Exercise)    Wrist AAROM (Therapeutic Exercise) right;flexion;extension;10 repetitions  -AR     Row Name 07/03/22 1452          Motor Skills    Therapeutic Exercise hand  -AR     Row Name 07/03/22 1452          Balance    Balance Assessment  sitting static balance;sitting dynamic balance;standing static balance;standing dynamic balance  -AR     Static Sitting Balance supervision  -AR     Dynamic Sitting Balance supervision  -AR     Position, Sitting Balance sitting edge of bed  -AR     Static Standing Balance contact guard  -AR     Dynamic Standing Balance contact guard  -AR     Position/Device Used, Standing Balance crutches, axillary  -AR           User Key  (r) = Recorded By, (t) = Taken By, (c) = Cosigned By    Initials Name Provider Type    Natalie Garibay OT Occupational Therapist               Goals/Plan     Row Name 07/03/22 1459          Transfer Goal 1 (OT)    Activity/Assistive Device (Transfer Goal 1, OT) sit-to-stand/stand-to-sit;toilet;crutches, axillary  -AR     [place] Level/Cues Needed (Transfer Goal 1, OT) supervision required;verbal cues required  -AR     Time Frame (Transfer Goal 1, OT) long term goal (LTG);by discharge  -AR     Progress/Outcome (Transfer Goal 1, OT) goal ongoing  -AR     Row Name 07/03/22 1459          Dressing Goal 1 (OT)    Activity/Device (Dressing Goal 1, OT) upper body dressing  -AR     [place]/Cues Needed (Dressing Goal 1, OT) verbal cues required;moderate assist (50-74% patient effort)  -AR     Time Frame (Dressing Goal 1, OT) 10 days;long term goal (LTG)  -AR     Progress/Outcome (Dressing Goal 1, OT) goal ongoing  -AR     Row Name 07/03/22 1459          ROM Goal 1 (OT)    Progress/Outcome (ROM Goal 1, OT) goal no longer appropriate  -AR           User Key  (r) = Recorded By, (t) = Taken By, (c) = Cosigned By    Initials Name Provider Type    Natalie Garibay OT Occupational Therapist               Clinical Impression     Row Name 07/03/22 1459          Pain Assessment    Pain Location - Side/Orientation Right  -AR     Pain Location upper  -AR     Pain Location - extremity  -AR     Pain Intervention(s) Repositioned;Ambulation/increased activity;Medication (See MAR)  -AR     Row Name  07/03/22 1453          Pain Scale: FACES Pre/Post-Treatment    Pain: FACES Scale, Pretreatment 0-->no hurt  -AR     Posttreatment Pain Rating 4-->hurts little more  -AR     Row Name 07/03/22 1453          Plan of Care Review    Plan of Care Reviewed With patient  -AR     Progress improving  -AR     Outcome Evaluation Pt's surgery was not completed last week, received orders to resume and she is scheduled for OR 7/5. She completed bed mobility with min assist, UB dressing with max assist and LB dressing with dependence. She completed toileting with CGA transfer and dependence post-tiolet hygiene and clothing management. Recommend SNF.  -AR     Row Name 07/03/22 1453          Therapy Assessment/Plan (OT)    Rehab Potential (OT) good, to achieve stated therapy goals  -AR     Criteria for Skilled Therapeutic Interventions Met (OT) yes  -AR     Therapy Frequency (OT) daily  -AR     Row Name 07/03/22 1453          Therapy Plan Review/Discharge Plan (OT)    Anticipated Discharge Disposition (OT) skilled nursing facility  -AR     Row Name 07/03/22 1453          Vital Signs    Pre SpO2 (%) 97  -AR     O2 Delivery Pre Treatment room air  -AR     Post SpO2 (%) 95  -AR     O2 Delivery Post Treatment room air  -AR     Pre Patient Position Supine  -AR     Intra Patient Position Standing  -AR     Post Patient Position Sitting  -AR     Row Name 07/03/22 1453          Positioning and Restraints    Pre-Treatment Position in bed  -AR     Post Treatment Position chair  -AR     In Chair reclined;call light within reach;encouraged to call for assist;exit alarm on;with PT;legs elevated;waffle cushion;on mechanical lift sling;with brace  -AR           User Key  (r) = Recorded By, (t) = Taken By, (c) = Cosigned By    Initials Name Provider Type    Natalie Garibay, OT Occupational Therapist               Outcome Measures     Row Name 07/03/22 1500          How much help from another is currently needed...    Putting on and taking off  regular lower body clothing? 1  -AR     Bathing (including washing, rinsing, and drying) 2  -AR     Toileting (which includes using toilet bed pan or urinal) 2  -AR     Putting on and taking off regular upper body clothing 2  -AR     Taking care of personal grooming (such as brushing teeth) 3  -AR     Eating meals 3  -AR     AM-PAC 6 Clicks Score (OT) 13  -AR     Row Name 07/03/22 1500          Functional Assessment    Outcome Measure Options AM-PAC 6 Clicks Daily Activity (OT)  -AR           User Key  (r) = Recorded By, (t) = Taken By, (c) = Cosigned By    Initials Name Provider Type    Natalie Garibay OT Occupational Therapist                Occupational Therapy Education                 Title: PT OT SLP Therapies (Done)     Topic: Occupational Therapy (Done)     Point: ADL training (Done)     Description:   Instruct learner(s) on proper safety adaptation and remediation techniques during self care or transfers.   Instruct in proper use of assistive devices.              Learning Progress Summary           Patient Eager, E,TB,D, VU,DU by AR at 7/3/2022 1500    Eager, E, VU by AR at 6/28/2022 1838    Acceptance, E,D, NR by LO at 6/27/2022 1434    Acceptance, E, NR by RAJNI at 6/26/2022 1312                   Point: Home exercise program (Done)     Description:   Instruct learner(s) on appropriate technique for monitoring, assisting and/or progressing therapeutic exercises/activities.              Learning Progress Summary           Patient Eager, E,TB,D, VU,DU by AR at 7/3/2022 1500    Eager, E, VU by AR at 6/28/2022 1838    Acceptance, E,D, NR by LO at 6/27/2022 1434    Acceptance, E, NR by RAJNI at 6/26/2022 1312                   Point: Precautions (Done)     Description:   Instruct learner(s) on prescribed precautions during self-care and functional transfers.              Learning Progress Summary           Patient Eager, E,TB,D, VU,DU by AR at 7/3/2022 1500    Eager, E, VU by AR at 6/28/2022 1838     Acceptance, E,D, NR by LO at 6/27/2022 1434    Acceptance, E, NR by RAJNI at 6/26/2022 1312                   Point: Body mechanics (Done)     Description:   Instruct learner(s) on proper positioning and spine alignment during self-care, functional mobility activities and/or exercises.              Learning Progress Summary           Patient Eager, E,TB,D, VU,DU by AR at 7/3/2022 1500    Eager, E, VU by AR at 6/28/2022 1838    Acceptance, E,D, NR by LO at 6/27/2022 1434    Acceptance, E, NR by RAJNI at 6/26/2022 1312                               User Key     Initials Effective Dates Name Provider Type Discipline    AR 06/16/21 -  Natalie Tellez, OT Occupational Therapist OT    RAJNI 06/16/21 -  Rosanna Meyers OT Occupational Therapist OT    LO 06/16/21 -  Shari Nicole OT Occupational Therapist OT              OT Recommendation and Plan  Therapy Frequency (OT): daily  Plan of Care Review  Plan of Care Reviewed With: patient  Progress: improving  Outcome Evaluation: Pt's surgery was not completed last week, received orders to resume and she is scheduled for OR 7/5. She completed bed mobility with min assist, UB dressing with max assist and LB dressing with dependence. She completed toileting with CGA transfer and dependence post-tiolet hygiene and clothing management. Recommend SNF.     Time Calculation:    Time Calculation- OT     Row Name 07/03/22 1501             Time Calculation- OT    OT Start Time 1410  -AR      OT Received On 07/03/22  -AR      OT Goal Re-Cert Due Date 07/06/22  -AR              Timed Charges    79789 - OT Self Care/Mgmt Minutes 16  -AR              Total Minutes    Timed Charges Total Minutes 16  -AR       Total Minutes 16  -AR            User Key  (r) = Recorded By, (t) = Taken By, (c) = Cosigned By    Initials Name Provider Type    Natalie Garibay OT Occupational Therapist              Therapy Charges for Today     Code Description Service Date Service Provider Modifiers Qty     [id number] HC OT SELF CARE/MGMT/TRAIN EA 15 MIN 7/3/2022 Natalie Tellez, OT GO 1               Natalie Tellez, OT  7/3/2022

## 2022-07-03 NOTE — PROGRESS NOTES
[facility]    Acute pain service Inpatient Progress Note    Patient Name: Altagracia Rothman  :  1950  MRN:  [id number]        Acute Pain  Service Inpatient Progress Note:    Analgesia:Good  Pain Score:4/10  LOC: alert and awake  Resp Status: room air  Cardiac: VS stable  Side Effects:None  Catheter Site:clean, dry and dressing intact  Cath type: peripheral nerve cath with ON Q  Volume: .  Catheter Plan:Catheter to remain Insitu and Continue catheter infusion rate unchanged

## 2022-07-03 NOTE — THERAPY TREATMENT NOTE
Patient Name: Altagracia Rothman  : 1950    MRN: [id number]                              Today's Date: 7/3/2022       Admit Date: 2022    Visit Dx:     ICD-10-CM ICD-9-CM   1. Other closed displaced fracture of proximal end of right humerus, initial encounter  S42.291A 812.09   2. Closed fracture of distal end of humerus, unspecified fracture morphology, initial encounter  S42.409A 812.40   3. Fall, initial encounter  W19.XXXA E888.9   4. Osteoporosis with current pathological fracture, unspecified osteoporosis type, initial encounter  M80.00XA 733.00     733.10     Patient Active Problem List   Diagnosis   • Adrenal insufficiency (HCC)   • Hypothyroidism   • Essential hypertension   • H/O pheochromocytoma   • Chronic bilateral low back pain without sciatica   • Encounter for chronic pain management   • Osteoporosis   • Tobacco abuse   • BMI 35.0-35.9,adult   • Bronchitis   • Moderate asthma without complication   • Paresthesia   • Medicare annual wellness visit, subsequent   • Routine medical exam   • Lipid screening   • Hair loss   • Other closed displaced fracture of proximal end of right humerus, initial encounter   • Acute UTI (urinary tract infection)     Past Medical History:   Diagnosis Date   • Adrenal insufficiency (HCC)    • Fibromyalgia    • Hypertension    • Hypothyroidism    • Skin cancer      Past Surgical History:   Procedure Laterality Date   • ADRENAL GLAND SURGERY     • BREAST BIOPSY      x4   • GALLBLADDER SURGERY     • HYSTERECTOMY     • MRI ANGIOGRAM LOWER EXTREMITY UNILATERAL W CONTRAST     • TONSILLECTOMY        General Information     Row Name 22 1421          Physical Therapy Time and Intention    Document Type therapy note (daily note)  -LR     Mode of Treatment physical therapy  -LR     Row Name 22 1421          General Information    Patient Profile Reviewed yes  -LR     Existing Precautions/Restrictions fall;non-weight bearing;right;shoulder;other (see comments)  sling  to R UE, R proximal and distal humerus fx, awaiting sx on 7/5  -LR     Barriers to Rehab medically complex;previous functional deficit  -LR     Row Name 07/03/22 1421          Cognition    Orientation Status (Cognition) oriented x 4  -LR     Row Name 07/03/22 1421          Safety Issues, Functional Mobility    Safety Issues Affecting Function (Mobility) safety precautions follow-through/compliance;safety precaution awareness;awareness of need for assistance;insight into deficits/self-awareness  -LR     Impairments Affecting Function (Mobility) strength;pain;endurance/activity tolerance;range of motion (ROM);balance  -LR           User Key  (r) = Recorded By, (t) = Taken By, (c) = Cosigned By    Initials Name Provider Type    LR Rosanna Overton, PT Physical Therapist               Mobility     Row Name 07/03/22 1421          Bed Mobility    Bed Mobility supine-sit  -LR     Supine-Sit [place] (Bed Mobility) verbal cues;minimum assist (75% patient effort);2 person assist  -LR     Sit-Supine [place] (Bed Mobility) not tested  -LR     Assistive Device (Bed Mobility) head of bed elevated;draw sheet  -LR     Comment, (Bed Mobility) Verbal cues to move LEs towards EOB and to pull with L UE to raise trunk into sitting. Cues to push from bed with L UE to scoot hips out to get feet on floor. Required increased time to perform. Denied dizziness upon sitting up.  -LR     Row Name 07/03/22 1421          Transfers    Comment, (Transfers) Verbal cues to push up from bed with L UE to stand and to reach back for chair with L UE to stand. Patient crosses feet on initial stand and then uncrosses them once standing, she reports this is her technique she uses at baseline. Verbal cues for correct sequencing of steps and positioning and sequencing of crutch for steps from bed to chair. Patient uses momentum to rise into standing.  -LR     Row Name 07/03/22 1421          Bed-Chair Transfer    Bed-Chair [place]  (Transfers) verbal cues;contact guard;2 person assist  -LR     Assistive Device (Bed-Chair Transfers) crutches, axillary  on L side only  -LR     Row Name 07/03/22 1421          Sit-Stand Transfer    Sit-Stand [place] (Transfers) verbal cues;minimum assist (75% patient effort)  -LR     Assistive Device (Sit-Stand Transfers) crutches, axillary  on L side only  -LR     Row Name 07/03/22 1421          Gait/Stairs (Locomotion)    [place] Level (Gait) verbal cues;contact guard;2 person assist  -LR     Assistive Device (Gait) crutches, axillary  -LR     Distance in Feet (Gait) 3  -LR     Deviations/Abnormal Patterns (Gait) bilateral deviations;carol ann decreased;gait speed decreased;stride length decreased  -LR     Bilateral Gait Deviations forward flexed posture;heel strike decreased  -LR     [place] Level (Stairs) not tested  -LR     Comment, (Gait/Stairs) Patient able to take steps form bed to chair with axillary crutch on L side only. Cues for correct sequencing of steps and placement/sequencing of crutch. Gait limited by weakness and fatigue.  -LR     Row Name 07/03/22 1421          Mobility    Extremity Weight-bearing Status right upper extremity  -LR     Right Upper Extremity (Weight-bearing Status) non weight-bearing (NWB)   -LR           User Key  (r) = Recorded By, (t) = Taken By, (c) = Cosigned By    Initials Name Provider Type    LR Rosanna Overton, PT Physical Therapist               Obj/Interventions     Row Name 07/03/22 1421          Motor Skills    Therapeutic Exercise ankle;knee;hip;other (see comments)  cues for technique; difficulty with R seated marches  -LR     Row Name 07/03/22 1421          Hip (Therapeutic Exercise)    Hip (Therapeutic Exercise) strengthening exercise;isometric exercises  -LR     Hip Isometrics (Therapeutic Exercise) bilateral;gluteal sets;sitting;10 repetitions;aDduction  seated hip adduction pillow squeezes  -LR     Hip Strengthening (Therapeutic Exercise)  bilateral;heel slides;aBduction;marching while seated;sitting;10 repetitions  -LR     Row Name 07/03/22 1421          Knee (Therapeutic Exercise)    Knee (Therapeutic Exercise) strengthening exercise;isometric exercises  -LR     Knee Isometrics (Therapeutic Exercise) bilateral;quad sets;sitting;10 repetitions  -LR     Knee Strengthening (Therapeutic Exercise) bilateral;SLR (straight leg raise);SAQ (short arc quad);LAQ (long arc quad);sitting;10 repetitions  -LR     Row Name 07/03/22 1421          Ankle (Therapeutic Exercise)    Ankle (Therapeutic Exercise) AROM (active range of motion)  -LR     Ankle AROM (Therapeutic Exercise) bilateral;dorsiflexion;plantarflexion;sitting;10 repetitions  -LR     Row Name 07/03/22 1421          Balance    Balance Assessment sitting static balance;standing static balance;standing dynamic balance;sitting dynamic balance  -LR     Static Sitting Balance supervision  -LR     Dynamic Sitting Balance supervision  -LR     Position, Sitting Balance unsupported;sitting edge of bed  -LR     Static Standing Balance contact guard;2-person assist  -LR     Dynamic Standing Balance contact guard;2-person assist  -LR     Position/Device Used, Standing Balance supported;crutches, axillary  -LR           User Key  (r) = Recorded By, (t) = Taken By, (c) = Cosigned By    Initials Name Provider Type    LR Rosanna Overton, PT Physical Therapist               Goals/Plan     Row Name 07/03/22 1421          Bed Mobility Goal 1 (PT)    Activity/Assistive Device (Bed Mobility Goal 1, PT) rolling to left;rolling to right;sit to supine;supine to sit;scooting  -LR     [place] Level/Cues Needed (Bed Mobility Goal 1, PT) minimum assist (75% or more patient effort)  -LR     Time Frame (Bed Mobility Goal 1, PT) long term goal (LTG);10 days  -LR     Progress/Outcomes (Bed Mobility Goal 1, PT) good progress toward goal;goal ongoing  -LR     Row Name 07/03/22 1421          Transfer Goal 1 (PT)     Activity/Assistive Device (Transfer Goal 1, PT) sit-to-stand/stand-to-sit;bed-to-chair/chair-to-bed  -LR     [place] Level/Cues Needed (Transfer Goal 1, PT) contact guard required  -LR     Time Frame (Transfer Goal 1, PT) long term goal (LTG);10 days  -LR     Progress/Outcome (Transfer Goal 1, PT) goal ongoing  -LR     Row Name 07/03/22 1421          Gait Training Goal 1 (PT)    Activity/Assistive Device (Gait Training Goal 1, PT) gait (walking locomotion);decrease fall risk;diminish gait deviation;increase endurance/gait distance;improve balance and speed;walker, raimundo;crutches, axillary  -LR     [place] Level (Gait Training Goal 1, PT) contact guard required  -LR     Distance (Gait Training Goal 1, PT) 10 feet  -LR     Time Frame (Gait Training Goal 1, PT) long term goal (LTG);10 days  -LR     Progress/Outcome (Gait Training Goal 1, PT) goal ongoing  -LR     Row Name 07/03/22 1421          Stairs Goal 1 (PT)    Activity/Assistive Device (Stairs Goal 1, PT) ascending stairs;descending stairs;cane, straight  -LR     [place] Level/Cues Needed (Stairs Goal 1, PT) minimum assist (75% or more patient effort)  -LR     Number of Stairs (Stairs Goal 1, PT) 1  -LR     Time Frame (Stairs Goal 1, PT) long term goal (LTG);10 days  -LR     Progress/Outcome (Stairs Goal 1, PT) goal ongoing  -LR     Row Name 07/03/22 1421          Patient Education Goal (PT)    Activity (Patient Education Goal, PT) Patient will demonstrate safe and effective sequencing for bed mobility and transfers.  -LR     [place]/Cues/Accuracy (Memory Goal 2, PT) independent;verbalizes understanding  -LR     Time Frame (Patient Education Goal, PT) long term goal (LTG);10 days  -LR     Progress/Outcome (Patient Education Goal, PT) goal ongoing  -LR           User Key  (r) = Recorded By, (t) = Taken By, (c) = Cosigned By    Initials Name Provider Type    LR Rosanna Overton, PT Physical Therapist               Clinical Impression      Row Name 07/03/22 1421          Pain    Pain Intervention(s) Ambulation/increased activity;Repositioned  -LR     Row Name 07/03/22 1421          Pain Scale: FACES Pre/Post-Treatment    Pain: FACES Scale, Pretreatment 0-->no hurt  -LR     Posttreatment Pain Rating 4-->hurts little more  -LR     Pain Location - Side/Orientation Right  -LR     Pain Location - shoulder  -LR     Row Name 07/03/22 1421          Plan of Care Review    Plan of Care Reviewed With patient  -LR     Progress improving  -LR     Outcome Evaluation Patient took steps from bed to chair with axillary crutch on L and CGAx2, limited by fatigue and weakness. Min assist x2 to t/f to EOB. CGAx2 to rise in to standing x2. Continues to demonstrate improved balance with OOB mobility. Will continue to progress as able. Recommend SNF at d/c.  -LR     Row Name 07/03/22 1421          Therapy Assessment/Plan (PT)    Rehab Potential (PT) good, to achieve stated therapy goals  -LR     Criteria for Skilled Interventions Met (PT) yes;skilled treatment is necessary;meets criteria  -LR     Therapy Frequency (PT) daily  -LR     Row Name 07/03/22 1421          Vital Signs    Pre SpO2 (%) 95  -LR     O2 Delivery Pre Treatment room air  -LR     Pre Patient Position Supine  -LR     Row Name 07/03/22 1421          Positioning and Restraints    Pre-Treatment Position in bed  -LR     Post Treatment Position chair  -LR     In Chair notified nsg;reclined;call light within reach;encouraged to call for assist;exit alarm on;sitting;legs elevated;waffle cushion;on mechanical lift sling  -LR           User Key  (r) = Recorded By, (t) = Taken By, (c) = Cosigned By    Initials Name Provider Type    LR Rosanna Overton, PT Physical Therapist               Outcome Measures     Row Name 07/03/22 1421          How much help from another person do you currently need...    Turning from your back to your side while in flat bed without using bedrails? 2  -LR     Moving from lying on  back to sitting on the side of a flat bed without bedrails? 2  -LR     Moving to and from a bed to a chair (including a wheelchair)? 3  -LR     Standing up from a chair using your arms (e.g., wheelchair, bedside chair)? 3  -LR     Climbing 3-5 steps with a railing? 1  -LR     To walk in hospital room? 2  -LR     AM-PAC 6 Clicks Score (PT) 13  -LR     Highest level of mobility 4 --> Transferred to chair/commode  -LR     Row Name 07/03/22 1500 07/03/22 1421       Functional Assessment    Outcome Measure Options AM-PAC 6 Clicks Daily Activity (OT)  -AR AM-PAC 6 Clicks Basic Mobility (PT)  -LR          User Key  (r) = Recorded By, (t) = Taken By, (c) = Cosigned By    Initials Name Provider Type    Rosanna Downing, PT Physical Therapist    Natalie Garibay, OT Occupational Therapist                             Physical Therapy Education                 Title: PT OT SLP Therapies (Done)     Topic: Physical Therapy (Done)     Point: Mobility training (Done)     Learning Progress Summary           Patient Acceptance, E,D, VU,NR by LR at 7/3/2022 1421    Comment: Educated on precautions, NWB status of R UE, safety with mobility, correct supine to sit t/f technique, correct sit<->stand t/f technique, correct bed to chair t/f technique, HEP, and progression of POC.    Acceptance, E,D, VU,NR by HP at 7/2/2022 1616    Acceptance, E,TB, NR by KG at 7/1/2022 1601    Acceptance, E, NR by FW at 6/28/2022 1512    Eager, E, VU,DU by SC at 6/27/2022 1151    Comment: Reviewed safety with mobility    Acceptance, E, VU,NR by LO at 6/26/2022 0825    Comment: Patient education regarding PT POC, sequencing for bed mobility, transfers, and ambulation                   Point: Home exercise program (Done)     Learning Progress Summary           Patient Acceptance, E,D, VU,NR by LR at 7/3/2022 1421    Comment: Educated on precautions, NWB status of R UE, safety with mobility, correct supine to sit t/f technique, correct  sit<->stand t/f technique, correct bed to chair t/f technique, HEP, and progression of POC.    Acceptance, E,D, VU,NR by HP at 7/2/2022 1616    Acceptance, E,TB, NR by KG at 7/1/2022 1601    Acceptance, E, VU,NR by LS at 6/29/2022 1450    Comment: Benefits of activity    Acceptance, E, NR by FW at 6/28/2022 1512    Eager, E, VU,DU by SC at 6/27/2022 1151    Comment: Reviewed safety with mobility    Acceptance, E, VU,NR by LO at 6/26/2022 0825    Comment: Patient education regarding PT POC, sequencing for bed mobility, transfers, and ambulation                   Point: Body mechanics (Done)     Learning Progress Summary           Patient Acceptance, E,D, VU,NR by LR at 7/3/2022 1421    Comment: Educated on precautions, NWB status of R UE, safety with mobility, correct supine to sit t/f technique, correct sit<->stand t/f technique, correct bed to chair t/f technique, HEP, and progression of POC.    Acceptance, E,D, VU,NR by HP at 7/2/2022 1616    Acceptance, E,TB, NR by KG at 7/1/2022 1601    Acceptance, E, NR by FW at 6/28/2022 1512    Eager, E, VU,DU by SC at 6/27/2022 1151    Comment: Reviewed safety with mobility    Acceptance, E, VU,NR by LO at 6/26/2022 0825    Comment: Patient education regarding PT POC, sequencing for bed mobility, transfers, and ambulation                   Point: Precautions (Done)     Learning Progress Summary           Patient Acceptance, E,D, VU,NR by LR at 7/3/2022 1421    Comment: Educated on precautions, NWB status of R UE, safety with mobility, correct supine to sit t/f technique, correct sit<->stand t/f technique, correct bed to chair t/f technique, HEP, and progression of POC.    Acceptance, E,D, VU,NR by HP at 7/2/2022 1616    Acceptance, E,TB, NR by KG at 7/1/2022 1601    Acceptance, E, NR by FW at 6/28/2022 1512    Eager, E, VU,DU by SC at 6/27/2022 1151    Comment: Reviewed safety with mobility    PETR Correa VU, NR by  at 6/26/2022 0826    Comment: Patient education  regarding PT POC, sequencing for bed mobility, transfers, and ambulation                               User Key     Initials Effective Dates Name Provider Type Discipline    SC 06/16/21 -  Juan Montez, PT Physical Therapist PT    LR 06/16/21 -  Rosanna Overton, PT Physical Therapist PT    LS 06/16/21 -  Baylee Pillai, PT Physical Therapist PT    KG 06/16/21 -  Antoinette Swift Physical Therapist PT    FW 05/05/22 -  Eyal Delgado, PT Physical Therapist PT    LO 06/16/21 -  Asia Sosa, PT Physical Therapist PT    HP 06/01/21 -  Fina Landeros, PT Physical Therapist PT              PT Recommendation and Plan     Plan of Care Reviewed With: patient  Progress: improving  Outcome Evaluation: Patient took steps from bed to chair with axillary crutch on L and CGAx2, limited by fatigue and weakness. Min assist x2 to t/f to EOB. CGAx2 to rise in to standing x2. Continues to demonstrate improved balance with OOB mobility. Will continue to progress as able. Recommend SNF at d/c.     Time Calculation:    PT Charges     Row Name 07/03/22 1421             Time Calculation    Start Time 1421  -LR      PT Received On 07/03/22  -LR      PT Goal Re-Cert Due Date 07/06/22  -LR              Timed Charges    60925 - PT Therapeutic Exercise Minutes 10  -LR      89162 - PT Therapeutic Activity Minutes 13  -LR              Total Minutes    Timed Charges Total Minutes 23  -LR       Total Minutes 23  -LR            User Key  (r) = Recorded By, (t) = Taken By, (c) = Cosigned By    Initials Name Provider Type    LR Rosanna Overton, PT Physical Therapist              Therapy Charges for Today     Code Description Service Date Service Provider Modifiers Qty    01105277926 HC PT THER PROC EA 15 MIN 7/3/2022 Rosanna Overton, PT GP 1    43209281841 HC PT THERAPEUTIC ACT EA 15 MIN 7/3/2022 Rosanna Overton, PT GP 1          PT G-Codes  Outcome Measure Options: AM-PAC 6 Clicks Daily Activity (OT)  AM-PAC  6 Clicks Score (PT): 13  AM-PAC 6 Clicks Score (OT): 13    Rosanna Overton, PT  7/3/2022

## 2022-07-03 NOTE — PLAN OF CARE
Goal Outcome Evaluation:  Plan of Care Reviewed With: patient        Progress: improving  Outcome Evaluation: Pt's surgery was not completed last week, received orders to resume and she is scheduled for OR 7/5. She completed bed mobility with min assist, UB dressing with max assist and LB dressing with dependence. She completed toileting with CGA transfer and dependence post-tiolet hygiene and clothing management. Recommend SNF.

## 2022-07-03 NOTE — PROGRESS NOTES
Psychiatric Medicine Services  PROGRESS NOTE    Patient Name: Altargacia Rothman  : 1950  MRN: [id number]    Date of Admission: 2022  Primary Care Physician: Alvina Lloyd PA-C    Subjective   Subjective     CC: f/u fx    HPI: Up in bed eating breakfast. Asking if her PNC can be increased today. Asking for probiotic.    ROS:  Gen- No fevers, chills  CV- No chest pain, palpitations  Resp- No cough, dyspnea  GI- No N/V/D, abd pain     Objective   Objective     Vital Signs:   Temp:  [98 °F (36.7 °C)-98.4 °F (36.9 °C)] 98.2 °F (36.8 °C)  Heart Rate:  [64-75] 72  Resp:  [16] 16  BP: (133-159)/(75-94) 140/76     Physical Exam:  Constitutional: No acute distress, awake, alert  HENT: NCAT, mucous membranes moist  Respiratory: Clear to auscultation bilaterally, respiratory effort normal   Cardiovascular: RRR, no murmurs, rubs, or gallops  Gastrointestinal: Positive bowel sounds, soft, nontender, nondistended, obese  Musculoskeletal: Right shoulder in sling  Psychiatric: Appropriate affect, cooperative  Neurologic: Oriented x 3, strength symmetric in all extremities, Cranial Nerves grossly intact to confrontation, speech clear  Skin: No rashes    Results Reviewed:  LAB RESULTS:      Lab 22  1558 22  0717 22  0557   WBC 12.69* 8.19 10.33   HEMOGLOBIN 12.8 11.0* 11.0*   HEMATOCRIT 39.5 34.2 34.1   PLATELETS 281 178 179   NEUTROS ABS 8.64*  --   --    IMMATURE GRANS (ABS) 0.15*  --   --    LYMPHS ABS 2.56  --   --    MONOS ABS 1.22*  --   --    EOS ABS 0.08  --   --    .9* 102.4* 102.4*         Lab 22  1558 22  1647 22  0717 22  0557   SODIUM 142 143 137 141   POTASSIUM 4.7 4.2 3.4* 3.4*   CHLORIDE 103 108* 104 108*   CO2 28.0 27.0 28.0 27.0   ANION GAP 11.0 8.0 5.0 6.0   BUN 11 10 12 18   CREATININE 0.74 0.55* 0.48* 0.62   EGFR 85.6 96.9 100.2 94.2   GLUCOSE 151* 110* 106* 86   CALCIUM 8.9 8.3* 8.4* 8.2*   MAGNESIUM 2.3  --  2.0 2.1    HEMOGLOBIN A1C  --   --   --  5.30         Lab 06/30/22  1558   TOTAL PROTEIN 5.6*   ALBUMIN 3.30*   GLOBULIN 2.3   ALT (SGPT) 42*   AST (SGOT) 43*   BILIRUBIN 0.5   ALK PHOS 131*         Lab 06/30/22  1558   TROPONIN T <0.010                 Lab 06/30/22  1601   PH, ARTERIAL 7.382   PCO2, ARTERIAL 48.7*   PO2 .0*   FIO2 70   HCO3 ART 28.9*   BASE EXCESS ART 3.0*   CARBOXYHEMOGLOBIN 0.9     Brief Urine Lab Results  (Last result in the past 365 days)      Color   Clarity   Blood   Leuk Est   Nitrite   Protein   CREAT   Urine HCG        06/27/22 0224 Yellow   Turbid   Small (1+)   Large (3+)   Positive   30 mg/dL (1+)                 Microbiology Results Abnormal     Procedure Component Value - Date/Time    COVID PRE-OP / PRE-PROCEDURE SCREENING ORDER (NO ISOLATION) - Swab, Nasopharynx [[id number]]  (Normal) Collected: 06/24/22 1901    Lab Status: Final result Specimen: Swab from Nasopharynx Updated: 06/24/22 1956    Narrative:      The following orders were created for panel order COVID PRE-OP / PRE-PROCEDURE SCREENING ORDER (NO ISOLATION) - Swab, Nasopharynx.  Procedure                               Abnormality         Status                     ---------                               -----------         ------                     COVID-19, FLU A/B, RSV P...[[id number]]  Normal              Final result                 Please view results for these tests on the individual orders.    COVID-19, FLU A/B, RSV PCR - Swab, Nasopharynx [[id number]]  (Normal) Collected: 06/24/22 1901    Lab Status: Final result Specimen: Swab from Nasopharynx Updated: 06/24/22 1956     COVID19 Not Detected     Influenza A PCR Not Detected     Influenza B PCR Not Detected     RSV, PCR Not Detected    Narrative:      Fact sheet for providers: https://www.fda.gov/media/201913/download    Fact sheet for patients: https://www.fda.gov/media/157480/download    Test performed by PCR.          No radiology results from the last 24 hrs        I  have reviewed the medications:  Scheduled Meds:budesonide-formoterol, 2 puff, Inhalation, BID - RT  doxycycline, 100 mg, Oral, Q12H  gabapentin, 800 mg, Oral, 4x Daily  hydrocortisone, 10 mg, Oral, Daily With Lunch  hydrocortisone, 20 mg, Oral, QAM  levothyroxine, 88 mcg, Oral, Q AM  lisinopril, 5 mg, Oral, Q24H  miconazole, 200 mg, Vaginal, Nightly  Morphine, 30 mg, Oral, Q12H  nortriptyline, 50 mg, Oral, TID  pantoprazole, 40 mg, Oral, Q AM  pindolol, 2.5 mg, Oral, TID  saccharomyces boulardii, 250 mg, Oral, BID  sodium chloride, 10 mL, Intravenous, Q12H  spironolactone, 50 mg, Oral, Daily      Continuous Infusions:Pharmacy Consult,   ropivacaine,   sodium chloride, 75 mL/hr, Last Rate: 75 mL/hr (22 0105)      PRN Meds:.acetaminophen **OR** acetaminophen **OR** acetaminophen  •  albuterol  •  hydrALAZINE  •  hydrOXYzine  •  labetalol  •  magnesium sulfate **OR** magnesium sulfate in D5W 1g/100mL (PREMIX) **OR** magnesium sulfate  •  [] Morphine **AND** naloxone  •  ondansetron **OR** ondansetron  •  oxyCODONE-acetaminophen  •  potassium chloride **OR** potassium chloride **OR** potassium chloride  •  sodium chloride    Assessment & Plan   Assessment & Plan     Active Hospital Problems    Diagnosis  POA   • Acute UTI (urinary tract infection) [N39.0]  Yes   • Other closed displaced fracture of proximal end of right humerus, initial encounter [S42.291A]  Yes   • Moderate asthma without complication [J45.909]  Yes   • Osteoporosis [M81.0]  Yes   • Adrenal insufficiency (HCC) [E27.40]  Yes   • Hypothyroidism [E03.9]  Yes   • Essential hypertension [I10]  Yes   • H/O pheochromocytoma [Z86.018]  Yes      Resolved Hospital Problems   No resolved problems to display.        Brief Hospital Course to date:  Altagracia Rothman is a 72 y.o. female with past medical history significant for hypertension, history of pheochromocytoma, s/p left adrenal resection, adrenal insufficiency, hypothyroidism, moderate asthma.   Patient was admitted for right humerus fracture after a fall.     Right proximal and distal humerus fracture after mechanical fall.    -Plan for ORIF next week when cleared from anesthesia standpoint.     Bronchitis - POA, now resolved  -CXR without infiltrate. S/P 5 days of empiric doxy. Doing well on room air.  -Resume home inhalers, continue symbicort     Hypoxia/Chest Pain  - RRT yesterday, likely related to combination of nerve block and anxiety  - CXR, EKG, troponin and ABG are WNL  - improved this morning     Hx adrenal insufficiency s/p adrenalectomy, s/p adrenal crisis (hypotension on admission) - possibly precipitated by bronchitis, humerus fracture, and UTI  History of pheochromocytoma - resected in 2003   - My partner has discussed this with patient's endocrinologist Dr. Nelson via telephone on 6/30. Patient had adrenalectomy in 2003 with removal of pheo, she has declined further screening tests since that time. Per Dr. Nelson, cathecholamine/metanphrine testing would be helpful if it is negative, although if positive (may be false positive) could be related toTricyclic which patient has refused to taper and would need to be off of for 4-6 weeks for accurate testing. I discussed this with the patient and with Dr. Pope from anesthesia. Patient is now agreeable to testing and will order 24hr urine metanephrines/catecholamines, this started on 6/30. Unclear how long results will take (as this is a holiday weekend)  -Home dose of hydrocortisone 20mg qam, 10mg nightly, wean back to home dose. Will need to be stress dosed at timing of surgery.     Hx HTN  -initally was hypotensive due to Adrenal crisis, so bp meds were held  -started back lisinopril 5mg on 6/27/22 (on quinapril 5mg at home)  -wean hydrocortisone back to home dose  -resume aldactone, lasix x1 dose 6/29  -resume home pindolol, PRN hydralazine and PRN labetalol      Gillespie catheter placed at time of admission  -Gillespie d/c'd on 6/26/22  -C/O  irritation from purewick and asked to get up to restroom. She however uses the restroom nearly every hour and is currently max assist so this is not feasible at this time. Can use bedpan or bassam.      UTI, proteus species  -clifford has been removed  -urine cultures growing proteus species   -completed course of fosfomycin  -pyridium for dysuria     Hyperglycemia, improved  -not diabetic, A1c 5.3     Hypothyroidism   -on home dose Synthroid.     This patient's problems and plans were partially entered by my partner and updated as appropriate by me 07/03/22.    Expected Discharge Location and Transportation: [facility]  Expected Discharge Date: 7/7    DVT prophylaxis:  Mechanical DVT prophylaxis orders are present.     AM-PAC 6 Clicks Score (PT): 14 (07/02/22 1616)    CODE STATUS:   Code Status and Medical Interventions:   Ordered at: 06/24/22 5372     Level Of Support Discussed With:    Patient     Code Status (Patient has no pulse and is not breathing):    CPR (Attempt to Resuscitate)     Medical Interventions (Patient has pulse or is breathing):    Full Support       Myrna Okeefe II, DO  07/03/22

## 2022-07-04 PROCEDURE — 99231 SBSQ HOSP IP/OBS SF/LOW 25: CPT | Performed by: ORTHOPAEDIC SURGERY

## 2022-07-04 PROCEDURE — 94799 UNLISTED PULMONARY SVC/PX: CPT

## 2022-07-04 PROCEDURE — 94761 N-INVAS EAR/PLS OXIMETRY MLT: CPT

## 2022-07-04 PROCEDURE — 94664 DEMO&/EVAL PT USE INHALER: CPT

## 2022-07-04 PROCEDURE — 97110 THERAPEUTIC EXERCISES: CPT

## 2022-07-04 PROCEDURE — 97535 SELF CARE MNGMENT TRAINING: CPT | Performed by: OCCUPATIONAL THERAPIST

## 2022-07-04 PROCEDURE — 99232 SBSQ HOSP IP/OBS MODERATE 35: CPT | Performed by: NURSE PRACTITIONER

## 2022-07-04 RX ADMIN — PANTOPRAZOLE SODIUM 40 MG: 40 TABLET, DELAYED RELEASE ORAL at 05:46

## 2022-07-04 RX ADMIN — GABAPENTIN 800 MG: 400 CAPSULE ORAL at 17:00

## 2022-07-04 RX ADMIN — OXYCODONE HYDROCHLORIDE AND ACETAMINOPHEN 1 TABLET: 10; 325 TABLET ORAL at 05:45

## 2022-07-04 RX ADMIN — NORTRIPTYLINE HYDROCHLORIDE 50 MG: 10 CAPSULE ORAL at 08:38

## 2022-07-04 RX ADMIN — Medication 250 MG: at 19:57

## 2022-07-04 RX ADMIN — GABAPENTIN 800 MG: 400 CAPSULE ORAL at 19:57

## 2022-07-04 RX ADMIN — PINDOLOL 2.5 MG: 5 TABLET ORAL at 15:00

## 2022-07-04 RX ADMIN — HYDROCORTISONE 10 MG: 10 TABLET ORAL at 12:34

## 2022-07-04 RX ADMIN — NORTRIPTYLINE HYDROCHLORIDE 50 MG: 10 CAPSULE ORAL at 19:56

## 2022-07-04 RX ADMIN — DOXYCYCLINE 100 MG: 100 CAPSULE ORAL at 08:38

## 2022-07-04 RX ADMIN — MORPHINE SULFATE 30 MG: 30 TABLET, FILM COATED, EXTENDED RELEASE ORAL at 08:37

## 2022-07-04 RX ADMIN — OXYCODONE HYDROCHLORIDE AND ACETAMINOPHEN 1 TABLET: 10; 325 TABLET ORAL at 00:01

## 2022-07-04 RX ADMIN — BUDESONIDE AND FORMOTEROL FUMARATE DIHYDRATE 2 PUFF: 160; 4.5 AEROSOL RESPIRATORY (INHALATION) at 19:51

## 2022-07-04 RX ADMIN — DOXYCYCLINE 100 MG: 100 CAPSULE ORAL at 19:56

## 2022-07-04 RX ADMIN — Medication 10 ML: at 19:58

## 2022-07-04 RX ADMIN — HYDROCORTISONE 20 MG: 10 TABLET ORAL at 05:45

## 2022-07-04 RX ADMIN — OXYCODONE HYDROCHLORIDE AND ACETAMINOPHEN 1 TABLET: 10; 325 TABLET ORAL at 14:59

## 2022-07-04 RX ADMIN — GABAPENTIN 800 MG: 400 CAPSULE ORAL at 08:37

## 2022-07-04 RX ADMIN — Medication 250 MG: at 08:37

## 2022-07-04 RX ADMIN — SPIRONOLACTONE 50 MG: 50 TABLET ORAL at 11:00

## 2022-07-04 RX ADMIN — LISINOPRIL 5 MG: 5 TABLET ORAL at 08:37

## 2022-07-04 RX ADMIN — PINDOLOL 2.5 MG: 5 TABLET ORAL at 19:55

## 2022-07-04 RX ADMIN — Medication 10 ML: at 08:40

## 2022-07-04 RX ADMIN — NORTRIPTYLINE HYDROCHLORIDE 50 MG: 10 CAPSULE ORAL at 15:00

## 2022-07-04 RX ADMIN — LEVOTHYROXINE SODIUM 88 MCG: 88 TABLET ORAL at 05:45

## 2022-07-04 RX ADMIN — BUDESONIDE AND FORMOTEROL FUMARATE DIHYDRATE 2 PUFF: 160; 4.5 AEROSOL RESPIRATORY (INHALATION) at 08:57

## 2022-07-04 RX ADMIN — GABAPENTIN 800 MG: 400 CAPSULE ORAL at 11:00

## 2022-07-04 RX ADMIN — PINDOLOL 2.5 MG: 5 TABLET ORAL at 08:37

## 2022-07-04 RX ADMIN — MICONAZOLE NITRATE 200 MG: 200 SUPPOSITORY VAGINAL at 20:02

## 2022-07-04 RX ADMIN — OXYCODONE HYDROCHLORIDE AND ACETAMINOPHEN 1 TABLET: 10; 325 TABLET ORAL at 11:02

## 2022-07-04 RX ADMIN — OXYCODONE HYDROCHLORIDE AND ACETAMINOPHEN 1 TABLET: 10; 325 TABLET ORAL at 21:06

## 2022-07-04 RX ADMIN — MORPHINE SULFATE 30 MG: 30 TABLET, FILM COATED, EXTENDED RELEASE ORAL at 19:57

## 2022-07-04 NOTE — PROGRESS NOTES
[facility]    Acute pain service Inpatient Progress Note    Patient Name: Altagracia Rothman  :  1950  MRN:  [id number]        Acute Pain  Service Inpatient Progress Note:    Analgesia:Good  Pain Score:3/10  LOC: alert and awake  Resp Status: supplemental oxygen  Cardiac: VS stable  Side Effects:None  Catheter Site:clean  Cath type: peripheral nerve cath with ON Q  Infusion rate: 4ml/hr  Dosing/Volume: ropivacaine 0.2%  Catheter Plan:Catheter to remain Insitu

## 2022-07-04 NOTE — PLAN OF CARE
Problem: Adult Inpatient Plan of Care  Goal: Plan of Care Review  Recent Flowsheet Documentation  Taken 7/4/2022 0823 by Rosanna Overton, PT  Progress: improving  Plan of Care Reviewed With: patient  Outcome Evaluation: Patient ambulated 4 feet with axillary crutch on L, CGA x2 for safety and balance, limited by weakness and fatigue. Min assist to rise from BSC today. Good participation/effort with LE ther ex. Will continue to progress as able. Recommend SNF at d/c. Possible surgery tomorrow.   Goal Outcome Evaluation:  Plan of Care Reviewed With: patient        Progress: improving  Outcome Evaluation: Patient ambulated 4 feet with axillary crutch on L, CGA x2 for safety and balance, limited by weakness and fatigue. Min assist to rise from BSC today. Good participation/effort with LE ther ex. Will continue to progress as able. Recommend SNF at d/c. Possible surgery tomorrow.

## 2022-07-04 NOTE — PLAN OF CARE
Goal Outcome Evaluation:  Plan of Care Reviewed With: patient        Progress: improving  Outcome Evaluation: She completed transfer training with min assist-CGA, toileting wtih dependence and grooming with supervision/set-up assist. Recommend SNF. Pt anticipates OR tomorrow.

## 2022-07-04 NOTE — PROGRESS NOTES
[facility] Medicine Services  PROGRESS NOTE    Patient Name: Altagracia Rothman  : 1950  MRN: [id number]    Date of Admission: 2022  Primary Care Physician: Alvina Lloyd PA-C    Subjective   Subjective     CC:  F/u arm fractures    HPI:  Patient resting in bed. Says she does not have pain unless she moves her shoulder. She explained that she is on doxycycline as a substitute for long-term use of tetracycline prescribed by her rheumatologist. Tetracycline not on [facility] formulary. She states she can only use hydrocortisone for stress-dosing, anticipates needing at least 100 mg hydrocortisone IV prior to surgery.      ROS:  Gen- No fevers, chills  CV- No chest pain, palpitations  Resp- No cough, dyspnea  GI- No N/V/D, abd pain    Objective   Objective     Vital Signs:   Temp:  [98.1 °F (36.7 °C)-99.3 °F (37.4 °C)] 98.1 °F (36.7 °C)  Heart Rate:  [65-76] 65  Resp:  [16-18] 16  BP: (121-147)/(72-93) 135/81     Physical Exam:  Constitutional: No acute distress, awake, alert  HENT: NCAT, mucous membranes moist  Respiratory: Clear to auscultation bilaterally, respiratory effort normal, room air  Cardiovascular: RRR, no murmurs, rubs, or gallops  Gastrointestinal: Positive bowel sounds, soft, nontender, nondistended  Musculoskeletal: No bilateral ankle edema  Psychiatric: Appropriate affect, cooperative  Neurologic: Oriented x 3, Cranial Nerves grossly intact to confrontation, speech clear  Skin: No rashes    Results Reviewed:  LAB RESULTS:      Lab 22  1558 22  0717   WBC 12.69* 8.19   HEMOGLOBIN 12.8 11.0*   HEMATOCRIT 39.5 34.2   PLATELETS 281 178   NEUTROS ABS 8.64*  --    IMMATURE GRANS (ABS) 0.15*  --    LYMPHS ABS 2.56  --    MONOS ABS 1.22*  --    EOS ABS 0.08  --    .9* 102.4*         Lab 22  1558 22  1647 22  0717   SODIUM 142 143 137   POTASSIUM 4.7 4.2 3.4*   CHLORIDE 103 108* 104   CO2 28.0 27.0 28.0   ANION GAP 11.0 8.0 5.0   BUN 11 10  12   CREATININE 0.74 0.55* 0.48*   EGFR 85.6 96.9 100.2   GLUCOSE 151* 110* 106*   CALCIUM 8.9 8.3* 8.4*   MAGNESIUM 2.3  --  2.0         Lab 06/30/22  1558   TOTAL PROTEIN 5.6*   ALBUMIN 3.30*   GLOBULIN 2.3   ALT (SGPT) 42*   AST (SGOT) 43*   BILIRUBIN 0.5   ALK PHOS 131*         Lab 06/30/22  1558   TROPONIN T <0.010                 Lab 06/30/22  1601   PH, ARTERIAL 7.382   PCO2, ARTERIAL 48.7*   PO2 .0*   FIO2 70   HCO3 ART 28.9*   BASE EXCESS ART 3.0*   CARBOXYHEMOGLOBIN 0.9     Brief Urine Lab Results  (Last result in the past 365 days)      Color   Clarity   Blood   Leuk Est   Nitrite   Protein   CREAT   Urine HCG        06/27/22 0224 Yellow   Turbid   Small (1+)   Large (3+)   Positive   30 mg/dL (1+)                 Microbiology Results Abnormal     Procedure Component Value - Date/Time    COVID PRE-OP / PRE-PROCEDURE SCREENING ORDER (NO ISOLATION) - Swab, Nasopharynx [[id number]]  (Normal) Collected: 06/24/22 1901    Lab Status: Final result Specimen: Swab from Nasopharynx Updated: 06/24/22 1956    Narrative:      The following orders were created for panel order COVID PRE-OP / PRE-PROCEDURE SCREENING ORDER (NO ISOLATION) - Swab, Nasopharynx.  Procedure                               Abnormality         Status                     ---------                               -----------         ------                     COVID-19, FLU A/B, RSV P...[[id number]]  Normal              Final result                 Please view results for these tests on the individual orders.    COVID-19, FLU A/B, RSV PCR - Swab, Nasopharynx [[id number]]  (Normal) Collected: 06/24/22 1901    Lab Status: Final result Specimen: Swab from Nasopharynx Updated: 06/24/22 1956     COVID19 Not Detected     Influenza A PCR Not Detected     Influenza B PCR Not Detected     RSV, PCR Not Detected    Narrative:      Fact sheet for providers: https://www.fda.gov/media/331688/download    Fact sheet for patients:  https://www.fda.gov/media/742477/download    Test performed by PCR.          No radiology results from the last 24 hrs        I have reviewed the medications:  Scheduled Meds:budesonide-formoterol, 2 puff, Inhalation, BID - RT  doxycycline, 100 mg, Oral, Q12H  gabapentin, 800 mg, Oral, 4x Daily  hydrocortisone, 10 mg, Oral, Daily With Lunch  hydrocortisone, 20 mg, Oral, QAM  levothyroxine, 88 mcg, Oral, Q AM  lisinopril, 5 mg, Oral, Q24H  miconazole, 200 mg, Vaginal, Nightly  Morphine, 30 mg, Oral, Q12H  nortriptyline, 50 mg, Oral, TID  pantoprazole, 40 mg, Oral, Q AM  pindolol, 2.5 mg, Oral, TID  saccharomyces boulardii, 250 mg, Oral, BID  sodium chloride, 10 mL, Intravenous, Q12H  spironolactone, 50 mg, Oral, Daily      Continuous Infusions:Pharmacy Consult,   ropivacaine,   sodium chloride, 75 mL/hr, Last Rate: 75 mL/hr (22 0105)      PRN Meds:.acetaminophen **OR** acetaminophen **OR** acetaminophen  •  albuterol  •  hydrALAZINE  •  hydrOXYzine  •  labetalol  •  magnesium sulfate **OR** magnesium sulfate in D5W 1g/100mL (PREMIX) **OR** magnesium sulfate  •  [] Morphine **AND** naloxone  •  ondansetron **OR** ondansetron  •  oxyCODONE-acetaminophen  •  potassium chloride **OR** potassium chloride **OR** potassium chloride  •  sodium chloride    Assessment & Plan   Assessment & Plan     Active Hospital Problems    Diagnosis  POA   • Acute UTI (urinary tract infection) [N39.0]  Yes   • Other closed displaced fracture of proximal end of right humerus, initial encounter [S42.291A]  Yes   • Moderate asthma without complication [J45.909]  Yes   • Osteoporosis [M81.0]  Yes   • Adrenal insufficiency (HCC) [E27.40]  Yes   • Hypothyroidism [E03.9]  Yes   • Essential hypertension [I10]  Yes   • H/O pheochromocytoma [Z86.018]  Yes      Resolved Hospital Problems   No resolved problems to display.        Brief Hospital Course to date:  Altagracia Rothman is a 72 y.o. female with past medical history significant for  hypertension, history of pheochromocytoma, s/p left adrenal resection, adrenal insufficiency, hypothyroidism, moderate asthma. Patient was admitted for right humerus fracture after a fall.     This patient's problems and plans were partially entered by my partner and updated as appropriate by me 07/04/22.    Right proximal and distal humerus fracture after mechanical fall.    -Plan for ORIF 7/5 per Dr. Campo  -NPO after MN  -AM labs  -Will need stress-doing of home steroids for AI with IV hydrocortisone, usually does IV hydrocortisone     Bronchitis - POA, now resolved  -CXR without infiltrate. S/P 5 days of empiric doxy. Doing well on room air.  -Resume home inhalers, continue symbicort     Hypoxia/Chest Pain-resolved  - RRT 6/30, likely related to combination of nerve block and anxiety  - CXR, EKG, troponin and ABG are WNL     Hx adrenal insufficiency s/p adrenalectomy, s/p adrenal crisis (hypotension on admission) - possibly precipitated by bronchitis, humerus fracture, and UTI  History of pheochromocytoma - resected in 2003   - My partner discussed this with patient's endocrinologist Dr. Nelson via telephone on 6/30. Patient had adrenalectomy in 2003 with removal of pheo and has declined further screening tests since that time. Per Dr. Nelson, cathecholamine/metanphrine testing would be helpful if it is negative, although if positive (may be false positive) could be related toTricyclic which patient has refused to taper and would need to be off of for 4-6 weeks for accurate testing. Discussed with the patient and with Dr. Pope from anesthesia. Patient is now agreeable to testing and will order 24hr urine metanephrines/catecholamines, this started on 6/30. Unclear how long results will take (as this is a holiday weekend)  -Home dose of hydrocortisone 20mg qam, 10mg nightly, has been weaned back to home dose. Will need to be stress dosed with IV hydrocortisone at timing of surgery.     Hx HTN  -initally was  hypotensive due to adrenal crisis, so bp meds were held  -started back lisinopril 5mg on 6/27/22 (on quinapril 5mg at home)  -hydrocortisone weaned back to home dose  -resume aldactone, lasix x1 dose 6/29  -resume home pindolol, PRN hydralazine and PRN labetalol      Clifford catheter placed at time of admission  -Clifford d/c'd on 6/26/22  -C/O irritation from purewick. She uses the restroom frequently hour and is currently max assist. Recommend using bedpan or bassam.      UTI, proteus species  -clifford has been removed  -urine cultures showed proteus mirabilis  -completed course of fosfomycin     Hyperglycemia, improved  -not diabetic, A1c 5.3     Hypothyroidism   -on home dose Synthroid.    AM labs: CBC w/ diff, CMP, Mg    Expected Discharge Location and Transportation: Rehab, [facility] van or EMS  Expected Discharge Date: Unknown, waiting on surgery    DVT prophylaxis:  Mechanical DVT prophylaxis orders are present.     AM-PAC 6 Clicks Score (PT): 13 (07/03/22 1421)    CODE STATUS:   Code Status and Medical Interventions:   Ordered at: 06/24/22 2121     Level Of Support Discussed With:    Patient     Code Status (Patient has no pulse and is not breathing):    CPR (Attempt to Resuscitate)     Medical Interventions (Patient has pulse or is breathing):    Full Support       Victoria Calhoun, MICHELE  07/04/22

## 2022-07-04 NOTE — THERAPY TREATMENT NOTE
Patient Name: Altagracia Rothman  : 1950    MRN: [id number]                              Today's Date: 2022       Admit Date: 2022    Visit Dx:     ICD-10-CM ICD-9-CM   1. Other closed displaced fracture of proximal end of right humerus, initial encounter  S42.291A 812.09   2. Closed fracture of distal end of humerus, unspecified fracture morphology, initial encounter  S42.409A 812.40   3. Fall, initial encounter  W19.XXXA E888.9   4. Osteoporosis with current pathological fracture, unspecified osteoporosis type, initial encounter  M80.00XA 733.00     733.10     Patient Active Problem List   Diagnosis   • Adrenal insufficiency (HCC)   • Hypothyroidism   • Essential hypertension   • H/O pheochromocytoma   • Chronic bilateral low back pain without sciatica   • Encounter for chronic pain management   • Osteoporosis   • Tobacco abuse   • BMI 35.0-35.9,adult   • Bronchitis   • Moderate asthma without complication   • Paresthesia   • Medicare annual wellness visit, subsequent   • Routine medical exam   • Lipid screening   • Hair loss   • Other closed displaced fracture of proximal end of right humerus, initial encounter   • Acute UTI (urinary tract infection)     Past Medical History:   Diagnosis Date   • Adrenal insufficiency (HCC)    • Fibromyalgia    • Hypertension    • Hypothyroidism    • Skin cancer      Past Surgical History:   Procedure Laterality Date   • ADRENAL GLAND SURGERY     • BREAST BIOPSY      x4   • GALLBLADDER SURGERY     • HYSTERECTOMY     • MRI ANGIOGRAM LOWER EXTREMITY UNILATERAL W CONTRAST     • TONSILLECTOMY        General Information     Row Name 22 1045          OT Time and Intention    Document Type therapy note (daily note)  -AR     Mode of Treatment occupational therapy  -AR     Row Name 22 1045          General Information    Existing Precautions/Restrictions fall;non-weight bearing;right;shoulder  sling RUE, R proximal and distal humeral FX- awaiting OR  -AR     Row Name  07/04/22 1045          Cognition    Orientation Status (Cognition) oriented x 4  -AR     Row Name 07/04/22 1045          Safety Issues, Functional Mobility    Safety Issues Affecting Function (Mobility) sequencing abilities;safety precaution awareness;safety precautions follow-through/compliance  -AR     Impairments Affecting Function (Mobility) strength;pain;endurance/activity tolerance;range of motion (ROM);balance;coordination;postural/trunk control  -AR           User Key  (r) = Recorded By, (t) = Taken By, (c) = Cosigned By    Initials Name Provider Type    AR Natalie Tellez OT Occupational Therapist                 Mobility/ADL's     Row Name 07/04/22 1046          Transfers    Transfers sit-stand transfer;stand-sit transfer;toilet transfer;bed-chair transfer  -AR     Comment, (Transfers) Cues for hand placement and chair approach.  -AR     Bed-Chair [place] (Transfers) contact guard;verbal cues  -AR     Assistive Device (Bed-Chair Transfers) crutches, axillary;other (see comments)  -AR     Sit-Stand [place] (Transfers) verbal cues;minimum assist (75% patient effort);1 person to manage equipment  -AR     Stand-Sit [place] (Transfers) contact guard;verbal cues  -AR     [place] Level (Toilet Transfer) minimum assist (75% patient effort);verbal cues  -AR     Assistive Device (Toilet Transfer) crutches, axillary  -AR     Row Name 07/04/22 1046          Sit-Stand Transfer    Assistive Device (Sit-Stand Transfers) crutches, axillary  -AR     Row Name 07/04/22 1046          Stand-Sit Transfer    Assistive Device (Stand-Sit Transfers) crutches, axillary  -AR     Row Name 07/04/22 1046          Toilet Transfer    Type (Toilet Transfer) stand-sit;sit-stand  -AR     Row Name 07/04/22 1046          Activities of Daily Living    BADL Assessment/Intervention upper body dressing;lower body dressing;feeding;toileting  -AR     Row Name 07/04/22 1046          Mobility    Extremity Weight-bearing Status  right upper extremity  -AR     Right Upper Extremity (Weight-bearing Status) non weight-bearing (NWB)   -AR     [facility] Name 07/04/22 1046          Grooming Assessment/Training    [place] Level (Grooming) wash face, hands;supervision;set up  -AR     Position (Grooming) supported sitting  -AR     Row Name 07/04/22 1046          Self-Feeding Assessment/Training    [place] Level (Feeding) supervision;verbal cues;set up  -AR     Position (Self-Feeding) supported sitting  -AR     [facility] Name 07/04/22 1046          Toileting Assessment/Training    [place] Level (Toileting) toileting skills;perform perineal hygiene;adjust/manage clothing;dependent (less than 25% patient effort)  -AR     Position (Toileting) supported standing  -AR           User Key  (r) = Recorded By, (t) = Taken By, (c) = Cosigned By    Initials Name Provider Type    Natalie Garibay OT Occupational Therapist               Obj/Interventions     Row Name 07/04/22 1053          Hand (Therapeutic Exercise)    Hand AROM/AAROM (Therapeutic Exercise) right;finger flexion;finger extension;10 repetitions  -AR     Row Name 07/04/22 1053          Balance    Static Sitting Balance supervision  -AR     Dynamic Sitting Balance contact guard  -AR     Position, Sitting Balance unsupported;other (see comments)  BSC  -AR     Static Standing Balance contact guard  -AR     Dynamic Standing Balance contact guard;2-person assist  -AR     Position/Device Used, Standing Balance crutches, axillary  -AR           User Key  (r) = Recorded By, (t) = Taken By, (c) = Cosigned By    Initials Name Provider Type    Natalie Garibay OT Occupational Therapist               Goals/Plan     Row Name 07/04/22 1056          Transfer Goal 1 (OT)    Progress/Outcome (Transfer Goal 1, OT) goal ongoing  -AR     Row Name 07/04/22 1056          Dressing Goal 1 (OT)    Progress/Outcome (Dressing Goal 1, OT) goal ongoing  -AR     Row Name 07/04/22 1056          ROM Goal 1 (OT)     Progress/Outcome (ROM Goal 1, OT) goal no longer appropriate  -AR           User Key  (r) = Recorded By, (t) = Taken By, (c) = Cosigned By    Initials Name Provider Type    Natalie Garibay OT Occupational Therapist               Clinical Impression     Row Name 07/04/22 1054          Pain Scale: FACES Pre/Post-Treatment    Pain: FACES Scale, Pretreatment 2-->hurts little bit  -AR     Posttreatment Pain Rating 4-->hurts little more  -AR     Pain Location - Side/Orientation Right  -AR     Pain Location - --  RUE  -AR     Row Name 07/04/22 1054          Plan of Care Review    Plan of Care Reviewed With patient  -AR     Progress improving  -AR     Outcome Evaluation She completed transfer training with min assist-CGA, toileting wtih dependence and grooming with supervision/set-up assist. Recommend SNF. Pt anticipates OR tomorrow.  -AR     Row Name 07/04/22 1054          Therapy Plan Review/Discharge Plan (OT)    Anticipated Discharge Disposition (OT) skilled nursing facility  -AR     Row Name 07/04/22 1054          Vital Signs    Pre Patient Position Sitting  -AR     Intra Patient Position Standing  -AR     Post Patient Position Sitting  -AR     Row Name 07/04/22 1054          Positioning and Restraints    Pre-Treatment Position bedside commode  -AR     Post Treatment Position chair  -AR     In Chair reclined;call light within reach;encouraged to call for assist;exit alarm on;with nsg;with brace;waffle cushion;on mechanical lift sling  -AR           User Key  (r) = Recorded By, (t) = Taken By, (c) = Cosigned By    Initials Name Provider Type    Natalie Garibay OT Occupational Therapist               Outcome Measures     Row Name 07/04/22 1056          How much help from another is currently needed...    Putting on and taking off regular lower body clothing? 1  -AR     Bathing (including washing, rinsing, and drying) 2  -AR     Toileting (which includes using toilet bed pan or urinal) 2  -AR      Putting on and taking off regular upper body clothing 2  -AR     Taking care of personal grooming (such as brushing teeth) 3  -AR     Eating meals 3  -AR     AM-PAC 6 Clicks Score (OT) 13  -AR     Row Name 07/04/22 0823          How much help from another person do you currently need...    Turning from your back to your side while in flat bed without using bedrails? 2  -LR     Moving from lying on back to sitting on the side of a flat bed without bedrails? 2  -LR     Moving to and from a bed to a chair (including a wheelchair)? 3  -LR     Standing up from a chair using your arms (e.g., wheelchair, bedside chair)? 3  -LR     Climbing 3-5 steps with a railing? 1  -LR     To walk in hospital room? 3  -LR     AM-PAC 6 Clicks Score (PT) 14  -LR     Highest level of mobility 4 --> Transferred to chair/commode  -LR     Row Name 07/04/22 1056 07/04/22 0823       Functional Assessment    Outcome Measure Options AM-PAC 6 Clicks Daily Activity (OT)  -AR AM-PAC 6 Clicks Basic Mobility (PT)  -LR          User Key  (r) = Recorded By, (t) = Taken By, (c) = Cosigned By    Initials Name Provider Type    LR Rosanna Overton, PT Physical Therapist    Natalie Garibay, OT Occupational Therapist                Occupational Therapy Education                 Title: PT OT SLP Therapies (Done)     Topic: Occupational Therapy (Done)     Point: ADL training (Done)     Description:   Instruct learner(s) on proper safety adaptation and remediation techniques during self care or transfers.   Instruct in proper use of assistive devices.              Learning Progress Summary           Patient Eager, E,TB,D, VU,NR by AR at 7/4/2022 1057    Eager, E,TB,D, VU,DU by AR at 7/3/2022 1500    Eager, E, VU by AR at 6/28/2022 1838    Acceptance, E,D, NR by LO at 6/27/2022 1434    Acceptance, E, NR by RAJNI at 6/26/2022 1312                   Point: Home exercise program (Done)     Description:   Instruct learner(s) on appropriate technique for  monitoring, assisting and/or progressing therapeutic exercises/activities.              Learning Progress Summary           Patient Eager, E,TB,D, VU,NR by AR at 7/4/2022 1057    Eager, E,TB,D, VU,DU by AR at 7/3/2022 1500    Eager, E, VU by AR at 6/28/2022 1838    Acceptance, E,D, NR by LO at 6/27/2022 1434    Acceptance, E, NR by RAJNI at 6/26/2022 1312                   Point: Precautions (Done)     Description:   Instruct learner(s) on prescribed precautions during self-care and functional transfers.              Learning Progress Summary           Patient Eager, E,TB,D, VU,NR by AR at 7/4/2022 1057    Eager, E,TB,D, VU,DU by AR at 7/3/2022 1500    Eager, E, VU by AR at 6/28/2022 1838    Acceptance, E,D, NR by LO at 6/27/2022 1434    Acceptance, E, NR by RAJNI at 6/26/2022 1312                   Point: Body mechanics (Done)     Description:   Instruct learner(s) on proper positioning and spine alignment during self-care, functional mobility activities and/or exercises.              Learning Progress Summary           Patient Eager, E,TB,D, VU,NR by AR at 7/4/2022 1057    Eager, E,TB,D, VU,DU by AR at 7/3/2022 1500    Eager, E, VU by AR at 6/28/2022 1838    Acceptance, E,D, NR by LO at 6/27/2022 1434    Acceptance, E, NR by  at 6/26/2022 1312                               User Key     Initials Effective Dates Name Provider Type Discipline    AR 06/16/21 -  Natalie Tellez OT Occupational Therapist OT    RAJNI 06/16/21 -  Rosanna Meyers OT Occupational Therapist OT    LO 06/16/21 -  Shari Nicole OT Occupational Therapist OT              OT Recommendation and Plan  Therapy Frequency (OT): daily  Plan of Care Review  Plan of Care Reviewed With: patient  Progress: improving  Outcome Evaluation: She completed transfer training with min assist-CGA, toileting wtih dependence and grooming with supervision/set-up assist. Recommend SNF. Pt anticipates OR tomorrow.     Time Calculation:    Time Calculation- OT     Row  Name 07/04/22 1058             Time Calculation- OT    OT Start Time 0814  -AR      OT Received On 07/04/22  -AR      OT Goal Re-Cert Due Date 07/06/22  -AR              Timed Charges    58110 - OT Self Care/Mgmt Minutes 17  -AR              Total Minutes    Timed Charges Total Minutes 17  -AR       Total Minutes 17  -AR            User Key  (r) = Recorded By, (t) = Taken By, (c) = Cosigned By    Initials Name Provider Type    Natalie Garibay OT Occupational Therapist              Therapy Charges for Today     Code Description Service Date Service Provider Modifiers Qty    75675068691 HC OT SELF CARE/MGMT/TRAIN EA 15 MIN 7/3/2022 Natalie Tellez, OT GO 1    22467301619 HC OT SELF CARE/MGMT/TRAIN EA 15 MIN 7/4/2022 Natalie Tellez OT GO 1               Natalie Tellez OT  7/4/2022

## 2022-07-04 NOTE — THERAPY TREATMENT NOTE
Patient Name: Altagracia Rothman  : 1950    MRN: [id number]                              Today's Date: 2022       Admit Date: 2022    Visit Dx:     ICD-10-CM ICD-9-CM   1. Other closed displaced fracture of proximal end of right humerus, initial encounter  S42.291A 812.09   2. Closed fracture of distal end of humerus, unspecified fracture morphology, initial encounter  S42.409A 812.40   3. Fall, initial encounter  W19.XXXA E888.9   4. Osteoporosis with current pathological fracture, unspecified osteoporosis type, initial encounter  M80.00XA 733.00     733.10     Patient Active Problem List   Diagnosis   • Adrenal insufficiency (HCC)   • Hypothyroidism   • Essential hypertension   • H/O pheochromocytoma   • Chronic bilateral low back pain without sciatica   • Encounter for chronic pain management   • Osteoporosis   • Tobacco abuse   • BMI 35.0-35.9,adult   • Bronchitis   • Moderate asthma without complication   • Paresthesia   • Medicare annual wellness visit, subsequent   • Routine medical exam   • Lipid screening   • Hair loss   • Other closed displaced fracture of proximal end of right humerus, initial encounter   • Acute UTI (urinary tract infection)     Past Medical History:   Diagnosis Date   • Adrenal insufficiency (HCC)    • Fibromyalgia    • Hypertension    • Hypothyroidism    • Skin cancer      Past Surgical History:   Procedure Laterality Date   • ADRENAL GLAND SURGERY     • BREAST BIOPSY      x4   • GALLBLADDER SURGERY     • HYSTERECTOMY     • MRI ANGIOGRAM LOWER EXTREMITY UNILATERAL W CONTRAST     • TONSILLECTOMY        General Information     Row Name 22          Physical Therapy Time and Intention    Document Type therapy note (daily note)  -LR     Mode of Treatment physical therapy  -LR     Row Name 22          General Information    Patient Profile Reviewed yes  -LR     Existing Precautions/Restrictions fall;non-weight bearing;right;shoulder  sling to R UE, R proximal  and distal humeral fxs  -LR     Barriers to Rehab medically complex;previous functional deficit  -LR     Row Name 07/04/22 0823          Cognition    Orientation Status (Cognition) oriented x 4  -LR     Row Name 07/04/22 0823          Safety Issues, Functional Mobility    Safety Issues Affecting Function (Mobility) awareness of need for assistance;insight into deficits/self-awareness;safety precautions follow-through/compliance;safety precaution awareness  -LR     Impairments Affecting Function (Mobility) strength;pain;endurance/activity tolerance;range of motion (ROM);balance;coordination;postural/trunk control  -LR           User Key  (r) = Recorded By, (t) = Taken By, (c) = Cosigned By    Initials Name Provider Type    LR Rosanna Overton, PT Physical Therapist               Mobility     Row Name 07/04/22 0823          Bed Mobility    Supine-Sit [place] (Bed Mobility) not tested  -LR     Sit-Supine [place] (Bed Mobility) not tested  -LR     Comment, (Bed Mobility) On BSC on arrival, [facility] at end of treatment.  -LR     Row Name 07/04/22 0823          Transfers    Comment, (Transfers) Verbal cues to push up from BSC with L UE to stand and to reach back for chair with L UE to lower into sitting. Slightly increased assist required to stand today.  -LR     Row Name 07/04/22 0823          Bed-Chair Transfer    Bed-Chair [place] (Transfers) verbal cues;contact guard;2 person assist  -LR     Assistive Device (Bed-Chair Transfers) crutches, axillary;other (see comments)  axillary crutch on L side only  -LR     Row Name 07/04/22 0823          Sit-Stand Transfer    Sit-Stand [place] (Transfers) verbal cues;minimum assist (75% patient effort);1 person to manage equipment  -LR     Assistive Device (Sit-Stand Transfers) other (see comments);crutches, axillary  axillary crutch on L side only  -LR     Row Name 07/04/22 0823          Gait/Stairs (Locomotion)    [place] Level (Gait) verbal cues;contact  guard;2 person assist  -LR     Assistive Device (Gait) crutches, axillary;other (see comments)  axillary crutch on L side only  -LR     Distance in Feet (Gait) 4  -LR     Deviations/Abnormal Patterns (Gait) bilateral deviations;carol ann decreased;gait speed decreased;stride length decreased  -LR     Bilateral Gait Deviations forward flexed posture;heel strike decreased  -LR     [place] Level (Stairs) not tested  -LR     Comment, (Gait/Stairs) Patient took steps from BSC to chair which was a few feet away. Verbal cues for correct sequencing of steps and placement and sequencing of crutch. Gait limited by fatigue and weakness.  -LR     Row Name 07/04/22 0823          Mobility    Extremity Weight-bearing Status right upper extremity  -LR     Right Upper Extremity (Weight-bearing Status) non weight-bearing (NWB)   -LR           User Key  (r) = Recorded By, (t) = Taken By, (c) = Cosigned By    Initials Name Provider Type    LR Rosanna Overton, PT Physical Therapist               Obj/Interventions     Row Name 07/04/22 0823          Motor Skills    Therapeutic Exercise ankle;knee;hip;other (see comments)  cues for technique; min assist with R LE ther ex  -LR     Row Name 07/04/22 0823          Hip (Therapeutic Exercise)    Hip (Therapeutic Exercise) strengthening exercise;isometric exercises  -LR     Hip Isometrics (Therapeutic Exercise) bilateral;gluteal sets;10 repetitions;sitting  -LR     Hip Strengthening (Therapeutic Exercise) bilateral;heel slides;aBduction;sitting;10 repetitions  -LR     Row Name 07/04/22 0823          Knee (Therapeutic Exercise)    Knee (Therapeutic Exercise) strengthening exercise;isometric exercises  -LR     Knee Isometrics (Therapeutic Exercise) bilateral;quad sets;sitting;10 repetitions  -LR     Knee Strengthening (Therapeutic Exercise) bilateral;SLR (straight leg raise);SAQ (short arc quad);LAQ (long arc quad);sitting;10 repetitions  -LR     Row Name 07/04/22 0823          Ankle  (Therapeutic Exercise)    Ankle (Therapeutic Exercise) AROM (active range of motion)  -LR     Ankle AROM (Therapeutic Exercise) bilateral;dorsiflexion;plantarflexion;sitting;10 repetitions  -LR     Row Name 07/04/22 0823          Balance    Balance Assessment sitting static balance;sitting dynamic balance;standing static balance;standing dynamic balance  -LR     Static Sitting Balance supervision  -LR     Dynamic Sitting Balance supervision  -LR     Position, Sitting Balance unsupported;sitting in chair  -LR     Static Standing Balance contact guard  -LR     Dynamic Standing Balance contact guard;2-person assist  -LR     Position/Device Used, Standing Balance crutches, axillary;other (see comments)  on L side only  -LR           User Key  (r) = Recorded By, (t) = Taken By, (c) = Cosigned By    Initials Name Provider Type    LR Rosanna Overton, PT Physical Therapist               Goals/Plan     Row Name 07/04/22 0823          Bed Mobility Goal 1 (PT)    Activity/Assistive Device (Bed Mobility Goal 1, PT) rolling to left;rolling to right;sit to supine;supine to sit;scooting  -LR     [place] Level/Cues Needed (Bed Mobility Goal 1, PT) minimum assist (75% or more patient effort)  -LR     Time Frame (Bed Mobility Goal 1, PT) long term goal (LTG);10 days  -LR     Progress/Outcomes (Bed Mobility Goal 1, PT) goal ongoing;continuing progress toward goal  -LR     Row Name 07/04/22 0823          Transfer Goal 1 (PT)    Activity/Assistive Device (Transfer Goal 1, PT) sit-to-stand/stand-to-sit;bed-to-chair/chair-to-bed;crutches, axillary  -LR     [place] Level/Cues Needed (Transfer Goal 1, PT) contact guard required  -LR     Time Frame (Transfer Goal 1, PT) long term goal (LTG);10 days  -LR     Progress/Outcome (Transfer Goal 1, PT) goal ongoing  -LR     Row Name 07/04/22 0823          Gait Training Goal 1 (PT)    Activity/Assistive Device (Gait Training Goal 1, PT) gait (walking locomotion);decrease fall  risk;diminish gait deviation;increase endurance/gait distance;improve balance and speed;walker, raimundo;crutches, axillary  -LR     [place] Level (Gait Training Goal 1, PT) contact guard required  -LR     Distance (Gait Training Goal 1, PT) 10 feet  -LR     Time Frame (Gait Training Goal 1, PT) long term goal (LTG);10 days  -LR     Progress/Outcome (Gait Training Goal 1, PT) good progress toward goal;goal ongoing  -LR     Row Name 07/04/22 0823          Stairs Goal 1 (PT)    Activity/Assistive Device (Stairs Goal 1, PT) ascending stairs;descending stairs;cane, straight  -LR     [place] Level/Cues Needed (Stairs Goal 1, PT) minimum assist (75% or more patient effort)  -LR     Number of Stairs (Stairs Goal 1, PT) 1  -LR     Time Frame (Stairs Goal 1, PT) long term goal (LTG);10 days  -LR     Progress/Outcome (Stairs Goal 1, PT) goal ongoing  -LR     Row Name 07/04/22 0823          Patient Education Goal (PT)    Activity (Patient Education Goal, PT) Patient will demonstrate safe and effective sequencing for bed mobility and transfers.  -LR     [place]/Cues/Accuracy (Memory Goal 2, PT) independent;verbalizes understanding  -LR     Time Frame (Patient Education Goal, PT) long term goal (LTG);10 days  -LR     Progress/Outcome (Patient Education Goal, PT) goal ongoing  -LR           User Key  (r) = Recorded By, (t) = Taken By, (c) = Cosigned By    Initials Name Provider Type    LR Rosanna Overton, PT Physical Therapist               Clinical Impression     Row Name 07/04/22 0823          Pain    Pain Intervention(s) Ambulation/increased activity;Repositioned  -LR     Additional Documentation Pain Scale: FACES Pre/Post-Treatment (Group)  -LR     Row Name 07/04/22 0823          Pain Scale: FACES Pre/Post-Treatment    Pain: FACES Scale, Pretreatment 2-->hurts little bit  -LR     Posttreatment Pain Rating 4-->hurts little more  -LR     Pain Location - Side/Orientation Right  -LR     Pain Location - shoulder   -LR     Row Name 07/04/22 0823          Plan of Care Review    Plan of Care Reviewed With patient  -LR     Progress improving  -LR     Outcome Evaluation Patient ambulated 4 feet with axillary crutch on L, CGA x2 for safety and balance, limited by weakness and fatigue. Min assist to rise from BSC today. Good participation/effort with LE ther ex. Will continue to progress as able. Recommend SNF at d/c. Possible surgery tomorrow.  -LR     Row Name 07/04/22 0823          Therapy Assessment/Plan (PT)    Rehab Potential (PT) good, to achieve stated therapy goals  -LR     Criteria for Skilled Interventions Met (PT) yes;skilled treatment is necessary;meets criteria  -LR     Therapy Frequency (PT) daily  -LR     Row Name 07/04/22 0823          Positioning and Restraints    Pre-Treatment Position bedside commode  -LR     Post Treatment Position chair  -LR     In Chair notified nsg;reclined;sitting;call light within reach;encouraged to call for assist;exit alarm on;with nsg;legs elevated;waffle cushion;on mechanical lift sling  -LR           User Key  (r) = Recorded By, (t) = Taken By, (c) = Cosigned By    Initials Name Provider Type    LR Rosanna Overton, PT Physical Therapist               Outcome Measures     Row Name 07/04/22 0823          How much help from another person do you currently need...    Turning from your back to your side while in flat bed without using bedrails? 2  -LR     Moving from lying on back to sitting on the side of a flat bed without bedrails? 2  -LR     Moving to and from a bed to a chair (including a wheelchair)? 3  -LR     Standing up from a chair using your arms (e.g., wheelchair, bedside chair)? 3  -LR     Climbing 3-5 steps with a railing? 1  -LR     To walk in hospital room? 3  -LR     AM-PAC 6 Clicks Score (PT) 14  -LR     Highest level of mobility 4 --> Transferred to chair/commode  -LR     Row Name 07/04/22 0823          Functional Assessment    Outcome Measure Options AM-PAC 6  Clicks Basic Mobility (PT)  -LR           User Key  (r) = Recorded By, (t) = Taken By, (c) = Cosigned By    Initials Name Provider Type    Rosanna Downing, PT Physical Therapist                             Physical Therapy Education                 Title: PT OT SLP Therapies (Done)     Topic: Physical Therapy (Done)     Point: Mobility training (Done)     Learning Progress Summary           Patient Acceptance, E,D, VU,NR by LR at 7/4/2022 0823    Comment: Educated on benefits of mobility, safety with mobility, NWB status of R UE, correct sit<->stand t/f technique, correct gait mechanics, LE HEP, and progression of POC.    Acceptance, E,D, VU,NR by LR at 7/3/2022 1421    Comment: Educated on precautions, NWB status of R UE, safety with mobility, correct supine to sit t/f technique, correct sit<->stand t/f technique, correct bed to chair t/f technique, HEP, and progression of POC.    Acceptance, E,D, VU,NR by HP at 7/2/2022 1616    Acceptance, E,TB, NR by KG at 7/1/2022 1601    Acceptance, E, NR by FW at 6/28/2022 1512    Eager, E, VU,DU by SC at 6/27/2022 1151    Comment: Reviewed safety with mobility    Acceptance, E, VU,NR by LO at 6/26/2022 0825    Comment: Patient education regarding PT POC, sequencing for bed mobility, transfers, and ambulation                   Point: Home exercise program (Done)     Learning Progress Summary           Patient Acceptance, E,D, VU,NR by LR at 7/4/2022 0823    Comment: Educated on benefits of mobility, safety with mobility, NWB status of R UE, correct sit<->stand t/f technique, correct gait mechanics, LE HEP, and progression of POC.    Acceptance, E,D, VU,NR by LR at 7/3/2022 1421    Comment: Educated on precautions, NWB status of R UE, safety with mobility, correct supine to sit t/f technique, correct sit<->stand t/f technique, correct bed to chair t/f technique, HEP, and progression of POC.    Acceptance, E,D, VU,NR by HP at 7/2/2022 1616    Acceptance, E,TB, NR by KG  at 7/1/2022 1601    Acceptance, E, VU,NR by LS at 6/29/2022 1450    Comment: Benefits of activity    Acceptance, E, NR by FW at 6/28/2022 1512    Eager, E, VU,DU by SC at 6/27/2022 1151    Comment: Reviewed safety with mobility    Acceptance, E, VU,NR by LO at 6/26/2022 0825    Comment: Patient education regarding PT POC, sequencing for bed mobility, transfers, and ambulation                   Point: Body mechanics (Done)     Learning Progress Summary           Patient Acceptance, E,D, VU,NR by LR at 7/4/2022 0823    Comment: Educated on benefits of mobility, safety with mobility, NWB status of R UE, correct sit<->stand t/f technique, correct gait mechanics, LE HEP, and progression of POC.    Acceptance, E,D, VU,NR by LR at 7/3/2022 1421    Comment: Educated on precautions, NWB status of R UE, safety with mobility, correct supine to sit t/f technique, correct sit<->stand t/f technique, correct bed to chair t/f technique, HEP, and progression of POC.    Acceptance, E,D, VU,NR by HP at 7/2/2022 1616    Acceptance, E,TB, NR by KG at 7/1/2022 1601    Acceptance, E, NR by FW at 6/28/2022 1512    Eager, E, VU,DU by SC at 6/27/2022 1151    Comment: Reviewed safety with mobility    Acceptance, E, VU,NR by LO at 6/26/2022 0825    Comment: Patient education regarding PT POC, sequencing for bed mobility, transfers, and ambulation                   Point: Precautions (Done)     Learning Progress Summary           Patient Acceptance, E,D, VU,NR by LR at 7/4/2022 0823    Comment: Educated on benefits of mobility, safety with mobility, NWB status of R UE, correct sit<->stand t/f technique, correct gait mechanics, LE HEP, and progression of POC.    Acceptance, E,D, VU,NR by LR at 7/3/2022 1421    Comment: Educated on precautions, NWB status of R UE, safety with mobility, correct supine to sit t/f technique, correct sit<->stand t/f technique, correct bed to chair t/f technique, HEP, and progression of POC.    Acceptance, E,D, VU,NR  by HP at 7/2/2022 1616    Acceptance, E,TB, NR by KG at 7/1/2022 1601    Acceptance, E, NR by FW at 6/28/2022 1512    Eager, E, VU,DU by SC at 6/27/2022 1151    Comment: Reviewed safety with mobility    Acceptance, E, VU,NR by LO at 6/26/2022 0825    Comment: Patient education regarding PT POC, sequencing for bed mobility, transfers, and ambulation                               User Key     Initials Effective Dates Name Provider Type Discipline    SC 06/16/21 -  Juan Montez, PT Physical Therapist PT    LR 06/16/21 -  Rosanna Overton, PT Physical Therapist PT    LS 06/16/21 -  Baylee Pillai, PT Physical Therapist PT    KG 06/16/21 -  Antoinette Swift Physical Therapist PT    FW 05/05/22 -  Eyal Delgado, PT Physical Therapist PT    LO 06/16/21 -  Asia Sosa, PT Physical Therapist PT    HP 06/01/21 -  Fina Landeros, PT Physical Therapist PT              PT Recommendation and Plan     Plan of Care Reviewed With: patient  Progress: improving  Outcome Evaluation: Patient ambulated 4 feet with axillary crutch on L, CGA x2 for safety and balance, limited by weakness and fatigue. Min assist to rise from BSC today. Good participation/effort with LE ther ex. Will continue to progress as able. Recommend SNF at d/c. Possible surgery tomorrow.     Time Calculation:    PT Charges     Row Name 07/04/22 0823             Time Calculation    Start Time 0823  -LR      PT Received On 07/04/22  -LR      PT Goal Re-Cert Due Date 07/06/22  -LR              Timed Charges    91390 - PT Therapeutic Exercise Minutes 8  -LR      45469 - PT Therapeutic Activity Minutes 7  -LR              Total Minutes    Timed Charges Total Minutes 15  -LR       Total Minutes 15  -LR            User Key  (r) = Recorded By, (t) = Taken By, (c) = Cosigned By    Initials Name Provider Type    LR Rosanna Overton, PT Physical Therapist              Therapy Charges for Today     Code Description Service Date Service Provider  Modifiers Qty    92458865026  PT THER PROC EA 15 MIN 7/3/2022 Rosanna Overton, PT GP 1    45617721054 HC PT THERAPEUTIC ACT EA 15 MIN 7/3/2022 Rosanna Overton, PT GP 1    69598976663 HC PT THER PROC EA 15 MIN 7/4/2022 Rosanna Overton, PT GP 1          PT G-Codes  Outcome Measure Options: AM-PAC 6 Clicks Basic Mobility (PT)  AM-PAC 6 Clicks Score (PT): 14  AM-PAC 6 Clicks Score (OT): 13    Rosanna Overton, PT  7/4/2022

## 2022-07-04 NOTE — PLAN OF CARE
Goal Outcome Evaluation:  Plan of Care Reviewed With: patient        Progress: improving  Outcome Evaluation: RUE to sling with rupivicaine pump infusing, AAOx4, up with assistance x2 to BSC and chair, c/o pain 7/10 prn meds giveb surgery in a.m. PO at midnight, VSS   Fall, initial encounter

## 2022-07-04 NOTE — CASE MANAGEMENT/SOCIAL WORK
Continued Stay Note  [facility]     Patient Name: Altagracia Rothman  MRN: [id number]  Today's Date: 7/4/2022    Admit Date: 6/24/2022     Discharge Plan     Row Name 07/04/22 1531       Plan    Plan SNF    Plan Comments Case mgt f/u. Plan remains to go to [facility] SNF ( if skilled bed still available) after surgery. Case mgt will f/u with [facility] in am               Discharge Codes    No documentation.               Expected Discharge Date and Time     Expected Discharge Date Expected Discharge Time    Jul 7, 2022             Sonja C Kellerman, RN

## 2022-07-04 NOTE — PROGRESS NOTES
"Orthopedic Daily Progress Note      CC: Right shoulder pain    Pain well controlled    No new complaints this morning.  Resting in bed.  Block working well.    Physical Exam:  I have reviewed the vital signs.  Temp:  [98.1 °F (36.7 °C)-99.3 °F (37.4 °C)] 98.9 °F (37.2 °C)  Heart Rate:  [65-76] 73  Resp:  [16-18] 16  BP: (121-147)/(72-94) 138/94    Objective:  Vital signs: (most recent): Blood pressure 138/94, pulse 73, temperature 98.9 °F (37.2 °C), temperature source Oral, resp. rate 16, height 154.9 cm (61\"), weight 82 kg (180 lb 12.8 oz), SpO2 100 %, not currently breastfeeding.            General Appearance:    Alert, cooperative, no distress  Extremities: No clubbing, cyanosis, or edema to lower extremities  Pulses:  2+ right radial pulse  Skin: +ecchymoses about RUE. Radial/median/ulnar nerves intact      Results Review:    I have reviewed the labs, radiology results and diagnostic studies    Results from last 7 days   Lab Units 06/30/22  1558   WBC 10*3/mm3 12.69*   HEMOGLOBIN g/dL 12.8   PLATELETS 10*3/mm3 281     Results from last 7 days   Lab Units 06/30/22  1558   SODIUM mmol/L 142   POTASSIUM mmol/L 4.7   CO2 mmol/L 28.0   CREATININE mg/dL 0.74   GLUCOSE mg/dL 151*       I have reviewed the medications.    Assessment/Problem List  R proximal and distal humerus fractures  Awaiting surgery ORIF/IMN R humerus fractures pending endocrine workup    Plan (per Dr. Matute):  NWRUFUS RUE  NPO at midnight on Monday, 7/4  Pending endocrine workup, we will plan for surgery on 7/5 for ORIF/IMN R humerus fractures  Catecholamine labs still pending      Phong Campo MD   Covering for Dr. Matute  07/04/22  11:31 EDT            "

## 2022-07-05 LAB
ALBUMIN SERPL-MCNC: 3 G/DL (ref 3.5–5.2)
ALBUMIN/GLOB SERPL: 1.1 G/DL
ALP SERPL-CCNC: 120 U/L (ref 39–117)
ALT SERPL W P-5'-P-CCNC: 27 U/L (ref 1–33)
ANION GAP SERPL CALCULATED.3IONS-SCNC: 5 MMOL/L (ref 5–15)
AST SERPL-CCNC: 21 U/L (ref 1–32)
BASOPHILS # BLD AUTO: 0.03 10*3/MM3 (ref 0–0.2)
BASOPHILS NFR BLD AUTO: 0.3 % (ref 0–1.5)
BILIRUB SERPL-MCNC: 0.5 MG/DL (ref 0–1.2)
BUN SERPL-MCNC: 16 MG/DL (ref 8–23)
BUN/CREAT SERPL: 19.8 (ref 7–25)
CALCIUM SPEC-SCNC: 9 MG/DL (ref 8.6–10.5)
CHLORIDE SERPL-SCNC: 103 MMOL/L (ref 98–107)
CO2 SERPL-SCNC: 34 MMOL/L (ref 22–29)
CREAT SERPL-MCNC: 0.81 MG/DL (ref 0.57–1)
DEPRECATED RDW RBC AUTO: 56.4 FL (ref 37–54)
EGFRCR SERPLBLD CKD-EPI 2021: 76.8 ML/MIN/1.73
EOSINOPHIL # BLD AUTO: 0.2 10*3/MM3 (ref 0–0.4)
EOSINOPHIL NFR BLD AUTO: 2.2 % (ref 0.3–6.2)
ERYTHROCYTE [DISTWIDTH] IN BLOOD BY AUTOMATED COUNT: 14.2 % (ref 12.3–15.4)
GLOBULIN UR ELPH-MCNC: 2.8 GM/DL
GLUCOSE BLDC GLUCOMTR-MCNC: 119 MG/DL (ref 70–130)
GLUCOSE BLDC GLUCOMTR-MCNC: 128 MG/DL (ref 70–130)
GLUCOSE BLDC GLUCOMTR-MCNC: 84 MG/DL (ref 70–130)
GLUCOSE SERPL-MCNC: 122 MG/DL (ref 65–99)
HCT VFR BLD AUTO: 37.5 % (ref 34–46.6)
HGB BLD-MCNC: 11.8 G/DL (ref 12–15.9)
IMM GRANULOCYTES # BLD AUTO: 0.05 10*3/MM3 (ref 0–0.05)
IMM GRANULOCYTES NFR BLD AUTO: 0.5 % (ref 0–0.5)
LYMPHOCYTES # BLD AUTO: 1.41 10*3/MM3 (ref 0.7–3.1)
LYMPHOCYTES NFR BLD AUTO: 15.4 % (ref 19.6–45.3)
MAGNESIUM SERPL-MCNC: 2.1 MG/DL (ref 1.6–2.4)
MCH RBC QN AUTO: 34.1 PG (ref 26.6–33)
MCHC RBC AUTO-ENTMCNC: 31.5 G/DL (ref 31.5–35.7)
MCV RBC AUTO: 108.4 FL (ref 79–97)
MONOCYTES # BLD AUTO: 0.82 10*3/MM3 (ref 0.1–0.9)
MONOCYTES NFR BLD AUTO: 9 % (ref 5–12)
NEUTROPHILS NFR BLD AUTO: 6.62 10*3/MM3 (ref 1.7–7)
NEUTROPHILS NFR BLD AUTO: 72.6 % (ref 42.7–76)
NRBC BLD AUTO-RTO: 0 /100 WBC (ref 0–0.2)
PLATELET # BLD AUTO: 263 10*3/MM3 (ref 140–450)
PMV BLD AUTO: 10.4 FL (ref 6–12)
POTASSIUM SERPL-SCNC: 4.6 MMOL/L (ref 3.5–5.2)
PROT SERPL-MCNC: 5.8 G/DL (ref 6–8.5)
RBC # BLD AUTO: 3.46 10*6/MM3 (ref 3.77–5.28)
SODIUM SERPL-SCNC: 142 MMOL/L (ref 136–145)
WBC NRBC COR # BLD: 9.13 10*3/MM3 (ref 3.4–10.8)

## 2022-07-05 PROCEDURE — 83735 ASSAY OF MAGNESIUM: CPT | Performed by: NURSE PRACTITIONER

## 2022-07-05 PROCEDURE — 85025 COMPLETE CBC W/AUTO DIFF WBC: CPT | Performed by: NURSE PRACTITIONER

## 2022-07-05 PROCEDURE — 80053 COMPREHEN METABOLIC PANEL: CPT | Performed by: NURSE PRACTITIONER

## 2022-07-05 PROCEDURE — 94664 DEMO&/EVAL PT USE INHALER: CPT

## 2022-07-05 PROCEDURE — 82962 GLUCOSE BLOOD TEST: CPT

## 2022-07-05 PROCEDURE — 94799 UNLISTED PULMONARY SVC/PX: CPT

## 2022-07-05 PROCEDURE — 99232 SBSQ HOSP IP/OBS MODERATE 35: CPT | Performed by: NURSE PRACTITIONER

## 2022-07-05 RX ADMIN — PINDOLOL 2.5 MG: 5 TABLET ORAL at 15:46

## 2022-07-05 RX ADMIN — BUDESONIDE AND FORMOTEROL FUMARATE DIHYDRATE 2 PUFF: 160; 4.5 AEROSOL RESPIRATORY (INHALATION) at 09:04

## 2022-07-05 RX ADMIN — Medication 10 ML: at 08:34

## 2022-07-05 RX ADMIN — LISINOPRIL 5 MG: 5 TABLET ORAL at 09:36

## 2022-07-05 RX ADMIN — Medication 10 ML: at 19:59

## 2022-07-05 RX ADMIN — OXYCODONE HYDROCHLORIDE AND ACETAMINOPHEN 1 TABLET: 10; 325 TABLET ORAL at 19:01

## 2022-07-05 RX ADMIN — OXYCODONE HYDROCHLORIDE AND ACETAMINOPHEN 1 TABLET: 10; 325 TABLET ORAL at 14:46

## 2022-07-05 RX ADMIN — OXYCODONE HYDROCHLORIDE AND ACETAMINOPHEN 1 TABLET: 10; 325 TABLET ORAL at 10:34

## 2022-07-05 RX ADMIN — GABAPENTIN 800 MG: 400 CAPSULE ORAL at 13:23

## 2022-07-05 RX ADMIN — HYDROCORTISONE 10 MG: 10 TABLET ORAL at 13:23

## 2022-07-05 RX ADMIN — NORTRIPTYLINE HYDROCHLORIDE 50 MG: 10 CAPSULE ORAL at 19:58

## 2022-07-05 RX ADMIN — GABAPENTIN 800 MG: 400 CAPSULE ORAL at 20:57

## 2022-07-05 RX ADMIN — MORPHINE SULFATE 30 MG: 30 TABLET, FILM COATED, EXTENDED RELEASE ORAL at 08:34

## 2022-07-05 RX ADMIN — NORTRIPTYLINE HYDROCHLORIDE 50 MG: 10 CAPSULE ORAL at 15:46

## 2022-07-05 RX ADMIN — GABAPENTIN 800 MG: 400 CAPSULE ORAL at 08:34

## 2022-07-05 RX ADMIN — HYDROCORTISONE 20 MG: 10 TABLET ORAL at 09:37

## 2022-07-05 RX ADMIN — DOXYCYCLINE 100 MG: 100 CAPSULE ORAL at 09:36

## 2022-07-05 RX ADMIN — Medication 250 MG: at 19:59

## 2022-07-05 RX ADMIN — NORTRIPTYLINE HYDROCHLORIDE 50 MG: 10 CAPSULE ORAL at 09:37

## 2022-07-05 RX ADMIN — GABAPENTIN 800 MG: 400 CAPSULE ORAL at 18:49

## 2022-07-05 RX ADMIN — SPIRONOLACTONE 50 MG: 50 TABLET ORAL at 13:23

## 2022-07-05 RX ADMIN — OXYCODONE HYDROCHLORIDE AND ACETAMINOPHEN 1 TABLET: 10; 325 TABLET ORAL at 03:36

## 2022-07-05 RX ADMIN — PINDOLOL 2.5 MG: 5 TABLET ORAL at 09:40

## 2022-07-05 RX ADMIN — BUDESONIDE AND FORMOTEROL FUMARATE DIHYDRATE 2 PUFF: 160; 4.5 AEROSOL RESPIRATORY (INHALATION) at 21:11

## 2022-07-05 RX ADMIN — Medication 250 MG: at 08:34

## 2022-07-05 RX ADMIN — MORPHINE SULFATE 30 MG: 30 TABLET, FILM COATED, EXTENDED RELEASE ORAL at 19:56

## 2022-07-05 RX ADMIN — DOXYCYCLINE 100 MG: 100 CAPSULE ORAL at 19:57

## 2022-07-05 NOTE — PLAN OF CARE
Goal Outcome Evaluation:              Outcome Evaluation: surgery postponed until thur 7/7, endocrine labs still pending, pain controlled with infu block and percocet, pt up to BSC with assist x1, voiding well, BM today, no skin issues. R radial pulses +1. Sling in place. pt complains of burning with voiding that started this evening, afebrile, urine clear and yellow, no odor, will let MD know in am, RA FLOR GRR on tele

## 2022-07-05 NOTE — CASE MANAGEMENT/SOCIAL WORK
Continued Stay Note  [facility]     Patient Name: Altagracia Rothman  MRN: [id number]  Today's Date: 7/5/2022    Admit Date: 6/24/2022     Discharge Plan     Row Name 07/05/22 1131       Plan    Plan Comments Plan is for an ORIF/IMN R humerus fractures today. Once pt is able to participate with therapy after CM will update facilities.               Discharge Codes    No documentation.               Expected Discharge Date and Time     Expected Discharge Date Expected Discharge Time    Jul 7, 2022             Francisca Smith RN

## 2022-07-05 NOTE — PROGRESS NOTES
[facility] Medicine Services  PROGRESS NOTE    Patient Name: Altagracia Rothman  : 1950  MRN: [id number]    Date of Admission: 2022  Primary Care Physician: Alvina Lloyd PA-C    Subjective   Subjective     CC:  F/u arm fractures    HPI:  Patient resting in bed.  States her pain is controlled unless she gets up and moves around.  She is concerned about not getting her steroids this morning because of n.p.o. status.  Requires inpatient meds.  Surgery postponed until Thursday at the earliest.  Spoke with Dr. Matute in the room and adrenal labs are still pending.    ROS:   Gen- No fevers, chills  CV- No chest pain, palpitations  Resp- No cough, dyspnea  GI- No N/V/D, abd pain    Objective   Objective     Vital Signs:   Temp:  [97.7 °F (36.5 °C)-98.9 °F (37.2 °C)] 97.7 °F (36.5 °C)  Heart Rate:  [65-78] 72  Resp:  [16-18] 18  BP: (113-163)/(71-94) 163/89     Physical Exam:   Constitutional: No acute distress, awake, alert  HENT: NCAT, mucous membranes moist  Respiratory: Clear to auscultation bilaterally, respiratory effort normal, room air  Cardiovascular: RRR, no murmurs, rubs, or gallops  Gastrointestinal: Positive bowel sounds, soft, nontender, nondistended  Musculoskeletal: No bilateral ankle edema  Psychiatric: Appropriate affect, cooperative  Neurologic: Oriented x 3, Cranial Nerves grossly intact to confrontation, speech clear  Skin: No rashes    Results Reviewed:  LAB RESULTS:      Lab 22  1558   WBC 12.69*   HEMOGLOBIN 12.8   HEMATOCRIT 39.5   PLATELETS 281   NEUTROS ABS 8.64*   IMMATURE GRANS (ABS) 0.15*   LYMPHS ABS 2.56   MONOS ABS 1.22*   EOS ABS 0.08   .9*         Lab 22  1558 22  1647   SODIUM 142 143   POTASSIUM 4.7 4.2   CHLORIDE 103 108*   CO2 28.0 27.0   ANION GAP 11.0 8.0   BUN 11 10   CREATININE 0.74 0.55*   EGFR 85.6 96.9   GLUCOSE 151* 110*   CALCIUM 8.9 8.3*   MAGNESIUM 2.3  --          Lab 22  1552   TOTAL PROTEIN 5.6*    ALBUMIN 3.30*   GLOBULIN 2.3   ALT (SGPT) 42*   AST (SGOT) 43*   BILIRUBIN 0.5   ALK PHOS 131*         Lab 06/30/22  1558   TROPONIN T <0.010                 Lab 06/30/22  1601   PH, ARTERIAL 7.382   PCO2, ARTERIAL 48.7*   PO2 .0*   FIO2 70   HCO3 ART 28.9*   BASE EXCESS ART 3.0*   CARBOXYHEMOGLOBIN 0.9     Brief Urine Lab Results  (Last result in the past 365 days)      Color   Clarity   Blood   Leuk Est   Nitrite   Protein   CREAT   Urine HCG        06/27/22 0224 Yellow   Turbid   Small (1+)   Large (3+)   Positive   30 mg/dL (1+)                 Microbiology Results Abnormal     Procedure Component Value - Date/Time    COVID PRE-OP / PRE-PROCEDURE SCREENING ORDER (NO ISOLATION) - Swab, Nasopharynx [[id number]]  (Normal) Collected: 06/24/22 1901    Lab Status: Final result Specimen: Swab from Nasopharynx Updated: 06/24/22 1956    Narrative:      The following orders were created for panel order COVID PRE-OP / PRE-PROCEDURE SCREENING ORDER (NO ISOLATION) - Swab, Nasopharynx.  Procedure                               Abnormality         Status                     ---------                               -----------         ------                     COVID-19, FLU A/B, RSV P...[[id number]]  Normal              Final result                 Please view results for these tests on the individual orders.    COVID-19, FLU A/B, RSV PCR - Swab, Nasopharynx [[id number]]  (Normal) Collected: 06/24/22 1901    Lab Status: Final result Specimen: Swab from Nasopharynx Updated: 06/24/22 1956     COVID19 Not Detected     Influenza A PCR Not Detected     Influenza B PCR Not Detected     RSV, PCR Not Detected    Narrative:      Fact sheet for providers: https://www.fda.gov/media/394578/download    Fact sheet for patients: https://www.fda.gov/media/777140/download    Test performed by PCR.          No radiology results from the last 24 hrs    I have reviewed the medications:  Scheduled Meds:budesonide-formoterol, 2 puff,  Inhalation, BID - RT  doxycycline, 100 mg, Oral, Q12H  gabapentin, 800 mg, Oral, 4x Daily  hydrocortisone, 10 mg, Oral, Daily With Lunch  hydrocortisone, 20 mg, Oral, QAM  levothyroxine, 88 mcg, Oral, Q AM  lisinopril, 5 mg, Oral, Q24H  Morphine, 30 mg, Oral, Q12H  nortriptyline, 50 mg, Oral, TID  pantoprazole, 40 mg, Oral, Q AM  pindolol, 2.5 mg, Oral, TID  saccharomyces boulardii, 250 mg, Oral, BID  sodium chloride, 10 mL, Intravenous, Q12H  spironolactone, 50 mg, Oral, Daily      Continuous Infusions:Pharmacy Consult,   ropivacaine,   sodium chloride, 75 mL/hr, Last Rate: 75 mL/hr (22 0105)      PRN Meds:.acetaminophen **OR** acetaminophen **OR** acetaminophen  •  albuterol  •  hydrALAZINE  •  hydrOXYzine  •  labetalol  •  magnesium sulfate **OR** magnesium sulfate in D5W 1g/100mL (PREMIX) **OR** magnesium sulfate  •  [] Morphine **AND** naloxone  •  ondansetron **OR** ondansetron  •  oxyCODONE-acetaminophen  •  potassium chloride **OR** potassium chloride **OR** potassium chloride  •  sodium chloride    Assessment & Plan   Assessment & Plan     Active Hospital Problems    Diagnosis  POA   • Acute UTI (urinary tract infection) [N39.0]  Yes   • Other closed displaced fracture of proximal end of right humerus, initial encounter [S42.291A]  Yes   • Moderate asthma without complication [J45.909]  Yes   • Osteoporosis [M81.0]  Yes   • Adrenal insufficiency (HCC) [E27.40]  Yes   • Hypothyroidism [E03.9]  Yes   • Essential hypertension [I10]  Yes   • H/O pheochromocytoma [Z86.018]  Yes      Resolved Hospital Problems   No resolved problems to display.        Brief Hospital Course to date:  Altagracia Rothman is a 72 y.o. female with past medical history significant for hypertension, history of pheochromocytoma, s/p left adrenal resection, adrenal insufficiency, hypothyroidism, moderate asthma. Patient was admitted for right humerus fracture after a fall.     This patient's problems and plans were partially entered  by my partner and updated as appropriate by me 07/05/22.    Right proximal and distal humerus fracture after mechanical fall.    -Plan for ORIF possibly Thursday, 7/7; results for metanephrines and catecholamines still pending  -Will need stress-doing of home steroids for AI with IV hydrocortisone, usually does IV hydrocortisone; spoke with Dr. Matute and anesthesiology will dose steroids prior to surgery     Bronchitis - POA, now resolved  -CXR without infiltrate. S/P 5 days of empiric doxy.  On room air  -Resume home inhalers, continue symbicort     Hypoxia/Chest Pain-resolved  - RRT 6/30, likely related to combination of nerve block and anxiety  - CXR, EKG, troponin and ABG are WNL     Hx adrenal insufficiency s/p adrenalectomy, s/p adrenal crisis (hypotension on admission) - possibly precipitated by bronchitis, humerus fracture, and UTI  History of pheochromocytoma - resected in 2003   - My partner discussed this with patient's endocrinologist Dr. Nelson via telephone on 6/30. Patient had adrenalectomy in 2003 with removal of pheo and has declined further screening tests since that time. Per Dr. Nelson, cathecholamine/metanphrine testing would be helpful if it is negative, although it could return a false positive from ongoing tricyclic med which patient has refused to taper. She would need to be off noratryptiline for 4-6 weeks for accurate testing.  - 24hr-urine metanephrines/catecholamines started on 6/30. Dr. Matute called lab this am. Results could take another 4-5 days.  - Patient has been beaned back to home dose of hydrocortisone 20mg qam, 10mg nightly after initial stress-dosing. Will need to be stress dosed with IV hydrocortisone at timing of surgery.     Hx HTN  -initally was hypotensive due to adrenal crisis, so bp meds were held  -started back lisinopril 5mg on 6/27/22 (on quinapril 5mg at home)  -hydrocortisone weaned back to home dose  -resume aldactone, lasix x1 dose 6/29  -resume home pindolol  2.5mg, PRN hydralazine and PRN labetalol      Gillespie catheter placed in ED at time of admission  UTI, proteus species  -UA ordered in ED  -Gillespie d/c'd on 6/26/22  -urine cultures showed proteus mirabilis  -completed course of fosfomycin    Hyperglycemia, improved  -not diabetic, A1c 5.3     Hypothyroidism   -on home dose Synthroid.    Expected Discharge Location and Transportation: Rehab, [facility] van or EMS  Expected Discharge Date: Unknown, waiting on surgery    DVT prophylaxis:  Mechanical DVT prophylaxis orders are present.     AM-PAC 6 Clicks Score (PT): 14 (07/04/22 0823)    CODE STATUS:   Code Status and Medical Interventions:   Ordered at: 06/24/22 2121     Level Of Support Discussed With:    Patient     Code Status (Patient has no pulse and is not breathing):    CPR (Attempt to Resuscitate)     Medical Interventions (Patient has pulse or is breathing):    Full Support       Victoria Calhoun, APRN  07/05/22

## 2022-07-05 NOTE — PROGRESS NOTES
[facility]    Acute pain service Inpatient Progress Note    Patient Name: Altagracia Rothman  :  1950  MRN:  [id number]        Acute Pain  Service Inpatient Progress Note:    Analgesia:Good  Pain Score:3/10  LOC: alert and awake  Resp Status: room air  Cardiac: VS stable  Side Effects:None  Catheter Site:clean, dressing intact and dry  Cath type: peripheral nerve cath with ON Q  Infusion rate: 4ml/hr  Catheter Plan:Catheter to remain Insitu and Continue catheter infusion rate unchanged

## 2022-07-05 NOTE — PROGRESS NOTES
"Orthopedic Daily Progress Note      CC: Right shoulder pain    Pain well controlled    No new complaints this morning.  Resting in bed.  Block working well.    Physical Exam:  I have reviewed the vital signs.  Temp:  [97.7 °F (36.5 °C)-98.5 °F (36.9 °C)] 98.2 °F (36.8 °C)  Heart Rate:  [67-78] 67  Resp:  [16-18] 18  BP: (113-163)/(68-89) 116/68    Objective:  Vital signs: (most recent): Blood pressure 116/68, pulse 67, temperature 98.2 °F (36.8 °C), temperature source Oral, resp. rate 18, height 154.9 cm (61\"), weight 82 kg (180 lb 12.8 oz), SpO2 97 %, not currently breastfeeding.            General Appearance:    Alert, cooperative, no distress  Extremities: No clubbing, cyanosis, or edema to lower extremities  Pulses:  2+ right radial pulse  Skin: +ecchymoses about RUE. Radial/median/ulnar nerves intact      Results Review:    I have reviewed the labs, radiology results and diagnostic studies. Urine studies still pending.    Results from last 7 days   Lab Units 07/05/22  1237   WBC 10*3/mm3 9.13   HEMOGLOBIN g/dL 11.8*   PLATELETS 10*3/mm3 263     Results from last 7 days   Lab Units 06/30/22  1558   SODIUM mmol/L 142   POTASSIUM mmol/L 4.7   CO2 mmol/L 28.0   CREATININE mg/dL 0.74   GLUCOSE mg/dL 151*       I have reviewed the medications.    Assessment/Problem List  R proximal and distal humerus fractures  Awaiting surgery ORIF/IMN R humerus fractures pending endocrine workup    Plan:  ROSY DELANEY  NPO at midnight on Wednesday, 7/6  Pending endocrine workup, we will plan for surgery on 7/7 for ORIF/IMN R humerus fractures  Catecholamine labs still pending      Jamari Matute Jr., MD   07/05/22  14:03 EDT            "

## 2022-07-05 NOTE — PLAN OF CARE
Right fingers wiggle well with rapid cap refill. Pain adequately controlled with nerve block agent/PO pain meds. VSS. SR on cardiac mx. Will cont to mx. Call Light in reach.

## 2022-07-06 LAB
BACTERIA UR QL AUTO: ABNORMAL /HPF
BILIRUB UR QL STRIP: NEGATIVE
CLARITY UR: CLEAR
COLOR UR: YELLOW
GLUCOSE UR STRIP-MCNC: NEGATIVE MG/DL
HGB UR QL STRIP.AUTO: NEGATIVE
HYALINE CASTS UR QL AUTO: ABNORMAL /LPF
KETONES UR QL STRIP: NEGATIVE
LEUKOCYTE ESTERASE UR QL STRIP.AUTO: ABNORMAL
NITRITE UR QL STRIP: NEGATIVE
PH UR STRIP.AUTO: 7 [PH] (ref 5–8)
PROT UR QL STRIP: NEGATIVE
RBC # UR STRIP: ABNORMAL /HPF
REF LAB TEST METHOD: ABNORMAL
SP GR UR STRIP: 1.01 (ref 1–1.03)
SQUAMOUS #/AREA URNS HPF: ABNORMAL /HPF
UROBILINOGEN UR QL STRIP: ABNORMAL
WBC # UR STRIP: ABNORMAL /HPF

## 2022-07-06 PROCEDURE — 25010000002 ROPIVACAINE PER 1 MG: Performed by: NURSE ANESTHETIST, CERTIFIED REGISTERED

## 2022-07-06 PROCEDURE — P9612 CATHETERIZE FOR URINE SPEC: HCPCS

## 2022-07-06 PROCEDURE — 87186 SC STD MICRODIL/AGAR DIL: CPT | Performed by: INTERNAL MEDICINE

## 2022-07-06 PROCEDURE — 81001 URINALYSIS AUTO W/SCOPE: CPT | Performed by: INTERNAL MEDICINE

## 2022-07-06 PROCEDURE — 25010000002 CEFTRIAXONE PER 250 MG: Performed by: INTERNAL MEDICINE

## 2022-07-06 PROCEDURE — 94799 UNLISTED PULMONARY SVC/PX: CPT

## 2022-07-06 PROCEDURE — 87086 URINE CULTURE/COLONY COUNT: CPT | Performed by: INTERNAL MEDICINE

## 2022-07-06 PROCEDURE — 99232 SBSQ HOSP IP/OBS MODERATE 35: CPT | Performed by: INTERNAL MEDICINE

## 2022-07-06 PROCEDURE — 87077 CULTURE AEROBIC IDENTIFY: CPT | Performed by: INTERNAL MEDICINE

## 2022-07-06 RX ORDER — FAMOTIDINE 10 MG/ML
20 INJECTION, SOLUTION INTRAVENOUS ONCE
Status: DISCONTINUED | OUTPATIENT
Start: 2022-07-07 | End: 2022-07-13

## 2022-07-06 RX ORDER — MIDAZOLAM HYDROCHLORIDE 1 MG/ML
0.5 INJECTION INTRAMUSCULAR; INTRAVENOUS
Status: DISCONTINUED | OUTPATIENT
Start: 2022-07-07 | End: 2022-07-14 | Stop reason: HOSPADM

## 2022-07-06 RX ORDER — FAMOTIDINE 20 MG/1
20 TABLET, FILM COATED ORAL ONCE
Status: COMPLETED | OUTPATIENT
Start: 2022-07-07 | End: 2022-07-07

## 2022-07-06 RX ORDER — SODIUM CHLORIDE 0.9 % (FLUSH) 0.9 %
10 SYRINGE (ML) INJECTION EVERY 12 HOURS SCHEDULED
Status: DISCONTINUED | OUTPATIENT
Start: 2022-07-06 | End: 2022-07-14 | Stop reason: HOSPADM

## 2022-07-06 RX ORDER — SODIUM CHLORIDE, SODIUM LACTATE, POTASSIUM CHLORIDE, CALCIUM CHLORIDE 600; 310; 30; 20 MG/100ML; MG/100ML; MG/100ML; MG/100ML
9 INJECTION, SOLUTION INTRAVENOUS CONTINUOUS
Status: DISCONTINUED | OUTPATIENT
Start: 2022-07-07 | End: 2022-07-19 | Stop reason: HOSPADM

## 2022-07-06 RX ORDER — PINDOLOL 5 MG/1
2.5 TABLET ORAL 3 TIMES DAILY
Status: DISCONTINUED | OUTPATIENT
Start: 2022-07-07 | End: 2022-07-19 | Stop reason: HOSPADM

## 2022-07-06 RX ORDER — LIDOCAINE HYDROCHLORIDE 10 MG/ML
0.5 INJECTION, SOLUTION EPIDURAL; INFILTRATION; INTRACAUDAL; PERINEURAL ONCE AS NEEDED
Status: DISCONTINUED | OUTPATIENT
Start: 2022-07-07 | End: 2022-07-14 | Stop reason: HOSPADM

## 2022-07-06 RX ORDER — DIPHENHYDRAMINE HYDROCHLORIDE 50 MG/ML
25 INJECTION INTRAMUSCULAR; INTRAVENOUS EVERY 6 HOURS PRN
Status: DISCONTINUED | OUTPATIENT
Start: 2022-07-06 | End: 2022-07-19 | Stop reason: HOSPADM

## 2022-07-06 RX ORDER — SODIUM CHLORIDE 0.9 % (FLUSH) 0.9 %
10 SYRINGE (ML) INJECTION AS NEEDED
Status: DISCONTINUED | OUTPATIENT
Start: 2022-07-06 | End: 2022-07-14 | Stop reason: HOSPADM

## 2022-07-06 RX ADMIN — NORTRIPTYLINE HYDROCHLORIDE 50 MG: 10 CAPSULE ORAL at 15:44

## 2022-07-06 RX ADMIN — Medication 250 MG: at 09:17

## 2022-07-06 RX ADMIN — BUDESONIDE AND FORMOTEROL FUMARATE DIHYDRATE 2 PUFF: 160; 4.5 AEROSOL RESPIRATORY (INHALATION) at 19:29

## 2022-07-06 RX ADMIN — Medication 250 MG: at 21:14

## 2022-07-06 RX ADMIN — GABAPENTIN 800 MG: 400 CAPSULE ORAL at 21:14

## 2022-07-06 RX ADMIN — OXYCODONE HYDROCHLORIDE AND ACETAMINOPHEN 1 TABLET: 10; 325 TABLET ORAL at 19:42

## 2022-07-06 RX ADMIN — GABAPENTIN 800 MG: 400 CAPSULE ORAL at 09:17

## 2022-07-06 RX ADMIN — OXYCODONE HYDROCHLORIDE AND ACETAMINOPHEN 1 TABLET: 10; 325 TABLET ORAL at 09:55

## 2022-07-06 RX ADMIN — HYDROCORTISONE 20 MG: 10 TABLET ORAL at 05:17

## 2022-07-06 RX ADMIN — OXYCODONE HYDROCHLORIDE AND ACETAMINOPHEN 1 TABLET: 10; 325 TABLET ORAL at 16:07

## 2022-07-06 RX ADMIN — GABAPENTIN 800 MG: 400 CAPSULE ORAL at 17:40

## 2022-07-06 RX ADMIN — PINDOLOL 2.5 MG: 5 TABLET ORAL at 21:16

## 2022-07-06 RX ADMIN — NORTRIPTYLINE HYDROCHLORIDE 50 MG: 10 CAPSULE ORAL at 21:14

## 2022-07-06 RX ADMIN — DOXYCYCLINE 100 MG: 100 CAPSULE ORAL at 09:17

## 2022-07-06 RX ADMIN — PINDOLOL 2.5 MG: 5 TABLET ORAL at 09:17

## 2022-07-06 RX ADMIN — MORPHINE SULFATE 30 MG: 30 TABLET, FILM COATED, EXTENDED RELEASE ORAL at 09:17

## 2022-07-06 RX ADMIN — MORPHINE SULFATE 30 MG: 30 TABLET, FILM COATED, EXTENDED RELEASE ORAL at 21:14

## 2022-07-06 RX ADMIN — DOXYCYCLINE 100 MG: 100 CAPSULE ORAL at 21:14

## 2022-07-06 RX ADMIN — SODIUM CHLORIDE 1 G: 900 INJECTION INTRAVENOUS at 15:44

## 2022-07-06 RX ADMIN — LEVOTHYROXINE SODIUM 88 MCG: 88 TABLET ORAL at 05:17

## 2022-07-06 RX ADMIN — Medication 10 ML: at 09:17

## 2022-07-06 RX ADMIN — PANTOPRAZOLE SODIUM 40 MG: 40 TABLET, DELAYED RELEASE ORAL at 05:17

## 2022-07-06 RX ADMIN — LISINOPRIL 5 MG: 5 TABLET ORAL at 09:17

## 2022-07-06 RX ADMIN — Medication 1000 MG: at 01:39

## 2022-07-06 RX ADMIN — Medication 10 ML: at 15:44

## 2022-07-06 RX ADMIN — NORTRIPTYLINE HYDROCHLORIDE 50 MG: 10 CAPSULE ORAL at 09:17

## 2022-07-06 RX ADMIN — HYDROCORTISONE 10 MG: 10 TABLET ORAL at 13:09

## 2022-07-06 RX ADMIN — OXYCODONE HYDROCHLORIDE AND ACETAMINOPHEN 1 TABLET: 10; 325 TABLET ORAL at 01:29

## 2022-07-06 RX ADMIN — BUDESONIDE AND FORMOTEROL FUMARATE DIHYDRATE 2 PUFF: 160; 4.5 AEROSOL RESPIRATORY (INHALATION) at 10:05

## 2022-07-06 RX ADMIN — SPIRONOLACTONE 50 MG: 50 TABLET ORAL at 09:55

## 2022-07-06 RX ADMIN — PINDOLOL 2.5 MG: 5 TABLET ORAL at 15:44

## 2022-07-06 RX ADMIN — GABAPENTIN 800 MG: 400 CAPSULE ORAL at 13:09

## 2022-07-06 RX ADMIN — OXYCODONE HYDROCHLORIDE AND ACETAMINOPHEN 1 TABLET: 10; 325 TABLET ORAL at 05:21

## 2022-07-06 NOTE — PROGRESS NOTES
[facility]    Acute pain service Inpatient Progress Note    Patient Name: Altagracia Rothman  :  1950  MRN:  [id number]        Acute Pain  Service Inpatient Progress Note:    Analgesia:Good  Pain Score:4/10  LOC: alert and awake  Resp Status: room air  Cardiac: VS stable  Side Effects:None  Catheter Site:clean, dressing intact and dry  Cath type: peripheral nerve cath with ON Q  Infusion rate: 4ml/hr  Dosing/Volume: ropivacaine 0.2%  Catheter Plan:Catheter to remain Insitu and Continue catheter infusion rate unchanged

## 2022-07-06 NOTE — CASE MANAGEMENT/SOCIAL WORK
Continued Stay Note   [facility]     Patient Name: Altagracia Rothman  MRN: [id number]  Today's Date: 7/6/2022    Admit Date: 6/24/2022     Discharge Plan     Row Name 07/06/22 1046       Plan    Plan Comments Surgery is planned for 7/7. Once pt is able to participate with therapy CM will update rehab facilities               Discharge Codes    No documentation.               Expected Discharge Date and Time     Expected Discharge Date Expected Discharge Time    Jul 7, 2022             Francisca Smith RN

## 2022-07-06 NOTE — PLAN OF CARE
VSS. SR on cardiac mx.  equally strong tonight with rapid cap refill.   Patient cont to requires two person assist for transfer due to severe gait disturbance, currently rests in bed without sx/si of pain/acute distress. Will cont to mx. Call light in reach.

## 2022-07-06 NOTE — PROGRESS NOTES
[facility] Medicine Services  PROGRESS NOTE    Patient Name: Altagracia Rothman  : 1950  MRN: [id number]    Date of Admission: 2022  Primary Care Physician: Alvina Lloyd PA-C    Subjective   Subjective     CC: f/u arm fx    HPI: Up in chair. Says her pain is controlled. Continues to complain of dysuria.    ROS:  Gen- No fevers, chills  CV- No chest pain, palpitations  Resp- No cough, dyspnea  GI- No N/V/D, abd pain     Objective   Objective     Vital Signs:   Temp:  [97.9 °F (36.6 °C)-98.7 °F (37.1 °C)] 98.7 °F (37.1 °C)  Heart Rate:  [] 69  Resp:  [16-18] 18  BP: (116-171)/(60-92) 136/76     Physical Exam:  Constitutional: No acute distress, awake, alert, up in chair  HENT: NCAT, mucous membranes moist  Respiratory: Clear to auscultation bilaterally, respiratory effort normal   Cardiovascular: RRR, no murmurs, rubs, or gallops  Gastrointestinal: Positive bowel sounds, soft, nontender, nondistended, obese abd  Musculoskeletal: RUE sling, RUE nerve catheter  Psychiatric: Appropriate affect, cooperative  Neurologic: Oriented x 3, strength symmetric in all extremities, Cranial Nerves grossly intact to confrontation, speech clear  Skin: No rashes    Results Reviewed:  LAB RESULTS:      Lab 22  1237 22  1558   WBC 9.13 12.69*   HEMOGLOBIN 11.8* 12.8   HEMATOCRIT 37.5 39.5   PLATELETS 263 281   NEUTROS ABS 6.62 8.64*   IMMATURE GRANS (ABS) 0.05 0.15*   LYMPHS ABS 1.41 2.56   MONOS ABS 0.82 1.22*   EOS ABS 0.20 0.08   .4* 105.9*         Lab 22  1434 22  1558 22  1647   SODIUM 142 142 143   POTASSIUM 4.6 4.7 4.2   CHLORIDE 103 103 108*   CO2 34.0* 28.0 27.0   ANION GAP 5.0 11.0 8.0   BUN 16 11 10   CREATININE 0.81 0.74 0.55*   EGFR 76.8 85.6 96.9   GLUCOSE 122* 151* 110*   CALCIUM 9.0 8.9 8.3*   MAGNESIUM 2.1 2.3  --          Lab 22  1434 22  1558   TOTAL PROTEIN 5.8* 5.6*   ALBUMIN 3.00* 3.30*   GLOBULIN 2.8 2.3   ALT (SGPT) 27  42*   AST (SGOT) 21 43*   BILIRUBIN 0.5 0.5   ALK PHOS 120* 131*         Lab 06/30/22  1558   TROPONIN T <0.010                 Lab 06/30/22  1601   PH, ARTERIAL 7.382   PCO2, ARTERIAL 48.7*   PO2 .0*   FIO2 70   HCO3 ART 28.9*   BASE EXCESS ART 3.0*   CARBOXYHEMOGLOBIN 0.9     Brief Urine Lab Results  (Last result in the past 365 days)      Color   Clarity   Blood   Leuk Est   Nitrite   Protein   CREAT   Urine HCG        06/27/22 0224 Yellow   Turbid   Small (1+)   Large (3+)   Positive   30 mg/dL (1+)                 Microbiology Results Abnormal     Procedure Component Value - Date/Time    COVID PRE-OP / PRE-PROCEDURE SCREENING ORDER (NO ISOLATION) - Swab, Nasopharynx [[id number]]  (Normal) Collected: 06/24/22 1901    Lab Status: Final result Specimen: Swab from Nasopharynx Updated: 06/24/22 1956    Narrative:      The following orders were created for panel order COVID PRE-OP / PRE-PROCEDURE SCREENING ORDER (NO ISOLATION) - Swab, Nasopharynx.  Procedure                               Abnormality         Status                     ---------                               -----------         ------                     COVID-19, FLU A/B, RSV P...[[id number]]  Normal              Final result                 Please view results for these tests on the individual orders.    COVID-19, FLU A/B, RSV PCR - Swab, Nasopharynx [[id number]]  (Normal) Collected: 06/24/22 1901    Lab Status: Final result Specimen: Swab from Nasopharynx Updated: 06/24/22 1956     COVID19 Not Detected     Influenza A PCR Not Detected     Influenza B PCR Not Detected     RSV, PCR Not Detected    Narrative:      Fact sheet for providers: https://www.fda.gov/media/758466/download    Fact sheet for patients: https://www.fda.gov/media/952108/download    Test performed by PCR.          No radiology results from the last 24 hrs        I have reviewed the medications:  Scheduled Meds:budesonide-formoterol, 2 puff, Inhalation, BID - RT  doxycycline, 100  mg, Oral, Q12H  gabapentin, 800 mg, Oral, 4x Daily  hydrocortisone, 10 mg, Oral, Daily With Lunch  hydrocortisone, 20 mg, Oral, QAM  levothyroxine, 88 mcg, Oral, Q AM  lisinopril, 5 mg, Oral, Q24H  Morphine, 30 mg, Oral, Q12H  nortriptyline, 50 mg, Oral, TID  pantoprazole, 40 mg, Oral, Q AM  pindolol, 2.5 mg, Oral, TID  saccharomyces boulardii, 250 mg, Oral, BID  sodium chloride, 10 mL, Intravenous, Q12H  spironolactone, 50 mg, Oral, Daily      Continuous Infusions:Pharmacy Consult,   ropivacaine,   sodium chloride, 75 mL/hr, Last Rate: 75 mL/hr (22 0105)      PRN Meds:.acetaminophen **OR** acetaminophen **OR** acetaminophen  •  albuterol  •  hydrALAZINE  •  hydrOXYzine  •  labetalol  •  magnesium sulfate **OR** magnesium sulfate in D5W 1g/100mL (PREMIX) **OR** magnesium sulfate  •  [] Morphine **AND** naloxone  •  ondansetron **OR** ondansetron  •  oxyCODONE-acetaminophen  •  potassium chloride **OR** potassium chloride **OR** potassium chloride  •  sodium chloride    Assessment & Plan   Assessment & Plan     Active Hospital Problems    Diagnosis  POA   • Acute UTI (urinary tract infection) [N39.0]  Yes   • Other closed displaced fracture of proximal end of right humerus, initial encounter [S42.291A]  Yes   • Moderate asthma without complication [J45.909]  Yes   • Osteoporosis [M81.0]  Yes   • Adrenal insufficiency (HCC) [E27.40]  Yes   • Hypothyroidism [E03.9]  Yes   • Essential hypertension [I10]  Yes   • H/O pheochromocytoma [Z86.018]  Yes      Resolved Hospital Problems   No resolved problems to display.        Brief Hospital Course to date:  Altagracia Rothman is a 72 y.o. female with past medical history significant for hypertension, history of pheochromocytoma, s/p left adrenal resection, adrenal insufficiency, hypothyroidism, moderate asthma. Patient was admitted for right humerus fracture after a fall.     Right proximal and distal humerus fracture after mechanical fall.    -Have d/w Dr. Matute.  Tentatively planning for ORIF possibly Thursday, 7/7; results for metanephrines and catecholamines still pending  -Will need stress-doing of home steroids for AI with IV hydrocortisone, usually does IV hydrocortisone; spoke with Dr. Matute and anesthesiology will dose steroids prior to surgery     Bronchitis - POA, now resolved  -CXR without infiltrate. S/P 5 days of empiric doxy.  On room air  -Resume home inhalers, continue symbicort     Hypoxia/Chest Pain-resolved  - RRT 6/30, likely related to combination of nerve block and anxiety  - CXR, EKG, troponin and ABG are WNL     Hx adrenal insufficiency s/p adrenalectomy, s/p adrenal crisis (hypotension on admission)  History of pheochromocytoma - resected in 2003   - My partner discussed this with patient's endocrinologist Dr. Nelson via telephone on 6/30. Patient had adrenalectomy in 2003 with removal of pheo and has declined further screening tests since that time. Per Dr. Nelson, cathecholamine/metanphrine testing would be helpful if it is negative, although it could return a false positive from ongoing tricyclic med which patient has refused to taper. She would need to be off noratryptiline for 4-6 weeks for accurate testing.  - 24hr-urine metanephrines/catecholamines started on 6/30. Dr. Matute called lab this am. Results could take another 4-5 days.  - Patient has been beaned back to home dose of hydrocortisone 20mg qam, 10mg nightly after initial stress-dosing. Will need to be stress dosed with IV hydrocortisone at timing of surgery.     Hx HTN  -initally was hypotensive due to adrenal crisis, so bp meds were held  -started back lisinopril 5mg on 6/27/22 (on quinapril 5mg at home)  -hydrocortisone weaned back to home dose  -resume aldactone, lasix x1 dose 6/29  -resume home pindolol 2.5mg, PRN hydralazine and PRN labetalol      Gillespie catheter placed in ED at time of admission  UTI, proteus species  -Gillespie d/c'd on 6/26/22. Initial urine cultures showed proteus  mirabilis  -She completed course of fosfomycin  -Repeat U/A this am.     Hyperglycemia, improved  -not diabetic, A1c 5.3     Hypothyroidism   -on home dose Synthroid.     Expected Discharge Location and Transportation: Rehab, [facility] van or EMS  Expected Discharge Date: Unknown, waiting on surgery    DVT prophylaxis:  Mechanical DVT prophylaxis orders are present.     AM-PAC 6 Clicks Score (PT): 13 (07/05/22 0815)    CODE STATUS:   Code Status and Medical Interventions:   Ordered at: 06/24/22 2613     Level Of Support Discussed With:    Patient     Code Status (Patient has no pulse and is not breathing):    CPR (Attempt to Resuscitate)     Medical Interventions (Patient has pulse or is breathing):    Full Support       Myrna Okeefe II, DO  07/06/22

## 2022-07-06 NOTE — PROGRESS NOTES
"Orthopedic Daily Progress Note      CC: Right shoulder pain    Pain well controlled    C/o burning when she urinates still and is concerned about continued UTI    Physical Exam:  I have reviewed the vital signs.  Temp:  [97.9 °F (36.6 °C)-98.2 °F (36.8 °C)] 98.1 °F (36.7 °C)  Heart Rate:  [] 105  Resp:  [16-18] 18  BP: (116-171)/(60-92) 159/92    Objective:  Vital signs: (most recent): Blood pressure 159/92, pulse 105, temperature 98.1 °F (36.7 °C), temperature source Oral, resp. rate 18, height 154.9 cm (61\"), weight 82 kg (180 lb 12.8 oz), SpO2 93 %, not currently breastfeeding.            General Appearance:    Alert, cooperative, no distress  Extremities: No clubbing, cyanosis, or edema to lower extremities  Pulses:  2+ right radial pulse  Skin: +ecchymoses about RUE. Radial/median/ulnar nerves intact      Results Review:    I have reviewed the labs, radiology results and diagnostic studies. Urine studies still pending.    Results from last 7 days   Lab Units 07/05/22  1237   WBC 10*3/mm3 9.13   HEMOGLOBIN g/dL 11.8*   PLATELETS 10*3/mm3 263     Results from last 7 days   Lab Units 07/05/22  1434   SODIUM mmol/L 142   POTASSIUM mmol/L 4.6   CO2 mmol/L 34.0*   CREATININE mg/dL 0.81   GLUCOSE mg/dL 122*       I have reviewed the medications.    Assessment/Problem List  R proximal and distal humerus fractures  Awaiting surgery ORIF/IMN R humerus fractures pending endocrine workup    Plan:  ROSY DELANEY  NPO at midnight on Wednesday, 7/6  Pending endocrine workup, we will plan for surgery on 7/7 for ORIF/IMN R humerus fractures  Catecholamine labs still pending      Jamari Matute Jr., MD   07/06/22  07:24 EDT            "

## 2022-07-07 LAB
METANEPH 24H UR-MRATE: 51 UG/24 HR (ref 36–209)
METANEPHS 24H UR-MCNC: 27 UG/L
NORMETANEPHRINE 24H UR-MCNC: 201 UG/L
NORMETANEPHRINE 24H UR-MRATE: 382 UG/24 HR (ref 131–612)

## 2022-07-07 PROCEDURE — 25010000002 CEFTRIAXONE PER 250 MG: Performed by: INTERNAL MEDICINE

## 2022-07-07 PROCEDURE — 99232 SBSQ HOSP IP/OBS MODERATE 35: CPT | Performed by: NURSE PRACTITIONER

## 2022-07-07 PROCEDURE — 94664 DEMO&/EVAL PT USE INHALER: CPT

## 2022-07-07 PROCEDURE — 94761 N-INVAS EAR/PLS OXIMETRY MLT: CPT

## 2022-07-07 PROCEDURE — 94799 UNLISTED PULMONARY SVC/PX: CPT

## 2022-07-07 RX ADMIN — BUDESONIDE AND FORMOTEROL FUMARATE DIHYDRATE 2 PUFF: 160; 4.5 AEROSOL RESPIRATORY (INHALATION) at 08:55

## 2022-07-07 RX ADMIN — Medication 250 MG: at 20:21

## 2022-07-07 RX ADMIN — Medication 250 MG: at 08:43

## 2022-07-07 RX ADMIN — BUDESONIDE AND FORMOTEROL FUMARATE DIHYDRATE 2 PUFF: 160; 4.5 AEROSOL RESPIRATORY (INHALATION) at 20:05

## 2022-07-07 RX ADMIN — GABAPENTIN 800 MG: 400 CAPSULE ORAL at 12:10

## 2022-07-07 RX ADMIN — Medication 10 ML: at 08:44

## 2022-07-07 RX ADMIN — NORTRIPTYLINE HYDROCHLORIDE 50 MG: 10 CAPSULE ORAL at 20:21

## 2022-07-07 RX ADMIN — LISINOPRIL 5 MG: 5 TABLET ORAL at 08:43

## 2022-07-07 RX ADMIN — MORPHINE SULFATE 30 MG: 30 TABLET, FILM COATED, EXTENDED RELEASE ORAL at 09:34

## 2022-07-07 RX ADMIN — GABAPENTIN 800 MG: 400 CAPSULE ORAL at 17:54

## 2022-07-07 RX ADMIN — Medication 10 ML: at 20:21

## 2022-07-07 RX ADMIN — NORTRIPTYLINE HYDROCHLORIDE 50 MG: 10 CAPSULE ORAL at 08:43

## 2022-07-07 RX ADMIN — PINDOLOL 2.5 MG: 5 TABLET ORAL at 18:00

## 2022-07-07 RX ADMIN — OXYCODONE HYDROCHLORIDE AND ACETAMINOPHEN 1 TABLET: 10; 325 TABLET ORAL at 01:33

## 2022-07-07 RX ADMIN — DOXYCYCLINE 100 MG: 100 CAPSULE ORAL at 08:43

## 2022-07-07 RX ADMIN — OXYCODONE HYDROCHLORIDE AND ACETAMINOPHEN 1 TABLET: 10; 325 TABLET ORAL at 10:45

## 2022-07-07 RX ADMIN — SODIUM CHLORIDE 1 G: 900 INJECTION INTRAVENOUS at 15:12

## 2022-07-07 RX ADMIN — OXYCODONE HYDROCHLORIDE AND ACETAMINOPHEN 1 TABLET: 10; 325 TABLET ORAL at 15:12

## 2022-07-07 RX ADMIN — NORTRIPTYLINE HYDROCHLORIDE 50 MG: 10 CAPSULE ORAL at 15:19

## 2022-07-07 RX ADMIN — LEVOTHYROXINE SODIUM 88 MCG: 88 TABLET ORAL at 05:39

## 2022-07-07 RX ADMIN — SPIRONOLACTONE 50 MG: 50 TABLET ORAL at 10:43

## 2022-07-07 RX ADMIN — GABAPENTIN 800 MG: 400 CAPSULE ORAL at 08:43

## 2022-07-07 RX ADMIN — DOXYCYCLINE 100 MG: 100 CAPSULE ORAL at 20:21

## 2022-07-07 RX ADMIN — GABAPENTIN 800 MG: 400 CAPSULE ORAL at 20:21

## 2022-07-07 RX ADMIN — HYDROCORTISONE 20 MG: 10 TABLET ORAL at 08:42

## 2022-07-07 RX ADMIN — HYDROCORTISONE 10 MG: 10 TABLET ORAL at 12:10

## 2022-07-07 RX ADMIN — OXYCODONE HYDROCHLORIDE AND ACETAMINOPHEN 1 TABLET: 10; 325 TABLET ORAL at 20:22

## 2022-07-07 RX ADMIN — PINDOLOL 2.5 MG: 5 TABLET ORAL at 08:43

## 2022-07-07 RX ADMIN — FAMOTIDINE 20 MG: 20 TABLET ORAL at 05:39

## 2022-07-07 RX ADMIN — PANTOPRAZOLE SODIUM 40 MG: 40 TABLET, DELAYED RELEASE ORAL at 05:39

## 2022-07-07 RX ADMIN — OXYCODONE HYDROCHLORIDE AND ACETAMINOPHEN 1 TABLET: 10; 325 TABLET ORAL at 05:39

## 2022-07-07 NOTE — PROGRESS NOTES
[facility]    Acute pain service Inpatient Progress Note    Patient Name: Altagracia Rothman  :  1950  MRN:  [id number]        Acute Pain  Service Inpatient Progress Note:    Comments: Spoke with Dr. Nicole this morning about the plan for the patient as the nerve catheter has been in place for 7 days. Infusystem pump off at 10:30 but nerve catheter left in place. Will monitor patients level of pain throughout the day. If patient has uncontrolled pain, we plan on small slow bolus of Rop and then dc nerve catheter. If patient's pain in manageable throughout the day, we plan on dc'ing nerve catheter later today.    Friday we will re-evaluate the patients pain and make decision as to replace nerve catheter on Friday or at the time of surgery.    DISCUSSED ALL THE ABOVE WITH THE RN. PLEASE CALL [phone] WITH QUESTIONS

## 2022-07-07 NOTE — PROGRESS NOTES
"Orthopedic Daily Progress Note      CC: Right shoulder pain    Pain well controlled    Burning improved with urination    Physical Exam:  I have reviewed the vital signs.  Temp:  [96.5 °F (35.8 °C)-98.2 °F (36.8 °C)] 98 °F (36.7 °C)  Heart Rate:  [60-72] 64  Resp:  [16-18] 18  BP: (109-148)/(64-95) 109/64    Objective:  Vital signs: (most recent): Blood pressure 109/64, pulse 64, temperature 98 °F (36.7 °C), temperature source Oral, resp. rate 18, height 154.9 cm (61\"), weight 82 kg (180 lb 12.8 oz), SpO2 92 %, not currently breastfeeding.            General Appearance:    Alert, cooperative, no distress  Extremities: No clubbing, cyanosis, or edema to lower extremities  Pulses:  2+ right radial pulse  Skin: +ecchymoses about RUE. Radial/median/ulnar nerves intact      Results Review:    I have reviewed the labs, radiology results and diagnostic studies. Urine studies still pending.     Results from last 7 days   Lab Units 07/05/22  1237   WBC 10*3/mm3 9.13   HEMOGLOBIN g/dL 11.8*   PLATELETS 10*3/mm3 263     Results from last 7 days   Lab Units 07/05/22  1434   SODIUM mmol/L 142   POTASSIUM mmol/L 4.6   CO2 mmol/L 34.0*   CREATININE mg/dL 0.81   GLUCOSE mg/dL 122*       I have reviewed the medications.    Assessment/Problem List  R proximal and distal humerus fractures  Awaiting surgery ORIF/IMN R humerus fractures pending endocrine workup    Plan:  NWB RUE    Pending endocrine workup, we will plan for surgery on 7/8 v 7/12 for ORIF/IMN R humerus fractures  Catecholamine labs still pending      Jamari Matute Jr., MD   07/07/22  11:28 EDT            "

## 2022-07-07 NOTE — PROGRESS NOTES
[facility] Medicine Services  PROGRESS NOTE    Patient Name: Altagracia Rothman  : 1950  MRN: [id number]    Date of Admission: 2022  Primary Care Physician: Alvina Lloyd PA-C    Subjective   Subjective     CC: f/u arm fx    HPI:   Reports dysuria is improved  Concerned about loose stools    ROS:  Gen- No fevers, chills  CV- No chest pain, palpitations  Resp- No cough, dyspnea  GI- No N/V/D, abd pain    Objective   Objective     Vital Signs:   Temp:  [96.5 °F (35.8 °C)-98.2 °F (36.8 °C)] 98 °F (36.7 °C)  Heart Rate:  [60-72] 64  Resp:  [16-18] 18  BP: (109-148)/(64-95) 109/64     Physical Exam:  Constitutional: No acute distress, awake, alert  HENT: NCAT, mucous membranes moist  Respiratory: Clear to auscultation bilaterally, respiratory effort normal   Cardiovascular: RRR, no murmurs, rubs, or gallops  Gastrointestinal: Positive bowel sounds, soft, nontender, nondistended  Musculoskeletal: No bilateral ankle edema  Psychiatric: Appropriate affect, cooperative  Neurologic: Oriented x 3, CARR, RUE in sling, speech clear  Skin: No rashes noted      Results Reviewed:  LAB RESULTS:      Lab 22  1237 22  1558   WBC 9.13 12.69*   HEMOGLOBIN 11.8* 12.8   HEMATOCRIT 37.5 39.5   PLATELETS 263 281   NEUTROS ABS 6.62 8.64*   IMMATURE GRANS (ABS) 0.05 0.15*   LYMPHS ABS 1.41 2.56   MONOS ABS 0.82 1.22*   EOS ABS 0.20 0.08   .4* 105.9*         Lab 22  1434 22  1558   SODIUM 142 142   POTASSIUM 4.6 4.7   CHLORIDE 103 103   CO2 34.0* 28.0   ANION GAP 5.0 11.0   BUN 16 11   CREATININE 0.81 0.74   EGFR 76.8 85.6   GLUCOSE 122* 151*   CALCIUM 9.0 8.9   MAGNESIUM 2.1 2.3         Lab 22  1434 22  1558   TOTAL PROTEIN 5.8* 5.6*   ALBUMIN 3.00* 3.30*   GLOBULIN 2.8 2.3   ALT (SGPT) 27 42*   AST (SGOT) 21 43*   BILIRUBIN 0.5 0.5   ALK PHOS 120* 131*         Lab 22  1558   TROPONIN T <0.010                 Lab 22  1601   PH, ARTERIAL 7.382    PCO2, ARTERIAL 48.7*   PO2 .0*   FIO2 70   HCO3 ART 28.9*   BASE EXCESS ART 3.0*   CARBOXYHEMOGLOBIN 0.9     Brief Urine Lab Results  (Last result in the past 365 days)      Color   Clarity   Blood   Leuk Est   Nitrite   Protein   CREAT   Urine HCG        07/06/22 1145 Yellow   Clear   Negative   Small (1+)   Negative   Negative                 Microbiology Results Abnormal     Procedure Component Value - Date/Time    COVID PRE-OP / PRE-PROCEDURE SCREENING ORDER (NO ISOLATION) - Swab, Nasopharynx [[id number]]  (Normal) Collected: 06/24/22 1901    Lab Status: Final result Specimen: Swab from Nasopharynx Updated: 06/24/22 1956    Narrative:      The following orders were created for panel order COVID PRE-OP / PRE-PROCEDURE SCREENING ORDER (NO ISOLATION) - Swab, Nasopharynx.  Procedure                               Abnormality         Status                     ---------                               -----------         ------                     COVID-19, FLU A/B, RSV P...[[id number]]  Normal              Final result                 Please view results for these tests on the individual orders.    COVID-19, FLU A/B, RSV PCR - Swab, Nasopharynx [[id number]]  (Normal) Collected: 06/24/22 1901    Lab Status: Final result Specimen: Swab from Nasopharynx Updated: 06/24/22 1956     COVID19 Not Detected     Influenza A PCR Not Detected     Influenza B PCR Not Detected     RSV, PCR Not Detected    Narrative:      Fact sheet for providers: https://www.fda.gov/media/252006/download    Fact sheet for patients: https://www.fda.gov/media/615303/download    Test performed by PCR.          No radiology results from the last 24 hrs        I have reviewed the medications:  Scheduled Meds:budesonide-formoterol, 2 puff, Inhalation, BID - RT  cefTRIAXone, 1 g, Intravenous, Q24H  doxycycline, 100 mg, Oral, Q12H  famotidine, 20 mg, Intravenous, Once  gabapentin, 800 mg, Oral, 4x Daily  hydrocortisone, 10 mg, Oral, Daily With  Lunch  hydrocortisone, 20 mg, Oral, QAM  levothyroxine, 88 mcg, Oral, Q AM  lisinopril, 5 mg, Oral, Q24H  nortriptyline, 50 mg, Oral, TID  pantoprazole, 40 mg, Oral, Q AM  pindolol, 2.5 mg, Oral, TID  saccharomyces boulardii, 250 mg, Oral, BID  sodium chloride, 10 mL, Intravenous, Q12H  sodium chloride, 10 mL, Intravenous, Q12H  spironolactone, 50 mg, Oral, Daily      Continuous Infusions:Pharmacy Consult,   lactated ringers, 9 mL/hr  ropivacaine,   sodium chloride, 75 mL/hr, Last Rate: 75 mL/hr (22 0105)      PRN Meds:.acetaminophen **OR** acetaminophen **OR** acetaminophen  •  albuterol  •  diphenhydrAMINE  •  hydrALAZINE  •  hydrOXYzine  •  labetalol  •  lidocaine PF 1%  •  magnesium sulfate **OR** magnesium sulfate in D5W 1g/100mL (PREMIX) **OR** magnesium sulfate  •  midazolam  •  [] Morphine **AND** naloxone  •  ondansetron **OR** ondansetron  •  oxyCODONE-acetaminophen  •  potassium chloride **OR** potassium chloride **OR** potassium chloride  •  sodium chloride  •  sodium chloride    Assessment & Plan   Assessment & Plan     Active Hospital Problems    Diagnosis  POA   • Acute UTI (urinary tract infection) [N39.0]  Yes   • Other closed displaced fracture of proximal end of right humerus, initial encounter [S42.291A]  Yes   • Moderate asthma without complication [J45.909]  Yes   • Osteoporosis [M81.0]  Yes   • Adrenal insufficiency (HCC) [E27.40]  Yes   • Hypothyroidism [E03.9]  Yes   • Essential hypertension [I10]  Yes   • H/O pheochromocytoma [Z86.018]  Yes      Resolved Hospital Problems   No resolved problems to display.        Brief Hospital Course to date:  Altagracia Rothman is a 72 y.o. female with past medical history significant for hypertension, history of pheochromocytoma, s/p left adrenal resection, adrenal insufficiency, hypothyroidism, moderate asthma. Patient was admitted for right humerus fracture after a fall.     Right proximal and distal humerus fracture after mechanical fall.     -Have d/w Dr. Matute. Tentatively planning for ORIF possibly Friday, 7/8 or Tuesday 7/12 pending results for metanephrines and catecholamines still pending  -Will need stress-doing of home steroids for AI with IV hydrocortisone, usually does IV hydrocortisone; spoke with Dr. Matute and anesthesiology will dose steroids prior to surgery     Bronchitis - POA, now resolved  -CXR without infiltrate. S/P 5 days of empiric doxy.  On room air  -Resume home inhalers, continue symbicort  -Now on doxy again for toothache  -continue probiotic     Hypoxia/Chest Pain-resolved  - RRT 6/30, likely related to combination of nerve block and anxiety  - CXR, EKG, troponin and ABG are WNL     Hx adrenal insufficiency s/p adrenalectomy, s/p adrenal crisis (hypotension on admission)  History of pheochromocytoma - resected in 2003   - My partner discussed this with patient's endocrinologist Dr. Nelson via telephone on 6/30. Patient had adrenalectomy in 2003 with removal of pheo and has declined further screening tests since that time. Per Dr. Nelson, cathecholamine/metanphrine testing would be helpful if it is negative, although it could return a false positive from ongoing tricyclic med which patient has refused to taper. She would need to be off noratryptiline for 4-6 weeks for accurate testing.  - 24hr-urine metanephrines/catecholamines started on 6/30. Dr. Matute called lab this am. Results could take another 4-5 days.  - Patient has been beaned back to home dose of hydrocortisone 20mg qam, 10mg nightly after initial stress-dosing. Will need to be stress dosed with IV hydrocortisone at timing of surgery.     Hx HTN  -initally was hypotensive due to adrenal crisis, so bp meds were held  -started back lisinopril 5mg on 6/27/22 (on quinapril 5mg at home)  -hydrocortisone weaned back to home dose  -resume aldactone, lasix x1 dose 6/29  -resume home pindolol 2.5mg, PRN hydralazine and PRN labetalol      Gillespie catheter placed in ED at  time of admission  UTI, proteus species  -Gillespie d/c'd on 6/26/22. Initial urine cultures showed proteus mirabilis  -She completed course of fosfomycin  -Repeat U/A with small leuks, 13-20 WBC and 1+bacteria, culture pending     Hyperglycemia, improved  -not diabetic, A1c 5.3     Hypothyroidism   -on home dose Synthroid.     Expected Discharge Location and Transportation: Rehab, [facility] van or EMS  Expected Discharge Date: Unknown, waiting on surgery    DVT prophylaxis:  Mechanical DVT prophylaxis orders are present.     AM-PAC 6 Clicks Score (PT): 13 (07/07/22 0888)    CODE STATUS:   Code Status and Medical Interventions:   Ordered at: 06/24/22 9041     Level Of Support Discussed With:    Patient     Code Status (Patient has no pulse and is not breathing):    CPR (Attempt to Resuscitate)     Medical Interventions (Patient has pulse or is breathing):    Full Support       Shadia Urbina, MICHELE  07/07/22

## 2022-07-07 NOTE — PLAN OF CARE
Problem: Adult Inpatient Plan of Care  Goal: Plan of Care Review  Flowsheets (Taken 7/7/2022 1834)  Progress: no change  Plan of Care Reviewed With: patient  Outcome Evaluation: Pt alert, oriented. Surgery case still open. Awaiting lab results. VSS. RA. Patient rates pain 6-7 out of 10 despite interventions in place. Spoke with Anesthesiologist who gave permission for a 5cc bolus of ropivicaine but patient refused bolus. Nerve cath was removed per anesthesiology as it has been 7 days. Blue tip intact. Plan of care discussed with pt. Verbalized understanding. Will continue to follow POC.  Goal: Absence of Hospital-Acquired Illness or Injury  Intervention: Identify and Manage Fall Risk  Recent Flowsheet Documentation  Taken 7/7/2022 1800 by Alexander Wilson RN  Safety Promotion/Fall Prevention:   activity supervised   assistive device/personal items within reach   clutter free environment maintained   fall prevention program maintained   gait belt   lighting adjusted   nonskid shoes/slippers when out of bed   room organization consistent   safety round/check completed   toileting scheduled  Taken 7/7/2022 1600 by Alexander Wilson RN  Safety Promotion/Fall Prevention:   activity supervised   assistive device/personal items within reach   clutter free environment maintained   gait belt   lighting adjusted   nonskid shoes/slippers when out of bed   room organization consistent   safety round/check completed   toileting scheduled  Taken 7/7/2022 1400 by Alexander Wilson RN  Safety Promotion/Fall Prevention:   activity supervised   assistive device/personal items within reach   clutter free environment maintained   fall prevention program maintained   gait belt   lighting adjusted   nonskid shoes/slippers when out of bed   room organization consistent   safety round/check completed   toileting scheduled  Taken 7/7/2022 1200 by Alexander Wilson RN  Safety Promotion/Fall Prevention:   activity supervised    assistive device/personal items within reach   clutter free environment maintained   fall prevention program maintained   gait belt   lighting adjusted   nonskid shoes/slippers when out of bed   room organization consistent   safety round/check completed   toileting scheduled  Taken 7/7/2022 1045 by Alexander Wilson RN  Safety Promotion/Fall Prevention:   activity supervised   assistive device/personal items within reach   clutter free environment maintained   fall prevention program maintained   gait belt   lighting adjusted   nonskid shoes/slippers when out of bed   room organization consistent   safety round/check completed   toileting scheduled  Taken 7/7/2022 0843 by Alexander Wilson RN  Safety Promotion/Fall Prevention:   activity supervised   assistive device/personal items within reach   clutter free environment maintained   fall prevention program maintained   gait belt   lighting adjusted   nonskid shoes/slippers when out of bed   room organization consistent   safety round/check completed   toileting scheduled  Intervention: Prevent Skin Injury  Recent Flowsheet Documentation  Taken 7/7/2022 1800 by Alexander Wilson RN  Body Position:   left   tilted  Skin Protection:   adhesive use limited   incontinence pads utilized   tubing/devices free from skin contact  Taken 7/7/2022 1600 by Alexander Wilson RN  Body Position: neutral body alignment  Skin Protection:   adhesive use limited   incontinence pads utilized   tubing/devices free from skin contact  Taken 7/7/2022 1400 by Alexander Wilson RN  Body Position:   left   tilted  Skin Protection:   adhesive use limited   incontinence pads utilized   tubing/devices free from skin contact  Taken 7/7/2022 1200 by Alexander Wilson RN  Skin Protection:   adhesive use limited   incontinence pads utilized   tubing/devices free from skin contact  Taken 7/7/2022 1045 by Alexander Wilson RN  Body Position: neutral head position  Skin Protection:   adhesive  use limited   incontinence pads utilized   tubing/devices free from skin contact  Taken 7/7/2022 0843 by Alexander Wilson RN  Body Position: position changed independently  Skin Protection:   adhesive use limited   incontinence pads utilized   tubing/devices free from skin contact  Intervention: Prevent and Manage VTE (Venous Thromboembolism) Risk  Recent Flowsheet Documentation  Taken 7/7/2022 1800 by Alexander Wilson RN  Activity Management: activity adjusted per tolerance  Taken 7/7/2022 1600 by Alexander Wilson RN  Activity Management: activity adjusted per tolerance  Taken 7/7/2022 1400 by Alexander Wilson RN  Activity Management: activity adjusted per tolerance  Taken 7/7/2022 1200 by Alexander Wilson RN  Activity Management: activity adjusted per tolerance  Taken 7/7/2022 1045 by Alexander Wilson RN  Activity Management: activity adjusted per tolerance  Taken 7/7/2022 0843 by Alexander Wilson RN  Activity Management: activity adjusted per tolerance  VTE Prevention/Management:   bilateral   dorsiflexion/plantar flexion performed  Intervention: Prevent Infection  Recent Flowsheet Documentation  Taken 7/7/2022 1800 by Alexander Wilson RN  Infection Prevention:   environmental surveillance performed   rest/sleep promoted   single patient room provided  Taken 7/7/2022 1600 by Alexander Wilson RN  Infection Prevention:   environmental surveillance performed   rest/sleep promoted   single patient room provided  Taken 7/7/2022 1400 by Alexander Wilson RN  Infection Prevention:   environmental surveillance performed   rest/sleep promoted   single patient room provided  Taken 7/7/2022 1200 by Alexander Wilson RN  Infection Prevention:   environmental surveillance performed   rest/sleep promoted   single patient room provided  Taken 7/7/2022 1045 by Alexander Wilson RN  Infection Prevention:   environmental surveillance performed   rest/sleep promoted   single patient room provided  Taken  7/7/2022 0843 by Alexander Wilson RN  Infection Prevention:   environmental surveillance performed   rest/sleep promoted   single patient room provided  Goal: Optimal Comfort and Wellbeing  Intervention: Monitor Pain and Promote Comfort  Recent Flowsheet Documentation  Taken 7/7/2022 1800 by Alexander Wilson RN  Pain Management Interventions:   pain management plan reviewed with patient/caregiver   pillow support provided   position adjusted   medication offered but refused  Taken 7/7/2022 1600 by Alexander Wilson RN  Pain Management Interventions:   pain management plan reviewed with patient/caregiver   pillow support provided   position adjusted  Taken 7/7/2022 1512 by Alexander Wilson RN  Pain Management Interventions:   pain management plan reviewed with patient/caregiver   see MAR  Taken 7/7/2022 1200 by Alexander Wilson RN  Pain Management Interventions:   pain management plan reviewed with patient/caregiver   position adjusted   pillow support provided  Taken 7/7/2022 1045 by Alexander Wilson RN  Pain Management Interventions:   pain management plan reviewed with patient/caregiver   see MAR  Taken 7/7/2022 1000 by Alexander Wilson RN  Pain Management Interventions: pain management plan reviewed with patient/caregiver  Taken 7/7/2022 0934 by Alexander Wilson RN  Pain Management Interventions:   pain management plan reviewed with patient/caregiver   see MAR  Taken 7/7/2022 0843 by Alexander Wilson RN  Pain Management Interventions:   pain management plan reviewed with patient/caregiver   pillow support provided   position adjusted  Intervention: Provide Person-Centered Care  Recent Flowsheet Documentation  Taken 7/7/2022 0843 by Alexander Wilson RN  Trust Relationship/Rapport:   care explained   choices provided   questions answered   questions encouraged     Problem: Skin Injury Risk Increased  Goal: Skin Health and Integrity  Intervention: Optimize Skin Protection  Recent Flowsheet  Documentation  Taken 7/7/2022 1800 by Alexander Wilson RN  Pressure Reduction Techniques:   frequent weight shift encouraged   rest period provided between sit times   pressure points protected  Head of Bed (HOB) Positioning: [facility] at 20-30 degrees  Pressure Reduction Devices:   pressure-redistributing mattress utilized   positioning supports utilized  Skin Protection:   adhesive use limited   incontinence pads utilized   tubing/devices free from skin contact  Taken 7/7/2022 1600 by Alexander Wilson RN  Pressure Reduction Techniques:   frequent weight shift encouraged   pressure points protected   rest period provided between sit times  Head of Bed (HOB) Positioning: [facility] at 20-30 degrees  Pressure Reduction Devices:   pressure-redistributing mattress utilized   positioning supports utilized  Skin Protection:   adhesive use limited   incontinence pads utilized   tubing/devices free from skin contact  Taken 7/7/2022 1400 by Alexander Wilson RN  Pressure Reduction Techniques:   frequent weight shift encouraged   pressure points protected   rest period provided between sit times  Head of Bed (HOB) Positioning: [facility] at 30 degrees  Pressure Reduction Devices:   pressure-redistributing mattress utilized   positioning supports utilized  Skin Protection:   adhesive use limited   incontinence pads utilized   tubing/devices free from skin contact  Taken 7/7/2022 1200 by Alexander Wilson RN  Pressure Reduction Techniques:   frequent weight shift encouraged   pressure points protected   rest period provided between sit times  Pressure Reduction Devices:   positioning supports utilized   pressure-redistributing mattress utilized  Skin Protection:   adhesive use limited   incontinence pads utilized   tubing/devices free from skin contact  Taken 7/7/2022 1045 by Alexander Wilson RN  Pressure Reduction Techniques:   frequent weight shift encouraged   pressure points protected   rest period provided between sit times  Pressure  Reduction Devices:   pressure-redistributing mattress utilized   positioning supports utilized  Skin Protection:   adhesive use limited   incontinence pads utilized   tubing/devices free from skin contact  Taken 7/7/2022 0843 by Alexander Wilson RN  Pressure Reduction Techniques:   frequent weight shift encouraged   pressure points protected   rest period provided between sit times  Head of Bed (HOB) Positioning: HOB at 30 degrees  Pressure Reduction Devices:   pressure-redistributing mattress utilized   positioning supports utilized  Skin Protection:   adhesive use limited   incontinence pads utilized   tubing/devices free from skin contact     Problem: Fall Injury Risk  Goal: Absence of Fall and Fall-Related Injury  Intervention: Identify and Manage Contributors  Recent Flowsheet Documentation  Taken 7/7/2022 1200 by Alexander Wilson RN  Medication Review/Management: medications reviewed  Taken 7/7/2022 0843 by Alexander Wilson RN  Medication Review/Management:   medications reviewed   high-risk medications identified  Intervention: Promote Injury-Free Environment  Recent Flowsheet Documentation  Taken 7/7/2022 1800 by Alexander Wilson RN  Safety Promotion/Fall Prevention:   activity supervised   assistive device/personal items within reach   clutter free environment maintained   fall prevention program maintained   gait belt   lighting adjusted   nonskid shoes/slippers when out of bed   room organization consistent   safety round/check completed   toileting scheduled  Taken 7/7/2022 1600 by Alexander Wilson RN  Safety Promotion/Fall Prevention:   activity supervised   assistive device/personal items within reach   clutter free environment maintained   gait belt   lighting adjusted   nonskid shoes/slippers when out of bed   room organization consistent   safety round/check completed   toileting scheduled  Taken 7/7/2022 1400 by Alexander Wilson RN  Safety Promotion/Fall Prevention:   activity  supervised   assistive device/personal items within reach   clutter free environment maintained   fall prevention program maintained   gait belt   lighting adjusted   nonskid shoes/slippers when out of bed   room organization consistent   safety round/check completed   toileting scheduled  Taken 7/7/2022 1200 by Alexander Wilsno RN  Safety Promotion/Fall Prevention:   activity supervised   assistive device/personal items within reach   clutter free environment maintained   fall prevention program maintained   gait belt   lighting adjusted   nonskid shoes/slippers when out of bed   room organization consistent   safety round/check completed   toileting scheduled  Taken 7/7/2022 1045 by Alexander Wilson RN  Safety Promotion/Fall Prevention:   activity supervised   assistive device/personal items within reach   clutter free environment maintained   fall prevention program maintained   gait belt   lighting adjusted   nonskid shoes/slippers when out of bed   room organization consistent   safety round/check completed   toileting scheduled  Taken 7/7/2022 0843 by Alexander Wilson RN  Safety Promotion/Fall Prevention:   activity supervised   assistive device/personal items within reach   clutter free environment maintained   fall prevention program maintained   gait belt   lighting adjusted   nonskid shoes/slippers when out of bed   room organization consistent   safety round/check completed   toileting scheduled     Problem: Bleeding (Orthopaedic Fracture)  Goal: Absence of Bleeding  Intervention: Monitor and Manage Fracture Bleeding  Recent Flowsheet Documentation  Taken 7/7/2022 0843 by Alexander Wilson RN  Fracture Immobilization: supported with pillows     Problem: Embolism (Orthopaedic Fracture)  Goal: Absence of Embolism Signs and Symptoms  Intervention: Prevent or Manage Embolism Risk  Recent Flowsheet Documentation  Taken 7/7/2022 0843 by Alexander Wilson RN  VTE Prevention/Management:   bilateral    dorsiflexion/plantar flexion performed     Problem: Fracture Stabilization and Management (Orthopaedic Fracture)  Goal: Fracture Stability  Intervention: Promote Fracture Stability and Healing  Recent Flowsheet Documentation  Taken 7/7/2022 0843 by Alexander iWlson RN  Fracture Immobilization: supported with pillows     Problem: Functional Ability Impaired (Orthopaedic Fracture)  Goal: Optimal Functional Ability  Intervention: Optimize Functional Ability  Recent Flowsheet Documentation  Taken 7/7/2022 1800 by Alexander Wilson RN  Activity Management: activity adjusted per tolerance  Activity Assistance Provided: assistance, 1 person  Positioning/Transfer Devices:   pillows   in use  Taken 7/7/2022 1600 by Alexander Wilson RN  Activity Management: activity adjusted per tolerance  Activity Assistance Provided: assistance, 1 person  Positioning/Transfer Devices: pillows  Taken 7/7/2022 1400 by Alexander Wilson RN  Activity Management: activity adjusted per tolerance  Activity Assistance Provided: assistance, 1 person  Positioning/Transfer Devices: pillows  Taken 7/7/2022 1200 by Alexander Wilson RN  Activity Management: activity adjusted per tolerance  Activity Assistance Provided: assistance, 1 person  Taken 7/7/2022 1045 by Alexander Wilson RN  Activity Management: activity adjusted per tolerance  Activity Assistance Provided: assistance, 1 person  Positioning/Transfer Devices:   pillows   in use  Taken 7/7/2022 0843 by Alexander Wilson RN  Activity Management: activity adjusted per tolerance  Activity Assistance Provided: assistance, 1 person  Positioning/Transfer Devices: pillows     Problem: Infection (Orthopaedic Fracture)  Goal: Absence of Infection Signs and Symptoms  Intervention: Prevent or Manage Infection  Recent Flowsheet Documentation  Taken 7/7/2022 1800 by Alexander Wilson RN  Infection Prevention:   environmental surveillance performed   rest/sleep promoted   single patient room  provided  Taken 7/7/2022 1600 by Alexander Wilson RN  Infection Prevention:   environmental surveillance performed   rest/sleep promoted   single patient room provided  Taken 7/7/2022 1400 by Alexander Wilson RN  Infection Prevention:   environmental surveillance performed   rest/sleep promoted   single patient room provided  Taken 7/7/2022 1200 by Alexander Wilson RN  Infection Prevention:   environmental surveillance performed   rest/sleep promoted   single patient room provided  Taken 7/7/2022 1045 by Alexander Wilson RN  Infection Prevention:   environmental surveillance performed   rest/sleep promoted   single patient room provided  Taken 7/7/2022 0843 by Alexander Wilson RN  Infection Prevention:   environmental surveillance performed   rest/sleep promoted   single patient room provided     Problem: Pain (Orthopaedic Fracture)  Goal: Acceptable Pain Control  Intervention: Manage Acute Orthopaedic-Related Pain  Recent Flowsheet Documentation  Taken 7/7/2022 1800 by Alexander Wilson RN  Pain Management Interventions:   pain management plan reviewed with patient/caregiver   pillow support provided   position adjusted   medication offered but refused  Taken 7/7/2022 1600 by Alexander Wilson RN  Pain Management Interventions:   pain management plan reviewed with patient/caregiver   pillow support provided   position adjusted  Taken 7/7/2022 1512 by Alexander Wilson RN  Pain Management Interventions:   pain management plan reviewed with patient/caregiver   see MAR  Taken 7/7/2022 1200 by Alexander Wilson RN  Pain Management Interventions:   pain management plan reviewed with patient/caregiver   position adjusted   pillow support provided  Taken 7/7/2022 1045 by Alexander Wilson RN  Pain Management Interventions:   pain management plan reviewed with patient/caregiver   see MAR  Taken 7/7/2022 1000 by Alexander Wilson RN  Pain Management Interventions: pain management plan reviewed with  patient/caregiver  Taken 7/7/2022 0934 by Alexander Wilson, RN  Pain Management Interventions:   pain management plan reviewed with patient/caregiver   see MAR  Taken 7/7/2022 0843 by Alexander Wilson RN  Pain Management Interventions:   pain management plan reviewed with patient/caregiver   pillow support provided   position adjusted  Diversional Activities: television   Goal Outcome Evaluation:  Plan of Care Reviewed With: patient        Progress: no change  Outcome Evaluation: Pt alert, oriented. Surgery case still open. Awaiting lab results. VSS. RA. Patient rates pain 6-7 out of 10 despite interventions in place. Spoke with Anesthesiologist who gave permission for a 5cc bolus of ropivicaine but patient refused bolus. Nerve cath was removed per anesthesiology as it has been 7 days. Blue tip intact. Plan of care discussed with pt. Verbalized understanding. Will continue to follow POC.

## 2022-07-07 NOTE — PROGRESS NOTES
[facility]    Acute pain service Inpatient Progress Note    Patient Name: Altagracia Rothman  :  1950  MRN:  [id number]        Acute Pain  Service Inpatient Progress Note:    Analgesia:Good  Pain Score:0/10  LOC: alert and awake  Resp Status: room air  Cardiac: VS stable  Side Effects:None  Catheter Site:clean, dressing intact and dry  Cath type: peripheral nerve cath with ON Q  Infusion rate: 4ml/hr (Resp Comp )  Catheter Plan:Catheter to remain Insitu and Continue catheter infusion rate unchanged  Comments: Motor function - normal  Sensory - intact  Shoulder pain well managed

## 2022-07-08 ENCOUNTER — ANESTHESIA EVENT CONVERTED (OUTPATIENT)
Dept: ANESTHESIOLOGY | Facility: HOSPITAL | Age: 72
End: 2022-07-08

## 2022-07-08 ENCOUNTER — ANESTHESIA EVENT (OUTPATIENT)
Dept: ANESTHESIOLOGY | Facility: HOSPITAL | Age: 72
End: 2022-07-08

## 2022-07-08 ENCOUNTER — ANESTHESIA (OUTPATIENT)
Dept: TELEMETRY | Facility: HOSPITAL | Age: 72
End: 2022-07-08

## 2022-07-08 ENCOUNTER — ANESTHESIA (OUTPATIENT)
Dept: ANESTHESIOLOGY | Facility: HOSPITAL | Age: 72
End: 2022-07-08

## 2022-07-08 LAB — BACTERIA SPEC AEROBE CULT: ABNORMAL

## 2022-07-08 PROCEDURE — 99233 SBSQ HOSP IP/OBS HIGH 50: CPT | Performed by: NURSE PRACTITIONER

## 2022-07-08 PROCEDURE — 25010000002 ERTAPENEM PER 500 MG: Performed by: NURSE PRACTITIONER

## 2022-07-08 PROCEDURE — 25010000002 CEFTRIAXONE PER 250 MG: Performed by: INTERNAL MEDICINE

## 2022-07-08 PROCEDURE — 25010000002 ROPIVACAINE PER 1 MG: Performed by: NURSE ANESTHETIST, CERTIFIED REGISTERED

## 2022-07-08 PROCEDURE — 94799 UNLISTED PULMONARY SVC/PX: CPT

## 2022-07-08 RX ORDER — BUPIVACAINE HYDROCHLORIDE 2.5 MG/ML
INJECTION, SOLUTION EPIDURAL; INFILTRATION; INTRACAUDAL
Status: COMPLETED | OUTPATIENT
Start: 2022-07-08 | End: 2022-07-08

## 2022-07-08 RX ORDER — MORPHINE SULFATE 30 MG/1
30 TABLET, FILM COATED, EXTENDED RELEASE ORAL 2 TIMES DAILY
Status: DISCONTINUED | OUTPATIENT
Start: 2022-07-08 | End: 2022-07-19 | Stop reason: HOSPADM

## 2022-07-08 RX ORDER — ROPIVACAINE HYDROCHLORIDE 2 MG/ML
INJECTION, SOLUTION EPIDURAL; INFILTRATION; PERINEURAL CONTINUOUS
Status: DISCONTINUED | OUTPATIENT
Start: 2022-07-08 | End: 2022-07-19 | Stop reason: HOSPADM

## 2022-07-08 RX ORDER — ROPIVACAINE HYDROCHLORIDE 2 MG/ML
INJECTION, SOLUTION EPIDURAL; INFILTRATION; PERINEURAL CONTINUOUS
Status: DISCONTINUED | OUTPATIENT
Start: 2022-07-08 | End: 2022-07-08

## 2022-07-08 RX ADMIN — PINDOLOL 2.5 MG: 5 TABLET ORAL at 02:37

## 2022-07-08 RX ADMIN — GABAPENTIN 800 MG: 400 CAPSULE ORAL at 11:24

## 2022-07-08 RX ADMIN — OXYCODONE HYDROCHLORIDE AND ACETAMINOPHEN 1 TABLET: 10; 325 TABLET ORAL at 23:53

## 2022-07-08 RX ADMIN — Medication 250 MG: at 09:38

## 2022-07-08 RX ADMIN — LISINOPRIL 5 MG: 5 TABLET ORAL at 09:38

## 2022-07-08 RX ADMIN — BUDESONIDE AND FORMOTEROL FUMARATE DIHYDRATE 2 PUFF: 160; 4.5 AEROSOL RESPIRATORY (INHALATION) at 10:59

## 2022-07-08 RX ADMIN — PINDOLOL 2.5 MG: 5 TABLET ORAL at 20:02

## 2022-07-08 RX ADMIN — LEVOTHYROXINE SODIUM 88 MCG: 88 TABLET ORAL at 06:51

## 2022-07-08 RX ADMIN — DOXYCYCLINE 100 MG: 100 CAPSULE ORAL at 09:38

## 2022-07-08 RX ADMIN — OXYCODONE HYDROCHLORIDE AND ACETAMINOPHEN 1 TABLET: 10; 325 TABLET ORAL at 11:24

## 2022-07-08 RX ADMIN — Medication 250 MG: at 20:02

## 2022-07-08 RX ADMIN — NORTRIPTYLINE HYDROCHLORIDE 50 MG: 10 CAPSULE ORAL at 09:38

## 2022-07-08 RX ADMIN — PINDOLOL 2.5 MG: 5 TABLET ORAL at 15:24

## 2022-07-08 RX ADMIN — PANTOPRAZOLE SODIUM 40 MG: 40 TABLET, DELAYED RELEASE ORAL at 06:51

## 2022-07-08 RX ADMIN — ERTAPENEM SODIUM 1 G: 1 INJECTION, POWDER, LYOPHILIZED, FOR SOLUTION INTRAMUSCULAR; INTRAVENOUS at 15:24

## 2022-07-08 RX ADMIN — Medication 1000 MG: at 12:57

## 2022-07-08 RX ADMIN — SPIRONOLACTONE 50 MG: 50 TABLET ORAL at 11:24

## 2022-07-08 RX ADMIN — SODIUM CHLORIDE 1 G: 900 INJECTION INTRAVENOUS at 13:11

## 2022-07-08 RX ADMIN — Medication 10 ML: at 09:39

## 2022-07-08 RX ADMIN — GABAPENTIN 800 MG: 400 CAPSULE ORAL at 09:38

## 2022-07-08 RX ADMIN — GABAPENTIN 800 MG: 400 CAPSULE ORAL at 17:32

## 2022-07-08 RX ADMIN — OXYCODONE HYDROCHLORIDE AND ACETAMINOPHEN 1 TABLET: 10; 325 TABLET ORAL at 02:36

## 2022-07-08 RX ADMIN — MORPHINE SULFATE 30 MG: 30 TABLET, FILM COATED, EXTENDED RELEASE ORAL at 20:01

## 2022-07-08 RX ADMIN — GABAPENTIN 800 MG: 400 CAPSULE ORAL at 20:01

## 2022-07-08 RX ADMIN — MORPHINE SULFATE 30 MG: 30 TABLET, FILM COATED, EXTENDED RELEASE ORAL at 13:11

## 2022-07-08 RX ADMIN — OXYCODONE HYDROCHLORIDE AND ACETAMINOPHEN 1 TABLET: 10; 325 TABLET ORAL at 06:51

## 2022-07-08 RX ADMIN — BUPIVACAINE HYDROCHLORIDE 5 ML: 2.5 INJECTION, SOLUTION EPIDURAL; INFILTRATION; INTRACAUDAL at 12:36

## 2022-07-08 RX ADMIN — NORTRIPTYLINE HYDROCHLORIDE 50 MG: 10 CAPSULE ORAL at 20:01

## 2022-07-08 RX ADMIN — PINDOLOL 2.5 MG: 5 TABLET ORAL at 09:39

## 2022-07-08 RX ADMIN — OXYCODONE HYDROCHLORIDE AND ACETAMINOPHEN 1 TABLET: 10; 325 TABLET ORAL at 18:34

## 2022-07-08 RX ADMIN — HYDROCORTISONE 20 MG: 10 TABLET ORAL at 09:38

## 2022-07-08 RX ADMIN — BUPIVACAINE HYDROCHLORIDE 5 ML: 2.5 INJECTION, SOLUTION EPIDURAL; INFILTRATION; INTRACAUDAL at 12:12

## 2022-07-08 RX ADMIN — NORTRIPTYLINE HYDROCHLORIDE 50 MG: 10 CAPSULE ORAL at 15:24

## 2022-07-08 RX ADMIN — BUDESONIDE AND FORMOTEROL FUMARATE DIHYDRATE 2 PUFF: 160; 4.5 AEROSOL RESPIRATORY (INHALATION) at 21:09

## 2022-07-08 RX ADMIN — HYDROCORTISONE 10 MG: 10 TABLET ORAL at 11:24

## 2022-07-08 NOTE — PROGRESS NOTES
[facility] Medicine Services  PROGRESS NOTE    Patient Name: Altagracia Rothman  : 1950  MRN: [id number]    Date of Admission: 2022  Primary Care Physician: Alvina Lloyd PA-C    Subjective   Subjective     CC: f/u arm fx    HPI:   Tearful due to pain, home morphine  overnight    ROS:  Gen- No fevers, chills  CV- No chest pain, palpitations  Resp- No cough, dyspnea  GI- No N/V/D, abd pain    No change from 22    Objective   Objective     Vital Signs:   Temp:  [98 °F (36.7 °C)-98.5 °F (36.9 °C)] 98.4 °F (36.9 °C)  Heart Rate:  [66-77] 68  Resp:  [16-18] 17  BP: (122-147)/(70-85) 131/75     Physical Exam:  Constitutional: No acute distress, awake, alert  HENT: NCAT, mucous membranes moist  Respiratory: Clear to auscultation bilaterally, respiratory effort normal   Cardiovascular: RRR, no murmurs, rubs, or gallops  Gastrointestinal: Positive bowel sounds, soft, nontender, nondistended  Musculoskeletal: No bilateral ankle edema  Psychiatric: Appropriate affect, cooperative  Neurologic: Oriented x 3, CARR, RUE in sling, speech clear  Skin: No rashes noted    No change in exam from 22    Results Reviewed:  LAB RESULTS:      Lab 22  1237   WBC 9.13   HEMOGLOBIN 11.8*   HEMATOCRIT 37.5   PLATELETS 263   NEUTROS ABS 6.62   IMMATURE GRANS (ABS) 0.05   LYMPHS ABS 1.41   MONOS ABS 0.82   EOS ABS 0.20   .4*         Lab 22  1434   SODIUM 142   POTASSIUM 4.6   CHLORIDE 103   CO2 34.0*   ANION GAP 5.0   BUN 16   CREATININE 0.81   EGFR 76.8   GLUCOSE 122*   CALCIUM 9.0   MAGNESIUM 2.1         Lab 22  1434   TOTAL PROTEIN 5.8*   ALBUMIN 3.00*   GLOBULIN 2.8   ALT (SGPT) 27   AST (SGOT) 21   BILIRUBIN 0.5   ALK PHOS 120*         Brief Urine Lab Results  (Last result in the past 365 days)      Color   Clarity   Blood   Leuk Est   Nitrite   Protein   CREAT   Urine HCG        22 1145 Yellow   Clear   Negative   Small (1+)   Negative   Negative                  Microbiology Results Abnormal     Procedure Component Value - Date/Time    COVID PRE-OP / PRE-PROCEDURE SCREENING ORDER (NO ISOLATION) - Swab, Nasopharynx [[id number]]  (Normal) Collected: 06/24/22 1901    Lab Status: Final result Specimen: Swab from Nasopharynx Updated: 06/24/22 1956    Narrative:      The following orders were created for panel order COVID PRE-OP / PRE-PROCEDURE SCREENING ORDER (NO ISOLATION) - Swab, Nasopharynx.  Procedure                               Abnormality         Status                     ---------                               -----------         ------                     COVID-19, FLU A/B, RSV P...[[id number]]  Normal              Final result                 Please view results for these tests on the individual orders.    COVID-19, FLU A/B, RSV PCR - Swab, Nasopharynx [[id number]]  (Normal) Collected: 06/24/22 1901    Lab Status: Final result Specimen: Swab from Nasopharynx Updated: 06/24/22 1956     COVID19 Not Detected     Influenza A PCR Not Detected     Influenza B PCR Not Detected     RSV, PCR Not Detected    Narrative:      Fact sheet for providers: https://www.fda.gov/media/102959/download    Fact sheet for patients: https://www.fda.gov/media/843454/download    Test performed by PCR.          Peripheral Block    Result Date: 7/8/2022  Nirmal De La Torre CRNA     7/8/2022 12:52 PM Peripheral Block Patient reassessed immediately prior to procedure Patient location during procedure: pre-op Start time: 7/8/2022 12:36 PM Stop time: 7/8/2022 12:52 PM Reason for block: at surgeon's request and post-op pain management Performed by ZAINAB/CAA: Nirmal De La Torre CRNA Assisted by: Siobhan Moya, RN Preanesthetic Checklist Completed: patient identified, IV checked, site marked, risks and benefits discussed, surgical consent, monitors and equipment checked, pre-op evaluation and timeout performed Prep: Pt Position: left lateral decubitus Sterile barriers:cap, gloves, mask and sterile  barriers Prep: ChloraPrep Patient monitoring: blood pressure monitoring, continuous pulse oximetry and EKG Procedure Sedation: yes Performed under: local infiltration Guidance:ultrasound guided Images:still images obtained, printed/placed on chart Laterality:right Block Type:interscalene Injection Technique:catheter Needle Type:Tuohy and echogenic Needle Gauge:18 G Resistance on Injection: none Catheter Size:20 G (20g) Cath Depth at skin: 9 cm Medications Used: bupivacaine PF (MARCAINE) 0.25 % injection, 5 mL Med administered at 7/8/2022 12:36 PM Medications Preservative Free Saline:5ml Post Assessment Injection Assessment: negative aspiration for heme, no paresthesia on injection and incremental injection Patient Tolerance:comfortable throughout block Complications:no Additional Notes Procedure:               The pt was placed in semifowlers position with a slight tilt of the thorax contralateral to the insertion site.  The Insertion Site was prepped and draped in sterile fashion.  The pt was anesthetized with  IV Sedation( see meds) and  Skin and cutaneous tissue was infiltrated and anesthetized with 1% Lidocaine 3 mls via a 25g needle.  Utilizing ultrasound guidance, a BBraun 4 inch 18 g Contiplex echogenic touhy needle was advanced in-plane.  Hydro dissection of tissue was achieved with Normal saline. Major vessels(carotid and Internal Jugular) where visualized as the brachial plexus was approached at the approximate level of C-7/ T-1.  Cervical 5 and Branches of Cervical 6 nerve roots where visualized and the needle tip was placed posterior at the level of C-6 roots.  LA spread was visualized and injection was made incrementally every 5 mls with aspiration. Injection pressure was normal or little, there was no intraneural injection, no vascular injection.    The BBraun 20 g wire stylet catheter was then placed under US guidance on the posterior aspect of the Brachial Plexus. The tuohy was removed and the  location of catheter was confirmed with NS injection visualized with US . The skin was sealed with exofin tissue adhesive at catheter insertion site.  Skin was prepped with benzoin and the catheter was secured with steristrips and a CHG tegaderm. Appropriate labels applied. Thank You.     Peripheral Block    Result Date: 7/8/2022  Nirmal De La Torre CRNA     7/8/2022 12:13 PM Peripheral Block Patient reassessed immediately prior to procedure Patient location during procedure: pre-op Reason for block: at surgeon's request and post-op pain management Performed by ZAINAB/CAA: Nirmal De La Torre CRNA Assisted by: Siobhan Moya RN Preanesthetic Checklist Completed: patient identified, IV checked, site marked, risks and benefits discussed, surgical consent, monitors and equipment checked, pre-op evaluation and timeout performed Prep: Pt Position: left lateral decubitus Sterile barriers:cap, gloves, mask and sterile barriers Prep: ChloraPrep Patient monitoring: blood pressure monitoring, continuous pulse oximetry and EKG Procedure Sedation: yes Performed under: local infiltration Guidance:ultrasound guided Images:still images obtained, printed/placed on chart Laterality:right Block Type:interscalene Injection Technique:catheter Needle Type:Tuohy and echogenic Needle Gauge:18 G Resistance on Injection: none Catheter Size:20 G (20g) Cath Depth at skin: 10 cm Medications Used: bupivacaine PF (MARCAINE) 0.25 % injection, 5 mL Medications Preservative Free Saline:5ml Post Assessment Injection Assessment: negative aspiration for heme, no paresthesia on injection and incremental injection Patient Tolerance:comfortable throughout block Complications:no Additional Notes Procedure:               The pt was placed in semifowlers position with a slight tilt of the thorax contralateral to the insertion site.  The Insertion Site was prepped and draped in sterile fashion.  The pt was anesthetized with  IV Sedation( see meds) and  Skin  and cutaneous tissue was infiltrated and anesthetized with 1% Lidocaine 3 mls via a 25g needle.  Utilizing ultrasound guidance, a BBraun 4 inch 18 g Contiplex echogenic touhy needle was advanced in-plane.  Hydro dissection of tissue was achieved with Normal saline. Major vessels(carotid and Internal Jugular) where visualized as the brachial plexus was approached at the approximate level of C-7/ T-1.  Cervical 5 and Branches of Cervical 6 nerve roots where visualized and the needle tip was placed posterior at the level of C-6 roots.  LA spread was visualized and injection was made incrementally every 5 mls with aspiration. Injection pressure was normal or little, there was no intraneural injection, no vascular injection.    The BBraun 20 g wire stylet catheter was then placed under US guidance on the posterior aspect of the Brachial Plexus. The tuohy was removed and the location of catheter was confirmed with NS injection visualized with US . The skin was sealed with exofin tissue adhesive at catheter insertion site.  Skin was prepped with benzoin and the catheter was secured with steristrips and a CHG tegaderm. Appropriate labels applied. Thank You.           I have reviewed the medications:  Scheduled Meds:budesonide-formoterol, 2 puff, Inhalation, BID - RT  cefTRIAXone, 1 g, Intravenous, Q24H  famotidine, 20 mg, Intravenous, Once  gabapentin, 800 mg, Oral, 4x Daily  hydrocortisone, 10 mg, Oral, Daily With Lunch  hydrocortisone, 20 mg, Oral, QAM  levothyroxine, 88 mcg, Oral, Q AM  lisinopril, 5 mg, Oral, Q24H  Morphine, 30 mg, Oral, BID  nortriptyline, 50 mg, Oral, TID  pantoprazole, 40 mg, Oral, Q AM  pindolol, 2.5 mg, Oral, TID  saccharomyces boulardii, 250 mg, Oral, BID  sodium chloride, 10 mL, Intravenous, Q12H  sodium chloride, 10 mL, Intravenous, Q12H  spironolactone, 50 mg, Oral, Daily      Continuous Infusions:Pharmacy Consult,   lactated ringers, 9 mL/hr  ropivacaine,   sodium chloride, 75 mL/hr, Last  Rate: 75 mL/hr (22 0105)      PRN Meds:.acetaminophen **OR** acetaminophen **OR** acetaminophen  •  albuterol  •  diphenhydrAMINE  •  hydrALAZINE  •  hydrOXYzine  •  labetalol  •  lidocaine PF 1%  •  magnesium sulfate **OR** magnesium sulfate in D5W 1g/100mL (PREMIX) **OR** magnesium sulfate  •  midazolam  •  [] Morphine **AND** naloxone  •  ondansetron **OR** ondansetron  •  oxyCODONE-acetaminophen  •  potassium chloride **OR** potassium chloride **OR** potassium chloride  •  sodium chloride  •  sodium chloride    Assessment & Plan   Assessment & Plan     Active Hospital Problems    Diagnosis  POA   • Acute UTI (urinary tract infection) [N39.0]  Yes   • Other closed displaced fracture of proximal end of right humerus, initial encounter [S42.291A]  Yes   • Moderate asthma without complication [J45.909]  Yes   • Osteoporosis [M81.0]  Yes   • Adrenal insufficiency (HCC) [E27.40]  Yes   • Hypothyroidism [E03.9]  Yes   • Essential hypertension [I10]  Yes   • H/O pheochromocytoma [Z86.018]  Yes      Resolved Hospital Problems   No resolved problems to display.        Brief Hospital Course to date:  Altagracia Rothman is a 72 y.o. female with past medical history significant for hypertension, history of pheochromocytoma, s/p left adrenal resection, adrenal insufficiency, hypothyroidism, moderate asthma. Patient was admitted for right humerus fracture after a fall.     Right proximal and distal humerus fracture after mechanical fall.    -Have d/w Dr. Matute. Tentatively planning for ORIF   pending results for metanephrines and catecholamines still pending  -Will need stress-doing of home steroids for AI with IV hydrocortisone, usually does IV hydrocortisone; spoke with Dr. Matute and anesthesiology will dose steroids prior to surgery     Bronchitis - POA, now resolved  -CXR without infiltrate. S/P 5 days of empiric doxy.  On room air  -Resume home inhalers, continue symbicort  -s/p another round of doxy  for toothache  -continue probiotic     Hypoxia/Chest Pain-resolved  - RRT 6/30, likely related to combination of nerve block and anxiety  - CXR, EKG, troponin and ABG are WNL     Hx adrenal insufficiency s/p adrenalectomy, s/p adrenal crisis (hypotension on admission)  History of pheochromocytoma - resected in 2003   - My partner discussed this with patient's endocrinologist Dr. Nelson via telephone on 6/30. Patient had adrenalectomy in 2003 with removal of pheo and has declined further screening tests since that time. Per Dr. Nelson, cathecholamine/metanphrine testing would be helpful if it is negative, although it could return a false positive from ongoing tricyclic med which patient has refused to taper. She would need to be off noratryptiline for 4-6 weeks for accurate testing.  - 24hr-urine metanephrines resulted, catecholamines still pending  - Patient has been weaned back to home dose of hydrocortisone 20mg qam, 10mg nightly after initial stress-dosing. Will need to be stress dosed with IV hydrocortisone at timing of surgery.     Hx HTN  -initally was hypotensive due to adrenal crisis, so bp meds were held  -started back lisinopril 5mg on 6/27/22 (on quinapril 5mg at home)  -hydrocortisone weaned back to home dose  -resume aldactone, lasix x1 dose 6/29  -resume home pindolol 2.5mg, PRN hydralazine and PRN labetalol      Gillespie catheter placed in ED at time of admission  UTI, proteus species/ESBL  -Gillespie d/c'd on 6/26/22. Initial urine cultures showed proteus mirabilis  -She completed course of fosfomycin  -Repeat U/A with small leuks, 13-20 WBC and 1+bacteria, culture again >100K proteus/ESBL  -rocephin changed to ertapenem due to ESBL     Hyperglycemia, improved  -not diabetic, A1c 5.3     Hypothyroidism   -on home dose Synthroid.     Expected Discharge Location and Transportation: Rehab, [facility] van or EMS  Expected Discharge Date: Unknown, waiting on surgery    DVT prophylaxis:  Mechanical DVT prophylaxis orders  are present.     AM-PAC 6 Clicks Score (PT): 13 (07/07/22 0843)    CODE STATUS:   Code Status and Medical Interventions:   Ordered at: 06/24/22 212     Level Of Support Discussed With:    Patient     Code Status (Patient has no pulse and is not breathing):    CPR (Attempt to Resuscitate)     Medical Interventions (Patient has pulse or is breathing):    Full Support       MICHELE García  07/08/22

## 2022-07-08 NOTE — ANESTHESIA PROCEDURE NOTES
Peripheral Block      Patient reassessed immediately prior to procedure    Patient location during procedure: pre-op  Reason for block: at surgeon's request and post-op pain management  Performed by  CRNA/CAA: Nirmal De La Torre CRNA  Assisted by: Siobhan Moya RN  Preanesthetic Checklist  Completed: patient identified, IV checked, site marked, risks and benefits discussed, surgical consent, monitors and equipment checked, pre-op evaluation and timeout performed  Prep:  Pt Position: left lateral decubitus  Sterile barriers:cap, gloves, mask and sterile barriers  Prep: ChloraPrep  Patient monitoring: blood pressure monitoring, continuous pulse oximetry and EKG  Procedure    Sedation: yes  Performed under: local infiltration  Guidance:ultrasound guided  Images:still images obtained, printed/placed on chart    Laterality:right  Block Type:interscalene  Injection Technique:catheter  Needle Type:Tuohy and echogenic  Needle Gauge:18 G  Resistance on Injection: none  Catheter Size:20 G (20g)  Cath Depth at skin: 10 cm    Medications Used: bupivacaine PF (MARCAINE) 0.25 % injection, 5 mL      Medications  Preservative Free Saline:5ml    Post Assessment  Injection Assessment: negative aspiration for heme, no paresthesia on injection and incremental injection  Patient Tolerance:comfortable throughout block  Complications:no  Additional Notes  Procedure:                 The pt was placed in semifowlers position with a slight tilt of the thorax contralateral to the insertion site.  The Insertion Site was prepped and draped in sterile fashion.  The pt was anesthetized with  IV Sedation( see meds) and  Skin and cutaneous tissue was infiltrated and anesthetized with 1% Lidocaine 3 mls via a 25g needle.  Utilizing ultrasound guidance, a BBraun 4 inch 18 g Contiplex echogenic touhy needle was advanced in-plane.  Hydro dissection of tissue was achieved with Normal saline. Major vessels(carotid and Internal Jugular) where  visualized as the brachial plexus was approached at the approximate level of C-7/ T-1.  Cervical 5 and Branches of Cervical 6 nerve roots where visualized and the needle tip was placed posterior at the level of C-6 roots.  LA spread was visualized and injection was made incrementally every 5 mls with aspiration. Injection pressure was normal or little, there was no intraneural injection, no vascular injection.      The BBraun 20 g wire stylet catheter was then placed under US guidance on the posterior aspect of the Brachial Plexus. The tuohy was removed and the location of catheter was confirmed with NS injection visualized with US . The skin was sealed with exofin tissue adhesive at catheter insertion site.  Skin was prepped with benzoin and the catheter was secured with steristrips and a CHG tegaderm. Appropriate labels applied. Thank You.

## 2022-07-08 NOTE — ANESTHESIA PROCEDURE NOTES
Peripheral Block      Patient reassessed immediately prior to procedure    Patient location during procedure: pre-op  Start time: 7/8/2022 12:36 PM  Stop time: 7/8/2022 12:52 PM  Reason for block: at surgeon's request and post-op pain management  Performed by  CRNA/CAA: Nirmal De La Torre, CRNA  Assisted by: Siobhan Moya RN  Preanesthetic Checklist  Completed: patient identified, IV checked, site marked, risks and benefits discussed, surgical consent, monitors and equipment checked, pre-op evaluation and timeout performed  Prep:  Pt Position: left lateral decubitus  Sterile barriers:cap, gloves, mask and sterile barriers  Prep: ChloraPrep  Patient monitoring: blood pressure monitoring, continuous pulse oximetry and EKG  Procedure    Sedation: yes  Performed under: local infiltration  Guidance:ultrasound guided  Images:still images obtained, printed/placed on chart    Laterality:right  Block Type:interscalene  Injection Technique:catheter  Needle Type:Tuohy and echogenic  Needle Gauge:18 G  Resistance on Injection: none  Catheter Size:20 G (20g)  Cath Depth at skin: 9 cm    Medications Used: bupivacaine PF (MARCAINE) 0.25 % injection, 5 mL  Med administered at 7/8/2022 12:36 PM      Medications  Preservative Free Saline:5ml    Post Assessment  Injection Assessment: negative aspiration for heme, no paresthesia on injection and incremental injection  Patient Tolerance:comfortable throughout block  Complications:no  Additional Notes  Procedure:                 The pt was placed in semifowlers position with a slight tilt of the thorax contralateral to the insertion site.  The Insertion Site was prepped and draped in sterile fashion.  The pt was anesthetized with  IV Sedation( see meds) and  Skin and cutaneous tissue was infiltrated and anesthetized with 1% Lidocaine 3 mls via a 25g needle.  Utilizing ultrasound guidance, a BBraun 4 inch 18 g Contiplex echogenic touhy needle was advanced in-plane.  Hydro dissection of  tissue was achieved with Normal saline. Major vessels(carotid and Internal Jugular) where visualized as the brachial plexus was approached at the approximate level of C-7/ T-1.  Cervical 5 and Branches of Cervical 6 nerve roots where visualized and the needle tip was placed posterior at the level of C-6 roots.  LA spread was visualized and injection was made incrementally every 5 mls with aspiration. Injection pressure was normal or little, there was no intraneural injection, no vascular injection.      The BBraun 20 g wire stylet catheter was then placed under US guidance on the posterior aspect of the Brachial Plexus. The tuohy was removed and the location of catheter was confirmed with NS injection visualized with US . The skin was sealed with exofin tissue adhesive at catheter insertion site.  Skin was prepped with benzoin and the catheter was secured with steristrips and a CHG tegaderm. Appropriate labels applied. Thank You.

## 2022-07-08 NOTE — PROGRESS NOTES
"Orthopedic Daily Progress Note      CC: Right shoulder pain    Pain well controlled    Physical Exam:  I have reviewed the vital signs.  Temp:  [98 °F (36.7 °C)-98.4 °F (36.9 °C)] 98.4 °F (36.9 °C)  Heart Rate:  [66-74] 72  Resp:  [16-18] 17  BP: (116-147)/(74-85) 116/78    Objective:  Vital signs: (most recent): Blood pressure 116/78, pulse 72, temperature 98.4 °F (36.9 °C), temperature source Oral, resp. rate 17, height 154.9 cm (61\"), weight 82 kg (180 lb 12.8 oz), SpO2 96 %, not currently breastfeeding.            General Appearance:    Alert, cooperative, no distress  Extremities: No clubbing, cyanosis, or edema to lower extremities  Pulses:  2+ right radial pulse  Skin: +ecchymoses about RUE. Radial/median/ulnar nerves intact      Results Review:    I have reviewed the labs, radiology results and diagnostic studies. Urine studies demonstrate no signs of pheo    Results from last 7 days   Lab Units 07/05/22  1237   WBC 10*3/mm3 9.13   HEMOGLOBIN g/dL 11.8*   PLATELETS 10*3/mm3 263     Results from last 7 days   Lab Units 07/05/22  1434   SODIUM mmol/L 142   POTASSIUM mmol/L 4.6   CO2 mmol/L 34.0*   CREATININE mg/dL 0.81   GLUCOSE mg/dL 122*       I have reviewed the medications.    Assessment/Problem List  R proximal and distal humerus fractures  Plan on ORIF/IMN R humerus fractures on 7/12    Plan:  ROSY DELANEY    NPO at MN on 7/11 in preparation for surgery on 7/12    Jamari Matute Jr., MD   07/08/22  19:37 EDT            "

## 2022-07-08 NOTE — CASE MANAGEMENT/SOCIAL WORK
Continued Stay Note  [facility]     Patient Name: Altagracia Rothman  MRN: [id number]  Today's Date: 7/8/2022    Admit Date: 6/24/2022     Discharge Plan     Row Name 07/08/22 9612       Plan    Plan Comments Awaiting labs for medical clearance for surgery. Once pt is able to participate with therapy CM will make referrals for inpt rehab.               Discharge Codes    No documentation.               Expected Discharge Date and Time     Expected Discharge Date Expected Discharge Time    Jul 7, 2022             Francisca Smith RN

## 2022-07-09 LAB
ANION GAP SERPL CALCULATED.3IONS-SCNC: 8 MMOL/L (ref 5–15)
BASOPHILS # BLD AUTO: 0.02 10*3/MM3 (ref 0–0.2)
BASOPHILS NFR BLD AUTO: 0.2 % (ref 0–1.5)
BUN SERPL-MCNC: 15 MG/DL (ref 8–23)
BUN/CREAT SERPL: 25.9 (ref 7–25)
CALCIUM SPEC-SCNC: 9.4 MG/DL (ref 8.6–10.5)
CHLORIDE SERPL-SCNC: 102 MMOL/L (ref 98–107)
CO2 SERPL-SCNC: 29 MMOL/L (ref 22–29)
CREAT SERPL-MCNC: 0.58 MG/DL (ref 0.57–1)
DEPRECATED RDW RBC AUTO: 55 FL (ref 37–54)
EGFRCR SERPLBLD CKD-EPI 2021: 95.7 ML/MIN/1.73
EOSINOPHIL # BLD AUTO: 0.16 10*3/MM3 (ref 0–0.4)
EOSINOPHIL NFR BLD AUTO: 2 % (ref 0.3–6.2)
ERYTHROCYTE [DISTWIDTH] IN BLOOD BY AUTOMATED COUNT: 14.3 % (ref 12.3–15.4)
GLUCOSE SERPL-MCNC: 97 MG/DL (ref 65–99)
HCT VFR BLD AUTO: 39.5 % (ref 34–46.6)
HGB BLD-MCNC: 12.4 G/DL (ref 12–15.9)
IMM GRANULOCYTES # BLD AUTO: 0.05 10*3/MM3 (ref 0–0.05)
IMM GRANULOCYTES NFR BLD AUTO: 0.6 % (ref 0–0.5)
LYMPHOCYTES # BLD AUTO: 1.65 10*3/MM3 (ref 0.7–3.1)
LYMPHOCYTES NFR BLD AUTO: 20.2 % (ref 19.6–45.3)
MCH RBC QN AUTO: 33.2 PG (ref 26.6–33)
MCHC RBC AUTO-ENTMCNC: 31.4 G/DL (ref 31.5–35.7)
MCV RBC AUTO: 105.6 FL (ref 79–97)
MONOCYTES # BLD AUTO: 0.73 10*3/MM3 (ref 0.1–0.9)
MONOCYTES NFR BLD AUTO: 8.9 % (ref 5–12)
NEUTROPHILS NFR BLD AUTO: 5.57 10*3/MM3 (ref 1.7–7)
NEUTROPHILS NFR BLD AUTO: 68.1 % (ref 42.7–76)
NRBC BLD AUTO-RTO: 0 /100 WBC (ref 0–0.2)
PLATELET # BLD AUTO: 304 10*3/MM3 (ref 140–450)
PMV BLD AUTO: 9.9 FL (ref 6–12)
POTASSIUM SERPL-SCNC: 4.1 MMOL/L (ref 3.5–5.2)
RBC # BLD AUTO: 3.74 10*6/MM3 (ref 3.77–5.28)
SODIUM SERPL-SCNC: 139 MMOL/L (ref 136–145)
WBC NRBC COR # BLD: 8.18 10*3/MM3 (ref 3.4–10.8)

## 2022-07-09 PROCEDURE — 80048 BASIC METABOLIC PNL TOTAL CA: CPT | Performed by: NURSE PRACTITIONER

## 2022-07-09 PROCEDURE — 99232 SBSQ HOSP IP/OBS MODERATE 35: CPT | Performed by: INTERNAL MEDICINE

## 2022-07-09 PROCEDURE — 94799 UNLISTED PULMONARY SVC/PX: CPT

## 2022-07-09 PROCEDURE — 85025 COMPLETE CBC W/AUTO DIFF WBC: CPT | Performed by: NURSE PRACTITIONER

## 2022-07-09 PROCEDURE — 94664 DEMO&/EVAL PT USE INHALER: CPT

## 2022-07-09 PROCEDURE — 25010000002 ERTAPENEM PER 500 MG: Performed by: NURSE PRACTITIONER

## 2022-07-09 RX ORDER — DOXYCYCLINE 100 MG/1
100 CAPSULE ORAL EVERY 12 HOURS SCHEDULED
Status: DISCONTINUED | OUTPATIENT
Start: 2022-07-09 | End: 2022-07-13

## 2022-07-09 RX ADMIN — BUDESONIDE AND FORMOTEROL FUMARATE DIHYDRATE 2 PUFF: 160; 4.5 AEROSOL RESPIRATORY (INHALATION) at 10:17

## 2022-07-09 RX ADMIN — GABAPENTIN 800 MG: 400 CAPSULE ORAL at 16:22

## 2022-07-09 RX ADMIN — GABAPENTIN 800 MG: 400 CAPSULE ORAL at 08:34

## 2022-07-09 RX ADMIN — ERTAPENEM SODIUM 1 G: 1 INJECTION, POWDER, LYOPHILIZED, FOR SOLUTION INTRAMUSCULAR; INTRAVENOUS at 08:35

## 2022-07-09 RX ADMIN — NORTRIPTYLINE HYDROCHLORIDE 50 MG: 10 CAPSULE ORAL at 20:07

## 2022-07-09 RX ADMIN — Medication 10 ML: at 20:08

## 2022-07-09 RX ADMIN — NORTRIPTYLINE HYDROCHLORIDE 50 MG: 10 CAPSULE ORAL at 08:34

## 2022-07-09 RX ADMIN — PINDOLOL 2.5 MG: 5 TABLET ORAL at 08:47

## 2022-07-09 RX ADMIN — HYDROCORTISONE 10 MG: 10 TABLET ORAL at 12:35

## 2022-07-09 RX ADMIN — DOXYCYCLINE 100 MG: 100 CAPSULE ORAL at 13:59

## 2022-07-09 RX ADMIN — GABAPENTIN 800 MG: 400 CAPSULE ORAL at 20:08

## 2022-07-09 RX ADMIN — SPIRONOLACTONE 50 MG: 50 TABLET ORAL at 08:52

## 2022-07-09 RX ADMIN — GABAPENTIN 800 MG: 400 CAPSULE ORAL at 12:35

## 2022-07-09 RX ADMIN — LEVOTHYROXINE SODIUM 88 MCG: 88 TABLET ORAL at 05:15

## 2022-07-09 RX ADMIN — MORPHINE SULFATE 30 MG: 30 TABLET, FILM COATED, EXTENDED RELEASE ORAL at 20:08

## 2022-07-09 RX ADMIN — Medication 10 ML: at 08:38

## 2022-07-09 RX ADMIN — Medication 250 MG: at 08:34

## 2022-07-09 RX ADMIN — LISINOPRIL 5 MG: 5 TABLET ORAL at 08:47

## 2022-07-09 RX ADMIN — PINDOLOL 2.5 MG: 5 TABLET ORAL at 20:08

## 2022-07-09 RX ADMIN — OXYCODONE HYDROCHLORIDE AND ACETAMINOPHEN 1 TABLET: 10; 325 TABLET ORAL at 16:22

## 2022-07-09 RX ADMIN — HYDROCORTISONE 20 MG: 10 TABLET ORAL at 05:15

## 2022-07-09 RX ADMIN — BUDESONIDE AND FORMOTEROL FUMARATE DIHYDRATE 2 PUFF: 160; 4.5 AEROSOL RESPIRATORY (INHALATION) at 20:36

## 2022-07-09 RX ADMIN — OXYCODONE HYDROCHLORIDE AND ACETAMINOPHEN 1 TABLET: 10; 325 TABLET ORAL at 05:37

## 2022-07-09 RX ADMIN — NORTRIPTYLINE HYDROCHLORIDE 50 MG: 10 CAPSULE ORAL at 16:22

## 2022-07-09 RX ADMIN — MORPHINE SULFATE 30 MG: 30 TABLET, FILM COATED, EXTENDED RELEASE ORAL at 08:34

## 2022-07-09 RX ADMIN — PANTOPRAZOLE SODIUM 40 MG: 40 TABLET, DELAYED RELEASE ORAL at 05:15

## 2022-07-09 RX ADMIN — PINDOLOL 2.5 MG: 5 TABLET ORAL at 16:23

## 2022-07-09 RX ADMIN — DOXYCYCLINE 100 MG: 100 CAPSULE ORAL at 20:08

## 2022-07-09 RX ADMIN — OXYCODONE HYDROCHLORIDE AND ACETAMINOPHEN 1 TABLET: 10; 325 TABLET ORAL at 10:06

## 2022-07-09 RX ADMIN — Medication 250 MG: at 20:08

## 2022-07-09 NOTE — PLAN OF CARE
Patient cont to require strong one to two assist for transfer due to BLE weakness. Right fingers wigglr with rapid cap refill.  equally strong. VSS. SR/ S Luís on cardiac mx. Pain adequately controlled with PO pain meds. UOP ADQ. Will cont to mx. Call light in reach.

## 2022-07-09 NOTE — PROGRESS NOTES
[facility] Medicine Services  PROGRESS NOTE    Patient Name: Altagracia Rothman  : 1950  MRN: [id number]    Date of Admission: 2022  Primary Care Physician: Alvina Lloyd PA-C    Subjective   Subjective     CC: f/u arm fracture    HPI:   Doing OK today.  Having some aches and pains all over.  She thinks may be due to being off doxycycline because she normally takes tetracycline for arthritis at home.    ROS:  Gen- No fevers, chills  MSK- As above    Objective   Objective     Vital Signs:   Temp:  [97.9 °F (36.6 °C)-98.4 °F (36.9 °C)] 97.9 °F (36.6 °C)  Heart Rate:  [64-80] 64  Resp:  [17-18] 18  BP: (116-157)/(75-95) 123/88     Physical Exam:  Constitutional: No acute distress, awake, alert, sitting up in bed  HENT: NCAT, mucous membranes moist  Respiratory: Respiratory effort normal   Cardiovascular: RRR  Musculoskeletal: No bilateral ankle edema, RUE in sling  Psychiatric: Appropriate affect, cooperative  Neurologic: Speech clear and fluent  Skin: No rashes on exposed surfaces    Results Reviewed:  LAB RESULTS:      Lab 22  0744 22  1237   WBC 8.18 9.13   HEMOGLOBIN 12.4 11.8*   HEMATOCRIT 39.5 37.5   PLATELETS 304 263   NEUTROS ABS 5.57 6.62   IMMATURE GRANS (ABS) 0.05 0.05   LYMPHS ABS 1.65 1.41   MONOS ABS 0.73 0.82   EOS ABS 0.16 0.20   .6* 108.4*         Lab 22  0744 22  1434   SODIUM 139 142   POTASSIUM 4.1 4.6   CHLORIDE 102 103   CO2 29.0 34.0*   ANION GAP 8.0 5.0   BUN 15 16   CREATININE 0.58 0.81   EGFR 95.7 76.8   GLUCOSE 97 122*   CALCIUM 9.4 9.0   MAGNESIUM  --  2.1         Lab 22  1434   TOTAL PROTEIN 5.8*   ALBUMIN 3.00*   GLOBULIN 2.8   ALT (SGPT) 27   AST (SGOT) 21   BILIRUBIN 0.5   ALK PHOS 120*         Brief Urine Lab Results  (Last result in the past 365 days)      Color   Clarity   Blood   Leuk Est   Nitrite   Protein   CREAT   Urine HCG        22 1145 Yellow   Clear   Negative   Small (1+)   Negative    Negative                 Microbiology Results Abnormal     Procedure Component Value - Date/Time    COVID PRE-OP / PRE-PROCEDURE SCREENING ORDER (NO ISOLATION) - Swab, Nasopharynx [[id number]]  (Normal) Collected: 06/24/22 1901    Lab Status: Final result Specimen: Swab from Nasopharynx Updated: 06/24/22 1956    Narrative:      The following orders were created for panel order COVID PRE-OP / PRE-PROCEDURE SCREENING ORDER (NO ISOLATION) - Swab, Nasopharynx.  Procedure                               Abnormality         Status                     ---------                               -----------         ------                     COVID-19, FLU A/B, RSV P...[[id number]]  Normal              Final result                 Please view results for these tests on the individual orders.    COVID-19, FLU A/B, RSV PCR - Swab, Nasopharynx [[id number]]  (Normal) Collected: 06/24/22 1901    Lab Status: Final result Specimen: Swab from Nasopharynx Updated: 06/24/22 1956     COVID19 Not Detected     Influenza A PCR Not Detected     Influenza B PCR Not Detected     RSV, PCR Not Detected    Narrative:      Fact sheet for providers: https://www.fda.gov/media/087423/download    Fact sheet for patients: https://www.fda.gov/media/127970/download    Test performed by PCR.          Peripheral Block    Result Date: 7/8/2022  Nirmal De La Torre CRNA     7/8/2022 12:52 PM Peripheral Block Patient reassessed immediately prior to procedure Patient location during procedure: pre-op Start time: 7/8/2022 12:36 PM Stop time: 7/8/2022 12:52 PM Reason for block: at surgeon's request and post-op pain management Performed by ZAINAB/CAA: Nirmal De La Torre CRNA Assisted by: Siobhan Moya RN Preanesthetic Checklist Completed: patient identified, IV checked, site marked, risks and benefits discussed, surgical consent, monitors and equipment checked, pre-op evaluation and timeout performed Prep: Pt Position: left lateral decubitus Sterile barriers:cap, gloves,  mask and sterile barriers Prep: ChloraPrep Patient monitoring: blood pressure monitoring, continuous pulse oximetry and EKG Procedure Sedation: yes Performed under: local infiltration Guidance:ultrasound guided Images:still images obtained, printed/placed on chart Laterality:right Block Type:interscalene Injection Technique:catheter Needle Type:Tuohy and echogenic Needle Gauge:18 G Resistance on Injection: none Catheter Size:20 G (20g) Cath Depth at skin: 9 cm Medications Used: bupivacaine PF (MARCAINE) 0.25 % injection, 5 mL Med administered at 7/8/2022 12:36 PM Medications Preservative Free Saline:5ml Post Assessment Injection Assessment: negative aspiration for heme, no paresthesia on injection and incremental injection Patient Tolerance:comfortable throughout block Complications:no Additional Notes Procedure:               The pt was placed in semifowlers position with a slight tilt of the thorax contralateral to the insertion site.  The Insertion Site was prepped and draped in sterile fashion.  The pt was anesthetized with  IV Sedation( see meds) and  Skin and cutaneous tissue was infiltrated and anesthetized with 1% Lidocaine 3 mls via a 25g needle.  Utilizing ultrasound guidance, a BBraun 4 inch 18 g Contiplex echogenic touhy needle was advanced in-plane.  Hydro dissection of tissue was achieved with Normal saline. Major vessels(carotid and Internal Jugular) where visualized as the brachial plexus was approached at the approximate level of C-7/ T-1.  Cervical 5 and Branches of Cervical 6 nerve roots where visualized and the needle tip was placed posterior at the level of C-6 roots.  LA spread was visualized and injection was made incrementally every 5 mls with aspiration. Injection pressure was normal or little, there was no intraneural injection, no vascular injection.    The BBraun 20 g wire stylet catheter was then placed under US guidance on the posterior aspect of the Brachial Plexus. The tuohy was  removed and the location of catheter was confirmed with NS injection visualized with US . The skin was sealed with exofin tissue adhesive at catheter insertion site.  Skin was prepped with benzoin and the catheter was secured with steristrips and a CHG tegaderm. Appropriate labels applied. Thank You.     Peripheral Block    Result Date: 7/8/2022  Nirmal De La Torre CRNA     7/8/2022 12:13 PM Peripheral Block Patient reassessed immediately prior to procedure Patient location during procedure: pre-op Reason for block: at surgeon's request and post-op pain management Performed by ZAINAB/CAA: Nirmal De La Torre CRNA Assisted by: Siobhan Moya RN Preanesthetic Checklist Completed: patient identified, IV checked, site marked, risks and benefits discussed, surgical consent, monitors and equipment checked, pre-op evaluation and timeout performed Prep: Pt Position: left lateral decubitus Sterile barriers:cap, gloves, mask and sterile barriers Prep: ChloraPrep Patient monitoring: blood pressure monitoring, continuous pulse oximetry and EKG Procedure Sedation: yes Performed under: local infiltration Guidance:ultrasound guided Images:still images obtained, printed/placed on chart Laterality:right Block Type:interscalene Injection Technique:catheter Needle Type:Tuohy and echogenic Needle Gauge:18 G Resistance on Injection: none Catheter Size:20 G (20g) Cath Depth at skin: 10 cm Medications Used: bupivacaine PF (MARCAINE) 0.25 % injection, 5 mL Medications Preservative Free Saline:5ml Post Assessment Injection Assessment: negative aspiration for heme, no paresthesia on injection and incremental injection Patient Tolerance:comfortable throughout block Complications:no Additional Notes Procedure:               The pt was placed in semifowlers position with a slight tilt of the thorax contralateral to the insertion site.  The Insertion Site was prepped and draped in sterile fashion.  The pt was anesthetized with  IV Sedation( see  meds) and  Skin and cutaneous tissue was infiltrated and anesthetized with 1% Lidocaine 3 mls via a 25g needle.  Utilizing ultrasound guidance, a BBraun 4 inch 18 g Contiplex echogenic touhy needle was advanced in-plane.  Hydro dissection of tissue was achieved with Normal saline. Major vessels(carotid and Internal Jugular) where visualized as the brachial plexus was approached at the approximate level of C-7/ T-1.  Cervical 5 and Branches of Cervical 6 nerve roots where visualized and the needle tip was placed posterior at the level of C-6 roots.  LA spread was visualized and injection was made incrementally every 5 mls with aspiration. Injection pressure was normal or little, there was no intraneural injection, no vascular injection.    The BBraun 20 g wire stylet catheter was then placed under US guidance on the posterior aspect of the Brachial Plexus. The tuohy was removed and the location of catheter was confirmed with NS injection visualized with US . The skin was sealed with exofin tissue adhesive at catheter insertion site.  Skin was prepped with benzoin and the catheter was secured with steristrips and a CHG tegaderm. Appropriate labels applied. Thank You.           I have reviewed the medications:  Scheduled Meds:budesonide-formoterol, 2 puff, Inhalation, BID - RT  ertapenem, 1 g, Intravenous, Daily  famotidine, 20 mg, Intravenous, Once  gabapentin, 800 mg, Oral, 4x Daily  hydrocortisone, 10 mg, Oral, Daily With Lunch  hydrocortisone, 20 mg, Oral, QAM  levothyroxine, 88 mcg, Oral, Q AM  lisinopril, 5 mg, Oral, Q24H  Morphine, 30 mg, Oral, BID  nortriptyline, 50 mg, Oral, TID  pantoprazole, 40 mg, Oral, Q AM  pindolol, 2.5 mg, Oral, TID  saccharomyces boulardii, 250 mg, Oral, BID  sodium chloride, 10 mL, Intravenous, Q12H  sodium chloride, 10 mL, Intravenous, Q12H  spironolactone, 50 mg, Oral, Daily      Continuous Infusions:Pharmacy Consult,   lactated ringers, 9 mL/hr  ropivacaine,   sodium chloride, 75  mL/hr, Last Rate: 75 mL/hr (22 0105)      PRN Meds:.acetaminophen **OR** acetaminophen **OR** acetaminophen  •  albuterol  •  diphenhydrAMINE  •  hydrALAZINE  •  hydrOXYzine  •  labetalol  •  lidocaine PF 1%  •  magnesium sulfate **OR** magnesium sulfate in D5W 1g/100mL (PREMIX) **OR** magnesium sulfate  •  midazolam  •  [] Morphine **AND** naloxone  •  ondansetron **OR** ondansetron  •  oxyCODONE-acetaminophen  •  potassium chloride **OR** potassium chloride **OR** potassium chloride  •  sodium chloride  •  sodium chloride    Assessment & Plan   Assessment & Plan     Active Hospital Problems    Diagnosis  POA   • Acute UTI (urinary tract infection) [N39.0]  Yes   • Other closed displaced fracture of proximal end of right humerus, initial encounter [S42.291A]  Yes   • Moderate asthma without complication [J45.909]  Yes   • Osteoporosis [M81.0]  Yes   • Adrenal insufficiency (HCC) [E27.40]  Yes   • Hypothyroidism [E03.9]  Yes   • Essential hypertension [I10]  Yes   • H/O pheochromocytoma [Z86.018]  Yes      Resolved Hospital Problems   No resolved problems to display.        Brief Hospital Course to date:  Altagracia Rothman is a 72 y.o. female with past medical history significant for hypertension, history of pheochromocytoma, s/p left adrenal resection, adrenal insufficiency, hypothyroidism, moderate asthma. Patient was admitted for right humerus fracture after a fall.     Right proximal and distal humerus fracture after mechanical fall.    -Tentatively planning for ORIF   pending results for metanephrines and catecholamines still pending  -Will need stress-doing of home steroids for AI with IV hydrocortisone, usually does IV hydrocortisone; my partner spoke with Dr. Matute and anesthesiology will dose steroids prior to surgery     Bronchitis - POA, now resolved  -CXR without infiltrate. S/P 5 days of empiric doxy.  On room air  -Resume home inhalers, continue symbicort  -s/p another round of doxy  for toothache  -continue probiotic     Hypoxia/Chest Pain-resolved  - RRT 6/30, likely related to combination of nerve block and anxiety  - CXR, EKG, troponin and ABG are WNL     Hx adrenal insufficiency s/p adrenalectomy, s/p adrenal crisis (hypotension on admission)  History of pheochromocytoma - resected in 2003   - My partner discussed this with patient's endocrinologist Dr. Nelson via telephone on 6/30. Patient had adrenalectomy in 2003 with removal of pheo and has declined further screening tests since that time. Per Dr. Nelson, cathecholamine/metanphrine testing would be helpful if it is negative, although it could return a false positive from ongoing tricyclic med which patient has refused to taper. She would need to be off noratryptiline for 4-6 weeks for accurate testing.  - 24hr-urine metanephrines resulted, catecholamines still pending  - Patient has been weaned back to home dose of hydrocortisone 20mg qam, 10mg nightly after initial stress-dosing. Will need to be stress dosed with IV hydrocortisone at timing of surgery.     Hx HTN  -initally was hypotensive due to adrenal crisis, so bp meds were held  -started back lisinopril 5mg on 6/27/22 (on quinapril 5mg at home)  -hydrocortisone weaned back to home dose  -Continue aldactone  -Resumed home pindolol 2.5mg, PRN hydralazine and PRN labetalol      Gillespie catheter placed in ED at time of admission  UTI, proteus species/ESBL  -Gillespie d/c'd on 6/26/22. Initial urine cultures showed proteus mirabilis  -She completed course of fosfomycin  -Repeat U/A with small leuks, 13-20 WBC and 1+bacteria, culture again >100K proteus/ESBL  -Rocephin changed to ertapenem due to ESBL     Hyperglycemia, improved  -Not diabetic, A1c 5.3     Hypothyroidism   -On home dose Synthroid.     Expected Discharge Location and Transportation: Rehab, [facility] van or EMS  Expected Discharge Date: Unknown, waiting on surgery, 7/15 at the earliest    DVT prophylaxis:  Mechanical DVT prophylaxis  orders are present.     AM-PAC 6 Clicks Score (PT): 13 (07/07/22 0843)    CODE STATUS:   Code Status and Medical Interventions:   Ordered at: 06/24/22 2129     Level Of Support Discussed With:    Patient     Code Status (Patient has no pulse and is not breathing):    CPR (Attempt to Resuscitate)     Medical Interventions (Patient has pulse or is breathing):    Full Support       Kalpana Hernandez MD  07/09/22

## 2022-07-09 NOTE — PLAN OF CARE
Goal Outcome Evaluation:  Plan of Care Reviewed With: patient      Pt aox4, vss, room air, up x2 assist to bsc, pain controlled with po meds and infu-block, sling on RUE, planning for surgery Tuesday 7/12.

## 2022-07-09 NOTE — PROGRESS NOTES
"Orthopedic Daily Progress Note      CC: INDIRA overnight    Pain well controlled    Physical Exam:  I have reviewed the vital signs.  Temp:  [97.9 °F (36.6 °C)-98.4 °F (36.9 °C)] 97.9 °F (36.6 °C)  Heart Rate:  [64-80] 64  Resp:  [17-18] 18  BP: (116-157)/(74-95) 123/88    Objective:  Vital signs: (most recent): Blood pressure 123/88, pulse 64, temperature 97.9 °F (36.6 °C), temperature source Oral, resp. rate 18, height 154.9 cm (61\"), weight 82 kg (180 lb 12.8 oz), SpO2 94 %, not currently breastfeeding.            General Appearance:    Alert, cooperative, no distress  Extremities: No clubbing, cyanosis, or edema to lower extremities  Pulses:  2+ right radial pulse  Skin: +ecchymoses about RUE. Radial/median/ulnar nerves intact      Results Review:    I have reviewed the labs, radiology results and diagnostic studies. Urine studies demonstrate no signs of pheo    Results from last 7 days   Lab Units 07/09/22  0744   WBC 10*3/mm3 8.18   HEMOGLOBIN g/dL 12.4   PLATELETS 10*3/mm3 304     Results from last 7 days   Lab Units 07/05/22  1434   SODIUM mmol/L 142   POTASSIUM mmol/L 4.6   CO2 mmol/L 34.0*   CREATININE mg/dL 0.81   GLUCOSE mg/dL 122*       I have reviewed the medications.    Assessment/Problem List  R proximal and distal humerus fractures  Plan on ORIF/IMN R humerus fractures on 7/12    Plan:  ROSY DELANEY    NPO at MN on 7/11 in preparation for surgery on 7/12    Nancy Zuñiga PA-C   07/09/22  09:15 EDT            "

## 2022-07-09 NOTE — PROGRESS NOTES
Acute pain service Inpatient Progress Note    Patient Name: Altagracia Rothman  :  1950  MRN:  [id number]        Acute Pain  Service Inpatient Progress Note:    Analgesia:Good  Pain Score:4/10  LOC: alert and awake  Resp Status: room air  Cardiac: VS stable  Side Effects:None  Catheter Site:clean, dressing intact and dry  Cath type: peripheral nerve cath with ON Q  Volume: basal 1/PIB 5/PCA 5.  Dosing/Volume: ropivacaine 0.2%  Catheter Plan:Catheter to remain Insitu and Continue catheter infusion rate unchanged

## 2022-07-10 PROCEDURE — 99231 SBSQ HOSP IP/OBS SF/LOW 25: CPT | Performed by: INTERNAL MEDICINE

## 2022-07-10 PROCEDURE — 25010000002 ERTAPENEM PER 500 MG: Performed by: NURSE PRACTITIONER

## 2022-07-10 PROCEDURE — 94799 UNLISTED PULMONARY SVC/PX: CPT

## 2022-07-10 PROCEDURE — 97164 PT RE-EVAL EST PLAN CARE: CPT

## 2022-07-10 PROCEDURE — 97110 THERAPEUTIC EXERCISES: CPT

## 2022-07-10 RX ADMIN — LEVOTHYROXINE SODIUM 88 MCG: 88 TABLET ORAL at 06:27

## 2022-07-10 RX ADMIN — HYDROCORTISONE 10 MG: 10 TABLET ORAL at 13:07

## 2022-07-10 RX ADMIN — MORPHINE SULFATE 30 MG: 30 TABLET, FILM COATED, EXTENDED RELEASE ORAL at 08:50

## 2022-07-10 RX ADMIN — GABAPENTIN 800 MG: 400 CAPSULE ORAL at 20:24

## 2022-07-10 RX ADMIN — GABAPENTIN 800 MG: 400 CAPSULE ORAL at 08:49

## 2022-07-10 RX ADMIN — OXYCODONE HYDROCHLORIDE AND ACETAMINOPHEN 1 TABLET: 10; 325 TABLET ORAL at 01:14

## 2022-07-10 RX ADMIN — OXYCODONE HYDROCHLORIDE AND ACETAMINOPHEN 1 TABLET: 10; 325 TABLET ORAL at 21:38

## 2022-07-10 RX ADMIN — Medication 10 ML: at 08:55

## 2022-07-10 RX ADMIN — PINDOLOL 2.5 MG: 5 TABLET ORAL at 20:25

## 2022-07-10 RX ADMIN — MORPHINE SULFATE 30 MG: 30 TABLET, FILM COATED, EXTENDED RELEASE ORAL at 20:24

## 2022-07-10 RX ADMIN — Medication 250 MG: at 20:24

## 2022-07-10 RX ADMIN — LISINOPRIL 5 MG: 5 TABLET ORAL at 10:17

## 2022-07-10 RX ADMIN — ERTAPENEM SODIUM 1 G: 1 INJECTION, POWDER, LYOPHILIZED, FOR SOLUTION INTRAMUSCULAR; INTRAVENOUS at 08:55

## 2022-07-10 RX ADMIN — GABAPENTIN 800 MG: 400 CAPSULE ORAL at 17:16

## 2022-07-10 RX ADMIN — NORTRIPTYLINE HYDROCHLORIDE 50 MG: 10 CAPSULE ORAL at 17:16

## 2022-07-10 RX ADMIN — HYDROCORTISONE 20 MG: 10 TABLET ORAL at 06:26

## 2022-07-10 RX ADMIN — OXYCODONE HYDROCHLORIDE AND ACETAMINOPHEN 1 TABLET: 10; 325 TABLET ORAL at 06:26

## 2022-07-10 RX ADMIN — NORTRIPTYLINE HYDROCHLORIDE 50 MG: 10 CAPSULE ORAL at 08:50

## 2022-07-10 RX ADMIN — DOXYCYCLINE 100 MG: 100 CAPSULE ORAL at 08:50

## 2022-07-10 RX ADMIN — SPIRONOLACTONE 50 MG: 50 TABLET ORAL at 08:50

## 2022-07-10 RX ADMIN — NORTRIPTYLINE HYDROCHLORIDE 50 MG: 10 CAPSULE ORAL at 20:24

## 2022-07-10 RX ADMIN — DOXYCYCLINE 100 MG: 100 CAPSULE ORAL at 20:24

## 2022-07-10 RX ADMIN — OXYCODONE HYDROCHLORIDE AND ACETAMINOPHEN 1 TABLET: 10; 325 TABLET ORAL at 15:08

## 2022-07-10 RX ADMIN — PANTOPRAZOLE SODIUM 40 MG: 40 TABLET, DELAYED RELEASE ORAL at 06:30

## 2022-07-10 RX ADMIN — Medication 250 MG: at 08:49

## 2022-07-10 RX ADMIN — GABAPENTIN 800 MG: 400 CAPSULE ORAL at 13:07

## 2022-07-10 RX ADMIN — BUDESONIDE AND FORMOTEROL FUMARATE DIHYDRATE 2 PUFF: 160; 4.5 AEROSOL RESPIRATORY (INHALATION) at 21:00

## 2022-07-10 NOTE — PLAN OF CARE
SBP moderately elevated. SR/S Luís on cardiac mx. UOP ADQ, pain adequately controlled with scheduled/PRN analgesics. Patient is resting in bed w/o sx/si of pain/acute distress at this time. Will cont to mx. Call light in reach.

## 2022-07-10 NOTE — THERAPY RE-EVALUATION
Patient Name: Altagracia Rothman  : 1950    MRN: [id number]                              Today's Date: 7/10/2022       Admit Date: 2022    Visit Dx:     ICD-10-CM ICD-9-CM   1. Other closed displaced fracture of proximal end of right humerus, initial encounter  S42.291A 812.09   2. Closed fracture of distal end of humerus, unspecified fracture morphology, initial encounter  S42.409A 812.40   3. Fall, initial encounter  W19.XXXA E888.9   4. Osteoporosis with current pathological fracture, unspecified osteoporosis type, initial encounter  M80.00XA 733.00     733.10     Patient Active Problem List   Diagnosis   • Adrenal insufficiency (HCC)   • Hypothyroidism   • Essential hypertension   • H/O pheochromocytoma   • Chronic bilateral low back pain without sciatica   • Encounter for chronic pain management   • Osteoporosis   • Tobacco abuse   • BMI 35.0-35.9,adult   • Bronchitis   • Moderate asthma without complication   • Paresthesia   • Medicare annual wellness visit, subsequent   • Routine medical exam   • Lipid screening   • Hair loss   • Other closed displaced fracture of proximal end of right humerus, initial encounter   • Acute UTI (urinary tract infection)     Past Medical History:   Diagnosis Date   • Adrenal insufficiency (HCC)    • Fibromyalgia    • Hypertension    • Hypothyroidism    • Skin cancer      Past Surgical History:   Procedure Laterality Date   • ADRENAL GLAND SURGERY     • BREAST BIOPSY      x4   • GALLBLADDER SURGERY     • HYSTERECTOMY     • MRI ANGIOGRAM LOWER EXTREMITY UNILATERAL W CONTRAST     • TONSILLECTOMY        General Information     Row Name 07/10/22 0835          Physical Therapy Time and Intention    Document Type re-evaluation  -CT     Mode of Treatment physical therapy  -CT     Row Name 07/10/22 0835          General Information    Patient Profile Reviewed yes  -CT     Prior Level of Function independent:;gait;transfer;bed mobility;dressing;bathing;driving  -CT     Existing  Precautions/Restrictions brace worn when out of bed;shoulder;non-weight bearing  -CT     Barriers to Rehab medically complex;previous functional deficit;physical barrier  -CT     Row Name 07/10/22 0835          Living Environment    People in Home alone  -CT     Row Name 07/10/22 0835          Cognition    Orientation Status (Cognition) oriented x 4  -CT     Row Name 07/10/22 0835          Safety Issues, Functional Mobility    Safety Issues Affecting Function (Mobility) positioning of assistive device;sequencing abilities  -CT     Impairments Affecting Function (Mobility) balance;endurance/activity tolerance;range of motion (ROM)  -CT     Comment, Safety Issues/Impairments (Mobility) pt has significant impairements with lumbar and hip AROM, unable to stand erect. Used 2 crutches for years for mobility now limited to 1 crutch to stand pivot to chair.  -CT           User Key  (r) = Recorded By, (t) = Taken By, (c) = Cosigned By    Initials Name Provider Type    CT Malvin Stephenson, PT Physical Therapist               Mobility     Row Name 07/10/22 0839          Bed Mobility    Bed Mobility bed mobility (all) activities;rolling right  -CT     All Activities, [place] (Bed Mobility) minimum assist (75% patient effort);1 person assist  -CT     Rolling Left [place] (Bed Mobility) minimum assist (75% patient effort)  -CT     Supine-Sit [place] (Bed Mobility) minimum assist (75% patient effort)  -CT     Sit-Supine [place] (Bed Mobility) minimum assist (75% patient effort)  -CT     Assistive Device (Bed Mobility) draw sheet  -CT     Row Name 07/10/22 0839          Bed-Chair Transfer    Bed-Chair [place] (Transfers) contact guard  -CT     Assistive Device (Bed-Chair Transfers) crutches, axillary  -CT     Comment, (Bed-Chair Transfer) single crutch  -CT     Row Name 07/10/22 0839          Sit-Stand Transfer    Sit-Stand [place] (Transfers) contact guard  -CT     Assistive Device (Sit-Stand  Transfers) crutches, axillary  -CT     Row Name 07/10/22 0839          Gait/Stairs (Locomotion)    [place] Level (Gait) contact guard  -CT     Assistive Device (Gait) crutches, axillary  -CT     Distance in Feet (Gait) able to take 5-6 small steps to chair w increased R side hip, back and foot pain.  -CT     Deviations/Abnormal Patterns (Gait) base of support, narrow;festinating/shuffling  -CT     Row Name 07/10/22 0839          Mobility    Extremity Weight-bearing Status right upper extremity  -CT     Right Upper Extremity (Weight-bearing Status) non weight-bearing (NWB)  -CT           User Key  (r) = Recorded By, (t) = Taken By, (c) = Cosigned By    Initials Name Provider Type    CT Malvin Stephenson, PT Physical Therapist               Obj/Interventions     Row Name 07/10/22 0842          Range of Motion Comprehensive    General Range of Motion lower extremity range of motion deficits identified  -CT     Comment, General Range of Motion limited hip and knee range prior functional deficits.  -CT     Row Name 07/10/22 0842          Strength Comprehensive (MMT)    General Manual Muscle Testing (MMT) Assessment lower extremity strength deficits identified;upper extremity strength deficits identified  -CT     Comment, General Manual Muscle Testing (MMT) Assessment overal stength impaired fucntionally to 4/5  -CT     Row Name 07/10/22 0842          Motor Skills    Motor Skills functional endurance;posture  -CT     Therapeutic Exercise hip;knee;ankle  -CT     Row Name 07/10/22 0842          Shoulder (Therapeutic Exercise)    Shoulder (Therapeutic Exercise) AROM (active range of motion)  -CT     Row Name 07/10/22 0842          Hip (Therapeutic Exercise)    Hip (Therapeutic Exercise) AROM (active range of motion)  -CT     Hip AROM (Therapeutic Exercise) 20 repititions  -CT     Hip Strengthening (Therapeutic Exercise) flexion;20 repititions  -CT     Row Name 07/10/22 0842          Knee (Therapeutic Exercise)     Knee (Therapeutic Exercise) AROM (active range of motion)  -CT     Knee AROM (Therapeutic Exercise) LAQ (long arc quad);SAQ (short arc quad);20 repititions  -CT     Row Name 07/10/22 0842          Ankle (Therapeutic Exercise)    Ankle (Therapeutic Exercise) AROM (active range of motion)  -CT     Ankle AROM (Therapeutic Exercise) dorsiflexion;plantarflexion;20 repititions  -CT     Row Name 07/10/22 0842          Balance    Balance Assessment sitting static balance;sitting dynamic balance;standing static balance;standing dynamic balance  -CT     Static Sitting Balance supervision  -CT     Dynamic Sitting Balance minimal assist  -CT     Static Standing Balance minimal assist  -CT     Dynamic Standing Balance moderate assist  -CT     Position/Device Used, Standing Balance crutches, axillary  -CT     Balance Interventions sitting;standing;sit to stand;supported  -CT           User Key  (r) = Recorded By, (t) = Taken By, (c) = Cosigned By    Initials Name Provider Type    CT Malvin Stephenson, PT Physical Therapist               Goals/Plan     Row Name 07/10/22 0848          Bed Mobility Goal 1 (PT)    Activity/Assistive Device (Bed Mobility Goal 1, PT) bed mobility activities, all;rolling to left;rolling to right  -CT     [place] Level/Cues Needed (Bed Mobility Goal 1, PT) supervision required  -CT     Time Frame (Bed Mobility Goal 1, PT) short term goal (STG);3 days  -CT     Row Name 07/10/22 0848          Transfer Goal 1 (PT)    Activity/Assistive Device (Transfer Goal 1, PT) transfers, all  -CT     [place] Level/Cues Needed (Transfer Goal 1, PT) supervision required  -CT     Time Frame (Transfer Goal 1, PT) short term goal (STG);3 days  -CT     Row Name 07/10/22 0848          Gait Training Goal 1 (PT)    Activity/Assistive Device (Gait Training Goal 1, PT) gait (walking locomotion);assistive device use;crutches, axillary  -CT     [place] Level (Gait Training Goal 1, PT) minimum assist (75% or more  patient effort)  -CT     Distance (Gait Training Goal 1, PT) 25 ft  -CT           User Key  (r) = Recorded By, (t) = Taken By, (c) = Cosigned By    Initials Name Provider Type    CT Malvin Stephenson, FAIZA Physical Therapist               Clinical Impression     Row Name 07/10/22 0845          Pain    Pretreatment Pain Rating 8/10  -CT     Posttreatment Pain Rating 8/10  -CT     Pain Location - Side/Orientation Left  -CT     Pain Location - shoulder  -CT     Pre/Posttreatment Pain Comment pt used PCA with now effect.  Check site as pt had used wipe to clean up in bed and found edges of dressing to be damp down her back and along collar bone Nsg notified to assess intergity of PCA.  -CT     Pain Intervention(s) Medication (See MAR);Repositioned;Elevated  -CT     Row Name 07/10/22 0851          Plan of Care Review    Progress improving  -CT     Outcome Evaluation able to transfer bed to chair MIN A with single crutch and 5-6 small steps.  Pain was significant in shldr hip and knee.  NSG notified re PCA intergrity.  OOB in chair with sling.  -CT     Row Name 07/10/22 0851 07/10/22 0845       Therapy Assessment/Plan (PT)    Rehab Potential (PT) good, to achieve stated therapy goals  -CT good, to achieve stated therapy goals  -CT    Criteria for Skilled Interventions Met (PT) yes  -CT yes  -CT    Therapy Frequency (PT) daily  -CT daily  -CT    Row Name 07/10/22 0845          Positioning and Restraints    Pre-Treatment Position in bed  -CT     Post Treatment Position chair  -CT     In Chair notified nsg;reclined;sitting;call light within reach;exit alarm on;legs elevated  pillow under R shoulder  -CT           User Key  (r) = Recorded By, (t) = Taken By, (c) = Cosigned By    Initials Name Provider Type    CT Malvin Stephenson, PT Physical Therapist               Outcome Measures     Row Name 07/10/22 0850          How much help from another person do you currently need...    Turning from your back to your side  while in flat bed without using bedrails? 3  -CT     Moving from lying on back to sitting on the side of a flat bed without bedrails? 3  -CT     Moving to and from a bed to a chair (including a wheelchair)? 3  -CT     Standing up from a chair using your arms (e.g., wheelchair, bedside chair)? 3  -CT     Climbing 3-5 steps with a railing? 1  -CT     To walk in hospital room? 2  -CT     AM-PAC 6 Clicks Score (PT) 15  -CT     Highest level of mobility 4 --> Transferred to chair/commode  -CT     Row Name 07/10/22 0850          Functional Assessment    Outcome Measure Options AM-PAC 6 Clicks Basic Mobility (PT)  -CT           User Key  (r) = Recorded By, (t) = Taken By, (c) = Cosigned By    Initials Name Provider Type    CT Malvin Stephenson, FAIZA Physical Therapist                             Physical Therapy Education                 Title: PT OT SLP Therapies (In Progress)     Topic: Physical Therapy (In Progress)     Point: Mobility training (In Progress)     Learning Progress Summary           Patient Acceptance, E,D, NR by CT at 7/10/2022 0850    Acceptance, E,D, VU,NR by LR at 7/4/2022 0823    Comment: Educated on benefits of mobility, safety with mobility, NWB status of R UE, correct sit<->stand t/f technique, correct gait mechanics, LE HEP, and progression of POC.    Acceptance, E,D, VU,NR by LR at 7/3/2022 1421    Comment: Educated on precautions, NWB status of R UE, safety with mobility, correct supine to sit t/f technique, correct sit<->stand t/f technique, correct bed to chair t/f technique, HEP, and progression of POC.    Acceptance, E,D, VU,NR by HP at 7/2/2022 1616    Acceptance, E,TB, NR by KG at 7/1/2022 1601    Acceptance, E, NR by FW at 6/28/2022 1512    Eager, E, VU,DU by SC at 6/27/2022 1151    Comment: Reviewed safety with mobility    Acceptance, E, VU,NR by LO at 6/26/2022 0825    Comment: Patient education regarding PT POC, sequencing for bed mobility, transfers, and ambulation                    Point: Home exercise program (In Progress)     Learning Progress Summary           Patient Acceptance, E,D, NR by CT at 7/10/2022 0850    Acceptance, E,D, VU,NR by LR at 7/4/2022 0823    Comment: Educated on benefits of mobility, safety with mobility, NWB status of R UE, correct sit<->stand t/f technique, correct gait mechanics, LE HEP, and progression of POC.    Acceptance, E,D, VU,NR by LR at 7/3/2022 1421    Comment: Educated on precautions, NWB status of R UE, safety with mobility, correct supine to sit t/f technique, correct sit<->stand t/f technique, correct bed to chair t/f technique, HEP, and progression of POC.    Acceptance, E,D, VU,NR by HP at 7/2/2022 1616    Acceptance, E,TB, NR by KG at 7/1/2022 1601    Acceptance, E, VU,NR by LS at 6/29/2022 1450    Comment: Benefits of activity    Acceptance, E, NR by FW at 6/28/2022 1512    Eager, E, VU,DU by SC at 6/27/2022 1151    Comment: Reviewed safety with mobility    Acceptance, E, VU,NR by LO at 6/26/2022 0825    Comment: Patient education regarding PT POC, sequencing for bed mobility, transfers, and ambulation                   Point: Body mechanics (In Progress)     Learning Progress Summary           Patient Acceptance, E,D, NR by CT at 7/10/2022 0850    Acceptance, E,D, VU,NR by LR at 7/4/2022 0823    Comment: Educated on benefits of mobility, safety with mobility, NWB status of R UE, correct sit<->stand t/f technique, correct gait mechanics, LE HEP, and progression of POC.    Acceptance, E,D, VU,NR by LR at 7/3/2022 1421    Comment: Educated on precautions, NWB status of R UE, safety with mobility, correct supine to sit t/f technique, correct sit<->stand t/f technique, correct bed to chair t/f technique, HEP, and progression of POC.    Acceptance, E,D, VU,NR by HP at 7/2/2022 1616    Acceptance, E,TB, NR by KG at 7/1/2022 1601    PETR Correa NR by FW at 6/28/2022 1512    PETR Gallardo VU, DU by SC at 6/27/2022 1151    Comment: Reviewed safety with  mobility    Acceptance, E, VU,NR by LO at 6/26/2022 0825    Comment: Patient education regarding PT POC, sequencing for bed mobility, transfers, and ambulation                   Point: Precautions (In Progress)     Learning Progress Summary           Patient Acceptance, E,D, NR by CT at 7/10/2022 0850    Acceptance, E,D, VU,NR by LR at 7/4/2022 0823    Comment: Educated on benefits of mobility, safety with mobility, NWB status of R UE, correct sit<->stand t/f technique, correct gait mechanics, LE HEP, and progression of POC.    Acceptance, E,D, VU,NR by LR at 7/3/2022 1421    Comment: Educated on precautions, NWB status of R UE, safety with mobility, correct supine to sit t/f technique, correct sit<->stand t/f technique, correct bed to chair t/f technique, HEP, and progression of POC.    Acceptance, E,D, VU,NR by HP at 7/2/2022 1616    Acceptance, E,TB, NR by KG at 7/1/2022 1601    Acceptance, E, NR by FW at 6/28/2022 1512    Eager, E, VU,DU by SC at 6/27/2022 1151    Comment: Reviewed safety with mobility    Acceptance, E, VU,NR by LO at 6/26/2022 0825    Comment: Patient education regarding PT POC, sequencing for bed mobility, transfers, and ambulation                               User Key     Initials Effective Dates Name Provider Type Discipline    SC 06/16/21 -  Juan Montez, PT Physical Therapist PT    LR 06/16/21 -  Rosanna Overton, PT Physical Therapist PT    LS 06/16/21 -  Baylee Pillai, PT Physical Therapist PT    CT 06/16/21 -  Malvin Stephenson, PT Physical Therapist PT    KG 06/16/21 -  Antoinette Swift Physical Therapist PT    FW 05/05/22 -  Eyal Delgado, PT Physical Therapist PT    LO 06/16/21 -  Asia Sosa, PT Physical Therapist PT    HP 06/01/21 -  Fina Landeros, PT Physical Therapist PT              PT Recommendation and Plan     Progress: improving  Outcome Evaluation: able to transfer bed to chair MIN A with single crutch and 5-6 small steps.  Pain was  significant in shldr hip and knee.  NSG notified re PCA intergrity.  OOB in chair with sling.     Time Calculation:    PT Charges     Row Name 07/10/22 0853             Time Calculation    Start Time 0755  -CT      PT Received On 07/10/22  -CT      PT Goal Re-Cert Due Date 07/21/22  -CT              Time Calculation- PT    Total Timed Code Minutes- PT 45 minute(s)  -CT            User Key  (r) = Recorded By, (t) = Taken By, (c) = Cosigned By    Initials Name Provider Type    CT Malvin Stephenson, PT Physical Therapist              Therapy Charges for Today     Code Description Service Date Service Provider Modifiers Qty    38434331841 HC PT THER PROC EA 15 MIN 7/10/2022 Malvin Stephenson, PT GP 2    08517988129 HC PT RE-EVAL ESTABLISHED PLAN 2 7/10/2022 Malvin Stephenson, PT GP 1          PT G-Codes  Outcome Measure Options: AM-PAC 6 Clicks Basic Mobility (PT)  AM-PAC 6 Clicks Score (PT): 15  AM-PAC 6 Clicks Score (OT): 13    Malvin Stephenson PT  7/10/2022

## 2022-07-10 NOTE — PROGRESS NOTES
[facility]    Acute pain service Inpatient Progress Note    Patient Name: Altagracia Rothman  :  1950  MRN:  [id number]        Acute Pain  Service Inpatient Progress Note:    Analgesia:Good  Pain Score:5/10  LOC: alert and awake  Resp Status: supplemental oxygen  Side Effects:None  Catheter Site:clean  Cath type: peripheral nerve cath(MOOG pump)  Infusion rate: 6ml/hr  Dosing/Volume: ropivacaine 0.2%  Catheter Plan:Catheter to remain Insitu and Continue catheter infusion rate unchanged

## 2022-07-10 NOTE — PLAN OF CARE
Goal Outcome Evaluation:  Plan of Care Reviewed With: patient      Pt aox4, vss, room air, up to bsc with assist x2, pain controlled with po meds and infublock-dressing CDI, adequate I's and O's, sling in place to RUE, plans for surgery on Tuesday 7/12

## 2022-07-10 NOTE — PROGRESS NOTES
[facility] Medicine Services  PROGRESS NOTE    Patient Name: Altagracia Rothman  : 1950  MRN: [id number]    Date of Admission: 2022  Primary Care Physician: Alvina Lloyd PA-C    Subjective   Subjective     CC: f/u arm fracture    HPI:   About the same today.  Aches and pains all over.    ROS:  Gen- No fevers, chills  MSK- As above    Unchanged from     Objective   Objective     Vital Signs:   Temp:  [98 °F (36.7 °C)-98.4 °F (36.9 °C)] 98.2 °F (36.8 °C)  Heart Rate:  [66-83] 66  Resp:  [18-20] 18  BP: (108-151)/(56-91) 127/68     Physical Exam:  Constitutional: No acute distress, awake, alert, sitting up in bed  HENT: NCAT, mucous membranes moist  Respiratory: Respiratory effort normal   Cardiovascular: RRR  Musculoskeletal: No bilateral ankle edema, RUE in sling  Psychiatric: Appropriate affect, cooperative  Neurologic: Speech clear and fluent  Skin: No rashes on exposed surfaces    Exam unchanged from     Results Reviewed:  LAB RESULTS:      Lab 22  0744 22  1237   WBC 8.18 9.13   HEMOGLOBIN 12.4 11.8*   HEMATOCRIT 39.5 37.5   PLATELETS 304 263   NEUTROS ABS 5.57 6.62   IMMATURE GRANS (ABS) 0.05 0.05   LYMPHS ABS 1.65 1.41   MONOS ABS 0.73 0.82   EOS ABS 0.16 0.20   .6* 108.4*         Lab 22  0744 22  1434   SODIUM 139 142   POTASSIUM 4.1 4.6   CHLORIDE 102 103   CO2 29.0 34.0*   ANION GAP 8.0 5.0   BUN 15 16   CREATININE 0.58 0.81   EGFR 95.7 76.8   GLUCOSE 97 122*   CALCIUM 9.4 9.0   MAGNESIUM  --  2.1         Lab 22  1434   TOTAL PROTEIN 5.8*   ALBUMIN 3.00*   GLOBULIN 2.8   ALT (SGPT) 27   AST (SGOT) 21   BILIRUBIN 0.5   ALK PHOS 120*         Brief Urine Lab Results  (Last result in the past 365 days)      Color   Clarity   Blood   Leuk Est   Nitrite   Protein   CREAT   Urine HCG        22 1145 Yellow   Clear   Negative   Small (1+)   Negative   Negative                 Microbiology Results Abnormal     Procedure  Component Value - Date/Time    COVID PRE-OP / PRE-PROCEDURE SCREENING ORDER (NO ISOLATION) - Swab, Nasopharynx [[id number]]  (Normal) Collected: 06/24/22 1901    Lab Status: Final result Specimen: Swab from Nasopharynx Updated: 06/24/22 1956    Narrative:      The following orders were created for panel order COVID PRE-OP / PRE-PROCEDURE SCREENING ORDER (NO ISOLATION) - Swab, Nasopharynx.  Procedure                               Abnormality         Status                     ---------                               -----------         ------                     COVID-19, FLU A/B, RSV P...[[id number]]  Normal              Final result                 Please view results for these tests on the individual orders.    COVID-19, FLU A/B, RSV PCR - Swab, Nasopharynx [[id number]]  (Normal) Collected: 06/24/22 1901    Lab Status: Final result Specimen: Swab from Nasopharynx Updated: 06/24/22 1956     COVID19 Not Detected     Influenza A PCR Not Detected     Influenza B PCR Not Detected     RSV, PCR Not Detected    Narrative:      Fact sheet for providers: https://www.fda.gov/media/771233/download    Fact sheet for patients: https://www.fda.gov/media/102392/download    Test performed by PCR.          Peripheral Block    Result Date: 7/8/2022  Nirmal De La Torre CRNA     7/8/2022 12:52 PM Peripheral Block Patient reassessed immediately prior to procedure Patient location during procedure: pre-op Start time: 7/8/2022 12:36 PM Stop time: 7/8/2022 12:52 PM Reason for block: at surgeon's request and post-op pain management Performed by ZAINAB/CAA: Nirmal De La Torre CRNA Assisted by: Siobhan Moya RN Preanesthetic Checklist Completed: patient identified, IV checked, site marked, risks and benefits discussed, surgical consent, monitors and equipment checked, pre-op evaluation and timeout performed Prep: Pt Position: left lateral decubitus Sterile barriers:cap, gloves, mask and sterile barriers Prep: ChloraPrep Patient monitoring: blood  pressure monitoring, continuous pulse oximetry and EKG Procedure Sedation: yes Performed under: local infiltration Guidance:ultrasound guided Images:still images obtained, printed/placed on chart Laterality:right Block Type:interscalene Injection Technique:catheter Needle Type:Tuohy and echogenic Needle Gauge:18 G Resistance on Injection: none Catheter Size:20 G (20g) Cath Depth at skin: 9 cm Medications Used: bupivacaine PF (MARCAINE) 0.25 % injection, 5 mL Med administered at 7/8/2022 12:36 PM Medications Preservative Free Saline:5ml Post Assessment Injection Assessment: negative aspiration for heme, no paresthesia on injection and incremental injection Patient Tolerance:comfortable throughout block Complications:no Additional Notes Procedure:               The pt was placed in semifowlers position with a slight tilt of the thorax contralateral to the insertion site.  The Insertion Site was prepped and draped in sterile fashion.  The pt was anesthetized with  IV Sedation( see meds) and  Skin and cutaneous tissue was infiltrated and anesthetized with 1% Lidocaine 3 mls via a 25g needle.  Utilizing ultrasound guidance, a BBraun 4 inch 18 g Contiplex echogenic touhy needle was advanced in-plane.  Hydro dissection of tissue was achieved with Normal saline. Major vessels(carotid and Internal Jugular) where visualized as the brachial plexus was approached at the approximate level of C-7/ T-1.  Cervical 5 and Branches of Cervical 6 nerve roots where visualized and the needle tip was placed posterior at the level of C-6 roots.  LA spread was visualized and injection was made incrementally every 5 mls with aspiration. Injection pressure was normal or little, there was no intraneural injection, no vascular injection.    The BBraun 20 g wire stylet catheter was then placed under US guidance on the posterior aspect of the Brachial Plexus. The tuohy was removed and the location of catheter was confirmed with NS injection  visualized with US . The skin was sealed with exofin tissue adhesive at catheter insertion site.  Skin was prepped with benzoin and the catheter was secured with steristrips and a CHG tegaderm. Appropriate labels applied. Thank You.           I have reviewed the medications:  Scheduled Meds:budesonide-formoterol, 2 puff, Inhalation, BID - RT  doxycycline, 100 mg, Oral, Q12H  ertapenem, 1 g, Intravenous, Daily  famotidine, 20 mg, Intravenous, Once  gabapentin, 800 mg, Oral, 4x Daily  hydrocortisone, 10 mg, Oral, Daily With Lunch  hydrocortisone, 20 mg, Oral, QAM  levothyroxine, 88 mcg, Oral, Q AM  lisinopril, 5 mg, Oral, Q24H  Morphine, 30 mg, Oral, BID  nortriptyline, 50 mg, Oral, TID  pantoprazole, 40 mg, Oral, Q AM  pindolol, 2.5 mg, Oral, TID  saccharomyces boulardii, 250 mg, Oral, BID  sodium chloride, 10 mL, Intravenous, Q12H  sodium chloride, 10 mL, Intravenous, Q12H  spironolactone, 50 mg, Oral, Daily      Continuous Infusions:Pharmacy Consult,   lactated ringers, 9 mL/hr  ropivacaine,   sodium chloride, 75 mL/hr, Last Rate: 75 mL/hr (22 0105)      PRN Meds:.acetaminophen **OR** acetaminophen **OR** acetaminophen  •  albuterol  •  diphenhydrAMINE  •  hydrALAZINE  •  hydrOXYzine  •  labetalol  •  lidocaine PF 1%  •  magnesium sulfate **OR** magnesium sulfate in D5W 1g/100mL (PREMIX) **OR** magnesium sulfate  •  midazolam  •  [] Morphine **AND** naloxone  •  ondansetron **OR** ondansetron  •  oxyCODONE-acetaminophen  •  potassium chloride **OR** potassium chloride **OR** potassium chloride  •  sodium chloride  •  sodium chloride    Assessment & Plan   Assessment & Plan     Active Hospital Problems    Diagnosis  POA   • **Other closed displaced fracture of proximal end of right humerus, initial encounter [S43.291A]  Yes   • Acute UTI (urinary tract infection) [N39.0]  Yes   • Moderate asthma without complication [J45.309]  Yes   • Osteoporosis [M81.0]  Yes   • Adrenal insufficiency (HCC) [E27.40]   Yes   • Hypothyroidism [E03.9]  Yes   • Essential hypertension [I10]  Yes   • H/O pheochromocytoma [Z86.018]  Yes      Resolved Hospital Problems   No resolved problems to display.        Brief Hospital Course to date:  Altagracia Rothman is a 72 y.o. female with past medical history significant for hypertension, history of pheochromocytoma, s/p left adrenal resection, adrenal insufficiency, hypothyroidism, moderate asthma. Patient was admitted for right humerus fracture after a fall.     Right proximal and distal humerus fracture after mechanical fall.    -Tentatively planning for ORIF  Tuesday 7/12 pending results for metanephrines and catecholamines still pending  -Will need stress-doing of home steroids for AI with IV hydrocortisone, usually does IV hydrocortisone; my partner spoke with Dr. Matute and anesthesiology will dose steroids prior to surgery     Bronchitis - POA, now resolved  -CXR without infiltrate. S/P 5 days of empiric doxy.  On room air  -Resume home inhalers, continue symbicort  -s/p another round of doxy for toothache  -continue probiotic     Hypoxia/Chest Pain-resolved  - RRT 6/30, likely related to combination of nerve block and anxiety  - CXR, EKG, troponin and ABG are WNL     Hx adrenal insufficiency s/p adrenalectomy, s/p adrenal crisis (hypotension on admission)  History of pheochromocytoma - resected in 2003   - My partner discussed this with patient's endocrinologist Dr. Nelson via telephone on 6/30. Patient had adrenalectomy in 2003 with removal of pheo and has declined further screening tests since that time. Per Dr. Nelson, cathecholamine/metanphrine testing would be helpful if it is negative, although it could return a false positive from ongoing tricyclic med which patient has refused to taper. She would need to be off noratryptiline for 4-6 weeks for accurate testing.  - 24hr-urine metanephrines resulted, catecholamines still pending  - Patient has been weaned back to home dose of  hydrocortisone 20mg qam, 10mg nightly after initial stress-dosing. Will need to be stress dosed with IV hydrocortisone at timing of surgery.     Hx HTN  -initally was hypotensive due to adrenal crisis, so bp meds were held  -started back lisinopril 5mg on 6/27/22 (on quinapril 5mg at home)  -hydrocortisone weaned back to home dose  -Continue aldactone  -Resumed home pindolol 2.5mg, PRN hydralazine and PRN labetalol      Gillespie catheter placed in ED at time of admission  UTI, proteus species/ESBL  -Gillespie d/c'd on 6/26/22. Initial urine cultures showed proteus mirabilis  -She completed course of fosfomycin  -Repeat U/A with small leuks, 13-20 WBC and 1+bacteria, culture again >100K proteus/ESBL  -Rocephin changed to ertapenem due to ESBL     Hyperglycemia, improved  -Not diabetic, A1c 5.3     Hypothyroidism   -On home dose Synthroid.     Expected Discharge Location and Transportation: Rehab, [facility] van or EMS  Expected Discharge Date: Unknown, waiting on surgery, 7/15 at the earliest    DVT prophylaxis:  Mechanical DVT prophylaxis orders are present.     AM-PAC 6 Clicks Score (PT): 15 (07/10/22 0853)    CODE STATUS:   Code Status and Medical Interventions:   Ordered at: 06/24/22 9798     Level Of Support Discussed With:    Patient     Code Status (Patient has no pulse and is not breathing):    CPR (Attempt to Resuscitate)     Medical Interventions (Patient has pulse or is breathing):    Full Support       Kalpana Hernandez MD  07/10/22

## 2022-07-10 NOTE — PLAN OF CARE
Problem: Adult Inpatient Plan of Care  Goal: Plan of Care Review  Recent Flowsheet Documentation  Taken 7/10/2022 0851 by Malvin Stephenson, PT  Progress: improving  Outcome Evaluation: able to transfer bed to chair MIN A with single crutch and 5-6 small steps.  Pain was significant in shldr hip and knee.  NSG notified re PCA intergrity.  OOB in chair with sling.   Goal Outcome Evaluation:           Progress: improving  Outcome Evaluation: able to transfer bed to chair MIN A with single crutch and 5-6 small steps.  Pain was significant in shldr hip and knee.  NSG notified re PCA intergrity.  OOB in chair with sling.

## 2022-07-11 ENCOUNTER — ANESTHESIA (OUTPATIENT)
Dept: TELEMETRY | Facility: HOSPITAL | Age: 72
End: 2022-07-11

## 2022-07-11 ENCOUNTER — ANESTHESIA EVENT (OUTPATIENT)
Dept: TELEMETRY | Facility: HOSPITAL | Age: 72
End: 2022-07-11

## 2022-07-11 ENCOUNTER — ANESTHESIA EVENT (OUTPATIENT)
Dept: PERIOP | Facility: HOSPITAL | Age: 72
End: 2022-07-11

## 2022-07-11 ENCOUNTER — ANESTHESIA EVENT CONVERTED (OUTPATIENT)
Dept: ANESTHESIOLOGY | Facility: HOSPITAL | Age: 72
End: 2022-07-11

## 2022-07-11 PROCEDURE — 94799 UNLISTED PULMONARY SVC/PX: CPT

## 2022-07-11 PROCEDURE — 99232 SBSQ HOSP IP/OBS MODERATE 35: CPT | Performed by: INTERNAL MEDICINE

## 2022-07-11 PROCEDURE — 25010000002 ERTAPENEM PER 500 MG: Performed by: NURSE PRACTITIONER

## 2022-07-11 PROCEDURE — 97110 THERAPEUTIC EXERCISES: CPT

## 2022-07-11 PROCEDURE — 97530 THERAPEUTIC ACTIVITIES: CPT

## 2022-07-11 RX ORDER — SODIUM CHLORIDE 0.9 % (FLUSH) 0.9 %
10 SYRINGE (ML) INJECTION EVERY 12 HOURS SCHEDULED
Status: CANCELLED | OUTPATIENT
Start: 2022-07-11

## 2022-07-11 RX ORDER — SODIUM CHLORIDE 0.9 % (FLUSH) 0.9 %
10 SYRINGE (ML) INJECTION AS NEEDED
Status: CANCELLED | OUTPATIENT
Start: 2022-07-11

## 2022-07-11 RX ORDER — FAMOTIDINE 10 MG/ML
20 INJECTION, SOLUTION INTRAVENOUS ONCE
Status: CANCELLED | OUTPATIENT
Start: 2022-07-11 | End: 2022-07-11

## 2022-07-11 RX ADMIN — Medication 10 ML: at 09:04

## 2022-07-11 RX ADMIN — OXYCODONE HYDROCHLORIDE AND ACETAMINOPHEN 1 TABLET: 10; 325 TABLET ORAL at 18:42

## 2022-07-11 RX ADMIN — Medication 250 MG: at 08:59

## 2022-07-11 RX ADMIN — DOXYCYCLINE 100 MG: 100 CAPSULE ORAL at 20:35

## 2022-07-11 RX ADMIN — NORTRIPTYLINE HYDROCHLORIDE 50 MG: 10 CAPSULE ORAL at 09:00

## 2022-07-11 RX ADMIN — ERTAPENEM SODIUM 1 G: 1 INJECTION, POWDER, LYOPHILIZED, FOR SOLUTION INTRAMUSCULAR; INTRAVENOUS at 08:59

## 2022-07-11 RX ADMIN — BUDESONIDE AND FORMOTEROL FUMARATE DIHYDRATE 2 PUFF: 160; 4.5 AEROSOL RESPIRATORY (INHALATION) at 20:07

## 2022-07-11 RX ADMIN — LISINOPRIL 5 MG: 5 TABLET ORAL at 08:59

## 2022-07-11 RX ADMIN — MORPHINE SULFATE 30 MG: 30 TABLET, FILM COATED, EXTENDED RELEASE ORAL at 20:35

## 2022-07-11 RX ADMIN — OXYCODONE HYDROCHLORIDE AND ACETAMINOPHEN 1 TABLET: 10; 325 TABLET ORAL at 05:26

## 2022-07-11 RX ADMIN — GABAPENTIN 800 MG: 400 CAPSULE ORAL at 20:35

## 2022-07-11 RX ADMIN — MORPHINE SULFATE 30 MG: 30 TABLET, FILM COATED, EXTENDED RELEASE ORAL at 08:59

## 2022-07-11 RX ADMIN — PANTOPRAZOLE SODIUM 40 MG: 40 TABLET, DELAYED RELEASE ORAL at 05:26

## 2022-07-11 RX ADMIN — GABAPENTIN 800 MG: 400 CAPSULE ORAL at 11:14

## 2022-07-11 RX ADMIN — HYDROCORTISONE 10 MG: 10 TABLET ORAL at 11:14

## 2022-07-11 RX ADMIN — GABAPENTIN 800 MG: 400 CAPSULE ORAL at 08:59

## 2022-07-11 RX ADMIN — DOXYCYCLINE 100 MG: 100 CAPSULE ORAL at 08:59

## 2022-07-11 RX ADMIN — NORTRIPTYLINE HYDROCHLORIDE 50 MG: 10 CAPSULE ORAL at 20:35

## 2022-07-11 RX ADMIN — OXYCODONE HYDROCHLORIDE AND ACETAMINOPHEN 1 TABLET: 10; 325 TABLET ORAL at 12:25

## 2022-07-11 RX ADMIN — NORTRIPTYLINE HYDROCHLORIDE 50 MG: 10 CAPSULE ORAL at 15:59

## 2022-07-11 RX ADMIN — GABAPENTIN 800 MG: 400 CAPSULE ORAL at 17:23

## 2022-07-11 RX ADMIN — LEVOTHYROXINE SODIUM 88 MCG: 88 TABLET ORAL at 05:26

## 2022-07-11 RX ADMIN — Medication 250 MG: at 20:35

## 2022-07-11 RX ADMIN — HYDROCORTISONE 20 MG: 10 TABLET ORAL at 05:26

## 2022-07-11 NOTE — PLAN OF CARE
Goal Outcome Evaluation:  Plan of Care Reviewed With: patient        Progress: no change  Outcome Evaluation: Pt's functional mobility continues to be limited d/t complaints of fatigue and R shoulder pain. Pt performed bed mobility with Modax1, transfers from elevated surface with CGA and L axillary crutch and from BSC with MinAx1 and L axillary crutch, and ambulated 2ft + 3ft with MinAx1 and L axillary crutch. PT reviewed BLE HEP. PT plans to continue progressing PT POC.   Suturegard Body: The suture ends were repeatedly re-tightened and re-clamped to achieve the desired tissue expansion.

## 2022-07-11 NOTE — PROGRESS NOTES
[facility]    Acute pain service Inpatient Progress Note    Patient Name: Altagracia Rothman  :  1950  MRN:  [id number]        Acute Pain  Service Inpatient Progress Note:    Analgesia:Fair  Pain Score:5/10  LOC: alert and awake  Resp Status: room air  Cardiac: VS stable  Side Effects:None  Catheter Site:clean, dressing intact and dry  Volume: 1mL,8ml, 8ml InfuSystem Pump.  Catheter Plan:Continue catheter infusion rate unchanged  Comments: Pt's right ISB catheter dressing was wet. Catheter examined under US and was noted that the nerve cath was migrated away from the brachial plexus. Right ISB cath was replaced after pt's verbal consent and appropriate time out performed.

## 2022-07-11 NOTE — PROGRESS NOTES
Acute pain service Inpatient Progress Note    Patient Name: Altagracia Rothman  :  1950  MRN:  [id number]        Acute Pain  Service Inpatient Progress Note:    Analgesia:Fair  Pain Score:5/10  LOC: alert and awake  Resp Status: room air  Cardiac: VS stable  Side Effects:None  Catheter Site:dressing intact (Leaking)  Catheter type: Infusystem.  Volume: .  Catheter Plan:Catheter to remain Insitu and Continue catheter infusion rate unchanged                    René Eduardo CRNA  is going to assess the nerve catheter under ultrasound. Dressing is leaking. Pt has complete sensory and motor function.

## 2022-07-11 NOTE — ANESTHESIA PROCEDURE NOTES
Right ISB Catheter Re-block      Patient reassessed immediately prior to procedure    Patient location during procedure: floor  Reason for block: at surgeon's request and post-op pain management  Preanesthetic Checklist  Completed: patient identified, IV checked, site marked, risks and benefits discussed, surgical consent, monitors and equipment checked, pre-op evaluation and timeout performed  Prep:  Sterile barriers:cap, gloves, mask and sterile barriers  Prep: ChloraPrep  Patient monitoring: blood pressure monitoring, continuous pulse oximetry and EKG  Procedure    Sedation: yes  Performed under: local infiltration  Guidance:ultrasound guided  Images:still images obtained, printed/placed on chart    Laterality:right  Block Type:interscalene  Injection Technique:catheter  Needle Type:Tuohy and echogenic  Needle Gauge:18 G  Resistance on Injection: none  Catheter Size:20 G (20g)          Post Assessment  Injection Assessment: negative aspiration for heme, no paresthesia on injection and incremental injection  Patient Tolerance:comfortable throughout block  Complications:no  Additional Notes  Procedure:                 The pt was placed in semifowlers position with a slight tilt of the thorax contralateral to the insertion site.  The Insertion Site was prepped and draped in sterile fashion.  The pt was anesthetized with  IV Sedation( see meds) and  Skin and cutaneous tissue was infiltrated and anesthetized with 1% Lidocaine 3 mls via a 25g needle.  Utilizing ultrasound guidance, a BBraun 4 inch 18 g Contiplex echogenic touhy needle was advanced in-plane.  Hydro dissection of tissue was achieved with Normal saline. Major vessels(carotid and Internal Jugular) where visualized as the brachial plexus was approached at the approximate level of C-7/ T-1.  Cervical 5 and Branches of Cervical 6 nerve roots where visualized and the needle tip was placed posterior at the level of C-6 roots.  LA spread was visualized and  injection was made incrementally every 5 mls with aspiration. Injection pressure was normal or little, there was no intraneural injection, no vascular injection.      The BBraun 20 g wire stylet catheter was then placed under US guidance on the posterior aspect of the Brachial Plexus. The tuohy was removed and the location of catheter was confirmed with NS injection visualized with US . The skin was sealed with exofin tissue adhesive at catheter insertion site.  Skin was prepped with benzoin and the catheter was secured with steristrips and a CHG tegaderm. Appropriate labels applied. Thank You.

## 2022-07-11 NOTE — THERAPY TREATMENT NOTE
Patient Name: Altagracia Rothman  : 1950    MRN: [id number]                              Today's Date: 2022       Admit Date: 2022    Visit Dx:     ICD-10-CM ICD-9-CM   1. Other closed displaced fracture of proximal end of right humerus, initial encounter  S42.291A 812.09   2. Closed fracture of distal end of humerus, unspecified fracture morphology, initial encounter  S42.409A 812.40   3. Fall, initial encounter  W19.XXXA E888.9   4. Osteoporosis with current pathological fracture, unspecified osteoporosis type, initial encounter  M80.00XA 733.00     733.10     Patient Active Problem List   Diagnosis   • Adrenal insufficiency (HCC)   • Hypothyroidism   • Essential hypertension   • H/O pheochromocytoma   • Chronic bilateral low back pain without sciatica   • Encounter for chronic pain management   • Osteoporosis   • Tobacco abuse   • BMI 35.0-35.9,adult   • Bronchitis   • Moderate asthma without complication   • Paresthesia   • Medicare annual wellness visit, subsequent   • Routine medical exam   • Lipid screening   • Hair loss   • Other closed displaced fracture of proximal end of right humerus, initial encounter   • Acute UTI (urinary tract infection)     Past Medical History:   Diagnosis Date   • Adrenal insufficiency (HCC)    • Fibromyalgia    • Hypertension    • Hypothyroidism    • Skin cancer      Past Surgical History:   Procedure Laterality Date   • ADRENAL GLAND SURGERY     • BREAST BIOPSY      x4   • GALLBLADDER SURGERY     • HYSTERECTOMY     • MRI ANGIOGRAM LOWER EXTREMITY UNILATERAL W CONTRAST     • TONSILLECTOMY        General Information     Row Name 22 0859          Physical Therapy Time and Intention    Document Type therapy note (daily note)  -     Mode of Treatment physical therapy;individual therapy  -     Row Name 22 0859          General Information    Existing Precautions/Restrictions right;shoulder;non-weight bearing;brace worn when out of bed;other (see comments)  JENIFER  interscalene block, anticipating R shoulder surgery 7/12  -     Barriers to Rehab medically complex;previous functional deficit;physical barrier  -     Row Name 07/11/22 0859          Cognition    Orientation Status (Cognition) oriented x 4  -[facility] Name 07/11/22 0859          Safety Issues, Functional Mobility    Safety Issues Affecting Function (Mobility) positioning of assistive device;sequencing abilities  -     Impairments Affecting Function (Mobility) balance;endurance/activity tolerance;range of motion (ROM)  -     Comment, Safety Issues/Impairments (Mobility) Alert and following comands, requires elevated surface for STS transfers. Unable to achieve upright posture d/t poor bilat hip mobility  -           User Key  (r) = Recorded By, (t) = Taken By, (c) = Cosigned By    Initials Name Provider Type     Trav Cordero, PT Physical Therapist               Mobility     Row Name 07/11/22 0902          Bed Mobility    Supine-Sit [place] (Bed Mobility) moderate assist (50% patient effort);1 person assist;verbal cues;nonverbal cues (demo/gesture)  -     Assistive Device (Bed Mobility) draw sheet;head of bed elevated  -     Comment, (Bed Mobility) Pt required cues for proper technique and NWB RUE precautions. Physical assist required for trunk support. Increased time to achieve EOB.  -     Row Name 07/11/22 0902          Bed-Chair Transfer    Bed-Chair [place] (Transfers) minimum assist (75% patient effort);1 person assist;verbal cues;nonverbal cues (demo/gesture)  -     Assistive Device (Bed-Chair Transfers) crutches, axillary;other (see comments)  L axillary crutch  -     Comment, (Bed-Chair Transfer) Pt with significant forward flexed posture throughout transfer, requires physical assist to achieve upright standing.  -     Row Name 07/11/22 0902          Sit-Stand Transfer    Sit-Stand [place] (Transfers) minimum assist (75% patient effort);1 person assist;verbal cues   -     Assistive Device (Sit-Stand Transfers) crutches, axillary;other (see comments)  L axillary crutch  -     Comment, (Sit-Stand Transfer) Pt able to perform STS from EOB with elevated surface with CGA, although requires Patria for STS from lower BSC surface. Pt demonstrates forward flexed posture throughout.  -     Row Name 07/11/22 0902          Gait/Stairs (Locomotion)    [place] Level (Gait) minimum assist (75% patient effort);1 person assist;verbal cues  -     Assistive Device (Gait) crutches, axillary;other (see comments)  L axillary crutch  -     Distance in Feet (Gait) 2 + 3  -     Deviations/Abnormal Patterns (Gait) base of support, narrow;festinating/shuffling;gait speed decreased;stride length decreased  -     Bilateral Gait Deviations forward flexed posture;heel strike decreased  -     [place] Level (Stairs) not tested  -     Comment, (Gait/Stairs) Pt ambulated from EOB to BSC and from BSC to recliner, demonstrating significant forward flexed posture and shuffled gait pattern requiring increased time for repositioning of L axillary crutch. Distance limited by fatigue and c/o RUE pain.  -     Row Name 07/11/22 0902          Mobility    Extremity Weight-bearing Status right upper extremity  -     Right Upper Extremity (Weight-bearing Status) non weight-bearing (NWB)  -           User Key  (r) = Recorded By, (t) = Taken By, (c) = Cosigned By    Initials Name Provider Type     Trav Cordero, PT Physical Therapist               Obj/Interventions     Row Name 07/11/22 0906          Motor Skills    Therapeutic Exercise hip;knee;ankle  -[facility] Name 07/11/22 0906          Hip (Therapeutic Exercise)    Hip AROM (Therapeutic Exercise) bilateral;flexion;extension;aBduction;aDduction;external rotation;internal rotation;sitting;10 repetitions  -     Row Name 07/11/22 0906          Knee (Therapeutic Exercise)    Knee AROM (Therapeutic Exercise) bilateral;SLR (straight leg  raise);LAQ (long arc quad);sitting;10 repetitions  -     Row Name 07/11/22 0906          Ankle (Therapeutic Exercise)    Ankle AROM (Therapeutic Exercise) bilateral;dorsiflexion;plantarflexion;sitting;10 repetitions  -     Row Name 07/11/22 0906          Balance    Balance Assessment sitting static balance;sitting dynamic balance;standing static balance;standing dynamic balance  -     Static Sitting Balance supervision  -     Dynamic Sitting Balance standby assist  -     Position, Sitting Balance unsupported;sitting in chair;sitting edge of bed  -     Static Standing Balance contact guard  -     Dynamic Standing Balance minimal assist  -     Position/Device Used, Standing Balance supported;crutches, axillary  -     Comment, Balance Pt with unsteadiness during OOB mobility with LUE support on axillary crutch requiring Patria to maintain balance.  -           User Key  (r) = Recorded By, (t) = Taken By, (c) = Cosigned By    Initials Name Provider Type     Trav Cordero, PT Physical Therapist               Goals/Plan    No documentation.                Clinical Impression     Row Name 07/11/22 0909          Pain    Pretreatment Pain Rating 7/10  -     Posttreatment Pain Rating 8/10  -     Pain Location - Side/Orientation Right  -     Pain Location generalized  -     Pain Location - shoulder  -     Pain Intervention(s) Repositioned;Ambulation/increased activity  -     Row Name 07/11/22 0909          Plan of Care Review    Plan of Care Reviewed With patient  -     Progress no change  -     Outcome Evaluation Pt's functional mobility continues to be limited d/t complaints of fatigue and R shoulder pain. Pt performed bed mobility with Modax1, transfers from elevated surface with CGA and L axillary crutch and [place] [facility] [place]Ax[place] and L axillary crutch, and ambulated 2ft + 3ft with MinAx1 and L axillary crutch. PT reviewed BLE HEP. PT plans to continue progressing PT POC.  -     Row  Name 07/11/22 0909          Vital Signs    Pre Systolic BP Rehab --  VSS  -     O2 Delivery Pre Treatment room air  -LH     O2 Delivery Intra Treatment room air  -     O2 Delivery Post Treatment room air  -LH     Pre Patient Position Supine  -     Intra Patient Position Standing  -LH     Post Patient Position Sitting  -     Row Name 07/11/22 0909          Positioning and Restraints    Pre-Treatment Position in bed  -LH     Post Treatment Position chair  -LH     In Chair notified nsg;reclined;call light within reach;encouraged to call for assist;exit alarm on;legs elevated;on mechanical lift sling;waffle cushion  -           User Key  (r) = Recorded By, (t) = Taken By, (c) = Cosigned By    Initials Name Provider Type     Trav Cordero, PT Physical Therapist               Outcome Measures     Row Name 07/11/22 0912          How much help from another person do you currently need...    Turning from your back to your side while in flat bed without using bedrails? 3  -LH     Moving from lying on back to sitting on the side of a flat bed without bedrails? 2  -LH     Moving to and from a bed to a chair (including a wheelchair)? 3  -LH     Standing up from a chair using your arms (e.g., wheelchair, bedside chair)? 3  -LH     Climbing 3-5 steps with a railing? 1  -LH     To walk in hospital room? 3  -LH     AM-PAC 6 Clicks Score (PT) 15  -     Highest level of mobility 4 --> Transferred to chair/commode  -     Row Name 07/11/22 0912          Functional Assessment    Outcome Measure Options AM-PAC 6 Clicks Basic Mobility (PT)  -           User Key  (r) = Recorded By, (t) = Taken By, (c) = Cosigned By    Initials Name Provider Type    Trav Newsome, PT Physical Therapist                             Physical Therapy Education                 Title: PT OT SLP Therapies (Done)     Topic: Physical Therapy (Done)     Point: Mobility training (Done)     Learning Progress Summary           Patient  Acceptance, E, VU by  at 7/11/2022 0913    Comment: Reviewed safety with mobility, PT POC, and HEP.    Acceptance, E,D, NR by CT at 7/10/2022 0850    Acceptance, E,D, VU,NR by LR at 7/4/2022 0823    Comment: Educated on benefits of mobility, safety with mobility, NWB status of R UE, correct sit<->stand t/f technique, correct gait mechanics, LE HEP, and progression of POC.    Acceptance, E,D, VU,NR by LR at 7/3/2022 1421    Comment: Educated on precautions, NWB status of R UE, safety with mobility, correct supine to sit t/f technique, correct sit<->stand t/f technique, correct bed to chair t/f technique, HEP, and progression of POC.    Acceptance, E,D, VU,NR by HP at 7/2/2022 1616    Acceptance, E,TB, NR by KG at 7/1/2022 1601    Acceptance, E, NR by FW at 6/28/2022 1512    Eager, E, VU,DU by SC at 6/27/2022 1151    Comment: Reviewed safety with mobility    Acceptance, E, VU,NR by LO at 6/26/2022 0825    Comment: Patient education regarding PT POC, sequencing for bed mobility, transfers, and ambulation                   Point: Home exercise program (Done)     Learning Progress Summary           Patient Acceptance, E, VU by  at 7/11/2022 0913    Comment: Reviewed safety with mobility, PT POC, and HEP.    Acceptance, E,D, NR by CT at 7/10/2022 0850    Acceptance, E,D, VU,NR by LR at 7/4/2022 0823    Comment: Educated on benefits of mobility, safety with mobility, NWB status of R UE, correct sit<->stand t/f technique, correct gait mechanics, LE HEP, and progression of POC.    Acceptance, E,D, VU,NR by LR at 7/3/2022 1421    Comment: Educated on precautions, NWB status of R UE, safety with mobility, correct supine to sit t/f technique, correct sit<->stand t/f technique, correct bed to chair t/f technique, HEP, and progression of POC.    Acceptance, E,D, VU,NR by HP at 7/2/2022 1616    Acceptance, E,TB, NR by KG at 7/1/2022 1601    Acceptance, E, VU,NR by LS at 6/29/2022 1450    Comment: Benefits of activity     Acceptance, E, NR by FW at 6/28/2022 1512    Eager, E, VU,DU by SC at 6/27/2022 1151    Comment: Reviewed safety with mobility    Acceptance, E, VU,NR by LO at 6/26/2022 0825    Comment: Patient education regarding PT POC, sequencing for bed mobility, transfers, and ambulation                   Point: Body mechanics (Done)     Learning Progress Summary           Patient Acceptance, E, VU by LH at 7/11/2022 0913    Comment: Reviewed safety with mobility, PT POC, and HEP.    Acceptance, E,D, NR by CT at 7/10/2022 0850    Acceptance, E,D, VU,NR by LR at 7/4/2022 0823    Comment: Educated on benefits of mobility, safety with mobility, NWB status of R UE, correct sit<->stand t/f technique, correct gait mechanics, LE HEP, and progression of POC.    Acceptance, E,D, VU,NR by LR at 7/3/2022 1421    Comment: Educated on precautions, NWB status of R UE, safety with mobility, correct supine to sit t/f technique, correct sit<->stand t/f technique, correct bed to chair t/f technique, HEP, and progression of POC.    Acceptance, E,D, VU,NR by HP at 7/2/2022 1616    Acceptance, E,TB, NR by KG at 7/1/2022 1601    Acceptance, E, NR by FW at 6/28/2022 1512    Eager, E, VU,DU by SC at 6/27/2022 1151    Comment: Reviewed safety with mobility    Acceptance, E, VU,NR by LO at 6/26/2022 0825    Comment: Patient education regarding PT POC, sequencing for bed mobility, transfers, and ambulation                   Point: Precautions (Done)     Learning Progress Summary           Patient Acceptance, E, VU by LH at 7/11/2022 0913    Comment: Reviewed safety with mobility, PT POC, and HEP.    Acceptance, E,D, NR by CT at 7/10/2022 0850    Acceptance, E,D, VU,NR by LR at 7/4/2022 0823    Comment: Educated on benefits of mobility, safety with mobility, NWB status of R UE, correct sit<->stand t/f technique, correct gait mechanics, LE HEP, and progression of POC.    Acceptance, E,D, VU,NR by LR at 7/3/2022 8881    Comment: Educated on precautions,  NWB status of R UE, safety with mobility, correct supine to sit t/f technique, correct sit<->stand t/f technique, correct bed to chair t/f technique, HEP, and progression of POC.    Acceptance, E,D, VU,NR by HP at 7/2/2022 1616    Acceptance, E,TB, NR by KG at 7/1/2022 1601    Acceptance, E, NR by FW at 6/28/2022 1512    Eager, E, VU,DU by SC at 6/27/2022 1151    Comment: Reviewed safety with mobility    Acceptance, E, VU,NR by LO at 6/26/2022 0825    Comment: Patient education regarding PT POC, sequencing for bed mobility, transfers, and ambulation                               User Key     Initials Effective Dates Name Provider Type Discipline    SC 06/16/21 -  Juan Montez, PT Physical Therapist PT    LR 06/16/21 -  Rosanna Overton, PT Physical Therapist PT    LS 06/16/21 -  Baylee Pillai, PT Physical Therapist PT    CT 06/16/21 -  Malvin Stephenson, PT Physical Therapist PT    KG 06/16/21 -  Antoinette Swift Physical Therapist PT    FW 05/05/22 -  Eyal Delgado, PT Physical Therapist PT    LO 06/16/21 -  Asia Sosa, PT Physical Therapist PT    LH 09/21/21 -  Trav Cordero, PT Physical Therapist PT    HP 06/01/21 -  Fina Landeros, PT Physical Therapist PT              PT Recommendation and Plan     Plan of Care Reviewed With: patient  Progress: no change  Outcome Evaluation: Pt's functional mobility continues to be limited d/t complaints of fatigue and R shoulder pain. Pt performed bed mobility with Modax1, transfers from elevated surface with CGA and L axillary crutch and from BSC with MinAx1 and L axillary crutch, and ambulated 2ft + 3ft with MinAx1 and L axillary crutch. PT reviewed BLE HEP. PT plans to continue progressing PT POC.     Time Calculation:    PT Charges     Row Name 07/11/22 0913             Time Calculation    Start Time 0823  -      PT Received On 07/11/22  -      PT Goal Re-Cert Due Date 07/21/22  -              Timed Charges    18938 - PT Therapeutic  Exercise Minutes 10  -      80959 - Gait Training Minutes  10  -      43371 - PT Therapeutic Activity Minutes 11  -              Total Minutes    Timed Charges Total Minutes 31  -       Total Minutes 31  -            User Key  (r) = Recorded By, (t) = Taken By, (c) = Cosigned By    Initials Name Provider Type     Trav Cordero, PT Physical Therapist              Therapy Charges for Today     Code Description Service Date Service Provider Modifiers Qty    20374725262 HC PT THER PROC EA 15 MIN 7/11/2022 Trav Cordero, PT GP 1    [id number] HC PT THERAPEUTIC ACT EA 15 MIN 7/11/2022 Trav Cordero, PT GP 1          PT G-Codes  Outcome Measure Options: AM-PAC 6 Clicks Basic Mobility (PT)  AM-PAC 6 Clicks Score (PT): 15  AM-PAC 6 Clicks Score (OT): 13    Trav Cordero PT  7/11/2022

## 2022-07-11 NOTE — PROGRESS NOTES
"    [facility] Medicine Services  PROGRESS NOTE    Patient Name: Altagracia Rothman  : 1950  MRN: [id number]    Date of Admission: 2022  Primary Care Physician: Alvina Lloyd PA-C    Subjective   Subjective     CC: f/u arm fracture    HPI:   No significant change today.  She has been holding her pindolol due to \"low blood pressure\"    ROS:  Gen- No fevers, chills  CV- As above      Objective   Objective     Vital Signs:   Temp:  [97.8 °F (36.6 °C)-98.4 °F (36.9 °C)] 97.9 °F (36.6 °C)  Heart Rate:  [65-84] 65  Resp:  [15-18] 15  BP: (114-139)/(62-99) 120/86     Physical Exam:  Constitutional: No acute distress, awake, alert, sitting up in on bedside commode  HENT: NCAT, mucous membranes moist  Respiratory: Respiratory effort normal   Cardiovascular: RRR  Musculoskeletal: No bilateral ankle edema, RUE in sling  Psychiatric: Appropriate affect, cooperative  Neurologic: Speech clear and fluent  Skin: No rashes on exposed surfaces    Results Reviewed:  LAB RESULTS:      Lab 22  0744 22  1237   WBC 8.18 9.13   HEMOGLOBIN 12.4 11.8*   HEMATOCRIT 39.5 37.5   PLATELETS 304 263   NEUTROS ABS 5.57 6.62   IMMATURE GRANS (ABS) 0.05 0.05   LYMPHS ABS 1.65 1.41   MONOS ABS 0.73 0.82   EOS ABS 0.16 0.20   .6* 108.4*         Lab 22  0744 22  1434   SODIUM 139 142   POTASSIUM 4.1 4.6   CHLORIDE 102 103   CO2 29.0 34.0*   ANION GAP 8.0 5.0   BUN 15 16   CREATININE 0.58 0.81   EGFR 95.7 76.8   GLUCOSE 97 122*   CALCIUM 9.4 9.0   MAGNESIUM  --  2.1         Lab 22  1434   TOTAL PROTEIN 5.8*   ALBUMIN 3.00*   GLOBULIN 2.8   ALT (SGPT) 27   AST (SGOT) 21   BILIRUBIN 0.5   ALK PHOS 120*         Brief Urine Lab Results  (Last result in the past 365 days)      Color   Clarity   Blood   Leuk Est   Nitrite   Protein   CREAT   Urine HCG        22 1145 Yellow   Clear   Negative   Small (1+)   Negative   Negative                 Microbiology Results Abnormal     " Procedure Component Value - Date/Time    COVID PRE-OP / PRE-PROCEDURE SCREENING ORDER (NO ISOLATION) - Swab, Nasopharynx [[id number]]  (Normal) Collected: 06/24/22 1901    Lab Status: Final result Specimen: Swab from Nasopharynx Updated: 06/24/22 1956    Narrative:      The following orders were created for panel order COVID PRE-OP / PRE-PROCEDURE SCREENING ORDER (NO ISOLATION) - Swab, Nasopharynx.  Procedure                               Abnormality         Status                     ---------                               -----------         ------                     COVID-19, FLU A/B, RSV P...[[id number]]  Normal              Final result                 Please view results for these tests on the individual orders.    COVID-19, FLU A/B, RSV PCR - Swab, Nasopharynx [[id number]]  (Normal) Collected: 06/24/22 1901    Lab Status: Final result Specimen: Swab from Nasopharynx Updated: 06/24/22 1956     COVID19 Not Detected     Influenza A PCR Not Detected     Influenza B PCR Not Detected     RSV, PCR Not Detected    Narrative:      Fact sheet for providers: https://www.fda.gov/media/255616/download    Fact sheet for patients: https://www.fda.gov/media/908528/download    Test performed by PCR.          No radiology results from the last 24 hrs        I have reviewed the medications:  Scheduled Meds:budesonide-formoterol, 2 puff, Inhalation, BID - RT  doxycycline, 100 mg, Oral, Q12H  ertapenem, 1 g, Intravenous, Daily  famotidine, 20 mg, Intravenous, Once  gabapentin, 800 mg, Oral, 4x Daily  hydrocortisone, 10 mg, Oral, Daily With Lunch  hydrocortisone, 20 mg, Oral, QAM  levothyroxine, 88 mcg, Oral, Q AM  lisinopril, 5 mg, Oral, Q24H  Morphine, 30 mg, Oral, BID  nortriptyline, 50 mg, Oral, TID  pantoprazole, 40 mg, Oral, Q AM  pindolol, 2.5 mg, Oral, TID  saccharomyces boulardii, 250 mg, Oral, BID  sodium chloride, 10 mL, Intravenous, Q12H  sodium chloride, 10 mL, Intravenous, Q12H  spironolactone, 50 mg, Oral,  Daily      Continuous Infusions:Pharmacy Consult,   lactated ringers, 9 mL/hr  ropivacaine,   sodium chloride, 75 mL/hr, Last Rate: 75 mL/hr (22 0105)      PRN Meds:.acetaminophen **OR** acetaminophen **OR** acetaminophen  •  albuterol  •  diphenhydrAMINE  •  hydrALAZINE  •  hydrOXYzine  •  labetalol  •  lidocaine PF 1%  •  magnesium sulfate **OR** magnesium sulfate in D5W 1g/100mL (PREMIX) **OR** magnesium sulfate  •  midazolam  •  [] Morphine **AND** naloxone  •  ondansetron **OR** ondansetron  •  oxyCODONE-acetaminophen  •  potassium chloride **OR** potassium chloride **OR** potassium chloride  •  sodium chloride  •  sodium chloride    Assessment & Plan   Assessment & Plan     Active Hospital Problems    Diagnosis  POA   • **Other closed displaced fracture of proximal end of right humerus, initial encounter [S42.291A]  Yes   • Acute UTI (urinary tract infection) [N39.0]  Yes   • Moderate asthma without complication [J45.909]  Yes   • Osteoporosis [M81.0]  Yes   • Adrenal insufficiency (HCC) [E27.40]  Yes   • Hypothyroidism [E03.9]  Yes   • Essential hypertension [I10]  Yes   • H/O pheochromocytoma [Z86.018]  Yes      Resolved Hospital Problems   No resolved problems to display.        Brief Hospital Course to date:  Altagracia Rothman is a 72 y.o. female with past medical history significant for hypertension, history of pheochromocytoma, s/p left adrenal resection, adrenal insufficiency, hypothyroidism, moderate asthma. Patient was admitted for right humerus fracture after a fall.     Right proximal and distal humerus fracture after mechanical fall.    -Tentatively planning for ORIF   pending results for metanephrines and catecholamines still pending  -Will need stress-doing of home steroids for AI with IV hydrocortisone, usually does IV hydrocortisone; my partner spoke with Dr. Matute and anesthesiology will dose steroids prior to surgery     Bronchitis - POA, now resolved  -CXR without  infiltrate. S/P 5 days of empiric doxy.  On room air  -Resume home inhalers, continue symbicort  -s/p another round of doxy for toothache  -continue probiotic     Hypoxia/Chest Pain-resolved  - RRT 6/30, likely related to combination of nerve block and anxiety  - CXR, EKG, troponin and ABG are WNL     Hx adrenal insufficiency s/p adrenalectomy, s/p adrenal crisis (hypotension on admission)  History of pheochromocytoma - resected in 2003   - My partner discussed this with patient's endocrinologist Dr. Nelson via telephone on 6/30. Patient had adrenalectomy in 2003 with removal of pheo and has declined further screening tests since that time. Per Dr. Nelson, cathecholamine/metanphrine testing would be helpful if it is negative, although it could return a false positive from ongoing tricyclic med which patient has refused to taper. She would need to be off noratryptiline for 4-6 weeks for accurate testing.  - 24hr-urine metanephrines resulted, catecholamines still pending-I discussed with lab, the patient's endocrinologist and ortho.  These may not be back until 7/14 or 7/15.  If they are abnormal, plan will be for non-operative management.  - Patient has been weaned back to home dose of hydrocortisone 20mg qam, 10mg nightly after initial stress-dosing. Will need to be stress dosed with IV hydrocortisone at timing of surgery.     HTN  -initally was hypotensive due to adrenal crisis, so bp meds were held  -hydrocortisone weaned back to home dose  -Continue aldactone  -Resumed home pindolol 2.5mg  -BP are currently normal and she has been getting lisinopril but she is concerned about low blood pressure and holding pindolol and asking for higher doses of hydrocortisone so we will d/c lisinopril for now and monitor     Gillespie catheter placed in ED at time of admission  UTI, proteus species/ESBL  -Gillespie d/c'd on 6/26/22. Initial urine cultures showed proteus mirabilis  -She completed course of fosfomycin  -Repeat U/A with  small leuks, 13-20 WBC and 1+bacteria, culture again >100K proteus/ESBL  -Rocephin changed to ertapenem due to ESBL     Hyperglycemia, improved  -Not diabetic, A1c 5.3     Hypothyroidism   -On home dose Synthroid.     Expected Discharge Location and Transportation: Rehab, [facility] van or EMS  Expected Discharge Date: Unknown, waiting on surgery, 7/15 at the earliest    DVT prophylaxis:  Mechanical DVT prophylaxis orders are present.     AM-PAC 6 Clicks Score (PT): 15 (07/11/22 0912)    CODE STATUS:   Code Status and Medical Interventions:   Ordered at: 06/24/22 2121     Level Of Support Discussed With:    Patient     Code Status (Patient has no pulse and is not breathing):    CPR (Attempt to Resuscitate)     Medical Interventions (Patient has pulse or is breathing):    Full Support       Kalpana Hernandez MD  07/11/22

## 2022-07-11 NOTE — ANESTHESIA PROCEDURE NOTES
Right ISB Catheter re-block      Patient reassessed immediately prior to procedure    Patient location during procedure: floor  Reason for block: at surgeon's request and post-op pain management  Performed by  CRNA/CAA: Tash Eduardo CRNA  Assisted by: Capri Whittaker RN  Preanesthetic Checklist  Completed: patient identified, IV checked, site marked, risks and benefits discussed, surgical consent, monitors and equipment checked, pre-op evaluation and timeout performed  Prep:  Patient position: sitting.  Sterile barriers:cap, gloves, mask and sterile barriers  Prep: ChloraPrep  Patient monitoring: blood pressure monitoring, continuous pulse oximetry and EKG  Procedure    Sedation: yes  Performed under: local infiltration  Guidance:ultrasound guided  Images:still images obtained, printed/placed on chart    Laterality:right  Block Type:interscalene  Injection Technique:catheter  Needle Type:Tuohy and echogenic  Needle Gauge:18 G  Resistance on Injection: none  Catheter Size:20 G (20g)  Cath Depth at skin: 8 cm      Med administered at 7/11/2022 10:40 AM      Post Assessment  Injection Assessment: negative aspiration for heme, no paresthesia on injection and incremental injection  Patient Tolerance:comfortable throughout block  Complications:no  Additional Notes  Procedure:                 The pt was placed in semifowlers position with a slight tilt of the thorax contralateral to the insertion site.  The Insertion Site was prepped and draped in sterile fashion.  The pt was anesthetized with  IV Sedation( see meds) and  Skin and cutaneous tissue was infiltrated and anesthetized with 1% Lidocaine 3 mls via a 25g needle.  Utilizing ultrasound guidance, a BBraun 4 inch 18 g Contiplex echogenic touhy needle was advanced in-plane.  Hydro dissection of tissue was achieved with Normal saline. Major vessels(carotid and Internal Jugular) where visualized as the brachial plexus was approached at the approximate level of  C-7/ T-1.  Cervical 5 and Branches of Cervical 6 nerve roots where visualized and the needle tip was placed posterior at the level of C-6 roots.  LA spread was visualized and injection was made incrementally every 5 mls with aspiration. Injection pressure was normal or little, there was no intraneural injection, no vascular injection.      The BBraun 20 g wire stylet catheter was then placed under US guidance on the posterior aspect of the Brachial Plexus. The tuohy was removed and the location of catheter was confirmed with NS injection visualized with US . The skin was sealed with exofin tissue adhesive at catheter insertion site.  Skin was prepped with benzoin and the catheter was secured with steristrips and a CHG tegaderm. Appropriate labels applied. Thank You.

## 2022-07-11 NOTE — PLAN OF CARE
Goal Outcome Evaluation:                 Problem: Adult Inpatient Plan of Care  Goal: Absence of Hospital-Acquired Illness or Injury  Intervention: Identify and Manage Fall Risk  Recent Flowsheet Documentation  Taken 7/11/2022 0400 by Bruno Kim RN  Safety Promotion/Fall Prevention:   activity supervised   safety round/check completed   toileting scheduled  Taken 7/11/2022 0200 by Bruno Kim RN  Safety Promotion/Fall Prevention:   activity supervised   safety round/check completed   toileting scheduled  Taken 7/11/2022 0000 by Bruno Kim RN  Safety Promotion/Fall Prevention:   activity supervised   safety round/check completed   toileting scheduled  Taken 7/10/2022 2200 by Bruno Kim RN  Safety Promotion/Fall Prevention:   activity supervised   safety round/check completed   toileting scheduled  Taken 7/10/2022 2000 by Bruno Kim RN  Safety Promotion/Fall Prevention:   activity supervised   safety round/check completed   toileting scheduled  Intervention: Prevent Skin Injury  Recent Flowsheet Documentation  Taken 7/11/2022 0400 by Bruno Kim RN  Body Position: supine  Skin Protection: adhesive use limited  Taken 7/11/2022 0200 by Bruno Kim RN  Body Position: supine  Skin Protection: adhesive use limited  Taken 7/11/2022 0015 by Bruno Kim RN  Skin Protection: adhesive use limited  Taken 7/11/2022 0000 by Bruno Kim RN  Body Position: supine  Skin Protection: adhesive use limited  Taken 7/10/2022 2200 by Bruno Kim RN  Body Position: supine  Skin Protection: adhesive use limited  Taken 7/10/2022 2000 by Bruno Kim RN  Body Position: supine  Intervention: Prevent and Manage VTE (Venous Thromboembolism) Risk  Recent Flowsheet Documentation  Taken 7/11/2022 0400 by Bruno Kim RN  VTE Prevention/Management:   bilateral   sequential compression devices off   patient refused intervention  Taken 7/11/2022 0200 by Bruno Kim RN  VTE  Prevention/Management:   bilateral   sequential compression devices off   patient refused intervention  Taken 7/11/2022 0000 by Bruno Kim RN  VTE Prevention/Management:   bilateral   sequential compression devices off   patient refused intervention  Taken 7/10/2022 2200 by Bruno Kim RN  VTE Prevention/Management:   bilateral   sequential compression devices off   patient refused intervention  Intervention: Prevent Infection  Recent Flowsheet Documentation  Taken 7/11/2022 0400 by Bruno Kim RN  Infection Prevention: environmental surveillance performed  Taken 7/11/2022 0200 by Bruno Kim RN  Infection Prevention: environmental surveillance performed  Taken 7/11/2022 0000 by Bruno Kim RN  Infection Prevention: environmental surveillance performed  Taken 7/10/2022 2200 by Bruno Kim RN  Infection Prevention: environmental surveillance performed  Taken 7/10/2022 2000 by Bruno Kim RN  Infection Prevention: environmental surveillance performed  Goal: Optimal Comfort and Wellbeing  Intervention: Monitor Pain and Promote Comfort  Recent Flowsheet Documentation  Taken 7/11/2022 0400 by Bruno Kim RN  Pain Management Interventions: quiet environment facilitated  Taken 7/11/2022 0200 by Bruno Kim RN  Pain Management Interventions: quiet environment facilitated  Taken 7/11/2022 0000 by Bruno Kim RN  Pain Management Interventions: quiet environment facilitated  Taken 7/10/2022 2200 by Bruno Kim RN  Pain Management Interventions: quiet environment facilitated  Taken 7/10/2022 2000 by Bruno Kim RN  Pain Management Interventions: quiet environment facilitated  Intervention: Provide Person-Centered Care  Recent Flowsheet Documentation  Taken 7/11/2022 0400 by Bruno Kim RN  Trust Relationship/Rapport: care explained  Taken 7/11/2022 0200 by Bruno Kim RN  Trust Relationship/Rapport: care explained  Taken 7/11/2022 0000 by Bruno Kim  RN  Trust Relationship/Rapport: care explained  Taken 7/10/2022 2200 by Bruno Kim RN  Trust Relationship/Rapport: care explained  Taken 7/10/2022 2000 by Bruno Kim RN  Trust Relationship/Rapport: care explained     Problem: Skin Injury Risk Increased  Goal: Skin Health and Integrity  Intervention: Optimize Skin Protection  Recent Flowsheet Documentation  Taken 7/11/2022 0400 by Bruno Kim RN  Pressure Reduction Techniques: frequent weight shift encouraged  Head of Bed (HOB) Positioning: HOB at 30-45 degrees  Pressure Reduction Devices: positioning supports utilized  Skin Protection: adhesive use limited  Taken 7/11/2022 0200 by Bruno Kim RN  Pressure Reduction Techniques: frequent weight shift encouraged  Head of Bed (HOB) Positioning: HOB at 30-45 degrees  Pressure Reduction Devices: positioning supports utilized  Skin Protection: adhesive use limited  Taken 7/11/2022 0015 by Bruno Kim RN  Pressure Reduction Techniques: frequent weight shift encouraged  Pressure Reduction Devices: positioning supports utilized  Skin Protection: adhesive use limited  Taken 7/11/2022 0000 by Bruno Kim RN  Pressure Reduction Techniques: frequent weight shift encouraged  Head of Bed (HOB) Positioning: HOB at 30-45 degrees  Pressure Reduction Devices: positioning supports utilized  Skin Protection: adhesive use limited  Taken 7/10/2022 2200 by Bruno Kim RN  Pressure Reduction Techniques: frequent weight shift encouraged  Head of Bed (HOB) Positioning: HOB at 30-45 degrees  Pressure Reduction Devices: positioning supports utilized  Skin Protection: adhesive use limited  Taken 7/10/2022 2000 by Bruno Kim RN  Head of Bed (HOB) Positioning: HOB at 30-45 degrees     Problem: Fall Injury Risk  Goal: Absence of Fall and Fall-Related Injury  Intervention: Identify and Manage Contributors  Recent Flowsheet Documentation  Taken 7/11/2022 0400 by Bruno Kim RN  Medication  Review/Management: medications reviewed  Taken 7/11/2022 0200 by Bruno Kim RN  Medication Review/Management: medications reviewed  Taken 7/11/2022 0000 by Bruno Kim RN  Medication Review/Management: medications reviewed  Taken 7/10/2022 2200 by Bruno Kim RN  Medication Review/Management: medications reviewed  Taken 7/10/2022 2000 by Bruno Kim RN  Medication Review/Management: medications reviewed  Intervention: Promote Injury-Free Environment  Recent Flowsheet Documentation  Taken 7/11/2022 0400 by Bruno Kim RN  Safety Promotion/Fall Prevention:   activity supervised   safety round/check completed   toileting scheduled  Taken 7/11/2022 0200 by Bruno Kim RN  Safety Promotion/Fall Prevention:   activity supervised   safety round/check completed   toileting scheduled  Taken 7/11/2022 0000 by Bruno Kim RN  Safety Promotion/Fall Prevention:   activity supervised   safety round/check completed   toileting scheduled  Taken 7/10/2022 2200 by Bruno Kim RN  Safety Promotion/Fall Prevention:   activity supervised   safety round/check completed   toileting scheduled  Taken 7/10/2022 2000 by Bruno Kim RN  Safety Promotion/Fall Prevention:   activity supervised   safety round/check completed   toileting scheduled     Problem: Embolism (Orthopaedic Fracture)  Goal: Absence of Embolism Signs and Symptoms  Intervention: Prevent or Manage Embolism Risk  Recent Flowsheet Documentation  Taken 7/11/2022 0400 by Bruno Kim RN  VTE Prevention/Management:   bilateral   sequential compression devices off   patient refused intervention  Taken 7/11/2022 0200 by Bruno Kim RN  VTE Prevention/Management:   bilateral   sequential compression devices off   patient refused intervention  Taken 7/11/2022 0000 by Bruno Kim RN  VTE Prevention/Management:   bilateral   sequential compression devices off   patient refused intervention  Taken 7/10/2022 2200 by Bruno Kim  D, RN  VTE Prevention/Management:   bilateral   sequential compression devices off   patient refused intervention     Problem: Functional Ability Impaired (Orthopaedic Fracture)  Goal: Optimal Functional Ability  Intervention: Optimize Functional Ability  Recent Flowsheet Documentation  Taken 7/11/2022 0400 by Bruno Kim RN  Positioning/Transfer Devices: pillows  Taken 7/11/2022 0200 by Bruno Kim RN  Positioning/Transfer Devices: pillows  Taken 7/11/2022 0000 by Bruno Kim RN  Positioning/Transfer Devices: pillows  Taken 7/10/2022 2200 by Bruno Kim RN  Positioning/Transfer Devices: pillows  Taken 7/10/2022 2000 by Bruno Kim RN  Positioning/Transfer Devices: pillows     Problem: Infection (Orthopaedic Fracture)  Goal: Absence of Infection Signs and Symptoms  Intervention: Prevent or Manage Infection  Recent Flowsheet Documentation  Taken 7/11/2022 0400 by Bruno Kim RN  Infection Prevention: environmental surveillance performed  Taken 7/11/2022 0200 by Bruno Kim RN  Infection Prevention: environmental surveillance performed  Taken 7/11/2022 0000 by Bruno Kim RN  Infection Prevention: environmental surveillance performed  Taken 7/10/2022 2200 by Bruno Kim RN  Infection Prevention: environmental surveillance performed  Taken 7/10/2022 2000 by Bruno Kim RN  Infection Prevention: environmental surveillance performed     Problem: Pain (Orthopaedic Fracture)  Goal: Acceptable Pain Control  Intervention: Manage Acute Orthopaedic-Related Pain  Recent Flowsheet Documentation  Taken 7/11/2022 0400 by Bruno Kim RN  Pain Management Interventions: quiet environment facilitated  Taken 7/11/2022 0200 by Bruno Kim RN  Pain Management Interventions: quiet environment facilitated  Taken 7/11/2022 0000 by Bruno Kim RN  Pain Management Interventions: quiet environment facilitated  Taken 7/10/2022 2200 by Bruno Kim RN  Pain Management  Interventions: quiet environment facilitated  Taken 7/10/2022 2000 by Bruno Kim, RN  Pain Management Interventions: quiet environment facilitated     VSS, voids well, pain managed with PRN medications, will continue to montior for changes.

## 2022-07-11 NOTE — PLAN OF CARE
Goal Outcome Evaluation:  Plan of Care Reviewed With: patient        Progress: no change     Pt VSS. RA. C/o pain controlled with PO medication. Up to BSC with 1 assist, gait belt, crutch, and voiding spontaneously without difficulty. 2 small BM this shift. Sling in place to RUE. Infu dressing CDI. Contact precautions maintained.

## 2022-07-12 ENCOUNTER — APPOINTMENT (OUTPATIENT)
Dept: GENERAL RADIOLOGY | Facility: HOSPITAL | Age: 72
End: 2022-07-12

## 2022-07-12 ENCOUNTER — ANESTHESIA (OUTPATIENT)
Dept: PERIOP | Facility: HOSPITAL | Age: 72
End: 2022-07-12

## 2022-07-12 LAB
DOPAMINE 24H UR-MRATE: 120 UG/24 HR (ref 0–510)
DOPAMINE UR-MCNC: 63 UG/L
EPINEPH 24H UR-MRATE: 4 UG/24 HR (ref 0–20)
EPINEPH UR-MCNC: 2 UG/L
NOREPINEPH 24H UR-MRATE: 68 UG/24 HR (ref 0–135)
NOREPINEPH UR-MCNC: 36 UG/L

## 2022-07-12 PROCEDURE — 73060 X-RAY EXAM OF HUMERUS: CPT

## 2022-07-12 PROCEDURE — 25010000002 ERTAPENEM PER 500 MG: Performed by: NURSE PRACTITIONER

## 2022-07-12 PROCEDURE — 94799 UNLISTED PULMONARY SVC/PX: CPT

## 2022-07-12 PROCEDURE — 97110 THERAPEUTIC EXERCISES: CPT

## 2022-07-12 PROCEDURE — 97530 THERAPEUTIC ACTIVITIES: CPT

## 2022-07-12 PROCEDURE — 99232 SBSQ HOSP IP/OBS MODERATE 35: CPT | Performed by: INTERNAL MEDICINE

## 2022-07-12 RX ORDER — LISINOPRIL 5 MG/1
5 TABLET ORAL
Status: DISCONTINUED | OUTPATIENT
Start: 2022-07-12 | End: 2022-07-19 | Stop reason: HOSPADM

## 2022-07-12 RX ADMIN — NORTRIPTYLINE HYDROCHLORIDE 50 MG: 10 CAPSULE ORAL at 20:58

## 2022-07-12 RX ADMIN — OXYCODONE HYDROCHLORIDE AND ACETAMINOPHEN 1 TABLET: 10; 325 TABLET ORAL at 16:48

## 2022-07-12 RX ADMIN — DOXYCYCLINE 100 MG: 100 CAPSULE ORAL at 20:58

## 2022-07-12 RX ADMIN — ERTAPENEM SODIUM 1 G: 1 INJECTION, POWDER, LYOPHILIZED, FOR SOLUTION INTRAMUSCULAR; INTRAVENOUS at 08:13

## 2022-07-12 RX ADMIN — PINDOLOL 2.5 MG: 5 TABLET ORAL at 08:15

## 2022-07-12 RX ADMIN — NORTRIPTYLINE HYDROCHLORIDE 50 MG: 10 CAPSULE ORAL at 08:14

## 2022-07-12 RX ADMIN — PANTOPRAZOLE SODIUM 40 MG: 40 TABLET, DELAYED RELEASE ORAL at 05:12

## 2022-07-12 RX ADMIN — MORPHINE SULFATE 30 MG: 30 TABLET, FILM COATED, EXTENDED RELEASE ORAL at 20:58

## 2022-07-12 RX ADMIN — OXYCODONE HYDROCHLORIDE AND ACETAMINOPHEN 1 TABLET: 10; 325 TABLET ORAL at 11:03

## 2022-07-12 RX ADMIN — Medication 10 ML: at 08:24

## 2022-07-12 RX ADMIN — GABAPENTIN 800 MG: 400 CAPSULE ORAL at 11:03

## 2022-07-12 RX ADMIN — LISINOPRIL 5 MG: 5 TABLET ORAL at 16:48

## 2022-07-12 RX ADMIN — HYDROCORTISONE 10 MG: 10 TABLET ORAL at 11:03

## 2022-07-12 RX ADMIN — LEVOTHYROXINE SODIUM 88 MCG: 88 TABLET ORAL at 05:13

## 2022-07-12 RX ADMIN — BUDESONIDE AND FORMOTEROL FUMARATE DIHYDRATE 2 PUFF: 160; 4.5 AEROSOL RESPIRATORY (INHALATION) at 19:25

## 2022-07-12 RX ADMIN — GABAPENTIN 800 MG: 400 CAPSULE ORAL at 16:48

## 2022-07-12 RX ADMIN — GABAPENTIN 800 MG: 400 CAPSULE ORAL at 08:13

## 2022-07-12 RX ADMIN — MORPHINE SULFATE 30 MG: 30 TABLET, FILM COATED, EXTENDED RELEASE ORAL at 08:15

## 2022-07-12 RX ADMIN — SPIRONOLACTONE 50 MG: 50 TABLET ORAL at 08:13

## 2022-07-12 RX ADMIN — OXYCODONE HYDROCHLORIDE AND ACETAMINOPHEN 1 TABLET: 10; 325 TABLET ORAL at 05:13

## 2022-07-12 RX ADMIN — DOXYCYCLINE 100 MG: 100 CAPSULE ORAL at 08:14

## 2022-07-12 RX ADMIN — NORTRIPTYLINE HYDROCHLORIDE 50 MG: 10 CAPSULE ORAL at 15:15

## 2022-07-12 RX ADMIN — GABAPENTIN 800 MG: 400 CAPSULE ORAL at 20:58

## 2022-07-12 RX ADMIN — Medication 250 MG: at 08:14

## 2022-07-12 RX ADMIN — PINDOLOL 2.5 MG: 5 TABLET ORAL at 15:15

## 2022-07-12 RX ADMIN — HYDROCORTISONE 20 MG: 10 TABLET ORAL at 05:12

## 2022-07-12 RX ADMIN — BUDESONIDE AND FORMOTEROL FUMARATE DIHYDRATE 2 PUFF: 160; 4.5 AEROSOL RESPIRATORY (INHALATION) at 09:09

## 2022-07-12 RX ADMIN — Medication 250 MG: at 20:58

## 2022-07-12 NOTE — PLAN OF CARE
Goal Outcome Evaluation:                 Problem: Adult Inpatient Plan of Care  Goal: Absence of Hospital-Acquired Illness or Injury  Intervention: Identify and Manage Fall Risk  Recent Flowsheet Documentation  Taken 7/12/2022 0400 by Bruno Kim RN  Safety Promotion/Fall Prevention:   activity supervised   safety round/check completed   toileting scheduled  Taken 7/12/2022 0200 by Bruno Kim RN  Safety Promotion/Fall Prevention:   activity supervised   safety round/check completed   toileting scheduled  Taken 7/12/2022 0000 by Bruno Kim RN  Safety Promotion/Fall Prevention:   activity supervised   safety round/check completed   toileting scheduled  Taken 7/11/2022 2200 by Bruno Kim RN  Safety Promotion/Fall Prevention:   activity supervised   safety round/check completed   toileting scheduled  Taken 7/11/2022 2000 by Bruno Kim RN  Safety Promotion/Fall Prevention:   activity supervised   safety round/check completed   toileting scheduled  Intervention: Prevent Skin Injury  Recent Flowsheet Documentation  Taken 7/12/2022 0400 by Bruno Kim RN  Body Position: supine  Skin Protection: adhesive use limited  Taken 7/12/2022 0200 by Bruno Kim RN  Body Position: supine  Skin Protection: adhesive use limited  Taken 7/12/2022 0000 by Bruno Kim RN  Body Position: supine  Skin Protection: adhesive use limited  Taken 7/11/2022 2200 by Bruno Kim RN  Body Position: supine  Skin Protection: adhesive use limited  Taken 7/11/2022 2000 by Bruno Kim RN  Body Position: supine  Skin Protection: adhesive use limited  Intervention: Prevent Infection  Recent Flowsheet Documentation  Taken 7/12/2022 0400 by Bruno Kim RN  Infection Prevention: environmental surveillance performed  Taken 7/12/2022 0200 by Bruno Kim RN  Infection Prevention: environmental surveillance performed  Taken 7/12/2022 0000 by Bruno Kim RN  Infection Prevention: environmental  surveillance performed  Taken 7/11/2022 2200 by Bruno Kim RN  Infection Prevention: environmental surveillance performed  Taken 7/11/2022 2000 by Bruno Kim RN  Infection Prevention: environmental surveillance performed  Goal: Optimal Comfort and Wellbeing  Intervention: Monitor Pain and Promote Comfort  Recent Flowsheet Documentation  Taken 7/12/2022 0400 by Bruno Kim RN  Pain Management Interventions: quiet environment facilitated  Taken 7/12/2022 0200 by Bruno Kim RN  Pain Management Interventions: quiet environment facilitated  Taken 7/12/2022 0000 by Bruno Kim RN  Pain Management Interventions: quiet environment facilitated  Taken 7/11/2022 2200 by Bruno Kim RN  Pain Management Interventions: quiet environment facilitated  Taken 7/11/2022 2000 by Bruno Kim RN  Pain Management Interventions: quiet environment facilitated  Intervention: Provide Person-Centered Care  Recent Flowsheet Documentation  Taken 7/12/2022 0400 by Bruno Kim RN  Trust Relationship/Rapport: care explained  Taken 7/12/2022 0200 by Bruno Kim RN  Trust Relationship/Rapport: care explained  Taken 7/12/2022 0000 by Bruno Kim RN  Trust Relationship/Rapport: care explained  Taken 7/11/2022 2200 by Bruno Kim RN  Trust Relationship/Rapport: care explained  Taken 7/11/2022 2000 by Bruno Kim RN  Trust Relationship/Rapport: care explained     Problem: Skin Injury Risk Increased  Goal: Skin Health and Integrity  Intervention: Optimize Skin Protection  Recent Flowsheet Documentation  Taken 7/12/2022 0400 by Bruno Kim RN  Pressure Reduction Techniques: frequent weight shift encouraged  Head of Bed (HOB) Positioning: HOB at 30-45 degrees  Pressure Reduction Devices: pressure-redistributing mattress utilized  Skin Protection: adhesive use limited  Taken 7/12/2022 0200 by Bruno Kim RN  Pressure Reduction Techniques: frequent weight shift encouraged  Head of Bed  (HOB) Positioning: HOB at 30-45 degrees  Pressure Reduction Devices: pressure-redistributing mattress utilized  Skin Protection: adhesive use limited  Taken 7/12/2022 0000 by Bruno Kim RN  Pressure Reduction Techniques: frequent weight shift encouraged  Head of Bed (HOB) Positioning: HOB at 30-45 degrees  Pressure Reduction Devices: pressure-redistributing mattress utilized  Skin Protection: adhesive use limited  Taken 7/11/2022 2200 by Bruno Kim RN  Pressure Reduction Techniques: frequent weight shift encouraged  Head of Bed (HOB) Positioning: HOB at 30-45 degrees  Pressure Reduction Devices: pressure-redistributing mattress utilized  Skin Protection: adhesive use limited  Taken 7/11/2022 2000 by Bruno Kim RN  Pressure Reduction Techniques: frequent weight shift encouraged  Head of Bed (HOB) Positioning: HOB at 30-45 degrees  Pressure Reduction Devices: pressure-redistributing mattress utilized  Skin Protection: adhesive use limited     Problem: Fall Injury Risk  Goal: Absence of Fall and Fall-Related Injury  Intervention: Identify and Manage Contributors  Recent Flowsheet Documentation  Taken 7/12/2022 0400 by Bruno Kim RN  Medication Review/Management: medications reviewed  Taken 7/12/2022 0200 by Bruno Kim RN  Medication Review/Management: medications reviewed  Taken 7/12/2022 0000 by Bruno Kim RN  Medication Review/Management: medications reviewed  Taken 7/11/2022 2200 by Bruno Kim RN  Medication Review/Management: medications reviewed  Taken 7/11/2022 2000 by Bruno Kim RN  Medication Review/Management: medications reviewed  Intervention: Promote Injury-Free Environment  Recent Flowsheet Documentation  Taken 7/12/2022 0400 by Bruno Kim RN  Safety Promotion/Fall Prevention:   activity supervised   safety round/check completed   toileting scheduled  Taken 7/12/2022 0200 by Bruno Kim RN  Safety Promotion/Fall Prevention:   activity supervised    safety round/check completed   toileting scheduled  Taken 7/12/2022 0000 by Bruno Kim RN  Safety Promotion/Fall Prevention:   activity supervised   safety round/check completed   toileting scheduled  Taken 7/11/2022 2200 by Bruno Kim RN  Safety Promotion/Fall Prevention:   activity supervised   safety round/check completed   toileting scheduled  Taken 7/11/2022 2000 by Bruno Kim RN  Safety Promotion/Fall Prevention:   activity supervised   safety round/check completed   toileting scheduled     Problem: Functional Ability Impaired (Orthopaedic Fracture)  Goal: Optimal Functional Ability  Intervention: Optimize Functional Ability  Recent Flowsheet Documentation  Taken 7/12/2022 0400 by Bruno Kim RN  Positioning/Transfer Devices: pillows  Taken 7/12/2022 0200 by Bruno Kim RN  Positioning/Transfer Devices: pillows  Taken 7/12/2022 0000 by Bruno Kim RN  Positioning/Transfer Devices: pillows  Taken 7/11/2022 2200 by Bruno Kim RN  Positioning/Transfer Devices: pillows  Taken 7/11/2022 2000 by Bruno Kim RN  Positioning/Transfer Devices: pillows     Problem: Infection (Orthopaedic Fracture)  Goal: Absence of Infection Signs and Symptoms  Intervention: Prevent or Manage Infection  Recent Flowsheet Documentation  Taken 7/12/2022 0400 by Bruno Kim RN  Infection Prevention: environmental surveillance performed  Taken 7/12/2022 0200 by Bruno Kim RN  Infection Prevention: environmental surveillance performed  Taken 7/12/2022 0000 by Bruno Kim RN  Infection Prevention: environmental surveillance performed  Taken 7/11/2022 2200 by Bruno Kim RN  Infection Prevention: environmental surveillance performed  Taken 7/11/2022 2000 by Bruno Kim RN  Infection Prevention: environmental surveillance performed     Problem: Pain (Orthopaedic Fracture)  Goal: Acceptable Pain Control  Intervention: Manage Acute Orthopaedic-Related Pain  Recent Flowsheet  Documentation  Taken 7/12/2022 0400 by Bruno Kim, RN  Pain Management Interventions: quiet environment facilitated  Taken 7/12/2022 0200 by Bruno Kim, RN  Pain Management Interventions: quiet environment facilitated  Taken 7/12/2022 0000 by Bruno Kim, RN  Pain Management Interventions: quiet environment facilitated  Taken 7/11/2022 2200 by Bruno Kim, RN  Pain Management Interventions: quiet environment facilitated  Taken 7/11/2022 2000 by Bruno Kim, RN  Pain Management Interventions: quiet environment facilitated       VSS, voids well, rested throughout the night, pain managed with PRN medications, will continue to monitor for changes.

## 2022-07-12 NOTE — CASE MANAGEMENT/SOCIAL WORK
Continued Stay Note  [facility]     Patient Name: Altagracia Rothman  MRN: [id number]  Today's Date: 7/12/2022    Admit Date: 6/24/2022     Discharge Plan     Row Name 07/12/22 1711       Plan    Plan TBD    Plan Comments Case mgt f/u. I met with Ms Rothman to discuss post op d/c plan. Prior to surgery plans,she had planned on going to skilled care at the [facility]. Now she would like to pursue acute rehab at [facility] if possible. I spoke with April at [facility] who accepted referral. Not sure is she will meet criteria for acute level of rehab. Case mgt will follow               Discharge Codes    No documentation.               Expected Discharge Date and Time     Expected Discharge Date Expected Discharge Time    Jul 7, 2022             Sonja C Kellerman, RN

## 2022-07-12 NOTE — PLAN OF CARE
Goal Outcome Evaluation:  Plan of Care Reviewed With: patient        Progress: no change       Pt VSS. A&O x4. RA. C/o of pain controlled with PRN medication and infu pump. Up to BSC with 1 assist, gait belt, crutch. Adequate UOP this shift. Sling to RUE. Contact precautions maintained.

## 2022-07-12 NOTE — THERAPY TREATMENT NOTE
Patient Name: Altagracia Rothman  : 1950    MRN: [id number]                              Today's Date: 2022       Admit Date: 2022    Visit Dx:     ICD-10-CM ICD-9-CM   1. Other closed displaced fracture of proximal end of right humerus, initial encounter  S42.291A 812.09   2. Closed fracture of distal end of humerus, unspecified fracture morphology, initial encounter  S42.409A 812.40   3. Fall, initial encounter  W19.XXXA E888.9   4. Osteoporosis with current pathological fracture, unspecified osteoporosis type, initial encounter  M80.00XA 733.00     733.10     Patient Active Problem List   Diagnosis   • Adrenal insufficiency (HCC)   • Hypothyroidism   • Essential hypertension   • H/O pheochromocytoma   • Chronic bilateral low back pain without sciatica   • Encounter for chronic pain management   • Osteoporosis   • Tobacco abuse   • BMI 35.0-35.9,adult   • Bronchitis   • Moderate asthma without complication   • Paresthesia   • Medicare annual wellness visit, subsequent   • Routine medical exam   • Lipid screening   • Hair loss   • Other closed displaced fracture of proximal end of right humerus, initial encounter   • Acute UTI (urinary tract infection)     Past Medical History:   Diagnosis Date   • Adrenal insufficiency (HCC)    • Fibromyalgia    • Hypertension    • Hypothyroidism    • Skin cancer      Past Surgical History:   Procedure Laterality Date   • ADRENAL GLAND SURGERY     • BREAST BIOPSY      x4   • GALLBLADDER SURGERY     • HYSTERECTOMY     • MRI ANGIOGRAM LOWER EXTREMITY UNILATERAL W CONTRAST     • TONSILLECTOMY        General Information     Row Name 22 1000          Physical Therapy Time and Intention    Document Type therapy note (daily note)  -     Mode of Treatment physical therapy;individual therapy  -     Row Name 22 1000          General Information    Existing Precautions/Restrictions right;shoulder;non-weight bearing;brace worn when out of bed;other (see comments)  R  interscalene block  -     Barriers to Rehab medically complex;previous functional deficit;physical barrier  -[facility] Name 07/12/22 1000          Cognition    Orientation Status (Cognition) oriented x 4  -[facility] Name 07/12/22 1000          Safety Issues, Functional Mobility    Safety Issues Affecting Function (Mobility) positioning of assistive device;safety precaution awareness;sequencing abilities  -     Impairments Affecting Function (Mobility) balance;endurance/activity tolerance;range of motion (ROM)  -           User Key  (r) = Recorded By, (t) = Taken By, (c) = Cosigned By    Initials Name Provider Type     Trav Cordero, PT Physical Therapist               Mobility     Row Name 07/12/22 1001          Bed Mobility    Supine-Sit [place] (Bed Mobility) minimum assist (75% patient effort);1 person assist;verbal cues;nonverbal cues (demo/gesture)  -     Assistive Device (Bed Mobility) draw sheet;head of bed elevated  -     Comment, (Bed Mobility) Pt requires physical assist for trunk control, increased time and effort to achieve sitting EOB.  -[facility] Name 07/12/22 1001          Sit-Stand Transfer    Sit-Stand [place] (Transfers) minimum assist (75% patient effort);1 person assist;verbal cues  -     Assistive Device (Sit-Stand Transfers) crutches, axillary;other (see comments)  L axillary crutch  -     Comment, (Sit-Stand Transfer) Pt required elevated bed to perform STS. Pt demonstrates forward flexed posture throughout and is unable to use L hand/UE to deweight axilla from axillary crutch despite max cues.  -[facility] Name 07/12/22 1001          Gait/Stairs (Locomotion)    [place] Level (Gait) minimum assist (75% patient effort);1 person assist;verbal cues  -     Assistive Device (Gait) crutches, axillary;other (see comments)  L axillary crutch  -     Distance in Feet (Gait) 5  -     Deviations/Abnormal Patterns (Gait) base of support,  narrow;festinating/shuffling;gait speed decreased;stride length decreased  -     Bilateral Gait Deviations forward flexed posture;heel strike decreased  -     [place] Level (Stairs) not tested  -     Comment, (Gait/Stairs) Pt with significant forward flexed posture throughout with shuffled gait pattern. Pt with difficulty positioning axillary crutch and relies heavily on weightbearing through axilla with L axillary crutch. Pt limited this session d/t complaints of bilateral knee pain.  -     Row Name 07/12/22 1001          Mobility    Extremity Weight-bearing Status right upper extremity  -     Right Upper Extremity (Weight-bearing Status) non weight-bearing (NWB)  -           User Key  (r) = Recorded By, (t) = Taken By, (c) = Cosigned By    Initials Name Provider Type     Trav Cordero, PT Physical Therapist               Obj/Interventions     [facility] Name 07/12/22 1004          Motor Skills    Therapeutic Exercise hip;knee;ankle  -[facility] Name 07/12/22 1004          Hip (Therapeutic Exercise)    Hip AROM (Therapeutic Exercise) bilateral;aBduction;aDduction;external rotation;internal rotation;sitting;10 repetitions  -     Hip Isometrics (Therapeutic Exercise) bilateral;gluteal sets;sitting;10 repetitions  -     Hip Strengthening (Therapeutic Exercise) bilateral;heel slides;marching while seated;sitting;10 repetitions  -[facility] Name 07/12/22 1004          Knee (Therapeutic Exercise)    Knee AROM (Therapeutic Exercise) bilateral;SLR (straight leg raise);LAQ (long arc quad);sitting;10 repetitions  -     Knee Isometrics (Therapeutic Exercise) bilateral;quad sets;sitting;10 repetitions  -[facility] Name 07/12/22 1004          Ankle (Therapeutic Exercise)    Ankle AROM (Therapeutic Exercise) bilateral;dorsiflexion;plantarflexion;sitting;10 repetitions  -[facility] Name 07/12/22 1004          Balance    Static Sitting Balance supervision  -     Dynamic Sitting Balance standby assist  -      Position, Sitting Balance unsupported;sitting in chair;sitting edge of bed  -     Static Standing Balance contact guard  -     Dynamic Standing Balance minimal assist  -     Position/Device Used, Standing Balance supported;crutches, axillary;other (see comments)  L axillary crutch  -     Comment, Balance Pt with forward flexed posture and mild/moderate unsteadiness during ambulation requiring Wendie to maintain balance.  -           User Key  (r) = Recorded By, (t) = Taken By, (c) = Cosigned By    Initials Name Provider Type     Trav Cordero, PT Physical Therapist               Goals/Plan    No documentation.                Clinical Impression     [facility] Name 07/12/22 1006          Pain    Pretreatment Pain Rating 7/10  -     Pain Location - Side/Orientation Bilateral  -     Pain Location generalized  -     Pain Location - knee  -     Pain Intervention(s) Repositioned;Ambulation/increased activity;Elevated  -     Row Name 07/12/22 1006          Plan of Care Review    Plan of Care Reviewed With patient  -     Progress improving  -     Outcome Evaluation Pt demonstrated improved functional mobility this session, progressing ambulation distance to 5ft with L axillary crutch and  Minax1 with chair follow for safety, limited by c/o bilateral knee pain. Pt performed bed mobility with MinAx1 and transfers from elevated surfaces with MinAx1 and L axillary crutch. PT reviewed NWB RUE precautions and BLE HEP. PT plans to continue progressing PT POC as tolerated.  -     Row Name 07/12/22 1006          Vital Signs    Pre Systolic BP Rehab --  VSS  -     O2 Delivery Pre Treatment room air  -     O2 Delivery Intra Treatment room air  -     O2 Delivery Post Treatment room air  -     Pre Patient Position Supine  -     Intra Patient Position Standing  -     Post Patient Position Sitting  -     Row Name 07/12/22 1006          Positioning and Restraints    Pre-Treatment Position in bed  [facility]      Post Treatment Position chair  -     In Chair notified nsg;reclined;call light within reach;encouraged to call for assist;exit alarm on;legs elevated;waffle cushion;on mechanical lift sling  -           User Key  (r) = Recorded By, (t) = Taken By, (c) = Cosigned By    Initials Name Provider Type     Trav Cordero, PT Physical Therapist               Outcome Measures     Row Name 07/12/22 1010 07/12/22 0800       How much help from another person do you currently need...    Turning from your back to your side while in flat bed without using bedrails? 3  - 3  -SR    Moving from lying on back to sitting on the side of a flat bed without bedrails? 3  - 2  -SR    Moving to and from a bed to a chair (including a wheelchair)? 3  - 3  -SR    Standing up from a chair using your arms (e.g., wheelchair, bedside chair)? 3  - 3  -SR    Climbing 3-5 steps with a railing? 1  - 1  -SR    To walk in hospital room? 3  - 3  -SR    AM-PAC 6 Clicks Score (PT) 16  - 15  -SR    Highest level of mobility 5 --> Static standing  - 4 --> Transferred to chair/commode  -SR    Row Name 07/12/22 1010          Functional Assessment    Outcome Measure Options AM-PAC 6 Clicks Basic Mobility (PT)  -           User Key  (r) = Recorded By, (t) = Taken By, (c) = Cosigned By    Initials Name Provider Type     Drea Beckford RN Registered Nurse     Trav Cordero, PT Physical Therapist                             Physical Therapy Education                 Title: PT OT SLP Therapies (Done)     Topic: Physical Therapy (Done)     Point: Mobility training (Done)     Learning Progress Summary           Patient Acceptance, E, VU by  at 7/12/2022 1011    Acceptance, E, VU by  at 7/11/2022 0913    Comment: Reviewed safety with mobility, PT POC, and HEP.    Acceptance, E,D, NR by CT at 7/10/2022 0850    Acceptance, E,D, VU,NR by LR at 7/4/2022 0823    Comment: Educated on benefits of mobility, safety with mobility, NWB  status of R UE, correct sit<->stand t/f technique, correct gait mechanics, LE HEP, and progression of POC.    Acceptance, E,D, VU,NR by LR at 7/3/2022 1421    Comment: Educated on precautions, NWB status of R UE, safety with mobility, correct supine to sit t/f technique, correct sit<->stand t/f technique, correct bed to chair t/f technique, HEP, and progression of POC.    Acceptance, E,D, VU,NR by HP at 7/2/2022 1616    Acceptance, E,TB, NR by KG at 7/1/2022 1601    Acceptance, E, NR by FW at 6/28/2022 1512    EagerPETR, VU,DU by SC at 6/27/2022 1151    Comment: Reviewed safety with mobility    Acceptance, E, VU,NR by LO at 6/26/2022 0825    Comment: Patient education regarding PT POC, sequencing for bed mobility, transfers, and ambulation                   Point: Home exercise program (Done)     Learning Progress Summary           Patient Acceptance, E, VU by LH at 7/12/2022 1011    Acceptance, E, VU by LH at 7/11/2022 0913    Comment: Reviewed safety with mobility, PT POC, and HEP.    Acceptance, E,D, NR by CT at 7/10/2022 0850    Acceptance, E,D, VU,NR by LR at 7/4/2022 0823    Comment: Educated on benefits of mobility, safety with mobility, NWB status of R UE, correct sit<->stand t/f technique, correct gait mechanics, LE HEP, and progression of POC.    Acceptance, E,D, VU,NR by LR at 7/3/2022 1421    Comment: Educated on precautions, NWB status of R UE, safety with mobility, correct supine to sit t/f technique, correct sit<->stand t/f technique, correct bed to chair t/f technique, HEP, and progression of POC.    Acceptance, E,D, VU,NR by HP at 7/2/2022 1616    Acceptance, E,TB, NR by KG at 7/1/2022 1601    Acceptance, E, VU,NR by LS at 6/29/2022 1450    Comment: Benefits of activity    Acceptance, E, NR by FW at 6/28/2022 1512    PETR Gallardo VU, DU by SC at 6/27/2022 1151    Comment: Reviewed safety with mobility    PETR Correa VUNR by  at 6/26/2022 08    Comment: Patient education regarding PT POC,  sequencing for bed mobility, transfers, and ambulation                   Point: Body mechanics (Done)     Learning Progress Summary           Patient Acceptance, E, VU by  at 7/12/2022 1011    Acceptance, E, VU by  at 7/11/2022 0913    Comment: Reviewed safety with mobility, PT POC, and HEP.    Acceptance, E,D, NR by CT at 7/10/2022 0850    Acceptance, E,D, VU,NR by LR at 7/4/2022 0823    Comment: Educated on benefits of mobility, safety with mobility, NWB status of R UE, correct sit<->stand t/f technique, correct gait mechanics, LE HEP, and progression of POC.    Acceptance, E,D, VU,NR by LR at 7/3/2022 1421    Comment: Educated on precautions, NWB status of R UE, safety with mobility, correct supine to sit t/f technique, correct sit<->stand t/f technique, correct bed to chair t/f technique, HEP, and progression of POC.    Acceptance, E,D, VU,NR by HP at 7/2/2022 1616    Acceptance, E,TB, NR by KG at 7/1/2022 1601    Acceptance, E, NR by FW at 6/28/2022 1512    Eager, E, VU,DU by SC at 6/27/2022 1151    Comment: Reviewed safety with mobility    Acceptance, E, VU,NR by LO at 6/26/2022 0825    Comment: Patient education regarding PT POC, sequencing for bed mobility, transfers, and ambulation                   Point: Precautions (Done)     Learning Progress Summary           Patient Acceptance, E, VU by  at 7/12/2022 1011    Acceptance, E, VU by  at 7/11/2022 0913    Comment: Reviewed safety with mobility, PT POC, and HEP.    Acceptance, E,D, NR by CT at 7/10/2022 0850    Acceptance, E,D, VU,NR by LR at 7/4/2022 0823    Comment: Educated on benefits of mobility, safety with mobility, NWB status of R UE, correct sit<->stand t/f technique, correct gait mechanics, LE HEP, and progression of POC.    Acceptance, E,D, VU,NR by LR at 7/3/2022 1421    Comment: Educated on precautions, NWB status of R UE, safety with mobility, correct supine to sit t/f technique, correct sit<->stand t/f technique, correct bed to chair  t/f technique, HEP, and progression of POC.    Acceptance, E,D, VU,NR by HP at 7/2/2022 1616    Acceptance, E,TB, NR by KG at 7/1/2022 1601    Acceptance, E, NR by FW at 6/28/2022 1512    Eager, E, VU,DU by SC at 6/27/2022 1151    Comment: Reviewed safety with mobility    Acceptance, E, VU,NR by LO at 6/26/2022 0825    Comment: Patient education regarding PT POC, sequencing for bed mobility, transfers, and ambulation                               User Key     Initials Effective Dates Name Provider Type Discipline    SC 06/16/21 -  Juan Montez, PT Physical Therapist PT    LR 06/16/21 -  Rosanna Overton, PT Physical Therapist PT    LS 06/16/21 -  Baylee Pillai, PT Physical Therapist PT    CT 06/16/21 -  Malvin Stephenson, PT Physical Therapist PT    KG 06/16/21 -  Antoinette Swift Physical Therapist PT    FW 05/05/22 -  Eyal Delgado, PT Physical Therapist PT    LO 06/16/21 -  Asia Sosa, PT Physical Therapist PT    LH 09/21/21 -  Trav Cordero, PT Physical Therapist PT    HP 06/01/21 -  Fina Landeros, PT Physical Therapist PT              PT Recommendation and Plan     Plan of Care Reviewed With: patient  Progress: improving  Outcome Evaluation: Pt demonstrated improved functional mobility this session, progressing ambulation distance to 5ft with L axillary crutch and  Minax1 with chair follow for safety, limited by c/o bilateral knee pain. Pt performed bed mobility with MinAx1 and transfers from elevated surfaces with MinAx1 and L axillary crutch. PT reviewed NWB RUE precautions and BLE HEP. PT plans to continue progressing PT POC as tolerated.     Time Calculation:    PT Charges     Row Name 07/12/22 1011             Time Calculation    Start Time 0930  -      PT Received On 07/12/22  -      PT Goal Re-Cert Due Date 07/21/22  -              Timed Charges    69576 - PT Therapeutic Exercise Minutes 8 -      21214 - Gait Training Minutes  8 -      18437 - PT Therapeutic  Activity Minutes 9  -              Total Minutes    Timed Charges Total Minutes 25  -       Total Minutes 25  -LH            User Key  (r) = Recorded By, (t) = Taken By, (c) = Cosigned By    Initials Name Provider Type     Trav Cordero, PT Physical Therapist              Therapy Charges for Today     Code Description Service Date Service Provider Modifiers Qty    64333251942 HC PT THER PROC EA 15 MIN 7/11/2022 Trav Cordero, PT GP 1    [id number]  PT THERAPEUTIC ACT EA 15 MIN 7/11/2022 Trav Cordero, PT GP 1    [id number]  PT THER PROC EA 15 MIN 7/12/2022 Trav Cordero, PT GP 1    [id number]  PT THERAPEUTIC ACT EA 15 MIN 7/12/2022 Trav Cordero, PT GP 1          PT G-Codes  Outcome Measure Options: AM-PAC 6 Clicks Basic Mobility (PT)  AM-PAC 6 Clicks Score (PT): 16  AM-PAC 6 Clicks Score (OT): 13    Trav Cordero PT  7/12/2022

## 2022-07-12 NOTE — PROGRESS NOTES
[facility]    Acute pain service Inpatient Progress Note    Patient Name: Altagracia Rothman  :  1950  MRN:  [id number]        Acute Pain  Service Inpatient Progress Note:    Analgesia:Excellent  Pain Score:0/10  LOC: alert and awake  Resp Status: room air  Cardiac: VS stable  Side Effects:None  Catheter Site:clean, dressing intact and dry  Cath type: peripheral nerve cath with ON Q  Infusion rate: 4ml/hr  Catheter Plan:Catheter to remain Insitu and Continue catheter infusion rate unchanged  Comments: The neuro assessment of the operative extremity includes the ability to do finger flexion and extension; includes the ability to do wrist flexion and extension, includes the ability to do elbow flexion and extension.  The neuro exam of the patient includes sensory function throughout the operative extremity.     Patient doing well today from a pain management standpoint.  Her interscalene catheter was replaced yesterday.  She was originally on the surgery schedule for today, but has been rescheduled for Thursday of this week.

## 2022-07-12 NOTE — PLAN OF CARE
Goal Outcome Evaluation:  Plan of Care Reviewed With: patient        Progress: improving  Outcome Evaluation: Pt demonstrated improved functional mobility this session, progressing ambulation distance to 5ft with L axillary crutch and  Minax1 with chair follow for safety, limited by c/o bilateral knee pain. Pt performed bed mobility with MinAx1 and transfers from elevated surfaces with MinAx1 and L axillary crutch. PT reviewed NWB RUE precautions and BLE HEP. PT plans to continue progressing PT POC as tolerated.

## 2022-07-12 NOTE — PROGRESS NOTES
[facility] Medicine Services  PROGRESS NOTE    Patient Name: Altagracia Rothman  : 1950  MRN: [id number]    Date of Admission: 2022  Primary Care Physician: Alvina Lloyd PA-C    Subjective   Subjective     CC: f/u arm fracture    HPI:   Feels a little better today but has a headache and thinks her bp is the problem as it is too high off the lisinopril.    ROS:  Gen- No fevers, chills  Neuro- As above      Objective   Objective     Vital Signs:   Temp:  [97.6 °F (36.4 °C)-98.1 °F (36.7 °C)] 98 °F (36.7 °C)  Heart Rate:  [] 90  Resp:  [13-20] 16  BP: (119-141)/(71-89) 130/86     Physical Exam:  Constitutional: No acute distress, awake, alert, laying in bed  HENT: NCAT, mucous membranes moist  Respiratory: Respiratory effort normal   Cardiovascular: RRR  Musculoskeletal: No bilateral ankle edema, RUE in sling  Psychiatric: Appropriate affect, cooperative  Neurologic: Speech clear and fluent  Skin: No rashes on exposed surfaces    Results Reviewed:  LAB RESULTS:      Lab 22  0744 22  1237   WBC 8.18 9.13   HEMOGLOBIN 12.4 11.8*   HEMATOCRIT 39.5 37.5   PLATELETS 304 263   NEUTROS ABS 5.57 6.62   IMMATURE GRANS (ABS) 0.05 0.05   LYMPHS ABS 1.65 1.41   MONOS ABS 0.73 0.82   EOS ABS 0.16 0.20   .6* 108.4*         Lab 22  0744 22  1434   SODIUM 139 142   POTASSIUM 4.1 4.6   CHLORIDE 102 103   CO2 29.0 34.0*   ANION GAP 8.0 5.0   BUN 15 16   CREATININE 0.58 0.81   EGFR 95.7 76.8   GLUCOSE 97 122*   CALCIUM 9.4 9.0   MAGNESIUM  --  2.1         Lab 22  1434   TOTAL PROTEIN 5.8*   ALBUMIN 3.00*   GLOBULIN 2.8   ALT (SGPT) 27   AST (SGOT) 21   BILIRUBIN 0.5   ALK PHOS 120*         Brief Urine Lab Results  (Last result in the past 365 days)      Color   Clarity   Blood   Leuk Est   Nitrite   Protein   CREAT   Urine HCG        22 1145 Yellow   Clear   Negative   Small (1+)   Negative   Negative                 Microbiology Results Abnormal      Procedure Component Value - Date/Time    COVID PRE-OP / PRE-PROCEDURE SCREENING ORDER (NO ISOLATION) - Swab, Nasopharynx [[id number]]  (Normal) Collected: 06/24/22 1901    Lab Status: Final result Specimen: Swab from Nasopharynx Updated: 06/24/22 1956    Narrative:      The following orders were created for panel order COVID PRE-OP / PRE-PROCEDURE SCREENING ORDER (NO ISOLATION) - Swab, Nasopharynx.  Procedure                               Abnormality         Status                     ---------                               -----------         ------                     COVID-19, FLU A/B, RSV P...[[id number]]  Normal              Final result                 Please view results for these tests on the individual orders.    COVID-19, FLU A/B, RSV PCR - Swab, Nasopharynx [[id number]]  (Normal) Collected: 06/24/22 1901    Lab Status: Final result Specimen: Swab from Nasopharynx Updated: 06/24/22 1956     COVID19 Not Detected     Influenza A PCR Not Detected     Influenza B PCR Not Detected     RSV, PCR Not Detected    Narrative:      Fact sheet for providers: https://www.fda.gov/media/033423/download    Fact sheet for patients: https://www.fda.gov/media/507338/download    Test performed by PCR.          Right ISB Catheter re-block    Result Date: 7/11/2022  Tash Eduardo CRNA     7/11/2022 10:47 AM Right ISB Catheter re-block Patient reassessed immediately prior to procedure Patient location during procedure: floor Reason for block: at surgeon's request and post-op pain management Performed by ZAINAB/CAA: Tash Eduardo CRNA Assisted by: Capri Whittaker RN Preanesthetic Checklist Completed: patient identified, IV checked, site marked, risks and benefits discussed, surgical consent, monitors and equipment checked, pre-op evaluation and timeout performed Prep: Patient position: sitting. Sterile barriers:cap, gloves, mask and sterile barriers Prep: ChloraPrep Patient monitoring: blood pressure monitoring,  continuous pulse oximetry and EKG Procedure Sedation: yes Performed under: local infiltration Guidance:ultrasound guided Images:still images obtained, printed/placed on chart Laterality:right Block Type:interscalene Injection Technique:catheter Needle Type:Tuohy and echogenic Needle Gauge:18 G Resistance on Injection: none Catheter Size:20 G (20g) Cath Depth at skin: 8 cm Med administered at 7/11/2022 10:40 AM Post Assessment Injection Assessment: negative aspiration for heme, no paresthesia on injection and incremental injection Patient Tolerance:comfortable throughout block Complications:no Additional Notes Procedure:               The pt was placed in semifowlers position with a slight tilt of the thorax contralateral to the insertion site.  The Insertion Site was prepped and draped in sterile fashion.  The pt was anesthetized with  IV Sedation( see meds) and  Skin and cutaneous tissue was infiltrated and anesthetized with 1% Lidocaine 3 mls via a 25g needle.  Utilizing ultrasound guidance, a BBraun 4 inch 18 g Contiplex echogenic touhy needle was advanced in-plane.  Hydro dissection of tissue was achieved with Normal saline. Major vessels(carotid and Internal Jugular) where visualized as the brachial plexus was approached at the approximate level of C-7/ T-1.  Cervical 5 and Branches of Cervical 6 nerve roots where visualized and the needle tip was placed posterior at the level of C-6 roots.  LA spread was visualized and injection was made incrementally every 5 mls with aspiration. Injection pressure was normal or little, there was no intraneural injection, no vascular injection.    The BBraun 20 g wire stylet catheter was then placed under US guidance on the posterior aspect of the Brachial Plexus. The tuohy was removed and the location of catheter was confirmed with NS injection visualized with US . The skin was sealed with exofin tissue adhesive at catheter insertion site.  Skin was prepped with benzoin and  the catheter was secured with steristrips and a CHG tegaderm. Appropriate labels applied. Thank You.           I have reviewed the medications:  Scheduled Meds:budesonide-formoterol, 2 puff, Inhalation, BID - RT  doxycycline, 100 mg, Oral, Q12H  famotidine, 20 mg, Intravenous, Once  gabapentin, 800 mg, Oral, 4x Daily  hydrocortisone, 10 mg, Oral, Daily With Lunch  hydrocortisone, 20 mg, Oral, QAM  levothyroxine, 88 mcg, Oral, Q AM  Morphine, 30 mg, Oral, BID  nortriptyline, 50 mg, Oral, TID  pantoprazole, 40 mg, Oral, Q AM  pindolol, 2.5 mg, Oral, TID  saccharomyces boulardii, 250 mg, Oral, BID  sodium chloride, 10 mL, Intravenous, Q12H  sodium chloride, 10 mL, Intravenous, Q12H  spironolactone, 50 mg, Oral, Daily      Continuous Infusions:Pharmacy Consult,   lactated ringers, 9 mL/hr  ropivacaine,   sodium chloride, 75 mL/hr, Last Rate: 75 mL/hr (22 0105)      PRN Meds:.acetaminophen **OR** acetaminophen **OR** acetaminophen  •  albuterol  •  diphenhydrAMINE  •  hydrOXYzine  •  labetalol  •  lidocaine PF 1%  •  magnesium sulfate **OR** magnesium sulfate in D5W 1g/100mL (PREMIX) **OR** magnesium sulfate  •  midazolam  •  [] Morphine **AND** naloxone  •  ondansetron **OR** ondansetron  •  oxyCODONE-acetaminophen  •  potassium chloride **OR** potassium chloride **OR** potassium chloride  •  sodium chloride  •  sodium chloride    Assessment & Plan   Assessment & Plan     Active Hospital Problems    Diagnosis  POA   • **Other closed displaced fracture of proximal end of right humerus, initial encounter [S42.291A]  Yes   • Acute UTI (urinary tract infection) [N39.0]  Yes   • Moderate asthma without complication [J45.909]  Yes   • Osteoporosis [M81.0]  Yes   • Adrenal insufficiency (HCC) [E27.40]  Yes   • Hypothyroidism [E03.9]  Yes   • Essential hypertension [I10]  Yes   • H/O pheochromocytoma [Z86.018]  Yes      Resolved Hospital Problems   No resolved problems to display.        Brief Hospital Course to  date:  Altagracia Rothman is a 72 y.o. female with past medical history significant for hypertension, history of pheochromocytoma, s/p left adrenal resection, adrenal insufficiency, hypothyroidism, moderate asthma. Patient was admitted for right humerus fracture after a fall.     Right proximal and distal humerus fracture after mechanical fall.    -Tentatively planning for ORIF  Tuesday 7/12 pending results for metanephrines and catecholamines still pending  -Will need stress-doing of home steroids for AI with IV hydrocortisone, usually does IV hydrocortisone; my partner spoke with Dr. Matute and anesthesiology will dose steroids prior to surgery     Bronchitis - POA, now resolved  -CXR without infiltrate. S/P 5 days of empiric doxy.  On room air  -Resume home inhalers, continue symbicort  -s/p another round of doxy for toothache  -continue probiotic     Hypoxia/Chest Pain-resolved  - RRT 6/30, likely related to combination of nerve block and anxiety  - CXR, EKG, troponin and ABG are WNL     Hx adrenal insufficiency s/p adrenalectomy, s/p adrenal crisis (hypotension on admission)  History of pheochromocytoma - resected in 2003   - My partner discussed this with patient's endocrinologist Dr. Nelson via telephone on 6/30. Patient had adrenalectomy in 2003 with removal of pheo and has had abnormal testing since then in the setting of tricyclic antidepressant which she has not been able to taper off. Per Dr. Nelson, cathecholamine/metanphrine testing would be helpful if it is negative, although it could return a false positive from ongoing tricyclic med which patient has refused to taper. She would need to be off noratryptiline for 4-6 weeks for accurate testing.  - 24hr-urine metanephrines resulted, catecholamines still pending.  These may not be back until 7/14 or 7/15.  If they are abnormal, plan will be for non-operative management.  - Patient has been weaned back to home dose of hydrocortisone 20mg qam, 10mg nightly after  initial stress-dosing. Will need to be stress dosed with IV hydrocortisone at timing of surgery.     HTN  -initally was hypotensive due to adrenal crisis, so bp meds were held  -hydrocortisone weaned back to home dose  -Continue aldactone  -Resumed home pindolol 2.5mg and lisinopril 5mg daily     Gillespie catheter placed in ED at time of admission  UTI, proteus species/ESBL  -Gillespie d/c'd on 6/26/22. Initial urine cultures showed proteus mirabilis  -She completed course of fosfomycin  -Repeat U/A with small leuks, 13-20 WBC and 1+bacteria, culture again >100K proteus/ESBL  -Rocephin changed to ertapenem due to ESBL     Hyperglycemia, improved  -Not diabetic, A1c 5.3     Hypothyroidism   -On home dose Synthroid.     Expected Discharge Location and Transportation: Rehab, [facility] van or EMS  Expected Discharge Date: Unknown, waiting on surgery, 7/18 at the earliest    DVT prophylaxis:  Mechanical DVT prophylaxis orders are present.     AM-PAC 6 Clicks Score (PT): 15 (07/12/22 0800)    CODE STATUS:   Code Status and Medical Interventions:   Ordered at: 06/24/22 2121     Level Of Support Discussed With:    Patient     Code Status (Patient has no pulse and is not breathing):    CPR (Attempt to Resuscitate)     Medical Interventions (Patient has pulse or is breathing):    Full Support       Kalpana Hernandez MD  07/12/22

## 2022-07-12 NOTE — PROGRESS NOTES
"Orthopedic Daily Progress Note      CC: INDIRA overnight    Pain well controlled    Physical Exam:  I have reviewed the vital signs.  Temp:  [97.6 °F (36.4 °C)-98.1 °F (36.7 °C)] 98 °F (36.7 °C)  Heart Rate:  [] 90  Resp:  [13-20] 16  BP: (119-141)/(71-89) 130/86    Objective:  Vital signs: (most recent): Blood pressure 130/86, pulse 90, temperature 98 °F (36.7 °C), temperature source Oral, resp. rate 16, height 154.9 cm (61\"), weight 82 kg (180 lb 12.8 oz), SpO2 96 %, not currently breastfeeding.            General Appearance:    Alert, cooperative, no distress  Extremities: No clubbing, cyanosis, or edema to lower extremities  Pulses:  2+ right radial pulse  Skin: +ecchymoses about RUE. Radial/median/ulnar nerves intact      Results Review:    I have reviewed the labs, radiology results and diagnostic studies. Urine studies: catecholamines still pending    Results from last 7 days   Lab Units 07/09/22  0744   WBC 10*3/mm3 8.18   HEMOGLOBIN g/dL 12.4   PLATELETS 10*3/mm3 304     Results from last 7 days   Lab Units 07/09/22  0744   SODIUM mmol/L 139   POTASSIUM mmol/L 4.1   CO2 mmol/L 29.0   CREATININE mg/dL 0.58   GLUCOSE mg/dL 97       I have reviewed the medications.    Assessment/Problem List  R proximal and distal humerus fractures  Plan on ORIF/IMN R humerus fractures on 7/14, pending urine labs    Plan:  ROSY RUE    NPO at [facility] on 7/13 in preparation for surgery on 7/14    Jamari Matute Jr., MD   07/12/22  09:41 EDT            "

## 2022-07-13 PROCEDURE — 99233 SBSQ HOSP IP/OBS HIGH 50: CPT | Performed by: INTERNAL MEDICINE

## 2022-07-13 PROCEDURE — 25010000002 ROPIVACAINE PER 1 MG: Performed by: NURSE ANESTHETIST, CERTIFIED REGISTERED

## 2022-07-13 PROCEDURE — 97116 GAIT TRAINING THERAPY: CPT

## 2022-07-13 PROCEDURE — 97110 THERAPEUTIC EXERCISES: CPT

## 2022-07-13 PROCEDURE — 94799 UNLISTED PULMONARY SVC/PX: CPT

## 2022-07-13 RX ORDER — DOXYCYCLINE 100 MG/1
100 CAPSULE ORAL EVERY 12 HOURS SCHEDULED
Status: DISCONTINUED | OUTPATIENT
Start: 2022-07-13 | End: 2022-07-19 | Stop reason: HOSPADM

## 2022-07-13 RX ADMIN — BUDESONIDE AND FORMOTEROL FUMARATE DIHYDRATE 2 PUFF: 160; 4.5 AEROSOL RESPIRATORY (INHALATION) at 09:04

## 2022-07-13 RX ADMIN — DOXYCYCLINE 100 MG: 100 CAPSULE ORAL at 20:42

## 2022-07-13 RX ADMIN — GABAPENTIN 800 MG: 400 CAPSULE ORAL at 18:07

## 2022-07-13 RX ADMIN — SPIRONOLACTONE 50 MG: 50 TABLET ORAL at 08:50

## 2022-07-13 RX ADMIN — NORTRIPTYLINE HYDROCHLORIDE 50 MG: 10 CAPSULE ORAL at 20:42

## 2022-07-13 RX ADMIN — HYDROCORTISONE 20 MG: 10 TABLET ORAL at 05:20

## 2022-07-13 RX ADMIN — NORTRIPTYLINE HYDROCHLORIDE 50 MG: 10 CAPSULE ORAL at 18:07

## 2022-07-13 RX ADMIN — HYDROCORTISONE 10 MG: 10 TABLET ORAL at 12:40

## 2022-07-13 RX ADMIN — GABAPENTIN 800 MG: 400 CAPSULE ORAL at 12:33

## 2022-07-13 RX ADMIN — Medication 250 MG: at 08:49

## 2022-07-13 RX ADMIN — GABAPENTIN 800 MG: 400 CAPSULE ORAL at 20:42

## 2022-07-13 RX ADMIN — GABAPENTIN 800 MG: 400 CAPSULE ORAL at 08:49

## 2022-07-13 RX ADMIN — LISINOPRIL 5 MG: 5 TABLET ORAL at 08:50

## 2022-07-13 RX ADMIN — Medication 10 ML: at 10:06

## 2022-07-13 RX ADMIN — MORPHINE SULFATE 30 MG: 30 TABLET, FILM COATED, EXTENDED RELEASE ORAL at 20:45

## 2022-07-13 RX ADMIN — LEVOTHYROXINE SODIUM 88 MCG: 88 TABLET ORAL at 05:20

## 2022-07-13 RX ADMIN — Medication 1000 MG: at 08:54

## 2022-07-13 RX ADMIN — PINDOLOL 2.5 MG: 5 TABLET ORAL at 08:50

## 2022-07-13 RX ADMIN — BUDESONIDE AND FORMOTEROL FUMARATE DIHYDRATE 2 PUFF: 160; 4.5 AEROSOL RESPIRATORY (INHALATION) at 20:08

## 2022-07-13 RX ADMIN — OXYCODONE HYDROCHLORIDE AND ACETAMINOPHEN 1 TABLET: 10; 325 TABLET ORAL at 10:06

## 2022-07-13 RX ADMIN — PANTOPRAZOLE SODIUM 40 MG: 40 TABLET, DELAYED RELEASE ORAL at 05:20

## 2022-07-13 RX ADMIN — OXYCODONE HYDROCHLORIDE AND ACETAMINOPHEN 1 TABLET: 10; 325 TABLET ORAL at 00:05

## 2022-07-13 RX ADMIN — MORPHINE SULFATE 30 MG: 30 TABLET, FILM COATED, EXTENDED RELEASE ORAL at 08:49

## 2022-07-13 RX ADMIN — Medication 250 MG: at 20:42

## 2022-07-13 RX ADMIN — NORTRIPTYLINE HYDROCHLORIDE 50 MG: 10 CAPSULE ORAL at 08:49

## 2022-07-13 NOTE — PLAN OF CARE
Goal Outcome Evaluation:  Plan of Care Reviewed With: patient        Progress: improving  Outcome Evaluation: Pt increased distance amb to 12' with Min A and axillary crutch. Pt required Min A to perform bed mobility and STS. Activity limited by B knee pain and fatigue this session. Pt tolerated ther-ex well. Continue to recommend SNF at discharge. Continue with PT POC as pt tolerates.

## 2022-07-13 NOTE — PROGRESS NOTES
[place]    Acute pain service Inpatient Progress Note    Patient Name: Altagracia Rothman  :  1950  MRN:  [id number]        Acute Pain  Service Inpatient Progress Note:    Analgesia:Good  Pain Score:3/10  LOC: alert and awake  Resp Status: room air  Cardiac: VS stable  Side Effects:None  Catheter Site:clean, dressing intact and dry  Cath type: peripheral nerve cath with ON Q  Volume: 0.  Catheter Plan:Catheter to remain Insitu and Continue catheter infusion rate unchanged  Comments: Bag ran out of volume. Pharmacy notified and a new bag ordered. RN waiting on pharmacy to tube the new bag up.

## 2022-07-13 NOTE — ACP (ADVANCE CARE PLANNING)
Pt completed the form designating her daughter and son as healthcare surrogates.  A copy of the form was faxed to HIM.

## 2022-07-13 NOTE — PROGRESS NOTES
Urine metanephrines and catecholamines back and WNL. Given all labs WNL, will plan on surgical management on 7/14. Please make NPO at [facility] for OR tomorrow.

## 2022-07-13 NOTE — THERAPY TREATMENT NOTE
Patient Name: Altagracia Rothman  : 1950    MRN: [id number]                              Today's Date: 2022       Admit Date: 2022    Visit Dx:     ICD-10-CM ICD-9-CM   1. Other closed displaced fracture of proximal end of right humerus, initial encounter  S42.291A 812.09   2. Closed fracture of distal end of humerus, unspecified fracture morphology, initial encounter  S42.409A 812.40   3. Fall, initial encounter  W19.XXXA E888.9   4. Osteoporosis with current pathological fracture, unspecified osteoporosis type, initial encounter  M80.00XA 733.00     733.10     Patient Active Problem List   Diagnosis   • Adrenal insufficiency (HCC)   • Hypothyroidism   • Essential hypertension   • H/O pheochromocytoma   • Chronic bilateral low back pain without sciatica   • Encounter for chronic pain management   • Osteoporosis   • Tobacco abuse   • BMI 35.0-35.9,adult   • Bronchitis   • Moderate asthma without complication   • Paresthesia   • Medicare annual wellness visit, subsequent   • Routine medical exam   • Lipid screening   • Hair loss   • Other closed displaced fracture of proximal end of right humerus, initial encounter   • Acute UTI (urinary tract infection)     Past Medical History:   Diagnosis Date   • Adrenal insufficiency (HCC)    • Fibromyalgia    • Hypertension    • Hypothyroidism    • Skin cancer      Past Surgical History:   Procedure Laterality Date   • ADRENAL GLAND SURGERY     • BREAST BIOPSY      x4   • GALLBLADDER SURGERY     • HYSTERECTOMY     • MRI ANGIOGRAM LOWER EXTREMITY UNILATERAL W CONTRAST     • TONSILLECTOMY        General Information     Row Name 22 1059          Physical Therapy Time and Intention    Document Type therapy note (daily note)  -HP     Mode of Treatment physical therapy;individual therapy  -     Row Name 22 1059          General Information    Patient Profile Reviewed yes  -HP     Existing Precautions/Restrictions right;shoulder;non-weight bearing;brace worn  when out of bed;other (see comments)  R interscalene block  -     Row Name 07/13/22 1059          Cognition    Orientation Status (Cognition) oriented x 4  -     Row Name 07/13/22 1059          Safety Issues, Functional Mobility    Impairments Affecting Function (Mobility) balance;endurance/activity tolerance;range of motion (ROM)  -           User Key  (r) = Recorded By, (t) = Taken By, (c) = Cosigned By    Initials Name Provider Type     Fina Landeros PT Physical Therapist               Mobility     Row Name 07/13/22 1059          Bed Mobility    Bed Mobility supine-sit  -     Supine-Sit [place] (Bed Mobility) minimum assist (75% patient effort);1 person assist;verbal cues;nonverbal cues (demo/gesture)  -     Assistive Device (Bed Mobility) draw sheet;head of bed elevated;bed rails  -     Comment, (Bed Mobility) Pt required Min A for trunk management and VC for sequencing  -[facility] Name 07/13/22 1059          Transfers    Comment, (Transfers) Pt performed STS from elevated EOB and chair with Min A and single crutch. Pt transferred to chair with Min A for crutch management. VC for sequencing and safety awareness.  -     Row Name 07/13/22 1059          Bed-Chair Transfer    Bed-Chair [place] (Transfers) minimum assist (75% patient effort);1 person assist;verbal cues;nonverbal cues (demo/gesture)  -     Assistive Device (Bed-Chair Transfers) crutches, axillary  -[facility] Name 07/13/22 1059          Sit-Stand Transfer    Sit-Stand [place] (Transfers) minimum assist (75% patient effort);1 person assist;verbal cues  -     Assistive Device (Sit-Stand Transfers) crutches, axillary  -[facility] Name 07/13/22 1059          Gait/Stairs (Locomotion)    [place] Level (Gait) minimum assist (75% patient effort);1 person assist;verbal cues  -     Assistive Device (Gait) crutches, axillary  -     Distance in Feet (Gait) 12  -     Deviations/Abnormal Patterns (Gait) base of  support, narrow;festinating/shuffling;gait speed decreased;stride length decreased  -     Bilateral Gait Deviations forward flexed posture;heel strike decreased  -     Comment, (Gait/Stairs) Pt amb 12' with Min A and single axillary crutch on L side. Pt demonstrated signficant forward flexed position onto crutch. Pt educated on risk for nerve damage from using crutch improperly. Pt verbalized understanding and continued to lean on crutch. Pt demonstrated step-to gait pattern with decreased stride length and excessive B toeing out. Activity limited by fatigue and B knee pain.  -     Row Name 07/13/22 1059          Mobility    Extremity Weight-bearing Status right upper extremity  -     Right Upper Extremity (Weight-bearing Status) non weight-bearing (NWB)  -           User Key  (r) = Recorded By, (t) = Taken By, (c) = Cosigned By    Initials Name Provider Type     Fina Landeros PT Physical Therapist               Obj/Interventions     [facility] Name 07/13/22 1106          Motor Skills    Therapeutic Exercise knee;ankle;hip  -[facility] Name 07/13/22 1106          Hip (Therapeutic Exercise)    Hip (Therapeutic Exercise) strengthening exercise  -     Hip Strengthening (Therapeutic Exercise) bilateral;external rotation;internal rotation;aBduction;aDduction;10 repetitions  -[facility] Name 07/13/22 1106          Knee (Therapeutic Exercise)    Knee (Therapeutic Exercise) strengthening exercise;isometric exercises  -     Knee Isometrics (Therapeutic Exercise) bilateral;quad sets;10 repetitions  -     Knee Strengthening (Therapeutic Exercise) bilateral;SLR (straight leg raise);LAQ (long arc quad);10 repetitions  -[facility] Name 07/13/22 1106          Ankle (Therapeutic Exercise)    Ankle (Therapeutic Exercise) AROM (active range of motion)  -     Ankle AROM (Therapeutic Exercise) bilateral;dorsiflexion;plantarflexion;10 repetitions  -[facility] Name 07/13/22 1106          Balance    Balance Assessment  sitting static balance;sitting dynamic balance;sit to stand dynamic balance;standing static balance;standing dynamic balance  -     Static Sitting Balance standby assist  -     Dynamic Sitting Balance standby assist  -     Position, Sitting Balance sitting edge of bed  -     Static Standing Balance contact guard  -HP     Dynamic Standing Balance minimal assist  -     Position/Device Used, Standing Balance supported;crutches, axillary  -HP     Balance Interventions sitting;standing;sit to stand;occupation based/functional task  -           User Key  (r) = Recorded By, (t) = Taken By, (c) = Cosigned By    Initials Name Provider Type     Fina Landeros, FAIZA Physical Therapist               Goals/Plan    No documentation.                Clinical Impression     Row Name 07/13/22 1107          Pain    Pretreatment Pain Rating 7/10  -     Posttreatment Pain Rating 7/10  -     Pain Location - Side/Orientation Bilateral  -     Pain Location generalized  -     Pain Location - knee  -     Pain Intervention(s) Cold applied;Repositioned;Ambulation/increased activity;Elevated  -     Row Name 07/13/22 1107          Plan of Care Review    Plan of Care Reviewed With patient  -     Progress improving  -     Outcome Evaluation Pt increased distance amb to 12' with Min A and axillary crutch. Pt required Min A to perform bed mobility and STS. Activity limited by B knee pain and fatigue this session. Pt tolerated ther-ex well. Continue to recommend SNF at discharge. Continue with PT POC as pt tolerates.  -     Row Name 07/13/22 1107          Vital Signs    Pre Systolic BP Rehab --  VSS  -HP     Pre Patient Position Supine  -HP     Intra Patient Position Standing  -HP     Post Patient Position Sitting  -     Row Name 07/13/22 1107          Positioning and Restraints    Pre-Treatment Position in bed  -HP     Post Treatment Position chair  -     In Chair notified nsg;reclined;sitting;call light within  reach;encouraged to call for assist;exit alarm on;legs elevated  -           User Key  (r) = Recorded By, (t) = Taken By, (c) = Cosigned By    Initials Name Provider Type    Fina Reyna PT Physical Therapist               Outcome Measures     Row Name 07/13/22 1109          How much help from another person do you currently need...    Turning from your back to your side while in flat bed without using bedrails? 3  -HP     Moving from lying on back to sitting on the side of a flat bed without bedrails? 3  -HP     Moving to and from a bed to a chair (including a wheelchair)? 3  -HP     Standing up from a chair using your arms (e.g., wheelchair, bedside chair)? 3  -HP     Climbing 3-5 steps with a railing? 1  -HP     To walk in hospital room? 2  -HP     AM-PAC 6 Clicks Score (PT) 15  -HP     Highest level of mobility 4 --> Transferred to chair/commode  -     Row Name 07/13/22 1109          Functional Assessment    Outcome Measure Options AM-PAC 6 Clicks Basic Mobility (PT)  -           User Key  (r) = Recorded By, (t) = Taken By, (c) = Cosigned By    Initials Name Provider Type    Fina Reyna PT Physical Therapist                             Physical Therapy Education                 Title: PT OT SLP Therapies (Done)     Topic: Physical Therapy (Done)     Point: Mobility training (Done)     Learning Progress Summary           Patient Acceptance, E,D, VU,NR by  at 7/13/2022 1110    Acceptance, E, VU by  at 7/12/2022 1011    Acceptance, E, VU by  at 7/11/2022 0913    Comment: Reviewed safety with mobility, PT POC, and HEP.    Acceptance, E,D, NR by CT at 7/10/2022 0850    Acceptance, E,D, VU,NR by LR at 7/4/2022 0823    Comment: Educated on benefits of mobility, safety with mobility, NWB status of R UE, correct sit<->stand t/f technique, correct gait mechanics, LE HEP, and progression of POC.    Acceptance, E,D, VU,NR by LR at 7/3/2022 1421    Comment: Educated on precautions, NWB status of R  UE, safety with mobility, correct supine to sit t/f technique, correct sit<->stand t/f technique, correct bed to chair t/f technique, HEP, and progression of POC.    Acceptance, E,D, VU,NR by HP at 7/2/2022 1616    Acceptance, E,TB, NR by KG at 7/1/2022 1601    Acceptance, E, NR by FW at 6/28/2022 1512    Eager, E, VU,DU by SC at 6/27/2022 1151    Comment: Reviewed safety with mobility    Acceptance, E, VU,NR by LO at 6/26/2022 0825    Comment: Patient education regarding PT POC, sequencing for bed mobility, transfers, and ambulation                   Point: Home exercise program (Done)     Learning Progress Summary           Patient Acceptance, E,D, VU,NR by HP at 7/13/2022 1110    Acceptance, E, VU by LH at 7/12/2022 1011    Acceptance, E, VU by LH at 7/11/2022 0913    Comment: Reviewed safety with mobility, PT POC, and HEP.    Acceptance, E,D, NR by CT at 7/10/2022 0850    Acceptance, E,D, VU,NR by LR at 7/4/2022 0823    Comment: Educated on benefits of mobility, safety with mobility, NWB status of R UE, correct sit<->stand t/f technique, correct gait mechanics, LE HEP, and progression of POC.    Acceptance, E,D, VU,NR by LR at 7/3/2022 1421    Comment: Educated on precautions, NWB status of R UE, safety with mobility, correct supine to sit t/f technique, correct sit<->stand t/f technique, correct bed to chair t/f technique, HEP, and progression of POC.    Acceptance, E,D, VU,NR by HP at 7/2/2022 1616    Acceptance, E,TB, NR by KG at 7/1/2022 1601    Acceptance, E, VU,NR by LS at 6/29/2022 1450    Comment: Benefits of activity    Acceptance, E, NR by FW at 6/28/2022 1512    Eager, E, VU,DU by SC at 6/27/2022 1151    Comment: Reviewed safety with mobility    Acceptance, E, VU,NR by LO at 6/26/2022 0825    Comment: Patient education regarding PT POC, sequencing for bed mobility, transfers, and ambulation                   Point: Body mechanics (Done)     Learning Progress Summary           Patient Acceptance, E,D,  VU,NR by HP at 7/13/2022 1110    Acceptance, E, VU by  at 7/12/2022 1011    Acceptance, E, VU by  at 7/11/2022 0913    Comment: Reviewed safety with mobility, PT POC, and HEP.    Acceptance, E,D, NR by CT at 7/10/2022 0850    Acceptance, E,D, VU,NR by LR at 7/4/2022 0823    Comment: Educated on benefits of mobility, safety with mobility, NWB status of R UE, correct sit<->stand t/f technique, correct gait mechanics, LE HEP, and progression of POC.    Acceptance, E,D, VU,NR by LR at 7/3/2022 1421    Comment: Educated on precautions, NWB status of R UE, safety with mobility, correct supine to sit t/f technique, correct sit<->stand t/f technique, correct bed to chair t/f technique, HEP, and progression of POC.    Acceptance, E,D, VU,NR by HP at 7/2/2022 1616    Acceptance, E,TB, NR by KG at 7/1/2022 1601    Acceptance, E, NR by FW at 6/28/2022 1512    Eager, E, VU,DU by SC at 6/27/2022 1151    Comment: Reviewed safety with mobility    Acceptance, E, VU,NR by LO at 6/26/2022 0825    Comment: Patient education regarding PT POC, sequencing for bed mobility, transfers, and ambulation                   Point: Precautions (Done)     Learning Progress Summary           Patient Acceptance, E,D, VU,NR by HP at 7/13/2022 1110    Acceptance, E, VU by  at 7/12/2022 1011    Acceptance, E, VU by  at 7/11/2022 0913    Comment: Reviewed safety with mobility, PT POC, and HEP.    Acceptance, E,D, NR by CT at 7/10/2022 0850    Acceptance, E,D, VU,NR by LR at 7/4/2022 0823    Comment: Educated on benefits of mobility, safety with mobility, NWB status of R UE, correct sit<->stand t/f technique, correct gait mechanics, LE HEP, and progression of POC.    Acceptance, E,D, VU,NR by LR at 7/3/2022 1421    Comment: Educated on precautions, NWB status of R UE, safety with mobility, correct supine to sit t/f technique, correct sit<->stand t/f technique, correct bed to chair t/f technique, HEP, and progression of POC.    Acceptance, E,D,  VU,NR by HP at 7/2/2022 1616    Acceptance, E,TB, NR by KG at 7/1/2022 1601    Acceptance, E, NR by FW at 6/28/2022 1512    Eager, E, VU,DU by SC at 6/27/2022 1151    Comment: Reviewed safety with mobility    Acceptance, E, VU,NR by LO at 6/26/2022 0825    Comment: Patient education regarding PT POC, sequencing for bed mobility, transfers, and ambulation                               User Key     Initials Effective Dates Name Provider Type Discipline    SC 06/16/21 -  Juan Montez, PT Physical Therapist PT    LR 06/16/21 -  Rosanna Overton, PT Physical Therapist PT    LS 06/16/21 -  Baylee Pillai, PT Physical Therapist PT    CT 06/16/21 -  Malvin Stephenson, PT Physical Therapist PT    KG 06/16/21 -  Antoinetet Swift Physical Therapist PT    FW 05/05/22 -  Eyal Delgado, PT Physical Therapist PT    LO 06/16/21 -  Asia Sosa, PT Physical Therapist PT    LH 09/21/21 -  Trav Cordero, PT Physical Therapist PT    HP 06/01/21 -  Fina Landeros, PT Physical Therapist PT              PT Recommendation and Plan     Plan of Care Reviewed With: patient  Progress: improving  Outcome Evaluation: Pt increased distance amb to 12' with Min A and axillary crutch. Pt required Min A to perform bed mobility and STS. Activity limited by B knee pain and fatigue this session. Pt tolerated ther-ex well. Continue to recommend SNF at discharge. Continue with PT POC as pt tolerates.     Time Calculation:    PT Charges     Row Name 07/13/22 0915             Time Calculation    Start Time 0915  -HP      PT Received On 07/13/22  -HP              Timed Charges    37411 - PT Therapeutic Exercise Minutes 10  -HP      07888 - Gait Training Minutes  15  -HP              Total Minutes    Timed Charges Total Minutes 25  -HP       Total Minutes 25  -HP            User Key  (r) = Recorded By, (t) = Taken By, (c) = Cosigned By    Initials Name Provider Type     Fina Landeros, PT Physical Therapist              Therapy  Charges for Today     Code Description Service Date Service Provider Modifiers Qty    30950406048 HC PT THER PROC EA 15 MIN 7/13/2022 Fina Landeros, PT GP 1    [id number] HC GAIT TRAINING EA 15 MIN 7/13/2022 Fina Landeros, PT GP 1          PT G-Codes  Outcome Measure Options: AM-PAC 6 Clicks Basic Mobility (PT)  AM-PAC 6 Clicks Score (PT): 15  AM-PAC 6 Clicks Score (OT): 13    Fina Landeros, PT  7/13/2022

## 2022-07-13 NOTE — PLAN OF CARE
Goal Outcome Evaluation:                 Problem: Adult Inpatient Plan of Care  Goal: Absence of Hospital-Acquired Illness or Injury  Intervention: Identify and Manage Fall Risk  Recent Flowsheet Documentation  Taken 7/13/2022 0400 by Bruno Kim RN  Safety Promotion/Fall Prevention:   activity supervised   safety round/check completed   toileting scheduled  Taken 7/13/2022 0200 by Brnuo Kim RN  Safety Promotion/Fall Prevention:   activity supervised   safety round/check completed   toileting scheduled  Taken 7/13/2022 0000 by Bruno Kim RN  Safety Promotion/Fall Prevention:   activity supervised   safety round/check completed   toileting scheduled  Taken 7/12/2022 2200 by Bruno Kim RN  Safety Promotion/Fall Prevention:   activity supervised   safety round/check completed   toileting scheduled  Taken 7/12/2022 2000 by Bruno Kim RN  Safety Promotion/Fall Prevention:   activity supervised   safety round/check completed   toileting scheduled  Intervention: Prevent Skin Injury  Recent Flowsheet Documentation  Taken 7/13/2022 0400 by Bruno Kim RN  Body Position: supine  Skin Protection: adhesive use limited  Taken 7/13/2022 0200 by Bruno Kim RN  Body Position: supine  Skin Protection: adhesive use limited  Taken 7/13/2022 0000 by Bruno Kim RN  Body Position: supine  Skin Protection: adhesive use limited  Taken 7/12/2022 2200 by Bruno Kim RN  Body Position: supine  Skin Protection: adhesive use limited  Taken 7/12/2022 2000 by Bruno Kim RN  Body Position: supine  Skin Protection: adhesive use limited  Intervention: Prevent Infection  Recent Flowsheet Documentation  Taken 7/13/2022 0400 by Bruno Kim RN  Infection Prevention: environmental surveillance performed  Taken 7/13/2022 0200 by Bruno Kim RN  Infection Prevention: environmental surveillance performed  Taken 7/13/2022 0000 by Bruno Kim RN  Infection Prevention: environmental  surveillance performed  Taken 7/12/2022 2200 by Bruno Kim RN  Infection Prevention: environmental surveillance performed  Taken 7/12/2022 2000 by Bruno Kim RN  Infection Prevention: environmental surveillance performed  Goal: Optimal Comfort and Wellbeing  Intervention: Monitor Pain and Promote Comfort  Recent Flowsheet Documentation  Taken 7/13/2022 0400 by Bruno Kim RN  Pain Management Interventions: quiet environment facilitated  Taken 7/13/2022 0200 by Bruno Kim RN  Pain Management Interventions: quiet environment facilitated  Taken 7/13/2022 0000 by Bruno Kim RN  Pain Management Interventions: quiet environment facilitated  Taken 7/12/2022 2200 by Bruno Kim RN  Pain Management Interventions: quiet environment facilitated  Taken 7/12/2022 2000 by Bruno Kim RN  Pain Management Interventions: quiet environment facilitated  Intervention: Provide Person-Centered Care  Recent Flowsheet Documentation  Taken 7/13/2022 0400 by Bruno Kim RN  Trust Relationship/Rapport: care explained  Taken 7/13/2022 0200 by Bruno Kim RN  Trust Relationship/Rapport: care explained  Taken 7/13/2022 0000 by Bruno Kim RN  Trust Relationship/Rapport: care explained  Taken 7/12/2022 2200 by Bruno Kim RN  Trust Relationship/Rapport: care explained  Taken 7/12/2022 2000 by Bruno Kim RN  Trust Relationship/Rapport: care explained     Problem: Skin Injury Risk Increased  Goal: Skin Health and Integrity  Intervention: Optimize Skin Protection  Recent Flowsheet Documentation  Taken 7/13/2022 0400 by Bruno Kim RN  Pressure Reduction Techniques: frequent weight shift encouraged  Head of Bed (HOB) Positioning: HOB at 30-45 degrees  Pressure Reduction Devices: positioning supports utilized  Skin Protection: adhesive use limited  Taken 7/13/2022 0200 by Bruno Kim RN  Pressure Reduction Techniques: frequent weight shift encouraged  Head of Bed (HOB)  Positioning: HOB at 30-45 degrees  Pressure Reduction Devices: positioning supports utilized  Skin Protection: adhesive use limited  Taken 7/13/2022 0000 by Bruno Kim RN  Pressure Reduction Techniques: frequent weight shift encouraged  Head of Bed (HOB) Positioning: HOB at 30-45 degrees  Pressure Reduction Devices: positioning supports utilized  Skin Protection: adhesive use limited  Taken 7/12/2022 2200 by Bruno Kim RN  Pressure Reduction Techniques: frequent weight shift encouraged  Head of Bed (HOB) Positioning: HOB at 30-45 degrees  Pressure Reduction Devices: positioning supports utilized  Skin Protection: adhesive use limited  Taken 7/12/2022 2000 by Bruno Kim RN  Pressure Reduction Techniques: frequent weight shift encouraged  Head of Bed (HOB) Positioning: HOB at 30-45 degrees  Pressure Reduction Devices: positioning supports utilized  Skin Protection: adhesive use limited     Problem: Fall Injury Risk  Goal: Absence of Fall and Fall-Related Injury  Intervention: Identify and Manage Contributors  Recent Flowsheet Documentation  Taken 7/13/2022 0400 by Bruno Kim RN  Medication Review/Management: medications reviewed  Taken 7/13/2022 0200 by Bruno Kim RN  Medication Review/Management: medications reviewed  Taken 7/13/2022 0000 by Bruno Kim RN  Medication Review/Management: medications reviewed  Taken 7/12/2022 2200 by Bruno Kim RN  Medication Review/Management: medications reviewed  Taken 7/12/2022 2000 by Bruno Kim RN  Medication Review/Management: medications reviewed  Intervention: Promote Injury-Free Environment  Recent Flowsheet Documentation  Taken 7/13/2022 0400 by Bruno Kim RN  Safety Promotion/Fall Prevention:   activity supervised   safety round/check completed   toileting scheduled  Taken 7/13/2022 0200 by Bruno Kim RN  Safety Promotion/Fall Prevention:   activity supervised   safety round/check completed   toileting  scheduled  Taken 7/13/2022 0000 by Bruno Kim RN  Safety Promotion/Fall Prevention:   activity supervised   safety round/check completed   toileting scheduled  Taken 7/12/2022 2200 by Bruno Kim RN  Safety Promotion/Fall Prevention:   activity supervised   safety round/check completed   toileting scheduled  Taken 7/12/2022 2000 by Bruno Kim RN  Safety Promotion/Fall Prevention:   activity supervised   safety round/check completed   toileting scheduled     Problem: Functional Ability Impaired (Orthopaedic Fracture)  Goal: Optimal Functional Ability  Intervention: Optimize Functional Ability  Recent Flowsheet Documentation  Taken 7/13/2022 0400 by Bruno Kim RN  Positioning/Transfer Devices: pillows  Taken 7/13/2022 0200 by Bruno Kim RN  Positioning/Transfer Devices: pillows  Taken 7/13/2022 0000 by Bruno Kim RN  Positioning/Transfer Devices: pillows  Taken 7/12/2022 2200 by Bruno Kim RN  Positioning/Transfer Devices: pillows  Taken 7/12/2022 2000 by Bruno Kim RN  Positioning/Transfer Devices: pillows     Problem: Infection (Orthopaedic Fracture)  Goal: Absence of Infection Signs and Symptoms  Intervention: Prevent or Manage Infection  Recent Flowsheet Documentation  Taken 7/13/2022 0400 by Bruno Kim RN  Infection Prevention: environmental surveillance performed  Taken 7/13/2022 0200 by Bruno Kim RN  Infection Prevention: environmental surveillance performed  Taken 7/13/2022 0000 by Bruno Kim RN  Infection Prevention: environmental surveillance performed  Taken 7/12/2022 2200 by Bruno Kim RN  Infection Prevention: environmental surveillance performed  Taken 7/12/2022 2000 by Bruno Kim RN  Infection Prevention: environmental surveillance performed     Problem: Pain (Orthopaedic Fracture)  Goal: Acceptable Pain Control  Intervention: Manage Acute Orthopaedic-Related Pain  Recent Flowsheet Documentation  Taken 7/13/2022 0400 by Judy  Bruno PRITCHARD RN  Pain Management Interventions: quiet environment facilitated  Taken 7/13/2022 0200 by Bruno Kim RN  Pain Management Interventions: quiet environment facilitated  Taken 7/13/2022 0000 by Bruno Kim RN  Pain Management Interventions: quiet environment facilitated  Taken 7/12/2022 2200 by Bruno Kim RN  Pain Management Interventions: quiet environment facilitated  Taken 7/12/2022 2000 by Bruno Kim RN  Pain Management Interventions: quiet environment facilitated     Problem: Respiratory Compromise (Orthopaedic Fracture)  Goal: Effective Oxygenation and Ventilation  Intervention: Promote Airway Secretion Clearance  Recent Flowsheet Documentation  Taken 7/12/2022 2000 by Bruno Kim RN  Cough And Deep Breathing: done independently per patient     VSS, voids well, rested throughout the night, pain managed with PRN medications, awaiting surgery, will continue to monitor for changes.

## 2022-07-13 NOTE — PROGRESS NOTES
[facility]  PROGRESS NOTE    Patient Name: Altagracia Rothman  : 1950  MRN: [id number]    Date of Admission: 2022  Primary Care Physician: Alvina Lloyd PA-C    Subjective   Subjective     CC:  Follow-up humeral fracture    HPI:  States that the pain in her arm is actually overall improved since admission.  When her nerve block will run out needs to be changed she feels that the pain is better controlled than previously.  Also complaining of overall fatigue/lethargy.    Of note the patient raises the question of her doxycycline, per the chart she was on this for bronchitis, the patient identifies she is chronically on tetracycline in relation to her arthritis as well as a subacute-chronic tooth infection which she is planning to get followed up but was interrupted due to hospitalization    ROS:  Gen- No fevers, chills  CV- No chest pain, palpitations  Resp- No cough, dyspnea  GI- No N/V/D, abd pain     Objective   Objective     Vital Signs:   Temp:  [98 °F (36.7 °C)-98.3 °F (36.8 °C)] 98.3 °F (36.8 °C)  Heart Rate:  [74-96] 96  Resp:  [16-18] 18  BP: (114-146)/(70-92) 146/87     Physical Exam:  Constitutional: Awake, alert, no acute distress, sitting up in bed  HENT: NCAT, mucous membranes moist  Respiratory: Clear to auscultation bilaterally, respiratory effort normal   Cardiovascular: RRR, palpable radial pulses  Gastrointestinal: Positive bowel sounds, soft, nontender, nondistended  Musculoskeletal: RT UEs in sling  Psychiatric: Appropriate affect, cooperative  Neurologic: Speech clear and fluent    Results Reviewed:  LAB RESULTS:      Lab 22  0744   WBC 8.18   HEMOGLOBIN 12.4   HEMATOCRIT 39.5   PLATELETS 304   NEUTROS ABS 5.57   IMMATURE GRANS (ABS) 0.05   LYMPHS ABS 1.65   MONOS ABS 0.73   EOS ABS 0.16   .6*         Lab 22  0744   SODIUM 139   POTASSIUM 4.1   CHLORIDE 102   CO2 29.0   ANION GAP 8.0   BUN 15   CREATININE 0.58   EGFR  95.7   GLUCOSE 97   CALCIUM 9.4                         Brief Urine Lab Results  (Last result in the past 365 days)      Color   Clarity   Blood   Leuk Est   Nitrite   Protein   CREAT   Urine HCG        07/06/22 1145 Yellow   Clear   Negative   Small (1+)   Negative   Negative                 Microbiology Results Abnormal     Procedure Component Value - Date/Time    COVID PRE-OP / PRE-PROCEDURE SCREENING ORDER (NO ISOLATION) - Swab, Nasopharynx [[id number]]  (Normal) Collected: 06/24/22 1901    Lab Status: Final result Specimen: Swab from Nasopharynx Updated: 06/24/22 1956    Narrative:      The following orders were created for panel order COVID PRE-OP / PRE-PROCEDURE SCREENING ORDER (NO ISOLATION) - Swab, Nasopharynx.  Procedure                               Abnormality         Status                     ---------                               -----------         ------                     COVID-19, FLU A/B, RSV P...[[id number]]  Normal              Final result                 Please view results for these tests on the individual orders.    COVID-19, FLU A/B, RSV PCR - Swab, Nasopharynx [[id number]]  (Normal) Collected: 06/24/22 1901    Lab Status: Final result Specimen: Swab from Nasopharynx Updated: 06/24/22 1956     COVID19 Not Detected     Influenza A PCR Not Detected     Influenza B PCR Not Detected     RSV, PCR Not Detected    Narrative:      Fact sheet for providers: https://www.fda.gov/media/813143/download    Fact sheet for patients: https://www.fda.gov/media/588655/download    Test performed by PCR.          XR Humerus Right    Result Date: 7/12/2022  DATE OF EXAM: 7/12/2022 9:51 AM  PROCEDURE: XR HUMERUS RIGHT-  INDICATIONS: R humerus fracture; S42.291A-Other displaced fracture of upper end of right humerus, initial encounter for closed fracture; S42.409A-Unspecified fracture of lower end of unspecified humerus, initial encounter for closed fracture; W19.XXXA-Unspecified fall, initial encounter;  M80.00XA-Age-related osteoporosis with current pathological fracture, unspecified site, initial encounter for fracture  COMPARISON: Right shoulder radiographs 6/24/2022  TECHNIQUE: AP and lateral radiographic views were obtained of the right humerus.  FINDINGS: Redemonstration of a mildly comminuted spiral fracture of the proximal humeral shaft, with approximately 2 cm of foreshortening with mild apex medial angulation. There is a vague linear lucency extending to the surgical neck only well seen on the angled image. There is an additional nearly transverse fracture through the distal humeral metadiaphysis which appears mildly displaced with approximately one half shaft width medial displacement of the distal fragment and mild apex lateral angulation. No definite evidence of fracture extension to the humeral head articular surface. No definite evidence of fracture extension to the elbow joint, although the elbow is not well assessed on this humeral radiograph study. These findings could be further characterized with CT of the right arm. There is chondrocalcinosis of the subacromial bursa. There is expected soft tissue swelling and subcutaneous fat stranding about the fractures.      Impression: Redemonstration of a mildly comminuted spiral fracture of the proximal humeral shaft, with approximately 2 cm of foreshortening with mild apex medial angulation. There is a vague linear lucency extending to the surgical neck only well seen on the angled image. There is an additional nearly transverse fracture through the distal humeral metadiaphysis which appears mildly displaced with approximately one half shaft width medial displacement of the distal fragment and mild apex lateral angulation. No definite evidence of fracture extension to the humeral head articular surface. No definite evidence of fracture extension to the elbow joint, although the elbow is not well assessed on this humeral radiograph study. These findings  could be further characterized with CT of the right arm.  This report was finalized on 7/12/2022 10:36 AM by Stefan Jansen MD.      Right ISB Catheter re-block    Result Date: 7/11/2022  Tash Eduardo CRNA     7/11/2022 10:47 AM Right ISB Catheter re-block Patient reassessed immediately prior to procedure Patient location during procedure: floor Reason for block: at surgeon's request and post-op pain management Performed by ZAINAB/CAA: Tash Eduardo CRNA Assisted by: Capri Whittaker RN Preanesthetic Checklist Completed: patient identified, IV checked, site marked, risks and benefits discussed, surgical consent, monitors and equipment checked, pre-op evaluation and timeout performed Prep: Patient position: sitting. Sterile barriers:cap, gloves, mask and sterile barriers Prep: ChloraPrep Patient monitoring: blood pressure monitoring, continuous pulse oximetry and EKG Procedure Sedation: yes Performed under: local infiltration Guidance:ultrasound guided Images:still images obtained, printed/placed on chart Laterality:right Block Type:interscalene Injection Technique:catheter Needle Type:Tuohy and echogenic Needle Gauge:18 G Resistance on Injection: none Catheter Size:20 G (20g) Cath Depth at skin: 8 cm Med administered at 7/11/2022 10:40 AM Post Assessment Injection Assessment: negative aspiration for heme, no paresthesia on injection and incremental injection Patient Tolerance:comfortable throughout block Complications:no Additional Notes Procedure:               The pt was placed in semifowlers position with a slight tilt of the thorax contralateral to the insertion site.  The Insertion Site was prepped and draped in sterile fashion.  The pt was anesthetized with  IV Sedation( see meds) and  Skin and cutaneous tissue was infiltrated and anesthetized with 1% Lidocaine 3 mls via a 25g needle.  Utilizing ultrasound guidance, a BBraun 4 inch 18 g Contiplex echogenic touhy needle was advanced in-plane.  Hydro  dissection of tissue was achieved with Normal saline. Major vessels(carotid and Internal Jugular) where visualized as the brachial plexus was approached at the approximate level of C-7/ T-1.  Cervical 5 and Branches of Cervical 6 nerve roots where visualized and the needle tip was placed posterior at the level of C-6 roots.  LA spread was visualized and injection was made incrementally every 5 mls with aspiration. Injection pressure was normal or little, there was no intraneural injection, no vascular injection.    The BBraun 20 g wire stylet catheter was then placed under US guidance on the posterior aspect of the Brachial Plexus. The tuohy was removed and the location of catheter was confirmed with NS injection visualized with US . The skin was sealed with exofin tissue adhesive at catheter insertion site.  Skin was prepped with benzoin and the catheter was secured with steristrips and a CHG tegaderm. Appropriate labels applied. Thank You.           I have reviewed the medications:  Scheduled Meds:budesonide-formoterol, 2 puff, Inhalation, BID - RT  doxycycline, 100 mg, Oral, Q12H  famotidine, 20 mg, Intravenous, Once  gabapentin, 800 mg, Oral, 4x Daily  hydrocortisone, 10 mg, Oral, Daily With Lunch  hydrocortisone, 20 mg, Oral, QAM  levothyroxine, 88 mcg, Oral, Q AM  lisinopril, 5 mg, Oral, Q24H  Morphine, 30 mg, Oral, BID  nortriptyline, 50 mg, Oral, TID  pantoprazole, 40 mg, Oral, Q AM  pindolol, 2.5 mg, Oral, TID  saccharomyces boulardii, 250 mg, Oral, BID  sodium chloride, 10 mL, Intravenous, Q12H  sodium chloride, 10 mL, Intravenous, Q12H  spironolactone, 50 mg, Oral, Daily      Continuous Infusions:Pharmacy Consult,   lactated ringers, 9 mL/hr  ropivacaine,   sodium chloride, 75 mL/hr, Last Rate: 75 mL/hr (06/29/22 0105)      PRN Meds:.acetaminophen **OR** acetaminophen **OR** acetaminophen  •  albuterol  •  diphenhydrAMINE  •  hydrOXYzine  •  labetalol  •  lidocaine PF 1%  •  magnesium sulfate **OR**  magnesium sulfate in D5W 1g/100mL (PREMIX) **OR** magnesium sulfate  •  midazolam  •  [] Morphine **AND** naloxone  •  ondansetron **OR** ondansetron  •  oxyCODONE-acetaminophen  •  potassium chloride **OR** potassium chloride **OR** potassium chloride  •  sodium chloride  •  sodium chloride    Assessment & Plan   Assessment & Plan     Active Hospital Problems    Diagnosis  POA   • **Other closed displaced fracture of proximal end of right humerus, initial encounter [S42.291A]  Yes   • Acute UTI (urinary tract infection) [N39.0]  Yes   • Moderate asthma without complication [J45.909]  Yes   • Osteoporosis [M81.0]  Yes   • Adrenal insufficiency (HCC) [E27.40]  Yes   • Hypothyroidism [E03.9]  Yes   • Essential hypertension [I10]  Yes   • H/O pheochromocytoma [Z86.018]  Yes      Resolved Hospital Problems   No resolved problems to display.        Brief Hospital Course to date:  Altagracia Rothman is a 72 y.o. female with Hx HTN, pheochromocytoma s/p LT adrenal resection, chronic adrenal insufficiency on chronic steroids, hypothyroidism, asthma who presented for evaluation of arm pain after a fall, radiographs demonstrated 2 foci of fractures and she has been planned for operative management, however per the chart endocrinology is recommended catecholamines and metanephrines to be evaluated prior to venturing into the realm of surgery which is significantly delayed operative management and prolonged hospitalization    The following problems are new to me today    Assessment/plan    Acute RT proximal and distal humeral fractures s/p fall  - Per documentation anesthesia to manage stress dose steroids perioperatively  -Dr. Matute follows, plan surgery tomorrow  -Pain control    Hx pheochromocytoma s/p LT adrenal resection  Chronic adrenal insufficiency s/p adrenal crisis  - Previous provider d/w Dr. Nelson via phone ; pheo resection ; unable to adequately follow labs 2/2 chronic TCA use; recommended deferring  "surgery if catecholamine/metanephrines abnormal- all have returned WNL  -Currently on home split-dose oral hydrocortisone; anticipate need for stress dose IV hydrocortisone perioperatively    Hypertension  - Initially hypotensive  - Lisinopril, pindolol (patient supplied), spironolactone  -Monitor BP perioperatively    Arthritis with chronic tetracycline use  Tooth pain  Fibromyalgia  - Chronically on tetracycline from her \"arthritis doctor\"; not on formulary here and has been on substituted doxycycline  -Patient is NOT on doxycycline for bronchitis, this is a substitution for a chronic medication  - Chronic gabapentin, oral morphine, oral oxycodone; likely will have difficulty with perioperative pain control    Chronic inhaler use, data deficit  -Home meds include Wixela inhaler; no documented pulmonary disease however history of smoking  -RRT 6/34 chest pain/SOB, reported presumed 2/II nerve block/anxiety  - Symbicort substitute for Wixela, albuterol neb as needed    S/p Tx ESBL bacteriuria  - Per chart Gillespie placed in the ED, DC'd 6/26, UrCx 6/27 w/ MDR Proteus; completed fosfomycin, repeat UA 7/6 for unclear cause w/ ESBL Proteus; s/p Tx w/ ertapenem    Hypothyroidism  -Levothyroxine      Expected Discharge Location and Transportation: Needs to be determined postoperatively, anticipate she will need rehab, medical van to transport  Expected Discharge Date: 7/16    DVT prophylaxis:  Mechanical DVT prophylaxis orders are present.     AM-PAC 6 Clicks Score (PT): 16 (07/12/22 1010)    CODE STATUS:   Code Status and Medical Interventions:   Ordered at: 06/24/22 4528     Level Of Support Discussed With:    Patient     Code Status (Patient has no pulse and is not breathing):    CPR (Attempt to Resuscitate)     Medical Interventions (Patient has pulse or is breathing):    Full Support       Jamari Torres, DO  07/13/22              "

## 2022-07-14 ENCOUNTER — APPOINTMENT (OUTPATIENT)
Dept: GENERAL RADIOLOGY | Facility: HOSPITAL | Age: 72
End: 2022-07-14

## 2022-07-14 PROCEDURE — C1769 GUIDE WIRE: HCPCS | Performed by: ORTHOPAEDIC SURGERY

## 2022-07-14 PROCEDURE — 25010000002 HYDROCORTISONE SODIUM SUCCINATE 100 MG RECONSTITUTED SOLUTION: Performed by: NURSE ANESTHETIST, CERTIFIED REGISTERED

## 2022-07-14 PROCEDURE — C1713 ANCHOR/SCREW BN/BN,TIS/BN: HCPCS | Performed by: ORTHOPAEDIC SURGERY

## 2022-07-14 PROCEDURE — 25010000002 ONDANSETRON PER 1 MG: Performed by: ANESTHESIOLOGY

## 2022-07-14 PROCEDURE — 25010000002 MORPHINE PER 10 MG: Performed by: ANESTHESIOLOGY

## 2022-07-14 PROCEDURE — 25010000002 CEFAZOLIN PER 500 MG: Performed by: NURSE ANESTHETIST, CERTIFIED REGISTERED

## 2022-07-14 PROCEDURE — 23615 OPTX PROX HUMRL FX W/INT FIX: CPT | Performed by: PHYSICIAN ASSISTANT

## 2022-07-14 PROCEDURE — 25010000002 NEOSTIGMINE 10 MG/10ML SOLUTION: Performed by: ANESTHESIOLOGY

## 2022-07-14 PROCEDURE — 25010000002 HYDROCORTISONE SODIUM SUCCINATE 100 MG RECONSTITUTED SOLUTION

## 2022-07-14 PROCEDURE — 25010000002 HYDROMORPHONE 1 MG/ML SOLUTION

## 2022-07-14 PROCEDURE — 25010000002 DEXAMETHASONE PER 1 MG: Performed by: NURSE ANESTHETIST, CERTIFIED REGISTERED

## 2022-07-14 PROCEDURE — 94799 UNLISTED PULMONARY SVC/PX: CPT

## 2022-07-14 PROCEDURE — 25010000002 HYDROCORTISONE SODIUM SUCCINATE 100 MG RECONSTITUTED SOLUTION: Performed by: ANESTHESIOLOGY

## 2022-07-14 PROCEDURE — 71045 X-RAY EXAM CHEST 1 VIEW: CPT

## 2022-07-14 PROCEDURE — 25010000002 HYDROMORPHONE PER 4 MG: Performed by: NURSE ANESTHETIST, CERTIFIED REGISTERED

## 2022-07-14 PROCEDURE — 24515 OPTX HUMRL SHFT FX PLATE/SCR: CPT | Performed by: PHYSICIAN ASSISTANT

## 2022-07-14 PROCEDURE — 0PSC06Z REPOSITION RIGHT HUMERAL HEAD WITH INTRAMEDULLARY INTERNAL FIXATION DEVICE, OPEN APPROACH: ICD-10-PCS | Performed by: ORTHOPAEDIC SURGERY

## 2022-07-14 PROCEDURE — 99232 SBSQ HOSP IP/OBS MODERATE 35: CPT | Performed by: INTERNAL MEDICINE

## 2022-07-14 PROCEDURE — 25010000002 PROPOFOL 10 MG/ML EMULSION: Performed by: NURSE ANESTHETIST, CERTIFIED REGISTERED

## 2022-07-14 PROCEDURE — 0 MEPERIDINE PER 100 MG

## 2022-07-14 PROCEDURE — 0PHF36Z INSERTION OF INTRAMEDULLARY INTERNAL FIXATION DEVICE INTO RIGHT HUMERAL SHAFT, PERCUTANEOUS APPROACH: ICD-10-PCS | Performed by: ORTHOPAEDIC SURGERY

## 2022-07-14 PROCEDURE — 0 MEPERIDINE PER 100 MG: Performed by: ANESTHESIOLOGY

## 2022-07-14 PROCEDURE — 76000 FLUOROSCOPY <1 HR PHYS/QHP: CPT

## 2022-07-14 DEVICE — IMPLANTABLE DEVICE: Type: IMPLANTABLE DEVICE | Site: HUMERUS | Status: FUNCTIONAL

## 2022-07-14 DEVICE — SCRW M/THRD LK TI 3.5X18MM: Type: IMPLANTABLE DEVICE | Site: HUMERUS | Status: FUNCTIONAL

## 2022-07-14 DEVICE — IMPLANTABLE DEVICE
Type: IMPLANTABLE DEVICE | Site: HUMERUS | Status: FUNCTIONAL
Brand: AFFIXUS® NATURAL NAIL®

## 2022-07-14 RX ORDER — MORPHINE SULFATE 4 MG/ML
INJECTION, SOLUTION INTRAMUSCULAR; INTRAVENOUS AS NEEDED
Status: DISCONTINUED | OUTPATIENT
Start: 2022-07-14 | End: 2022-07-14 | Stop reason: SURG

## 2022-07-14 RX ORDER — ONDANSETRON 2 MG/ML
4 INJECTION INTRAMUSCULAR; INTRAVENOUS ONCE AS NEEDED
Status: DISCONTINUED | OUTPATIENT
Start: 2022-07-14 | End: 2022-07-14

## 2022-07-14 RX ORDER — MIDAZOLAM HYDROCHLORIDE 1 MG/ML
0.5 INJECTION INTRAMUSCULAR; INTRAVENOUS
Status: DISCONTINUED | OUTPATIENT
Start: 2022-07-14 | End: 2022-07-14 | Stop reason: HOSPADM

## 2022-07-14 RX ORDER — HYDROCODONE BITARTRATE AND ACETAMINOPHEN 5; 325 MG/1; MG/1
1 TABLET ORAL ONCE AS NEEDED
Status: DISCONTINUED | OUTPATIENT
Start: 2022-07-14 | End: 2022-07-14

## 2022-07-14 RX ORDER — HYDROMORPHONE HYDROCHLORIDE 1 MG/ML
0.5 INJECTION, SOLUTION INTRAMUSCULAR; INTRAVENOUS; SUBCUTANEOUS
Status: COMPLETED | OUTPATIENT
Start: 2022-07-14 | End: 2022-07-14

## 2022-07-14 RX ORDER — GLYCOPYRROLATE 0.2 MG/ML
INJECTION INTRAMUSCULAR; INTRAVENOUS AS NEEDED
Status: DISCONTINUED | OUTPATIENT
Start: 2022-07-14 | End: 2022-07-14 | Stop reason: SURG

## 2022-07-14 RX ORDER — TRANEXAMIC ACID 10 MG/ML
INJECTION, SOLUTION INTRAVENOUS AS NEEDED
Status: DISCONTINUED | OUTPATIENT
Start: 2022-07-14 | End: 2022-07-14 | Stop reason: SURG

## 2022-07-14 RX ORDER — MEPERIDINE HYDROCHLORIDE 25 MG/ML
12.5 INJECTION INTRAMUSCULAR; INTRAVENOUS; SUBCUTANEOUS EVERY 6 HOURS PRN
Status: DISCONTINUED | OUTPATIENT
Start: 2022-07-14 | End: 2022-07-14

## 2022-07-14 RX ORDER — EPHEDRINE SULFATE 50 MG/ML
INJECTION, SOLUTION INTRAVENOUS AS NEEDED
Status: DISCONTINUED | OUTPATIENT
Start: 2022-07-14 | End: 2022-07-14 | Stop reason: SURG

## 2022-07-14 RX ORDER — IPRATROPIUM BROMIDE AND ALBUTEROL SULFATE 2.5; .5 MG/3ML; MG/3ML
3 SOLUTION RESPIRATORY (INHALATION) ONCE AS NEEDED
Status: DISCONTINUED | OUTPATIENT
Start: 2022-07-14 | End: 2022-07-14

## 2022-07-14 RX ORDER — FENTANYL CITRATE 50 UG/ML
50 INJECTION, SOLUTION INTRAMUSCULAR; INTRAVENOUS
Status: DISCONTINUED | OUTPATIENT
Start: 2022-07-14 | End: 2022-07-14 | Stop reason: HOSPADM

## 2022-07-14 RX ORDER — MAGNESIUM HYDROXIDE 1200 MG/15ML
LIQUID ORAL AS NEEDED
Status: DISCONTINUED | OUTPATIENT
Start: 2022-07-14 | End: 2022-07-14 | Stop reason: HOSPADM

## 2022-07-14 RX ORDER — NEOSTIGMINE METHYLSULFATE 1 MG/ML
INJECTION, SOLUTION INTRAVENOUS AS NEEDED
Status: DISCONTINUED | OUTPATIENT
Start: 2022-07-14 | End: 2022-07-14 | Stop reason: SURG

## 2022-07-14 RX ORDER — SODIUM CHLORIDE 0.9 % (FLUSH) 0.9 %
3 SYRINGE (ML) INJECTION EVERY 12 HOURS SCHEDULED
Status: DISCONTINUED | OUTPATIENT
Start: 2022-07-14 | End: 2022-07-14

## 2022-07-14 RX ORDER — LABETALOL HYDROCHLORIDE 5 MG/ML
5 INJECTION, SOLUTION INTRAVENOUS
Status: DISCONTINUED | OUTPATIENT
Start: 2022-07-14 | End: 2022-07-14

## 2022-07-14 RX ORDER — CEFAZOLIN SODIUM 1 G/3ML
INJECTION, POWDER, FOR SOLUTION INTRAMUSCULAR; INTRAVENOUS AS NEEDED
Status: DISCONTINUED | OUTPATIENT
Start: 2022-07-14 | End: 2022-07-14 | Stop reason: SURG

## 2022-07-14 RX ORDER — HYDROCODONE BITARTRATE AND ACETAMINOPHEN 5; 325 MG/1; MG/1
TABLET ORAL
Status: COMPLETED
Start: 2022-07-14 | End: 2022-07-14

## 2022-07-14 RX ORDER — CEFAZOLIN SODIUM 2 G/100ML
2 INJECTION, SOLUTION INTRAVENOUS EVERY 8 HOURS
Status: COMPLETED | OUTPATIENT
Start: 2022-07-15 | End: 2022-07-15

## 2022-07-14 RX ORDER — ROCURONIUM BROMIDE 10 MG/ML
INJECTION, SOLUTION INTRAVENOUS AS NEEDED
Status: DISCONTINUED | OUTPATIENT
Start: 2022-07-14 | End: 2022-07-14 | Stop reason: SURG

## 2022-07-14 RX ORDER — DEXAMETHASONE SODIUM PHOSPHATE 4 MG/ML
INJECTION, SOLUTION INTRA-ARTICULAR; INTRALESIONAL; INTRAMUSCULAR; INTRAVENOUS; SOFT TISSUE AS NEEDED
Status: DISCONTINUED | OUTPATIENT
Start: 2022-07-14 | End: 2022-07-14 | Stop reason: SURG

## 2022-07-14 RX ORDER — NALOXONE HCL 0.4 MG/ML
0.4 VIAL (ML) INJECTION AS NEEDED
Status: DISCONTINUED | OUTPATIENT
Start: 2022-07-14 | End: 2022-07-14

## 2022-07-14 RX ORDER — BUPIVACAINE HCL/0.9 % NACL/PF 0.125 %
PLASTIC BAG, INJECTION (ML) EPIDURAL AS NEEDED
Status: DISCONTINUED | OUTPATIENT
Start: 2022-07-14 | End: 2022-07-14 | Stop reason: SURG

## 2022-07-14 RX ORDER — PROPOFOL 10 MG/ML
VIAL (ML) INTRAVENOUS AS NEEDED
Status: DISCONTINUED | OUTPATIENT
Start: 2022-07-14 | End: 2022-07-14 | Stop reason: SURG

## 2022-07-14 RX ORDER — IPRATROPIUM BROMIDE AND ALBUTEROL SULFATE 2.5; .5 MG/3ML; MG/3ML
3 SOLUTION RESPIRATORY (INHALATION) ONCE
Status: COMPLETED | OUTPATIENT
Start: 2022-07-15 | End: 2022-07-15

## 2022-07-14 RX ORDER — SODIUM CHLORIDE 0.9 % (FLUSH) 0.9 %
3-10 SYRINGE (ML) INJECTION AS NEEDED
Status: DISCONTINUED | OUTPATIENT
Start: 2022-07-14 | End: 2022-07-14

## 2022-07-14 RX ORDER — MEPERIDINE HYDROCHLORIDE 25 MG/ML
INJECTION INTRAMUSCULAR; INTRAVENOUS; SUBCUTANEOUS
Status: COMPLETED
Start: 2022-07-14 | End: 2022-07-14

## 2022-07-14 RX ORDER — LIDOCAINE HYDROCHLORIDE 10 MG/ML
0.5 INJECTION, SOLUTION EPIDURAL; INFILTRATION; INTRACAUDAL; PERINEURAL ONCE AS NEEDED
Status: COMPLETED | OUTPATIENT
Start: 2022-07-14 | End: 2022-07-14

## 2022-07-14 RX ORDER — SODIUM CHLORIDE, SODIUM LACTATE, POTASSIUM CHLORIDE, CALCIUM CHLORIDE 600; 310; 30; 20 MG/100ML; MG/100ML; MG/100ML; MG/100ML
9 INJECTION, SOLUTION INTRAVENOUS CONTINUOUS
Status: DISCONTINUED | OUTPATIENT
Start: 2022-07-14 | End: 2022-07-19 | Stop reason: HOSPADM

## 2022-07-14 RX ORDER — LIDOCAINE HYDROCHLORIDE 10 MG/ML
INJECTION, SOLUTION EPIDURAL; INFILTRATION; INTRACAUDAL; PERINEURAL AS NEEDED
Status: DISCONTINUED | OUTPATIENT
Start: 2022-07-14 | End: 2022-07-14 | Stop reason: SURG

## 2022-07-14 RX ORDER — MEPERIDINE HYDROCHLORIDE 25 MG/ML
12.5 INJECTION INTRAMUSCULAR; INTRAVENOUS; SUBCUTANEOUS
Status: DISCONTINUED | OUTPATIENT
Start: 2022-07-14 | End: 2022-07-14 | Stop reason: SDUPTHER

## 2022-07-14 RX ORDER — FAMOTIDINE 20 MG/1
20 TABLET, FILM COATED ORAL ONCE
Status: DISCONTINUED | OUTPATIENT
Start: 2022-07-14 | End: 2022-07-14 | Stop reason: HOSPADM

## 2022-07-14 RX ORDER — ONDANSETRON 2 MG/ML
INJECTION INTRAMUSCULAR; INTRAVENOUS AS NEEDED
Status: DISCONTINUED | OUTPATIENT
Start: 2022-07-14 | End: 2022-07-14 | Stop reason: SURG

## 2022-07-14 RX ADMIN — TRANEXAMIC ACID 1000 MG: 100 INJECTION, SOLUTION INTRAVENOUS at 16:05

## 2022-07-14 RX ADMIN — MEPERIDINE HYDROCHLORIDE 12.5 MG: 25 INJECTION INTRAMUSCULAR; INTRAVENOUS; SUBCUTANEOUS at 22:19

## 2022-07-14 RX ADMIN — SODIUM CHLORIDE, POTASSIUM CHLORIDE, SODIUM LACTATE AND CALCIUM CHLORIDE 9 ML/HR: 600; 310; 30; 20 INJECTION, SOLUTION INTRAVENOUS at 15:01

## 2022-07-14 RX ADMIN — HYDROMORPHONE HYDROCHLORIDE 0.5 MG: 1 INJECTION, SOLUTION INTRAMUSCULAR; INTRAVENOUS; SUBCUTANEOUS at 21:52

## 2022-07-14 RX ADMIN — SODIUM CHLORIDE, POTASSIUM CHLORIDE, SODIUM LACTATE AND CALCIUM CHLORIDE: 600; 310; 30; 20 INJECTION, SOLUTION INTRAVENOUS at 17:28

## 2022-07-14 RX ADMIN — LIDOCAINE HYDROCHLORIDE 50 MG: 10 INJECTION, SOLUTION EPIDURAL; INFILTRATION; INTRACAUDAL; PERINEURAL at 15:32

## 2022-07-14 RX ADMIN — NEOSTIGMINE METHYLSULFATE 3 MG: 0.5 INJECTION INTRAVENOUS at 20:33

## 2022-07-14 RX ADMIN — HYDROCODONE BITARTRATE AND ACETAMINOPHEN 1 TABLET: 5; 325 TABLET ORAL at 21:45

## 2022-07-14 RX ADMIN — LISINOPRIL 5 MG: 5 TABLET ORAL at 09:14

## 2022-07-14 RX ADMIN — PANTOPRAZOLE SODIUM 40 MG: 40 TABLET, DELAYED RELEASE ORAL at 05:52

## 2022-07-14 RX ADMIN — OXYCODONE HYDROCHLORIDE AND ACETAMINOPHEN 1 TABLET: 10; 325 TABLET ORAL at 03:24

## 2022-07-14 RX ADMIN — PINDOLOL 2.5 MG: 5 TABLET ORAL at 09:13

## 2022-07-14 RX ADMIN — MEPERIDINE HYDROCHLORIDE 12.5 MG: 25 INJECTION INTRAMUSCULAR; INTRAVENOUS; SUBCUTANEOUS at 22:00

## 2022-07-14 RX ADMIN — CEFAZOLIN SODIUM 1 G: 1 INJECTION, POWDER, FOR SOLUTION INTRAMUSCULAR; INTRAVENOUS at 20:04

## 2022-07-14 RX ADMIN — TRANEXAMIC ACID 1000 MG: 10 INJECTION, SOLUTION INTRAVENOUS at 20:33

## 2022-07-14 RX ADMIN — HYDROCORTISONE 20 MG: 10 TABLET ORAL at 05:52

## 2022-07-14 RX ADMIN — NORTRIPTYLINE HYDROCHLORIDE 50 MG: 10 CAPSULE ORAL at 09:14

## 2022-07-14 RX ADMIN — SPIRONOLACTONE 50 MG: 50 TABLET ORAL at 09:13

## 2022-07-14 RX ADMIN — HYDROCORTISONE 10 MG: 10 TABLET ORAL at 12:43

## 2022-07-14 RX ADMIN — DEXAMETHASONE SODIUM PHOSPHATE 4 MG: 4 INJECTION, SOLUTION INTRAMUSCULAR; INTRAVENOUS at 16:11

## 2022-07-14 RX ADMIN — PROPOFOL 120 MG: 10 INJECTION, EMULSION INTRAVENOUS at 15:32

## 2022-07-14 RX ADMIN — CEFAZOLIN SODIUM 2 G: 1 INJECTION, POWDER, FOR SOLUTION INTRAMUSCULAR; INTRAVENOUS at 16:04

## 2022-07-14 RX ADMIN — MORPHINE SULFATE 4 MG: 4 INJECTION INTRAVENOUS at 20:48

## 2022-07-14 RX ADMIN — HYDROMORPHONE HYDROCHLORIDE 0.5 MG: 1 INJECTION, SOLUTION INTRAMUSCULAR; INTRAVENOUS; SUBCUTANEOUS at 21:32

## 2022-07-14 RX ADMIN — GLYCOPYRROLATE 0.4 MG: 0.2 INJECTION INTRAMUSCULAR; INTRAVENOUS at 20:33

## 2022-07-14 RX ADMIN — HYDROMORPHONE HYDROCHLORIDE 0.5 MG: 1 INJECTION, SOLUTION INTRAMUSCULAR; INTRAVENOUS; SUBCUTANEOUS at 21:42

## 2022-07-14 RX ADMIN — EPHEDRINE SULFATE 10 MG: 50 INJECTION INTRAVENOUS at 15:38

## 2022-07-14 RX ADMIN — HYDROMORPHONE HYDROCHLORIDE 0.5 MG: 1 INJECTION, SOLUTION INTRAMUSCULAR; INTRAVENOUS; SUBCUTANEOUS at 21:22

## 2022-07-14 RX ADMIN — Medication 200 MCG: at 15:41

## 2022-07-14 RX ADMIN — ROCURONIUM BROMIDE 25 MG: 10 INJECTION INTRAVENOUS at 16:49

## 2022-07-14 RX ADMIN — Medication 250 MG: at 09:14

## 2022-07-14 RX ADMIN — HYDROCORTISONE SODIUM SUCCINATE 100 MG: 100 INJECTION, POWDER, FOR SOLUTION INTRAMUSCULAR; INTRAVENOUS at 21:18

## 2022-07-14 RX ADMIN — GABAPENTIN 800 MG: 400 CAPSULE ORAL at 09:14

## 2022-07-14 RX ADMIN — MORPHINE SULFATE 4 MG: 4 INJECTION INTRAVENOUS at 20:20

## 2022-07-14 RX ADMIN — MORPHINE SULFATE 30 MG: 30 TABLET, FILM COATED, EXTENDED RELEASE ORAL at 09:14

## 2022-07-14 RX ADMIN — ALBUTEROL SULFATE 2.5 MG: 2.5 SOLUTION RESPIRATORY (INHALATION) at 23:29

## 2022-07-14 RX ADMIN — HYDROCORTISONE SODIUM SUCCINATE 75 MG: 100 INJECTION, POWDER, FOR SOLUTION INTRAMUSCULAR; INTRAVENOUS at 15:39

## 2022-07-14 RX ADMIN — ROCURONIUM BROMIDE 25 MG: 10 INJECTION INTRAVENOUS at 17:55

## 2022-07-14 RX ADMIN — DOXYCYCLINE 100 MG: 100 CAPSULE ORAL at 09:14

## 2022-07-14 RX ADMIN — LEVOTHYROXINE SODIUM 88 MCG: 88 TABLET ORAL at 05:52

## 2022-07-14 RX ADMIN — SODIUM CHLORIDE, POTASSIUM CHLORIDE, SODIUM LACTATE AND CALCIUM CHLORIDE: 600; 310; 30; 20 INJECTION, SOLUTION INTRAVENOUS at 15:26

## 2022-07-14 RX ADMIN — BUDESONIDE AND FORMOTEROL FUMARATE DIHYDRATE 2 PUFF: 160; 4.5 AEROSOL RESPIRATORY (INHALATION) at 10:40

## 2022-07-14 RX ADMIN — Medication 100 MCG: at 15:38

## 2022-07-14 RX ADMIN — ROCURONIUM BROMIDE 50 MG: 10 INJECTION INTRAVENOUS at 15:32

## 2022-07-14 RX ADMIN — HYDROCORTISONE SODIUM SUCCINATE 75 MG: 100 INJECTION, POWDER, FOR SOLUTION INTRAMUSCULAR; INTRAVENOUS at 15:01

## 2022-07-14 RX ADMIN — LIDOCAINE HYDROCHLORIDE 0.5 ML: 10 INJECTION, SOLUTION EPIDURAL; INFILTRATION; INTRACAUDAL; PERINEURAL at 15:01

## 2022-07-14 RX ADMIN — ONDANSETRON 4 MG: 2 INJECTION INTRAMUSCULAR; INTRAVENOUS at 20:33

## 2022-07-14 RX ADMIN — PROPOFOL 25 MCG/KG/MIN: 10 INJECTION, EMULSION INTRAVENOUS at 15:40

## 2022-07-14 NOTE — PROGRESS NOTES
[facility] Medicine Services  PROGRESS NOTE    Patient Name: Altagracia Rothman  : 1950  MRN: [id number]    Date of Admission: 2022  Primary Care Physician: Alvina Lloyd PA-C    Subjective   Subjective     CC:  Follow-up humerus fractures, fatigue    HPI:  Fatigue is a little improved this morning, states she had her oral hydrocortisone already.  Labs have not yet been obtained.  Plan for surgery this afternoon, pain reasonably controlled    ROS:  Gen- No fevers, chills  CV- No chest pain, palpitations  Resp- No cough, dyspnea  GI- No N/V/D, abd pain     Objective   Objective     Vital Signs:   Temp:  [98 °F (36.7 °C)-98.3 °F (36.8 °C)] 98.2 °F (36.8 °C)  Heart Rate:  [65-86] 81  Resp:  [16-18] 18  BP: (113-147)/(58-87) 141/87     Physical Exam:  Constitutional: Awake, alert, laying in bed in no acute distress  HENT: NCAT, mucous membranes moist  Respiratory: Clear to auscultation bilaterally, respiratory effort normal   Cardiovascular: RRR, palpable radial pulses  Gastrointestinal: Positive bowel sounds, soft, nontender, nondistended  Musculoskeletal: RT UEs in sling  Psychiatric: Appropriate affect, cooperative  Neurologic: Speech clear and fluent    Results Reviewed:  LAB RESULTS:      Lab 22  0744   WBC 8.18   HEMOGLOBIN 12.4   HEMATOCRIT 39.5   PLATELETS 304   NEUTROS ABS 5.57   IMMATURE GRANS (ABS) 0.05   LYMPHS ABS 1.65   MONOS ABS 0.73   EOS ABS 0.16   .6*         Lab 22  0744   SODIUM 139   POTASSIUM 4.1   CHLORIDE 102   CO2 29.0   ANION GAP 8.0   BUN 15   CREATININE 0.58   EGFR 95.7   GLUCOSE 97   CALCIUM 9.4                         Brief Urine Lab Results  (Last result in the past 365 days)      Color   Clarity   Blood   Leuk Est   Nitrite   Protein   CREAT   Urine HCG        22 1145 Yellow   Clear   Negative   Small (1+)   Negative   Negative                 Microbiology Results Abnormal     Procedure Component Value - Date/Time    COVID  PRE-OP / PRE-PROCEDURE SCREENING ORDER (NO ISOLATION) - Swab, Nasopharynx [[id number]]  (Normal) Collected: 06/24/22 1901    Lab Status: Final result Specimen: Swab from Nasopharynx Updated: 06/24/22 1956    Narrative:      The following orders were created for panel order COVID PRE-OP / PRE-PROCEDURE SCREENING ORDER (NO ISOLATION) - Swab, Nasopharynx.  Procedure                               Abnormality         Status                     ---------                               -----------         ------                     COVID-19, FLU A/B, RSV P...[[id number]]  Normal              Final result                 Please view results for these tests on the individual orders.    COVID-19, FLU A/B, RSV PCR - Swab, Nasopharynx [[id number]]  (Normal) Collected: 06/24/22 1901    Lab Status: Final result Specimen: Swab from Nasopharynx Updated: 06/24/22 1956     COVID19 Not Detected     Influenza A PCR Not Detected     Influenza B PCR Not Detected     RSV, PCR Not Detected    Narrative:      Fact sheet for providers: https://www.fda.gov/media/360659/download    Fact sheet for patients: https://www.fda.gov/media/615188/download    Test performed by PCR.          XR Humerus Right    Result Date: 7/12/2022  DATE OF EXAM: 7/12/2022 9:51 AM  PROCEDURE: XR HUMERUS RIGHT-  INDICATIONS: R humerus fracture; S42.291A-Other displaced fracture of upper end of right humerus, initial encounter for closed fracture; S42.409A-Unspecified fracture of lower end of unspecified humerus, initial encounter for closed fracture; W19.XXXA-Unspecified fall, initial encounter; M80.00XA-Age-related osteoporosis with current pathological fracture, unspecified site, initial encounter for fracture  COMPARISON: Right shoulder radiographs 6/24/2022  TECHNIQUE: AP and lateral radiographic views were obtained of the right humerus.  FINDINGS: Redemonstration of a mildly comminuted spiral fracture of the proximal humeral shaft, with approximately 2 cm of  foreshortening with mild apex medial angulation. There is a vague linear lucency extending to the surgical neck only well seen on the angled image. There is an additional nearly transverse fracture through the distal humeral metadiaphysis which appears mildly displaced with approximately one half shaft width medial displacement of the distal fragment and mild apex lateral angulation. No definite evidence of fracture extension to the humeral head articular surface. No definite evidence of fracture extension to the elbow joint, although the elbow is not well assessed on this humeral radiograph study. These findings could be further characterized with CT of the right arm. There is chondrocalcinosis of the subacromial bursa. There is expected soft tissue swelling and subcutaneous fat stranding about the fractures.      Impression: Redemonstration of a mildly comminuted spiral fracture of the proximal humeral shaft, with approximately 2 cm of foreshortening with mild apex medial angulation. There is a vague linear lucency extending to the surgical neck only well seen on the angled image. There is an additional nearly transverse fracture through the distal humeral metadiaphysis which appears mildly displaced with approximately one half shaft width medial displacement of the distal fragment and mild apex lateral angulation. No definite evidence of fracture extension to the humeral head articular surface. No definite evidence of fracture extension to the elbow joint, although the elbow is not well assessed on this humeral radiograph study. These findings could be further characterized with CT of the right arm.  This report was finalized on 7/12/2022 10:36 AM by Stefan Jansen MD.            I have reviewed the medications:  Scheduled Meds:budesonide-formoterol, 2 puff, Inhalation, BID - RT  doxycycline, 100 mg, Oral, Q12H  gabapentin, 800 mg, Oral, 4x Daily  hydrocortisone, 10 mg, Oral, Daily With Lunch  hydrocortisone, 20  mg, Oral, QAM  levothyroxine, 88 mcg, Oral, Q AM  lisinopril, 5 mg, Oral, Q24H  Morphine, 30 mg, Oral, BID  nortriptyline, 50 mg, Oral, TID  pantoprazole, 40 mg, Oral, Q AM  pindolol, 2.5 mg, Oral, TID  saccharomyces boulardii, 250 mg, Oral, BID  sodium chloride, 10 mL, Intravenous, Q12H  sodium chloride, 10 mL, Intravenous, Q12H  spironolactone, 50 mg, Oral, Daily      Continuous Infusions:Pharmacy Consult,   lactated ringers, 9 mL/hr  ropivacaine,   sodium chloride, 75 mL/hr, Last Rate: 75 mL/hr (22 0105)      PRN Meds:.acetaminophen **OR** acetaminophen **OR** acetaminophen  •  albuterol  •  diphenhydrAMINE  •  hydrOXYzine  •  labetalol  •  lidocaine PF 1%  •  magnesium sulfate **OR** magnesium sulfate in D5W 1g/100mL (PREMIX) **OR** magnesium sulfate  •  midazolam  •  [] Morphine **AND** naloxone  •  ondansetron **OR** ondansetron  •  oxyCODONE-acetaminophen  •  potassium chloride **OR** potassium chloride **OR** potassium chloride  •  sodium chloride  •  sodium chloride    Assessment & Plan   Assessment & Plan     Active Hospital Problems    Diagnosis  POA   • **Other closed displaced fracture of proximal end of right humerus, initial encounter [S42.291A]  Yes   • Acute UTI (urinary tract infection) [N39.0]  Yes   • Moderate asthma without complication [J45.909]  Yes   • Osteoporosis [M81.0]  Yes   • Adrenal insufficiency (HCC) [E27.40]  Yes   • Hypothyroidism [E03.9]  Yes   • Essential hypertension [I10]  Yes   • H/O pheochromocytoma [Z86.018]  Yes      Resolved Hospital Problems   No resolved problems to display.        Brief Hospital Course to date:  Altagracia Rothman is a 72 y.o. female with Hx HTN, pheochromocytoma s/p LT adrenal resection, chronic adrenal insufficiency on chronic steroids, hypothyroidism, asthma who presented for evaluation of arm pain after a fall, radiographs demonstrated 2 foci of fractures and she has been planned for operative management, however per the chart endocrinology is  "recommended catecholamines and metanephrines to be evaluated prior to venturing into the realm of surgery which is significantly delayed operative management and prolonged hospitalization    Assessment/plan    Acute RT proximal and distal humeral fractures s/p fall  - d/w anesthesiology face-to-face regarding need for stress dose steroid preoperatively  -Dr. Matute follows, planned operative repair this afternoon  -Pain control    Hx pheochromocytoma s/p LT adrenal resection  Chronic adrenal insufficiency s/p adrenal crisis  - Previous provider d/w Dr. Nelson via phone 6/30; pheo resection 2003; unable to adequately follow labs 2/2 chronic TCA use; recommended deferring surgery if catecholamine/metanephrines abnormal- all have returned WNL  -Currently on home split-dose oral hydrocortisone; anticipate need for stress dose IV hydrocortisone perioperatively    Hypertension  - Initially hypotensive  - Lisinopril, pindolol (patient supplied), spironolactone  -Monitor BP perioperatively    Arthritis with chronic tetracycline use  Tooth pain  Fibromyalgia  - Chronically on tetracycline from her \"arthritis doctor\"; not on formulary here and has been on substituted doxycycline  -Patient is NOT on doxycycline for bronchitis, this is a substitution for a chronic medication  - Chronic gabapentin, oral morphine, oral oxycodone; likely will have difficulty with perioperative pain control    Chronic inhaler use, data deficit  -Home meds include Wixela inhaler; patient reports prior history of smoking, never formally diagnosed with COPD but told she was \"borderline\"  -RRT 6/30 for chest pain/SOB, reported presumed 2/2 nerve block/anxiety  - Symbicort substitute for Wixela, albuterol neb as needed    S/p Tx ESBL bacteriuria  - Per chart Jose Miguel placed in the ED, DC'd 6/26, UrCx 6/27 w/ MDR Proteus; completed fosfomycin, repeat UA 7/6 for unclear cause w/ ESBL Proteus; s/p Tx w/ " ertapenem    Hypothyroidism  -Levothyroxine      Expected Discharge Location and Transportation: Surgery today, patient reports that she is dependent on crutches at baseline and as such anticipate need for rehab at DC  Expected Discharge Date: 7/16    DVT prophylaxis:  Mechanical DVT prophylaxis orders are present.     AM-PAC 6 Clicks Score (PT): 15 (07/13/22 1109)    CODE STATUS:   Code Status and Medical Interventions:   Ordered at: 06/24/22 1511     Level Of Support Discussed With:    Patient     Code Status (Patient has no pulse and is not breathing):    CPR (Attempt to Resuscitate)     Medical Interventions (Patient has pulse or is breathing):    Full Support       Jamari Torres, DO  07/14/22

## 2022-07-14 NOTE — ANESTHESIA PROCEDURE NOTES
Airway  Urgency: elective    Date/Time: 7/14/2022 3:34 PM  Airway not difficult    General Information and Staff    Patient location during procedure: OR  CRNA/CAA: Daren Galicia CRNA    Indications and Patient Condition  Indications for airway management: airway protection    Preoxygenated: yes  MILS not maintained throughout  Mask difficulty assessment: 1 - vent by mask    Final Airway Details  Final airway type: endotracheal airway      Successful airway: ETT  Cuffed: yes   Successful intubation technique: direct laryngoscopy  Endotracheal tube insertion site: oral  Blade: Cooper  Blade size: 2  ETT size (mm): 7.0  Cormack-Lehane Classification: grade IIa - partial view of glottis  Placement verified by: chest auscultation and capnometry   Measured from: lips  ETT/EBT  to lips (cm): 20  Number of attempts at approach: 1  Assessment: lips, teeth, and gum same as pre-op and atraumatic intubation    Additional Comments  Negative epigastric sounds, Breath sound equal bilaterally with symmetric chest rise and fall

## 2022-07-14 NOTE — PLAN OF CARE
Problem: Adult Inpatient Plan of Care  Goal: Plan of Care Review  Outcome: Ongoing, Progressing  Flowsheets (Taken 7/14/2022 1640)  Progress: improving  Plan of Care Reviewed With: patient  Outcome Evaluation: Patient VSS, RA, NSR, A&Ox4. RUE in sling, infublock in place. No c/o pain. Denies numbness/tingling. Up w/ 1 assist to BSC. Voiding well. Patient currently in the OR. Continue to monitor when back to the floor.  Goal: Patient-Specific Goal (Individualized)  Outcome: Ongoing, Progressing  Goal: Absence of Hospital-Acquired Illness or Injury  Outcome: Ongoing, Progressing  Intervention: Identify and Manage Fall Risk  Recent Flowsheet Documentation  Taken 7/14/2022 1600 by Rosalia Hay, RN  Safety Promotion/Fall Prevention: patient off unit  Taken 7/14/2022 1430 by Rosalia Hay, RN  Safety Promotion/Fall Prevention:   activity supervised   assistive device/personal items within reach   clutter free environment maintained   gait belt   fall prevention program maintained   toileting scheduled   safety round/check completed   room organization consistent   nonskid shoes/slippers when out of bed  Taken 7/14/2022 1245 by Rosalia Hay, RN  Safety Promotion/Fall Prevention:   activity supervised   assistive device/personal items within reach   clutter free environment maintained   fall prevention program maintained   gait belt   toileting scheduled   safety round/check completed   nonskid shoes/slippers when out of bed   room organization consistent  Taken 7/14/2022 1059 by Rosalia Hay, RN  Safety Promotion/Fall Prevention:   clutter free environment maintained   assistive device/personal items within reach   activity supervised   fall prevention program maintained   gait belt   toileting scheduled   safety round/check completed   room organization consistent   nonskid shoes/slippers when out of bed  Taken 7/14/2022 0859 by Rosalia Hay, RN  Safety Promotion/Fall Prevention:   activity  supervised   assistive device/personal items within reach   clutter free environment maintained   fall prevention program maintained   gait belt   toileting scheduled   safety round/check completed   room organization consistent   nonskid shoes/slippers when out of bed  Intervention: Prevent Skin Injury  Recent Flowsheet Documentation  Taken 7/14/2022 1430 by Rosalia Hay RN  Body Position: sitting up in bed  Skin Protection:   adhesive use limited   transparent dressing maintained   tubing/devices free from skin contact  Taken 7/14/2022 1245 by Rosalia Hay RN  Body Position: supine  Skin Protection:   adhesive use limited   transparent dressing maintained   tubing/devices free from skin contact  Taken 7/14/2022 1059 by Rosalia Hay RN  Body Position:   sitting up in bed   tilted   right  Skin Protection:   adhesive use limited   transparent dressing maintained   tubing/devices free from skin contact  Taken 7/14/2022 0859 by Rosalia Hay RN  Body Position: sitting up in bed  Skin Protection:   adhesive use limited   tubing/devices free from skin contact   transparent dressing maintained  Intervention: Prevent and Manage VTE (Venous Thromboembolism) Risk  Recent Flowsheet Documentation  Taken 7/14/2022 1430 by Rosalia Hay RN  Activity Management: up to bedside commode  VTE Prevention/Management:   bilateral   sequential compression devices off  Taken 7/14/2022 1245 by Rosalia Hay RN  VTE Prevention/Management: patient refused intervention  Taken 7/14/2022 1059 by Rosalia Hay RN  Activity Management: up to bedside commode  VTE Prevention/Management: patient refused intervention  Taken 7/14/2022 0859 by Rosalia Hay RN  VTE Prevention/Management: patient refused intervention  Intervention: Prevent Infection  Recent Flowsheet Documentation  Taken 7/14/2022 1430 by Rosalia Hay, RN  Infection Prevention: environmental surveillance performed  Taken 7/14/2022 1245 by  Rosalia Hay RN  Infection Prevention: environmental surveillance performed  Taken 7/14/2022 1059 by Rosalia Hay RN  Infection Prevention: environmental surveillance performed  Taken 7/14/2022 0859 by Rosalia Hay RN  Infection Prevention: environmental surveillance performed  Goal: Optimal Comfort and Wellbeing  Outcome: Ongoing, Progressing  Intervention: Provide Person-Centered Care  Recent Flowsheet Documentation  Taken 7/14/2022 1430 by Rosalia Hay RN  Trust Relationship/Rapport: care explained  Taken 7/14/2022 1245 by Rosalia aHy RN  Trust Relationship/Rapport: care explained  Taken 7/14/2022 1059 by Rosalia Hay RN  Trust Relationship/Rapport: care explained  Taken 7/14/2022 0859 by Rosalia Hay RN  Trust Relationship/Rapport:   care explained   choices provided   questions encouraged   questions answered   reassurance provided   thoughts/feelings acknowledged  Goal: Readiness for Transition of Care  Outcome: Ongoing, Progressing     Problem: Skin Injury Risk Increased  Goal: Skin Health and Integrity  Outcome: Ongoing, Progressing  Intervention: Optimize Skin Protection  Recent Flowsheet Documentation  Taken 7/14/2022 1430 by Rosalia Hay RN  Pressure Reduction Techniques:   frequent weight shift encouraged   weight shift assistance provided  Head of Bed (HOB) Positioning: HOB at 45 degrees  Pressure Reduction Devices: pressure-redistributing mattress utilized  Skin Protection:   adhesive use limited   transparent dressing maintained   tubing/devices free from skin contact  Taken 7/14/2022 1245 by Rosalia Hay RN  Pressure Reduction Techniques:   frequent weight shift encouraged   weight shift assistance provided  Head of Bed (HOB) Positioning: HOB at 30-45 degrees  Pressure Reduction Devices: positioning supports utilized  Skin Protection:   adhesive use limited   transparent dressing maintained   tubing/devices free from skin contact  Taken 7/14/2022  1059 by Rosalia Hay, RN  Pressure Reduction Techniques:   frequent weight shift encouraged   weight shift assistance provided  Head of Bed (HOB) Positioning: HOB at 60 degrees  Pressure Reduction Devices: positioning supports utilized  Skin Protection:   adhesive use limited   transparent dressing maintained   tubing/devices free from skin contact  Taken 7/14/2022 0859 by Rosalia Hay, RN  Pressure Reduction Techniques: frequent weight shift encouraged  Head of Bed (HOB) Positioning: HOB at 45 degrees  Pressure Reduction Devices: positioning supports utilized  Skin Protection:   adhesive use limited   tubing/devices free from skin contact   transparent dressing maintained     Problem: Fall Injury Risk  Goal: Absence of Fall and Fall-Related Injury  Outcome: Ongoing, Progressing  Intervention: Identify and Manage Contributors  Recent Flowsheet Documentation  Taken 7/14/2022 0859 by Rosalia Hay, RN  Medication Review/Management: medications reviewed  Intervention: Promote Injury-Free Environment  Recent Flowsheet Documentation  Taken 7/14/2022 1600 by Rosalia Hay, RN  Safety Promotion/Fall Prevention: patient off unit  Taken 7/14/2022 1430 by Rosalia Hay, RN  Safety Promotion/Fall Prevention:   activity supervised   assistive device/personal items within reach   clutter free environment maintained   gait belt   fall prevention program maintained   toileting scheduled   safety round/check completed   room organization consistent   nonskid shoes/slippers when out of bed  Taken 7/14/2022 1245 by Rosalia Hay, RN  Safety Promotion/Fall Prevention:   activity supervised   assistive device/personal items within reach   clutter free environment maintained   fall prevention program maintained   gait belt   toileting scheduled   safety round/check completed   nonskid shoes/slippers when out of bed   room organization consistent  Taken 7/14/2022 1059 by Rosalia Hay, RN  Safety  Promotion/Fall Prevention:   clutter free environment maintained   assistive device/personal items within reach   activity supervised   fall prevention program maintained   gait belt   toileting scheduled   safety round/check completed   room organization consistent   nonskid shoes/slippers when out of bed  Taken 7/14/2022 0859 by Rosalia Hay, RN  Safety Promotion/Fall Prevention:   activity supervised   assistive device/personal items within reach   clutter free environment maintained   fall prevention program maintained   gait belt   toileting scheduled   safety round/check completed   room organization consistent   nonskid shoes/slippers when out of bed     Problem: Bleeding (Orthopaedic Fracture)  Goal: Absence of Bleeding  Outcome: Ongoing, Progressing     Problem: Embolism (Orthopaedic Fracture)  Goal: Absence of Embolism Signs and Symptoms  Outcome: Ongoing, Progressing  Intervention: Prevent or Manage Embolism Risk  Recent Flowsheet Documentation  Taken 7/14/2022 1430 by Rosalia Hay RN  VTE Prevention/Management:   bilateral   sequential compression devices off  Taken 7/14/2022 1245 by Rosalia Hay, RN  VTE Prevention/Management: patient refused intervention  Taken 7/14/2022 1059 by Rosalia Hay, RN  VTE Prevention/Management: patient refused intervention  Taken 7/14/2022 0859 by Rosalia Hay, RN  VTE Prevention/Management: patient refused intervention     Problem: Fracture Stabilization and Management (Orthopaedic Fracture)  Goal: Fracture Stability  Outcome: Ongoing, Progressing     Problem: Functional Ability Impaired (Orthopaedic Fracture)  Goal: Optimal Functional Ability  Outcome: Ongoing, Progressing  Intervention: Optimize Functional Ability  Recent Flowsheet Documentation  Taken 7/14/2022 1430 by Rosalia Hay, RN  Activity Management: up to bedside commode  Activity Assistance Provided: assistance, 1 person  Positioning/Transfer Devices:   pillows   in use  Taken  7/14/2022 1245 by Rosalia Hay RN  Positioning/Transfer Devices:   pillows   in use  Taken 7/14/2022 1059 by Rosalia Hay, RN  Activity Management: up to bedside commode  Activity Assistance Provided: assistance, 1 person  Positioning/Transfer Devices:   pillows   in use  Taken 7/14/2022 0859 by Rosalia Hay RN  Positioning/Transfer Devices:   pillows   in use     Problem: Infection (Orthopaedic Fracture)  Goal: Absence of Infection Signs and Symptoms  Outcome: Ongoing, Progressing  Intervention: Prevent or Manage Infection  Recent Flowsheet Documentation  Taken 7/14/2022 1430 by Rosalia Hay RN  Infection Prevention: environmental surveillance performed  Taken 7/14/2022 1245 by Rosalia Hay RN  Infection Prevention: environmental surveillance performed  Taken 7/14/2022 1059 by Rosalia Hay RN  Infection Prevention: environmental surveillance performed  Taken 7/14/2022 0859 by Rosalia Hya RN  Infection Prevention: environmental surveillance performed     Problem: Neurovascular Compromise (Orthopaedic Fracture)  Goal: Effective Tissue Perfusion  Outcome: Ongoing, Progressing     Problem: Pain (Orthopaedic Fracture)  Goal: Acceptable Pain Control  Outcome: Ongoing, Progressing  Intervention: Manage Acute Orthopaedic-Related Pain  Recent Flowsheet Documentation  Taken 7/14/2022 1430 by Rosalia Hay RN  Diversional Activities: television  Taken 7/14/2022 1245 by Rosalia Hay RN  Diversional Activities: television  Taken 7/14/2022 1059 by Rosalia Hay RN  Diversional Activities: television  Taken 7/14/2022 0859 by Rosalia Hay, RN  Diversional Activities: television     Problem: Respiratory Compromise (Orthopaedic Fracture)  Goal: Effective Oxygenation and Ventilation  Outcome: Ongoing, Progressing  Intervention: Promote Airway Secretion Clearance  Recent Flowsheet Documentation  Taken 7/14/2022 1430 by Rosalia Hay RN  Cough And Deep Breathing: done  independently per patient  Taken 7/14/2022 1245 by Rosalia Hay, RN  Cough And Deep Breathing: done independently per patient  Taken 7/14/2022 1059 by Rosalia Hay, RN  Cough And Deep Breathing: done independently per patient  Taken 7/14/2022 0859 by Rosalia Hay, RN  Cough And Deep Breathing: done independently per patient   Goal Outcome Evaluation:  Plan of Care Reviewed With: patient        Progress: improving  Outcome Evaluation: Patient VSS, RA, NSR, A&Ox4. RUE in sling, infublock in place. No c/o pain. Denies numbness/tingling. Up w/ 1 assist to BSC. Voiding well. Patient currently in the OR. Continue to monitor when back to the floor.

## 2022-07-14 NOTE — PLAN OF CARE
Goal Outcome Evaluation:                 Problem: Adult Inpatient Plan of Care  Goal: Absence of Hospital-Acquired Illness or Injury  Intervention: Identify and Manage Fall Risk  Recent Flowsheet Documentation  Taken 7/14/2022 0400 by Bruno Kim RN  Safety Promotion/Fall Prevention:   activity supervised   safety round/check completed   toileting scheduled  Taken 7/14/2022 0200 by Bruno Kim RN  Safety Promotion/Fall Prevention:   activity supervised   safety round/check completed   toileting scheduled  Taken 7/14/2022 0000 by Bruno Kim RN  Safety Promotion/Fall Prevention:   activity supervised   safety round/check completed   toileting scheduled  Taken 7/13/2022 2200 by Bruno Kim RN  Safety Promotion/Fall Prevention:   activity supervised   safety round/check completed   toileting scheduled  Taken 7/13/2022 2000 by Bruno Kim RN  Safety Promotion/Fall Prevention:   activity supervised   safety round/check completed   toileting scheduled  Intervention: Prevent Skin Injury  Recent Flowsheet Documentation  Taken 7/14/2022 0400 by Bruno Kim RN  Body Position: supine  Skin Protection: adhesive use limited  Taken 7/14/2022 0200 by Bruno Kim RN  Body Position: supine  Skin Protection: adhesive use limited  Taken 7/14/2022 0000 by Bruno Kim RN  Body Position: supine  Skin Protection: adhesive use limited  Taken 7/13/2022 2200 by Bruno Kim RN  Body Position: supine  Skin Protection: adhesive use limited  Taken 7/13/2022 2000 by Bruno Kim RN  Body Position: supine  Skin Protection: adhesive use limited  Intervention: Prevent and Manage VTE (Venous Thromboembolism) Risk  Recent Flowsheet Documentation  Taken 7/14/2022 0400 by Bruno Kim RN  VTE Prevention/Management: patient refused intervention  Taken 7/14/2022 0200 by Bruno Kmi RN  VTE Prevention/Management: patient refused intervention  Taken 7/14/2022 0000 by Bruno Kim RN  VTE  Prevention/Management: patient refused intervention  Taken 7/13/2022 2200 by Bruno Kim RN  VTE Prevention/Management: patient refused intervention  Taken 7/13/2022 2000 by Bruno Kim RN  VTE Prevention/Management:   bilateral   sequential compression devices off  Intervention: Prevent Infection  Recent Flowsheet Documentation  Taken 7/14/2022 0400 by Bruno Kim RN  Infection Prevention: environmental surveillance performed  Taken 7/14/2022 0200 by Bruno Kim RN  Infection Prevention: environmental surveillance performed  Taken 7/14/2022 0000 by Bruno Kim RN  Infection Prevention: environmental surveillance performed  Taken 7/13/2022 2200 by Bruno Kim RN  Infection Prevention: environmental surveillance performed  Taken 7/13/2022 2000 by Bruno Kim RN  Infection Prevention: environmental surveillance performed  Goal: Optimal Comfort and Wellbeing  Intervention: Monitor Pain and Promote Comfort  Recent Flowsheet Documentation  Taken 7/14/2022 0400 by Bruno Kim RN  Pain Management Interventions: quiet environment facilitated  Taken 7/14/2022 0200 by Bruno Kim RN  Pain Management Interventions: quiet environment facilitated  Taken 7/14/2022 0000 by Bruno Kim RN  Pain Management Interventions: quiet environment facilitated  Taken 7/13/2022 2200 by Bruno Kmi RN  Pain Management Interventions: quiet environment facilitated  Taken 7/13/2022 2000 by Bruno Kim RN  Pain Management Interventions: quiet environment facilitated  Intervention: Provide Person-Centered Care  Recent Flowsheet Documentation  Taken 7/14/2022 0400 by Bruno Kim RN  Trust Relationship/Rapport: care explained  Taken 7/14/2022 0200 by Bruno Kim RN  Trust Relationship/Rapport: care explained  Taken 7/14/2022 0000 by Bruno Kim RN  Trust Relationship/Rapport: care explained  Taken 7/13/2022 2200 by Bruno Kim RN  Trust Relationship/Rapport: care  explained  Taken 7/13/2022 2000 by Bruno Kim RN  Trust Relationship/Rapport: care explained     Problem: Skin Injury Risk Increased  Goal: Skin Health and Integrity  Intervention: Optimize Skin Protection  Recent Flowsheet Documentation  Taken 7/14/2022 0400 by Bruno Kim RN  Pressure Reduction Techniques: frequent weight shift encouraged  Pressure Reduction Devices: positioning supports utilized  Skin Protection: adhesive use limited  Taken 7/14/2022 0200 by Bruno Kim RN  Pressure Reduction Techniques: frequent weight shift encouraged  Head of Bed (HOB) Positioning: HOB at 20-30 degrees  Pressure Reduction Devices: positioning supports utilized  Skin Protection: adhesive use limited  Taken 7/14/2022 0000 by Bruno Kim RN  Pressure Reduction Techniques: frequent weight shift encouraged  Head of Bed (HOB) Positioning: HOB at 30-45 degrees  Pressure Reduction Devices: positioning supports utilized  Skin Protection: adhesive use limited  Taken 7/13/2022 2200 by Bruno Kim RN  Pressure Reduction Techniques: frequent weight shift encouraged  Head of Bed (HOB) Positioning: HOB at 20-30 degrees  Pressure Reduction Devices: positioning supports utilized  Skin Protection: adhesive use limited  Taken 7/13/2022 2000 by Bruno Kim RN  Pressure Reduction Techniques: pressure points protected  Head of Bed (HOB) Positioning: HOB at 30-45 degrees  Pressure Reduction Devices: positioning supports utilized  Skin Protection: adhesive use limited     Problem: Fall Injury Risk  Goal: Absence of Fall and Fall-Related Injury  Intervention: Identify and Manage Contributors  Recent Flowsheet Documentation  Taken 7/14/2022 0400 by Bruno Kim RN  Medication Review/Management: medications reviewed  Taken 7/14/2022 0200 by Bruno Kim RN  Medication Review/Management: medications reviewed  Taken 7/14/2022 0000 by Bruno Kim RN  Medication Review/Management: medications reviewed  Taken  7/13/2022 2200 by Bruno Kim RN  Medication Review/Management: medications reviewed  Taken 7/13/2022 2000 by Bruno Kim RN  Medication Review/Management: medications reviewed  Intervention: Promote Injury-Free Environment  Recent Flowsheet Documentation  Taken 7/14/2022 0400 by Bruno Kim RN  Safety Promotion/Fall Prevention:   activity supervised   safety round/check completed   toileting scheduled  Taken 7/14/2022 0200 by Bruno Kim RN  Safety Promotion/Fall Prevention:   activity supervised   safety round/check completed   toileting scheduled  Taken 7/14/2022 0000 by Bruno Kim RN  Safety Promotion/Fall Prevention:   activity supervised   safety round/check completed   toileting scheduled  Taken 7/13/2022 2200 by Bruno Kim RN  Safety Promotion/Fall Prevention:   activity supervised   safety round/check completed   toileting scheduled  Taken 7/13/2022 2000 by Bruno Kim RN  Safety Promotion/Fall Prevention:   activity supervised   safety round/check completed   toileting scheduled     Problem: Embolism (Orthopaedic Fracture)  Goal: Absence of Embolism Signs and Symptoms  Intervention: Prevent or Manage Embolism Risk  Recent Flowsheet Documentation  Taken 7/14/2022 0400 by Bruno Kim RN  VTE Prevention/Management: patient refused intervention  Taken 7/14/2022 0200 by Bruno Kim RN  VTE Prevention/Management: patient refused intervention  Taken 7/14/2022 0000 by Bruno Kim RN  VTE Prevention/Management: patient refused intervention  Taken 7/13/2022 2200 by Bruno Kim RN  VTE Prevention/Management: patient refused intervention  Taken 7/13/2022 2000 by Bruno Kim RN  VTE Prevention/Management:   bilateral   sequential compression devices off     Problem: Functional Ability Impaired (Orthopaedic Fracture)  Goal: Optimal Functional Ability  Intervention: Optimize Functional Ability  Recent Flowsheet Documentation  Taken 7/14/2022 0400 by Judy  Bruno PRITCHARD RN  Positioning/Transfer Devices: pillows  Taken 7/14/2022 0200 by Bruno Kim RN  Positioning/Transfer Devices: pillows  Taken 7/14/2022 0000 by Bruno Kim RN  Positioning/Transfer Devices: pillows  Taken 7/13/2022 2200 by Bruno Kim RN  Positioning/Transfer Devices: pillows  Taken 7/13/2022 2000 by Bruno Kim RN  Positioning/Transfer Devices: pillows     Problem: Infection (Orthopaedic Fracture)  Goal: Absence of Infection Signs and Symptoms  Intervention: Prevent or Manage Infection  Recent Flowsheet Documentation  Taken 7/14/2022 0400 by Bruno Kim RN  Infection Prevention: environmental surveillance performed  Taken 7/14/2022 0200 by Bruno Kim RN  Infection Prevention: environmental surveillance performed  Taken 7/14/2022 0000 by Bruno Kim RN  Infection Prevention: environmental surveillance performed  Taken 7/13/2022 2200 by Bruno Kim RN  Infection Prevention: environmental surveillance performed  Taken 7/13/2022 2000 by Bruno Kim RN  Infection Prevention: environmental surveillance performed     Problem: Pain (Orthopaedic Fracture)  Goal: Acceptable Pain Control  Intervention: Manage Acute Orthopaedic-Related Pain  Recent Flowsheet Documentation  Taken 7/14/2022 0400 by Bruno Kim RN  Pain Management Interventions: quiet environment facilitated  Taken 7/14/2022 0200 by Bruno Kim RN  Pain Management Interventions: quiet environment facilitated  Taken 7/14/2022 0000 by Bruno Kim RN  Pain Management Interventions: quiet environment facilitated  Taken 7/13/2022 2200 by Bruno Kim RN  Pain Management Interventions: quiet environment facilitated  Taken 7/13/2022 2000 by Bruno Kim RN  Pain Management Interventions: quiet environment facilitated       Awaiting surgery, pain managed with PRN medications, VSS, voids well, will continue to monitor for changes.

## 2022-07-14 NOTE — ANESTHESIA PREPROCEDURE EVALUATION
Anesthesia Evaluation     Patient summary reviewed and Nursing notes reviewed   no history of anesthetic complications:  NPO Solid Status: > 8 hours  NPO Liquid Status: > 8 hours           Airway   Mallampati: II  TM distance: >3 FB  Neck ROM: full  No difficulty expected  Dental      Pulmonary - normal exam   (+) asthma,  Cardiovascular - normal exam    (+) hypertension,       Neuro/Psych  GI/Hepatic/Renal/Endo    (+)   thyroid problem     Musculoskeletal     Abdominal    Substance History      OB/GYN          Other                        Anesthesia Plan    ASA 3     general     (Hydrocortisone for adrtenal insuff.)  intravenous induction     Anesthetic plan, risks, benefits, and alternatives have been provided, discussed and informed consent has been obtained with: patient.    Plan discussed with CRNA.        CODE STATUS:    Level Of Support Discussed With: Patient  Code Status (Patient has no pulse and is not breathing): CPR (Attempt to Resuscitate)  Medical Interventions (Patient has pulse or is breathing): Full Support

## 2022-07-14 NOTE — PROGRESS NOTES
Orthopedic Daily Progress Note      CC: INDIRA overnght    Pain well controlled  General: no fevers, chills  Abdomen: no nausea, vomiting, or diarrhea    No other complaints    Physical Exam:  I have reviewed the vital signs.  Temp:  [98 °F (36.7 °C)-98.4 °F (36.9 °C)] 98.4 °F (36.9 °C)  Heart Rate:  [65-87] 71  Resp:  [18] 18  BP: (114-157)/(58-87) 128/73    Objective  General Appearance:    Alert, cooperative, no distress  Extremities: No clubbing, cyanosis, or edema to lower extremities  Pulses:  2+ in distal surgical extremity        Results Review:    I have reviewed the labs, radiology results and diagnostic studies:    Results from last 7 days   Lab Units 07/09/22  0744   WBC 10*3/mm3 8.18   HEMOGLOBIN g/dL 12.4   PLATELETS 10*3/mm3 304     Results from last 7 days   Lab Units 07/09/22  0744   SODIUM mmol/L 139   POTASSIUM mmol/L 4.1   CO2 mmol/L 29.0   CREATININE mg/dL 0.58   GLUCOSE mg/dL 97       I have reviewed the medications.    Assessment/Problem List  DOS for IMN/ORIF R humerus fracture    Plan  NPO  Posted/consented/marked  To OR today        Jamari Matute Jr., MD  07/14/22  13:22 EDT

## 2022-07-15 ENCOUNTER — ANESTHESIA (OUTPATIENT)
Dept: TELEMETRY | Facility: HOSPITAL | Age: 72
End: 2022-07-15

## 2022-07-15 ENCOUNTER — ANESTHESIA EVENT (OUTPATIENT)
Dept: TELEMETRY | Facility: HOSPITAL | Age: 72
End: 2022-07-15

## 2022-07-15 ENCOUNTER — APPOINTMENT (OUTPATIENT)
Dept: GENERAL RADIOLOGY | Facility: HOSPITAL | Age: 72
End: 2022-07-15

## 2022-07-15 ENCOUNTER — ANESTHESIA EVENT CONVERTED (OUTPATIENT)
Dept: ANESTHESIOLOGY | Facility: HOSPITAL | Age: 72
End: 2022-07-15

## 2022-07-15 LAB
ALBUMIN SERPL-MCNC: 2.9 G/DL (ref 3.5–5.2)
ALBUMIN/GLOB SERPL: 1 G/DL
ALP SERPL-CCNC: 305 U/L (ref 39–117)
ALT SERPL W P-5'-P-CCNC: 18 U/L (ref 1–33)
ANION GAP SERPL CALCULATED.3IONS-SCNC: 10 MMOL/L (ref 5–15)
AST SERPL-CCNC: 23 U/L (ref 1–32)
BASOPHILS # BLD AUTO: 0.02 10*3/MM3 (ref 0–0.2)
BASOPHILS NFR BLD AUTO: 0.2 % (ref 0–1.5)
BILIRUB SERPL-MCNC: 0.7 MG/DL (ref 0–1.2)
BUN SERPL-MCNC: 17 MG/DL (ref 8–23)
BUN/CREAT SERPL: 40.5 (ref 7–25)
CALCIUM SPEC-SCNC: 8.3 MG/DL (ref 8.6–10.5)
CHLORIDE SERPL-SCNC: 98 MMOL/L (ref 98–107)
CO2 SERPL-SCNC: 26 MMOL/L (ref 22–29)
CREAT SERPL-MCNC: 0.42 MG/DL (ref 0.57–1)
DEPRECATED RDW RBC AUTO: 52.1 FL (ref 37–54)
EGFRCR SERPLBLD CKD-EPI 2021: 103.4 ML/MIN/1.73
EOSINOPHIL # BLD AUTO: 0.03 10*3/MM3 (ref 0–0.4)
EOSINOPHIL NFR BLD AUTO: 0.3 % (ref 0.3–6.2)
ERYTHROCYTE [DISTWIDTH] IN BLOOD BY AUTOMATED COUNT: 13.2 % (ref 12.3–15.4)
GLOBULIN UR ELPH-MCNC: 2.9 GM/DL
GLUCOSE SERPL-MCNC: 99 MG/DL (ref 65–99)
HCT VFR BLD AUTO: 38.9 % (ref 34–46.6)
HGB BLD-MCNC: 12.1 G/DL (ref 12–15.9)
IMM GRANULOCYTES # BLD AUTO: 0.04 10*3/MM3 (ref 0–0.05)
IMM GRANULOCYTES NFR BLD AUTO: 0.4 % (ref 0–0.5)
LYMPHOCYTES # BLD AUTO: 1.73 10*3/MM3 (ref 0.7–3.1)
LYMPHOCYTES NFR BLD AUTO: 19.3 % (ref 19.6–45.3)
MCH RBC QN AUTO: 33.2 PG (ref 26.6–33)
MCHC RBC AUTO-ENTMCNC: 31.1 G/DL (ref 31.5–35.7)
MCV RBC AUTO: 106.6 FL (ref 79–97)
MONOCYTES # BLD AUTO: 1.03 10*3/MM3 (ref 0.1–0.9)
MONOCYTES NFR BLD AUTO: 11.5 % (ref 5–12)
NEUTROPHILS NFR BLD AUTO: 6.11 10*3/MM3 (ref 1.7–7)
NEUTROPHILS NFR BLD AUTO: 68.3 % (ref 42.7–76)
NRBC BLD AUTO-RTO: 0 /100 WBC (ref 0–0.2)
PLAT MORPH BLD: NORMAL
PLATELET # BLD AUTO: 283 10*3/MM3 (ref 140–450)
PMV BLD AUTO: 10.2 FL (ref 6–12)
POTASSIUM SERPL-SCNC: 4.4 MMOL/L (ref 3.5–5.2)
PROT SERPL-MCNC: 5.8 G/DL (ref 6–8.5)
RBC # BLD AUTO: 3.65 10*6/MM3 (ref 3.77–5.28)
RBC MORPH BLD: NORMAL
SODIUM SERPL-SCNC: 134 MMOL/L (ref 136–145)
TSH SERPL DL<=0.05 MIU/L-ACNC: 0.74 UIU/ML (ref 0.27–4.2)
WBC MORPH BLD: NORMAL
WBC NRBC COR # BLD: 8.96 10*3/MM3 (ref 3.4–10.8)

## 2022-07-15 PROCEDURE — 94799 UNLISTED PULMONARY SVC/PX: CPT

## 2022-07-15 PROCEDURE — 84443 ASSAY THYROID STIM HORMONE: CPT | Performed by: INTERNAL MEDICINE

## 2022-07-15 PROCEDURE — 99232 SBSQ HOSP IP/OBS MODERATE 35: CPT | Performed by: INTERNAL MEDICINE

## 2022-07-15 PROCEDURE — 25010000002 HYDROMORPHONE 1 MG/ML SOLUTION: Performed by: ORTHOPAEDIC SURGERY

## 2022-07-15 PROCEDURE — 97535 SELF CARE MNGMENT TRAINING: CPT

## 2022-07-15 PROCEDURE — 73060 X-RAY EXAM OF HUMERUS: CPT

## 2022-07-15 PROCEDURE — 85007 BL SMEAR W/DIFF WBC COUNT: CPT | Performed by: INTERNAL MEDICINE

## 2022-07-15 PROCEDURE — 25010000002 HYDROCORTISONE SODIUM SUCCINATE 100 MG RECONSTITUTED SOLUTION: Performed by: INTERNAL MEDICINE

## 2022-07-15 PROCEDURE — 97168 OT RE-EVAL EST PLAN CARE: CPT

## 2022-07-15 PROCEDURE — 80053 COMPREHEN METABOLIC PANEL: CPT | Performed by: INTERNAL MEDICINE

## 2022-07-15 PROCEDURE — 97164 PT RE-EVAL EST PLAN CARE: CPT

## 2022-07-15 PROCEDURE — 97530 THERAPEUTIC ACTIVITIES: CPT

## 2022-07-15 PROCEDURE — 85025 COMPLETE CBC W/AUTO DIFF WBC: CPT | Performed by: INTERNAL MEDICINE

## 2022-07-15 PROCEDURE — 25010000002 CEFAZOLIN IN DEXTROSE 2-4 GM/100ML-% SOLUTION: Performed by: ORTHOPAEDIC SURGERY

## 2022-07-15 RX ORDER — GABAPENTIN 400 MG/1
800 CAPSULE ORAL
Status: DISCONTINUED | OUTPATIENT
Start: 2022-07-15 | End: 2022-07-19 | Stop reason: HOSPADM

## 2022-07-15 RX ORDER — BUPIVACAINE HYDROCHLORIDE 2.5 MG/ML
INJECTION, SOLUTION EPIDURAL; INFILTRATION; INTRACAUDAL
Status: COMPLETED | OUTPATIENT
Start: 2022-07-15 | End: 2022-07-15

## 2022-07-15 RX ORDER — ACETAMINOPHEN 500 MG
500 TABLET ORAL EVERY 6 HOURS
Status: DISCONTINUED | OUTPATIENT
Start: 2022-07-15 | End: 2022-07-19 | Stop reason: HOSPADM

## 2022-07-15 RX ADMIN — Medication 10 ML: at 08:18

## 2022-07-15 RX ADMIN — ACETAMINOPHEN 500 MG: 500 TABLET ORAL at 13:57

## 2022-07-15 RX ADMIN — PINDOLOL 2.5 MG: 5 TABLET ORAL at 08:18

## 2022-07-15 RX ADMIN — OXYCODONE HYDROCHLORIDE AND ACETAMINOPHEN 1 TABLET: 10; 325 TABLET ORAL at 00:05

## 2022-07-15 RX ADMIN — PANTOPRAZOLE SODIUM 40 MG: 40 TABLET, DELAYED RELEASE ORAL at 05:21

## 2022-07-15 RX ADMIN — NORTRIPTYLINE HYDROCHLORIDE 50 MG: 10 CAPSULE ORAL at 12:23

## 2022-07-15 RX ADMIN — OXYCODONE HYDROCHLORIDE AND ACETAMINOPHEN 1 TABLET: 10; 325 TABLET ORAL at 13:57

## 2022-07-15 RX ADMIN — HYDROMORPHONE HYDROCHLORIDE 1 MG: 1 INJECTION, SOLUTION INTRAMUSCULAR; INTRAVENOUS; SUBCUTANEOUS at 02:50

## 2022-07-15 RX ADMIN — DOXYCYCLINE 100 MG: 100 CAPSULE ORAL at 21:03

## 2022-07-15 RX ADMIN — BUPIVACAINE HYDROCHLORIDE 5 ML: 2.5 INJECTION, SOLUTION EPIDURAL; INFILTRATION; INTRACAUDAL; PERINEURAL at 09:34

## 2022-07-15 RX ADMIN — LISINOPRIL 5 MG: 5 TABLET ORAL at 08:16

## 2022-07-15 RX ADMIN — IPRATROPIUM BROMIDE AND ALBUTEROL SULFATE 3 ML: .5; 3 SOLUTION RESPIRATORY (INHALATION) at 00:07

## 2022-07-15 RX ADMIN — NORTRIPTYLINE HYDROCHLORIDE 50 MG: 10 CAPSULE ORAL at 17:54

## 2022-07-15 RX ADMIN — HYDROMORPHONE HYDROCHLORIDE 1 MG: 1 INJECTION, SOLUTION INTRAMUSCULAR; INTRAVENOUS; SUBCUTANEOUS at 08:12

## 2022-07-15 RX ADMIN — HYDROCORTISONE 20 MG: 10 TABLET ORAL at 06:37

## 2022-07-15 RX ADMIN — BUDESONIDE AND FORMOTEROL FUMARATE DIHYDRATE 2 PUFF: 160; 4.5 AEROSOL RESPIRATORY (INHALATION) at 07:45

## 2022-07-15 RX ADMIN — OXYCODONE HYDROCHLORIDE AND ACETAMINOPHEN 1 TABLET: 10; 325 TABLET ORAL at 17:54

## 2022-07-15 RX ADMIN — GABAPENTIN 800 MG: 400 CAPSULE ORAL at 17:54

## 2022-07-15 RX ADMIN — ACETAMINOPHEN 500 MG: 500 TABLET ORAL at 21:03

## 2022-07-15 RX ADMIN — OXYCODONE HYDROCHLORIDE AND ACETAMINOPHEN 1 TABLET: 10; 325 TABLET ORAL at 04:21

## 2022-07-15 RX ADMIN — PINDOLOL 2.5 MG: 5 TABLET ORAL at 00:18

## 2022-07-15 RX ADMIN — GABAPENTIN 800 MG: 400 CAPSULE ORAL at 21:03

## 2022-07-15 RX ADMIN — DOXYCYCLINE 100 MG: 100 CAPSULE ORAL at 08:16

## 2022-07-15 RX ADMIN — MORPHINE SULFATE 30 MG: 30 TABLET, FILM COATED, EXTENDED RELEASE ORAL at 08:16

## 2022-07-15 RX ADMIN — ACETAMINOPHEN 500 MG: 500 TABLET ORAL at 08:16

## 2022-07-15 RX ADMIN — OXYCODONE HYDROCHLORIDE AND ACETAMINOPHEN 1 TABLET: 10; 325 TABLET ORAL at 22:50

## 2022-07-15 RX ADMIN — Medication 250 MG: at 08:16

## 2022-07-15 RX ADMIN — NORTRIPTYLINE HYDROCHLORIDE 50 MG: 10 CAPSULE ORAL at 21:03

## 2022-07-15 RX ADMIN — HYDROCORTISONE SODIUM SUCCINATE 50 MG: 100 INJECTION, POWDER, FOR SOLUTION INTRAMUSCULAR; INTRAVENOUS at 23:40

## 2022-07-15 RX ADMIN — MORPHINE SULFATE 30 MG: 30 TABLET, FILM COATED, EXTENDED RELEASE ORAL at 22:41

## 2022-07-15 RX ADMIN — DOXYCYCLINE 100 MG: 100 CAPSULE ORAL at 00:05

## 2022-07-15 RX ADMIN — Medication 250 MG: at 21:02

## 2022-07-15 RX ADMIN — HYDROMORPHONE HYDROCHLORIDE 1 MG: 1 INJECTION, SOLUTION INTRAMUSCULAR; INTRAVENOUS; SUBCUTANEOUS at 05:22

## 2022-07-15 RX ADMIN — GABAPENTIN 800 MG: 400 CAPSULE ORAL at 13:57

## 2022-07-15 RX ADMIN — CEFAZOLIN SODIUM 2 G: 2 INJECTION, SOLUTION INTRAVENOUS at 12:24

## 2022-07-15 RX ADMIN — GABAPENTIN 800 MG: 400 CAPSULE ORAL at 00:05

## 2022-07-15 RX ADMIN — GABAPENTIN 800 MG: 400 CAPSULE ORAL at 08:16

## 2022-07-15 RX ADMIN — SODIUM CHLORIDE 75 ML/HR: 9 INJECTION, SOLUTION INTRAVENOUS at 11:31

## 2022-07-15 RX ADMIN — SPIRONOLACTONE 50 MG: 50 TABLET ORAL at 08:17

## 2022-07-15 RX ADMIN — OXYCODONE HYDROCHLORIDE AND ACETAMINOPHEN 1 TABLET: 10; 325 TABLET ORAL at 10:09

## 2022-07-15 RX ADMIN — HYDROCORTISONE 10 MG: 10 TABLET ORAL at 12:24

## 2022-07-15 RX ADMIN — HYDROCORTISONE SODIUM SUCCINATE 50 MG: 100 INJECTION, POWDER, FOR SOLUTION INTRAMUSCULAR; INTRAVENOUS at 15:08

## 2022-07-15 RX ADMIN — Medication 10 ML: at 21:15

## 2022-07-15 RX ADMIN — CEFAZOLIN SODIUM 2 G: 2 INJECTION, SOLUTION INTRAVENOUS at 04:21

## 2022-07-15 RX ADMIN — LEVOTHYROXINE SODIUM 88 MCG: 88 TABLET ORAL at 05:21

## 2022-07-15 RX ADMIN — BUDESONIDE AND FORMOTEROL FUMARATE DIHYDRATE 2 PUFF: 160; 4.5 AEROSOL RESPIRATORY (INHALATION) at 21:06

## 2022-07-15 NOTE — ANESTHESIA PROCEDURE NOTES
Interscalene Re-block      Patient reassessed immediately prior to procedure    Patient location during procedure: pre-op  Reason for block: at surgeon's request and post-op pain management  Performed by  CRNA/CAA: Jesus Mitchell CRNA  Assisted by: Siobhan Moya RN  Preanesthetic Checklist  Completed: patient identified, IV checked, site marked, risks and benefits discussed, surgical consent, monitors and equipment checked, pre-op evaluation and timeout performed  Prep:  Pt Position: left lateral decubitus  Sterile barriers:cap, gloves, mask and sterile barriers  Prep: ChloraPrep  Patient monitoring: blood pressure monitoring, continuous pulse oximetry and EKG  Procedure    Sedation: yes  Performed under: local infiltration  Guidance:ultrasound guided  Images:still images obtained, printed/placed on chart    Laterality:right  Block Type:interscalene  Injection Technique:catheter  Needle Type:Tuohy and echogenic  Needle Gauge:18 G  Resistance on Injection: none  Catheter Size:20 G (20g)  Cath Depth at skin: 11 cm    Medications Used: bupivacaine PF (MARCAINE) 0.25 % injection, 5 mL  Med administered at 7/15/2022 9:34 AM      Post Assessment  Injection Assessment: negative aspiration for heme, no paresthesia on injection and incremental injection  Patient Tolerance:comfortable throughout block  Complications:no  Additional Notes  Procedure:                 The pt was placed in semifowlers position with a slight tilt of the thorax contralateral to the insertion site.  The Insertion Site was prepped and draped in sterile fashion.  The pt was anesthetized with  IV Sedation( see meds) and  Skin and cutaneous tissue was infiltrated and anesthetized with 1% Lidocaine 3 mls via a 25g needle.  Utilizing ultrasound guidance, a BBraun 4 inch 18 g Contiplex echogenic touhy needle was advanced in-plane.  Hydro dissection of tissue was achieved with Normal saline. Major vessels(carotid and Internal Jugular) where visualized  as the brachial plexus was approached at the approximate level of C-7/ T-1.  Cervical 5 and Branches of Cervical 6 nerve roots where visualized and the needle tip was placed posterior at the level of C-6 roots.  LA spread was visualized and injection was made incrementally every 5 mls with aspiration. Injection pressure was normal or little, there was no intraneural injection, no vascular injection.      The BBraun 20 g wire stylet catheter was then placed under US guidance on the posterior aspect of the Brachial Plexus. The tuohy was removed and the location of catheter was confirmed with NS injection visualized with US . The skin was sealed with exofin tissue adhesive at catheter insertion site.  Skin was prepped with benzoin and the catheter was secured with steristrips and a CHG tegaderm. Appropriate labels applied. Thank You.

## 2022-07-15 NOTE — ANESTHESIA POSTPROCEDURE EVALUATION
Patient: Altagracia Rothman    Procedure Summary     Date: 07/14/22 Room / Location:  [facility]    Anesthesia Start: 1527 Anesthesia Stop: 2111    Procedures:       INTRAMEDULLARY NAIL INSERTION PROXIMAL HUMERUS (Right Arm Upper)      HUMERUS DISTAL OPEN REDUCTION INTERNAL FIXATION (Right Arm Upper) Diagnosis:     Surgeons: Jamari Matute Jr., MD Provider: Mando Pope MD    Anesthesia Type: general ASA Status: 3          Anesthesia Type: general    Vitals  No vitals data found for the desired time range.          Post Anesthesia Care and Evaluation    Patient location during evaluation: PACU  Patient participation: complete - patient participated  Level of consciousness: awake and alert  Pain management: adequate    Airway patency: patent  Anesthetic complications: No anesthetic complications  PONV Status: none  Cardiovascular status: hemodynamically stable and acceptable  Respiratory status: nonlabored ventilation, acceptable and nasal cannula  Hydration status: acceptable

## 2022-07-15 NOTE — PROGRESS NOTES
Orthopedic Daily Progress Note      CC: INDIRA overnight    Pain not well controlled  General: no fevers, chills  Abdomen: no nausea, vomiting, or diarrhea    No other complaints    Physical Exam:  I have reviewed the vital signs.  Temp:  [97.4 °F (36.3 °C)-98.4 °F (36.9 °C)] 98.2 °F (36.8 °C)  Heart Rate:  [68-89] 86  Resp:  [14-20] 18  BP: (109-164)/() 160/91    Objective  General Appearance:    Alert, cooperative, no distress  Extremities: No clubbing, cyanosis, or edema to lower extremities  Pulses:  2+ in distal surgical extremity  Skin: Dressing Clean/dry/intact      Results Review:    I have reviewed the labs, radiology results and diagnostic studies:no new labs yet    Results from last 7 days   Lab Units 07/09/22  0744   WBC 10*3/mm3 8.18   HEMOGLOBIN g/dL 12.4   PLATELETS 10*3/mm3 304     Results from last 7 days   Lab Units 07/09/22  0744   SODIUM mmol/L 139   POTASSIUM mmol/L 4.1   CO2 mmol/L 29.0   CREATININE mg/dL 0.58   GLUCOSE mg/dL 97       I have reviewed the medications.    Assessment/Problem List  POD# 1 Day Post-Op   S/p R IMN and ORIF R humerus fractures    Plan  WBAT RUPETR  Will work on pain control. Have added additional gabapentin 800 mg to five times per day, as she has tolerated this in the past  Anesthesia to reassess for possible blocks  Will speak with Dr. Torres about stress doses of steroid and pain control, as well  PT/OT      Discharge Planning: I expect patient to be discharged to [facility] in TBD days.    Jamari Matute Jr., MD  07/15/22  09:23 EDT

## 2022-07-15 NOTE — PROGRESS NOTES
[facility]    Acute pain service Inpatient Progress Note    Patient Name: Altagracia Rothman  :  1950  MRN:  [id number]        Acute Pain  Service Inpatient Progress Note:    Analgesia:Poor  Pain Score:10/10  LOC: alert and awake  Resp Status: room air  Cardiac: VS stable  Side Effects:None  Catheter Site:clean, dressing intact and dry  Cath type: peripheral nerve cath with ON Q  Volume: 1mL,5ml, 5ml InfuSystem Pump.  Dosing/Volume: ropivacaine 0.2%  Catheter Plan:Catheter to remain Insitu and Continue catheter infusion rate unchanged  Comments:    pts catheter was assessed under ultrasound and found to be above the fascia. Cathter was removed and replaced. Placement confirmed under ultrasound

## 2022-07-15 NOTE — THERAPY RE-EVALUATION
Patient Name: Altagracia Rothman  : 1950    MRN: [id number]                              Today's Date: 7/15/2022       Admit Date: 2022    Visit Dx:     ICD-10-CM ICD-9-CM   1. Other closed displaced fracture of proximal end of right humerus, initial encounter  S42.291A 812.09   2. Closed fracture of distal end of humerus, unspecified fracture morphology, initial encounter  S42.409A 812.40   3. Fall, initial encounter  W19.XXXA E888.9   4. Osteoporosis with current pathological fracture, unspecified osteoporosis type, initial encounter  M80.00XA 733.00     733.10     Patient Active Problem List   Diagnosis   • Adrenal insufficiency (HCC)   • Hypothyroidism   • Essential hypertension   • H/O pheochromocytoma   • Chronic bilateral low back pain without sciatica   • Encounter for chronic pain management   • Osteoporosis   • Tobacco abuse   • BMI 35.0-35.9,adult   • Bronchitis   • Moderate asthma without complication   • Paresthesia   • Medicare annual wellness visit, subsequent   • Routine medical exam   • Lipid screening   • Hair loss   • Other closed displaced fracture of proximal end of right humerus, initial encounter   • Acute UTI (urinary tract infection)     Past Medical History:   Diagnosis Date   • Adrenal insufficiency (HCC)    • Fibromyalgia    • Hypertension    • Hypothyroidism    • Skin cancer      Past Surgical History:   Procedure Laterality Date   • ADRENAL GLAND SURGERY     • BREAST BIOPSY      x4   • GALLBLADDER SURGERY     • HYSTERECTOMY     • MRI ANGIOGRAM LOWER EXTREMITY UNILATERAL W CONTRAST     • TONSILLECTOMY        General Information     Row Name 07/15/22 1537          Physical Therapy Time and Intention    Document Type re-evaluation  -LH     Mode of Treatment physical therapy;individual therapy  -     Row Name 07/15/22 1537          General Information    Patient Profile Reviewed yes  -LH     Prior Level of Function --  See IE for full PLOF  -LH     Existing Precautions/Restrictions  fall;other (see comments)  s/p R humerus proximal IMN and distal ORIF, RUE WBAT and ROMAT, interscalene nerve cath, sling for comfort  -     Barriers to Rehab medically complex;previous functional deficit;physical barrier;ineffective coping  -[facility] Name 07/15/22 1537          Living Environment    People in Home --  See IE for living environment  -[facility] Name 07/15/22 1537          Cognition    Orientation Status (Cognition) oriented x 4  -[facility] Name 07/15/22 1537          Safety Issues, Functional Mobility    Safety Issues Affecting Function (Mobility) awareness of need for assistance;insight into deficits/self-awareness;judgment;positioning of assistive device;safety precaution awareness;safety precautions follow-through/compliance;sequencing abilities  -     Impairments Affecting Function (Mobility) balance;endurance/activity tolerance;range of motion (ROM);pain;strength  -     Comment, Safety Issues/Impairments (Mobility) Alert and following commands, limited by pain and anxiety, very particular about sequencing and set up for mobility  -           User Key  (r) = Recorded By, (t) = Taken By, (c) = Cosigned By    Initials Name Provider Type     Trav Cordero, PT Physical Therapist               Mobility     [facility] Name 07/15/22 1541          Bed Mobility    Bed Mobility supine-sit  -     Supine-Sit [place] (Bed Mobility) moderate assist (50% patient effort);2 person assist;verbal cues;nonverbal cues (demo/gesture)  -     Assistive Device (Bed Mobility) draw sheet;head of bed elevated;bed rails  -     Comment, (Bed Mobility) Cues for proper technique, physical assist to position BLEs on EOB and for trunk support.  -[facility] Name 07/15/22 1541          Bed-Chair Transfer    Bed-Chair [place] (Transfers) minimum assist (75% patient effort);2 person assist;verbal cues;nonverbal cues (demo/gesture)  -     Assistive Device (Bed-Chair Transfers) crutches, axillary  -      Comment, (Bed-Chair Transfer) Pt with forward flexed posture with considerable amount of weight bearing through L axilla with crutch. Pt with shuffled steps from bed to chair. Cues for proper sequencing. Pt unable to tolerate RUE dangling d/t pain, so pt unable to utilize R crutch at this time.  -     Row Name 07/15/22 1541          Sit-Stand Transfer    Sit-Stand [place] (Transfers) minimum assist (75% patient effort);2 person assist;verbal cues;nonverbal cues (demo/gesture)  -     Assistive Device (Sit-Stand Transfers) crutches, axillary  -     Comment, (Sit-Stand Transfer) Pt requires considerable bed elevation to assist in STS transfer. Pt with forward flexed posture throughout with bilateral hip flexion.  -[facility] Name 07/15/22 1541          Gait/Stairs (Locomotion)    [place] Level (Gait) minimum assist (75% patient effort);2 person assist;verbal cues;nonverbal cues (demo/gesture)  -     Assistive Device (Gait) crutches, axillary  -     Distance in Feet (Gait) 2  -     Deviations/Abnormal Patterns (Gait) base of support, narrow;festinating/shuffling;gait speed decreased;stride length decreased  -     Bilateral Gait Deviations forward flexed posture;heel strike decreased  -     [place] Level (Stairs) not tested  -     Comment, (Gait/Stairs) Pt demonstrated shuffling gait pattern with significant forward flexed posture and decreased sequencing. Pt with considerable weight bearing through L axilla with crutch despite max education on proper use of LUE support. Pt with increased bilateral hip ER throughout. Distance limited d/t fatigue and RUE and BLE pain.  -     Row Name 07/15/22 1541          Mobility    Extremity Weight-bearing Status right upper extremity  -     Right Upper Extremity (Weight-bearing Status) weight-bearing as tolerated (WBAT)  -           User Key  (r) = Recorded By, (t) = Taken By, (c) = Cosigned By    Initials Name Provider Type     Trav Cordero  D, PT Physical Therapist               Obj/Interventions     [facility] Name 07/15/22 1547          Range of Motion Comprehensive    General Range of Motion lower extremity range of motion deficits identified  -     Comment, General Range of Motion Baseline limited bilateral hip IR extension.  -[facility] Name 07/15/22 1547          Strength Comprehensive (MMT)    General Manual Muscle Testing (MMT) Assessment lower extremity strength deficits identified  -     Comment, General Manual Muscle Testing (MMT) Assessment BLE strength grossly 4/5 via functional assessment.  -LH     Row Name 07/15/22 1547          Balance    Balance Assessment sitting static balance;sitting dynamic balance;standing static balance;standing dynamic balance  -     Static Sitting Balance standby assist  -     Dynamic Sitting Balance contact guard  -     Position, Sitting Balance unsupported;sitting edge of bed;sitting in chair  -     Static Standing Balance contact guard  -     Dynamic Standing Balance minimal assist  -     Position/Device Used, Standing Balance supported;crutches, axillary  -           User Key  (r) = Recorded By, (t) = Taken By, (c) = Cosigned By    Initials Name Provider Type     Trav Cordero D, PT Physical Therapist               Goals/Plan     Row Name 07/15/22 1553          Bed Mobility Goal 1 (PT)    Activity/Assistive Device (Bed Mobility Goal 1, PT) sit to supine/supine to sit  -     [place] Level/Cues Needed (Bed Mobility Goal 1, PT) contact guard required  -     Time Frame (Bed Mobility Goal 1, PT) long term goal (LTG);10 days  -     Progress/Outcomes (Bed Mobility Goal 1, PT) goal revised this date  -[facility] Name 07/15/22 8771          Transfer Goal 1 (PT)    Activity/Assistive Device (Transfer Goal 1, PT) sit-to-stand/stand-to-sit;bed-to-chair/chair-to-bed  -     [place] Level/Cues Needed (Transfer Goal 1, PT) contact guard required  -     Time Frame (Transfer Goal 1, PT) long  term goal (LTG);10 days  -     Progress/Outcome (Transfer Goal 1, PT) goal revised this date  -[facility] Name 07/15/22 1553          Gait Training Goal 1 (PT)    Activity/Assistive Device (Gait Training Goal 1, PT) gait (walking locomotion);assistive device use  -     [place] Level (Gait Training Goal 1, PT) contact guard required  -     Distance (Gait Training Goal 1, PT) 25 ft  -     Time Frame (Gait Training Goal 1, PT) long term goal (LTG);10 days  -     Progress/Outcome (Gait Training Goal 1, PT) goal revised this date  -[facility] Name 07/15/22 1555          Stairs Goal 1 (PT)    Progress/Outcome (Stairs Goal 1, PT) goal no longer appropriate  -[facility] Name 07/15/22 1555          Therapy Assessment/Plan (PT)    Planned Therapy Interventions (PT) balance training;bed mobility training;gait training;home exercise program;patient/family education;postural re-education;strengthening;transfer training  -           User Key  (r) = Recorded By, (t) = Taken By, (c) = Cosigned By    Initials Name Provider Type     Trav Cordero, PT Physical Therapist               Clinical Impression     [facility] Name 07/15/22 1547          Pain    Pain Intervention(s) Repositioned;Ambulation/increased activity  -     Additional Documentation Pain Scale: FACES Pre/Post-Treatment (Group)  -[facility] Name 07/15/22 1548          Pain Scale: FACES Pre/Post-Treatment    Pain: FACES Scale, Pretreatment 6-->hurts even more  -     Posttreatment Pain Rating 8-->hurts whole lot  -     Pain Location - Side/Orientation Right  -     Pain Location upper  -     Pain Location - extremity;back;other (see comments)  also complaints of generalized pain  -[facility] Name 07/15/22 1549          Plan of Care Review    Plan of Care Reviewed With patient  -     Progress no change  -     Outcome Evaluation PT re-evaluation complete s/p R proximal humerus IMN and distal ORIF. Pt generalized weakness, decreased functional  activity tolerance, impaired balance/safety, and RUE pain limiting functional moblity and warranting further skilled acute PT services. Pt performed bed mobility with ModAx2, STS transfers with L axillary crutch and MinAx2, and ambulated 2ft from bed to chair with MinAx2 and L axillary crutch. Pt unable to tolerate removal of RUE sling d/t pain and discomfort, so PT unable to attempt mobility with alternative ADs this session. PT recommending SNF at [place] pending improvements in functional activity tolerance.  -[facility] Name 07/15/22 1548          Therapy Assessment/Plan (PT)    Rehab Potential (PT) good, to achieve stated therapy goals  -     Therapy Frequency (PT) daily  -[facility] Name 07/15/22 1548          Vital Signs    Pre Systolic BP Rehab 143  -     Pre Treatment Diastolic BP 79  -     Pretreatment Heart Rate (beats/min) 60  -     Pre SpO2 (%) 99  -     O2 Delivery Pre Treatment room air  -     O2 Delivery Intra Treatment room air  -     Post SpO2 (%) 99  -     O2 Delivery Post Treatment room air  -     Pre Patient Position Supine  -     Intra Patient Position Standing  -     Post Patient Position Sitting  -LH     Row Name 07/15/22 1548          Positioning and Restraints    Pre-Treatment Position in bed  -     Post Treatment Position chair  -     In Chair notified nsg;reclined;call light within reach;encouraged to call for assist;exit alarm on;on mechanical lift sling;legs elevated  -           User Key  (r) = Recorded By, (t) = Taken By, (c) = Cosigned By    Initials Name Provider Type     Trav Cordero, PT Physical Therapist               Outcome Measures     [facility] Name 07/15/22 1554 07/15/22 0812       How much help from another person do you currently need...    Turning from your back to your side while in flat bed without using bedrails? 3  - 3  -MW    Moving from lying on back to sitting on the side of a flat bed without bedrails? 2  - 3  -MW    Moving to and from a  bed to a chair (including a wheelchair)? 2  - 2  -MW    Standing up from a chair using your arms (e.g., wheelchair, bedside chair)? 3  - 3  -MW    Climbing 3-5 steps with a railing? 1  - 2  -MW    To walk in hospital room? 3  - 2  -MW    AM-PAC 6 Clicks Score (PT) 14  - 15  -    Highest level of mobility 4 --> Transferred to chair/commode  - 4 --> Transferred to chair/commode  -    Row Name 07/15/22 1554          Functional Assessment    Outcome Measure Options AM-PAC 6 Clicks Basic Mobility (PT)  -           User Key  (r) = Recorded By, (t) = Taken By, (c) = Cosigned By    Initials Name Provider Type    Ila Hurst, RN Registered Nurse     Trav Cordero, PT Physical Therapist                             Physical Therapy Education                 Title: PT OT SLP Therapies (Done)     Topic: Physical Therapy (Done)     Point: Mobility training (Done)     Learning Progress Summary           Patient Acceptance, E, VU,NR by  at 7/15/2022 1554    Comment: Reviewed safety with mobility, PT POC, and RUE precautions      Show all documentation for this point (11)                 Point: Home exercise program (Done)     Learning Progress Summary           Patient Acceptance, E, VU,NR by  at 7/15/2022 1554    Comment: Reviewed safety with mobility, PT POC, and RUE precautions      Show all documentation for this point (12)                 Point: Body mechanics (Done)     Learning Progress Summary           Patient Acceptance, E, VU,NR by  at 7/15/2022 1554    Comment: Reviewed safety with mobility, PT POC, and RUE precautions      Show all documentation for this point (11)                 Point: Precautions (Done)     Learning Progress Summary           Patient Acceptance, E, VU,NR by  at 7/15/2022 1554    Comment: Reviewed safety with mobility, PT POC, and RUE precautions      Show all documentation for this point (11)                             User Key     Initials Effective Dates Name  Provider Type Discipline     09/21/21 -  Trav Cordero, PT Physical Therapist PT              PT Recommendation and Plan  Planned Therapy Interventions (PT): balance training, bed mobility training, gait training, home exercise program, patient/family education, postural re-education, strengthening, transfer training  Plan of Care Reviewed With: patient  Progress: no change  Outcome Evaluation: PT re-evaluation complete s/p R proximal humerus IMN and distal ORIF. Pt generalized weakness, decreased functional activity tolerance, impaired balance/safety, and RUE pain limiting functional moblity and warranting further skilled acute PT services. Pt performed bed mobility with ModAx2, STS transfers with L axillary crutch and MinAx2, and ambulated 2ft from bed to chair with MinAx2 and L axillary crutch. Pt unable to tolerate removal of RUE sling d/t pain and discomfort, so PT unable to attempt mobility with alternative ADs this session. PT recommending SNF at [place] pending improvements in functional activity tolerance.     Time Calculation:    PT Charges     Row Name 07/15/22 1555             Time Calculation    Start Time 1520  -      PT Received On 07/15/22  -      PT Goal Re-Cert Due Date 07/25/22  -              Timed Charges    27219 - PT Therapeutic Activity Minutes 10  -LH              Untimed Charges    PT Eval/Re-eval Minutes 25  -              Total Minutes    Timed Charges Total Minutes 10  -LH      Untimed Charges Total Minutes 25  -       Total Minutes 35  -LH            User Key  (r) = Recorded By, (t) = Taken By, (c) = Cosigned By    Initials Name Provider Type     Trav Cordero, PT Physical Therapist              Therapy Charges for Today     Code Description Service Date Service Provider Modifiers Qty    [id number]  PT THERAPEUTIC ACT EA 15 MIN 7/15/2022 Trav Cordero, PT GP 1    [id number]  PT RE-EVAL ESTABLISHED PLAN 2 7/15/2022 Trav Cordero, PT GP 1          PT  G-Codes  Outcome Measure Options: AM-PAC 6 Clicks Basic Mobility (PT)  AM-PAC 6 Clicks Score (PT): 14  AM-PAC 6 Clicks Score (OT): 13    Trav Cordero, PT  7/15/2022

## 2022-07-15 NOTE — THERAPY RE-EVALUATION
Patient Name: Altagracia Rothman  : 1950    MRN: [id number]                              Today's Date: 7/15/2022       Admit Date: 2022    Visit Dx:     ICD-10-CM ICD-9-CM   1. Other closed displaced fracture of proximal end of right humerus, initial encounter  S42.291A 812.09   2. Closed fracture of distal end of humerus, unspecified fracture morphology, initial encounter  S42.409A 812.40   3. Fall, initial encounter  W19.XXXA E888.9   4. Osteoporosis with current pathological fracture, unspecified osteoporosis type, initial encounter  M80.00XA 733.00     733.10     Patient Active Problem List   Diagnosis   • Adrenal insufficiency (HCC)   • Hypothyroidism   • Essential hypertension   • H/O pheochromocytoma   • Chronic bilateral low back pain without sciatica   • Encounter for chronic pain management   • Osteoporosis   • Tobacco abuse   • BMI 35.0-35.9,adult   • Bronchitis   • Moderate asthma without complication   • Paresthesia   • Medicare annual wellness visit, subsequent   • Routine medical exam   • Lipid screening   • Hair loss   • Other closed displaced fracture of proximal end of right humerus, initial encounter   • Acute UTI (urinary tract infection)     Past Medical History:   Diagnosis Date   • Adrenal insufficiency (HCC)    • Fibromyalgia    • Hypertension    • Hypothyroidism    • Skin cancer      Past Surgical History:   Procedure Laterality Date   • ADRENAL GLAND SURGERY     • BREAST BIOPSY      x4   • GALLBLADDER SURGERY     • HYSTERECTOMY     • MRI ANGIOGRAM LOWER EXTREMITY UNILATERAL W CONTRAST     • TONSILLECTOMY        General Information     Row Name 07/15/22 1558          OT Time and Intention    Document Type re-evaluation  -MR     Mode of Treatment occupational therapy  -MR     Row Name 07/15/22 1558          General Information    Patient Profile Reviewed yes  -MR     Prior Level of Function --  See IE for full PLOF  -MR     Existing Precautions/Restrictions fall;other (see comments)  s/p  R humerus proximal IMN and distal ORIF, RUE WBAT and ROMAT, interscalene nerve cath, sling for comfort  -MR     Barriers to Rehab medically complex;previous functional deficit;physical barrier;ineffective coping  -MR     Row Name 07/15/22 1558          Living Environment    People in Home other (see comments)  See IE for living environment  -MR     Row Name 07/15/22 1558          Home Main Entrance    Number of Stairs, Main Entrance two  -MR     Row Name 07/15/22 1558          Cognition    Orientation Status (Cognition) oriented x 4  -MR     Row Name 07/15/22 1558          Safety Issues, Functional Mobility    Safety Issues Affecting Function (Mobility) awareness of need for assistance;insight into deficits/self-awareness;judgment;positioning of assistive device;safety precaution awareness;safety precautions follow-through/compliance;sequencing abilities  -MR     Impairments Affecting Function (Mobility) balance;endurance/activity tolerance;range of motion (ROM);pain;strength  -MR           User Key  (r) = Recorded By, (t) = Taken By, (c) = Cosigned By    Initials Name Provider Type    MR Sandy Levi OT Occupational Therapist                 Mobility/ADL's     Row Name 07/15/22 1559          Bed Mobility    Bed Mobility supine-sit  -MR     Supine-Sit [place] (Bed Mobility) moderate assist (50% patient effort);2 person assist;verbal cues;nonverbal cues (demo/gesture)  -MR     Bed Mobility, Safety Issues decreased use of arms for pushing/pulling  -MR     Assistive Device (Bed Mobility) draw sheet;head of bed elevated;bed rails  -MR     Comment, (Bed Mobility) v/c for hand placement, sequencing and hysical assist for trunk and BLE advancement to EOB  -MR     Row Name 07/15/22 1550          Transfers    Transfers bed-chair transfer  -MR     Bed-Chair [place] (Transfers) minimum assist (75% patient effort);2 person assist;verbal cues;nonverbal cues (demo/gesture)  -MR     Assistive Device (Bed-Chair  Transfers) crutches, axillary  -MR     Sit-Stand [place] (Transfers) minimum assist (75% patient effort);2 person assist;verbal cues;nonverbal cues (demo/gesture)  -MR [place] Name 07/15/22 1559          Bed-Chair Transfer    Comment, (Bed-Chair Transfer) Pt limited by RUE pain this session. Utilized a crutch under the LUE and support provided for the RUE. Pt demo sloww shuffled side steps to chair  -MR     Row Name 07/15/22 1559          Sit-Stand Transfer    Assistive Device (Sit-Stand Transfers) crutches, axillary  -MR     Row Name 07/15/22 1559          Activities of Daily Living    BADL Assessment/Intervention upper body dressing;lower body dressing  -MR     Row Name 07/15/22 1559          Mobility    Extremity Weight-bearing Status right upper extremity  -MR     Right Upper Extremity (Weight-bearing Status) weight-bearing as tolerated (WBAT)  -MR     Brendan Name 07/15/22 1559          Upper Body Dressing Assessment/Training    [place] Level (Upper Body Dressing) don;pajama/robe;maximum assist (25% patient effort)  -MR     Position (Upper Body Dressing) edge of bed sitting  -MR     Brendan Name 07/15/22 1559          Lower Body Dressing Assessment/Training    [place] Level (Lower Body Dressing) don;pants/bottoms;dependent (less than 25% patient effort)  -MR     Position (Lower Body Dressing) supine;supported standing  -           User Key  (r) = Recorded By, (t) = Taken By, (c) = Cosigned By    Initials Name Provider Type     Sandy Levi OT Occupational Therapist               Obj/Interventions     [facility] Name 07/15/22 1601          Sensory Assessment (Somatosensory)    Sensory Assessment (Somatosensory) UE sensation intact;other (see comments)  RUE still numb, able to complete digit flex and ext  -MR     Row Name 07/15/22 1601          Vision Assessment/Intervention    Visual Impairment/Limitations corrective lenses full-time  -MR     Brendan Name 07/15/22 1601          Range of Motion Comprehensive     Comment, General Range of Motion LUE WFL   RUE: hand ROM WFL, did not assess elbow or shoulder d/t pain  -MR     Row Name 07/15/22 1601          Strength Comprehensive (MMT)    Comment, General Manual Muscle Testing (MMT) Assessment LUE grossly 4-/5 in all functional planes; DNT RUE  -MR     Row Name 07/15/22 1601          Hand (Therapeutic Exercise)    Hand (Therapeutic Exercise) AROM (active range of motion)  -MR     Hand AROM/AAROM (Therapeutic Exercise) right;finger flexion;finger extension  -MR     Row Name 07/15/22 1601          Motor Skills    Therapeutic Exercise hand  -MR     Row Name 07/15/22 1601          Balance    Balance Assessment sitting static balance;sitting dynamic balance;standing static balance;standing dynamic balance  -MR     Static Sitting Balance standby assist  -MR     Dynamic Sitting Balance contact guard  -MR     Position, Sitting Balance unsupported;sitting edge of bed;sitting in chair  -MR     Static Standing Balance contact guard  -MR     Dynamic Standing Balance minimal assist  -MR     Position/Device Used, Standing Balance supported;crutches, axillary  -MR     Balance Interventions sitting;standing;sit to stand;supported;static;dynamic;minimal challenge;occupation based/functional task  -MR           User Key  (r) = Recorded By, (t) = Taken By, (c) = Cosigned By    Initials Name Provider Type    MR Sandy Levi, YOGI Occupational Therapist               Goals/Plan     Row Name 07/15/22 1607          Transfer Goal 1 (OT)    Activity/Assistive Device (Transfer Goal 1, OT) sit-to-stand/stand-to-sit;toilet;crutches, axillary  -MR     Will Level/Cues Needed (Transfer Goal 1, OT) supervision required;verbal cues required  -MR     Time Frame (Transfer Goal 1, OT) long term goal (LTG);by discharge  -MR     Progress/Outcome (Transfer Goal 1, OT) goal ongoing;medical status inhibiting progress  -MR     Row Name 07/15/22 1607          Dressing Goal 1 (OT)    Activity/Device (Dressing  Goal 1, OT) upper body dressing  -MR     [place]/Cues Needed (Dressing Goal 1, OT) verbal cues required;moderate assist (50-74% patient effort)  -MR     Time Frame (Dressing Goal 1, OT) 10 days;long term goal (LTG)  -MR     Progress/Outcome (Dressing Goal 1, OT) goal ongoing;medical status inhibiting progress  -MR     Row Name 07/15/22 1607          ROM Goal 1 (OT)    ROM Goal 1 (OT) Pt. will complete RUE HEP with Max A and VC's to sequence by discharge.  -MR     Time Frame (ROM Goal 1, OT) 10 days;long term goal (LTG)  -MR     Progress/Outcome (ROM Goal 1, OT) goal no longer appropriate  -MR     Row Name 07/15/22 1607          Therapy Assessment/Plan (OT)    Planned Therapy Interventions (OT) activity tolerance training;adaptive equipment training;BADL retraining;functional balance retraining;IADL retraining;occupation/activity based interventions;patient/caregiver education/training;transfer/mobility retraining;ROM/therapeutic exercise;passive ROM/stretching  -MR           User Key  (r) = Recorded By, (t) = Taken By, (c) = Cosigned By    Initials Name Provider Type    Sandy Snow, OT Occupational Therapist               Clinical Impression     Row Name 07/15/22 160          Pain Scale: FACES Pre/Post-Treatment    Pain: FACES Scale, Pretreatment 6-->hurts even more  -MR     Posttreatment Pain Rating 8-->hurts whole lot  -MR     Pain Location - Side/Orientation Right  -MR     Pain Location upper  -MR     Pain Location - extremity;other (see comments)  also complaints of generalized pain  -MR     Pre/Posttreatment Pain Comment RN present and premedicated pt before session  -MR     Row Name 07/15/22 1606          Plan of Care Review    Plan of Care Reviewed With patient  -MR     Progress no change  -MR     Outcome Evaluation OT re-eval completed. Pt s/p R prox humerus IMN and dist ORIF. Pt cleared for WBAT and ROMAT. Pt continues to be limited by significant pain in the RUE, decreased activity  tolerance/endurance, impaired balance/mobility/transfers and increased need for assist w/ ADLs. R hand ROM WFL, unable to assess elbow or shoulder d/t pain. ModA x 2 for bed mobility, DEP for LB dressing and UB dressing. Pt unable to tolerate doffing sling for transfers. POC initiated. Recommendation upon d/c for SNF.  -MR     Row Name 07/15/22 1603          Therapy Assessment/Plan (OT)    Rehab Potential (OT) good, to achieve stated therapy goals  -MR     Criteria for Skilled Therapeutic Interventions Met (OT) yes;meets criteria;skilled treatment is necessary  -MR     Therapy Frequency (OT) daily  -MR     Row Name 07/15/22 1603          Therapy Plan Review/Discharge Plan (OT)    Anticipated Discharge Disposition (OT) skilled nursing facility  -MR     Row Name 07/15/22 1603          Vital Signs    Pre Systolic BP Rehab --  VSS  -MR     O2 Delivery Pre Treatment room air  -MR     O2 Delivery Intra Treatment room air  -MR     O2 Delivery Post Treatment room air  -MR     Pre Patient Position Supine  -MR     Intra Patient Position Standing  -MR     Post Patient Position Sitting  -MR     Row Name 07/15/22 1603          Positioning and Restraints    Pre-Treatment Position in bed  -MR     Post Treatment Position chair  -MR     In Chair notified nsg;reclined;sitting;call light within reach;encouraged to call for assist;exit alarm on;waffle cushion;on mechanical lift sling;legs elevated  -MR           User Key  (r) = Recorded By, (t) = Taken By, (c) = Cosigned By    Initials Name Provider Type    MR Sandy Levi, OT Occupational Therapist               Outcome Measures     Row Name 07/15/22 1601          How much help from another is currently needed...    Putting on and taking off regular lower body clothing? 1  -MR     Bathing (including washing, rinsing, and drying) 2  -MR     Toileting (which includes using toilet bed pan or urinal) 2  -MR     Putting on and taking off regular upper body clothing 2  -MR     Taking care  of personal grooming (such as brushing teeth) 3  -MR     Eating meals 3  -MR     AM-PAC 6 Clicks Score (OT) 13  -MR     Row Name 07/15/22 1554 07/15/22 0812       How much help from another person do you currently need...    Turning from your back to your side while in flat bed without using bedrails? 3  - 3  -MW    Moving from lying on back to sitting on the side of a flat bed without bedrails? 2  - 3  -MW    Moving to and from a bed to a chair (including a wheelchair)? 2  - 2  -MW    Standing up from a chair using your arms (e.g., wheelchair, bedside chair)? 3  - 3  -MW    Climbing 3-5 steps with a railing? 1  - 2  -MW    To walk in hospital room? 3  - 2  -MW    AM-PAC 6 Clicks Score (PT) 14  - 15  -MW    Highest level of mobility 4 --> Transferred to chair/commode  - 4 --> Transferred to chair/commode  -    Row Name 07/15/22 1608 07/15/22 1554       Functional Assessment    Outcome Measure Options AM-PAC 6 Clicks Daily Activity (OT)  - AM-PAC 6 Clicks Basic Mobility (PT)  -          User Key  (r) = Recorded By, (t) = Taken By, (c) = Cosigned By    Initials Name Provider Type    Ila Hurst, RN Registered Nurse     Trav Cordero, PT Physical Therapist    Sandy Snow, OT Occupational Therapist                Occupational Therapy Education                 Title: PT OT SLP Therapies (Done)     Topic: Occupational Therapy (Done)     Point: ADL training (Done)     Description:   Instruct learner(s) on proper safety adaptation and remediation techniques during self care or transfers.   Instruct in proper use of assistive devices.              Learning Progress Summary           Patient Acceptance, E, NR,VU by MR at 7/15/2022 1608      Show all documentation for this point (5)                 Point: Home exercise program (Done)     Description:   Instruct learner(s) on appropriate technique for monitoring, assisting and/or progressing therapeutic exercises/activities.               Learning Progress Summary           Patient Acceptance, E, NR,VU by MR at 7/15/2022 1608      Show all documentation for this point (5)                 Point: Precautions (Done)     Description:   Instruct learner(s) on prescribed precautions during self-care and functional transfers.              Learning Progress Summary           Patient Acceptance, E, NR,VU by MR at 7/15/2022 1608      Show all documentation for this point (5)                 Point: Body mechanics (Done)     Description:   Instruct learner(s) on proper positioning and spine alignment during self-care, functional mobility activities and/or exercises.              Learning Progress Summary           Patient Acceptance, E, NR,VU by MR at 7/15/2022 1608      Show all documentation for this point (5)                             User Key     Initials Effective Dates Name Provider Type Discipline    MR 10/06/21 -  Sandy Levi OT Occupational Therapist OT              OT Recommendation and Plan  Planned Therapy Interventions (OT): activity tolerance training, adaptive equipment training, BADL retraining, functional balance retraining, IADL retraining, occupation/activity based interventions, patient/caregiver education/training, transfer/mobility retraining, ROM/therapeutic exercise, passive ROM/stretching  Therapy Frequency (OT): daily  Plan of Care Review  Plan of Care Reviewed With: patient  Progress: no change  Outcome Evaluation: OT re-eval completed. Pt s/p R prox humerus IMN and dist ORIF. Pt cleared for WBAT and ROMAT. Pt continues to be limited by significant pain in the RUE, decreased activity tolerance/endurance, impaired balance/mobility/transfers and increased need for assist w/ ADLs. R hand ROM WFL, unable to assess elbow or shoulder d/t pain. ModA x 2 for bed mobility, DEP for LB dressing and UB dressing. Pt unable to tolerate doffing sling for transfers. POC initiated. Recommendation upon d/c for SNF.     Time Calculation:    Time  Calculation- OT     Row Name 07/15/22 1608             Time Calculation- OT    OT Start Time 1502  -MR      OT Received On 07/15/22  -MR      OT Goal Re-Cert Due Date 07/25/22  -MR              Timed Charges    95828 - OT Therapeutic Activity Minutes 2  -MR      40729 - OT Self Care/Mgmt Minutes 10  -MR              Untimed Charges    OT Eval/Re-eval Minutes 25  -MR              Total Minutes    Timed Charges Total Minutes 12  -MR      Untimed Charges Total Minutes 25  -MR       Total Minutes 37  -MR            User Key  (r) = Recorded By, (t) = Taken By, (c) = Cosigned By    Initials Name Provider Type     Billy Levi OT Occupational Therapist              Therapy Charges for Today     Code Description Service Date Service Provider Modifiers Qty    31374704384 HC OT SELF CARE/MGMT/TRAIN EA 15 MIN 7/15/2022 Billy Levi OT GO 1    [id number] HC OT RE-EVAL 2 7/15/2022 Billy Levi OT GO 1               BILLY LEVI OT  7/15/2022

## 2022-07-15 NOTE — PLAN OF CARE
Goal Outcome Evaluation:  Plan of Care Reviewed With: patient        Progress: no change  Outcome Evaluation: PT re-evaluation complete s/p R proximal humerus IMN and distal ORIF. Pt generalized weakness, decreased functional activity tolerance, impaired balance/safety, and RUE pain limiting functional moblity and warranting further skilled acute PT services. Pt performed bed mobility with ModAx2, STS transfers with L axillary crutch and MinAx2, and ambulated 2ft from bed to chair with MinAx2 and L axillary crutch. Pt unable to tolerate removal of RUE sling d/t pain and discomfort, so PT unable to attempt mobility with alternative ADs this session. PT recommending SNF at [place] pending improvements in functional activity tolerance.

## 2022-07-15 NOTE — PLAN OF CARE
Goal Outcome Evaluation:   Patient has been resting with no acute signs of distress. Patient arrived back to the floor at 2255 from PACU. Major complaints of pain from the patient as well as an inability to breathe properly. Patient O2 at 100% on 2L NC and lungs sound clear bilaterally. PRN albuterol treatment given with patient stating minimal relief. MD Clark spoken with regarding patients concerned breathing state and a one time duo-maryann treatment was given. No complaints of breathing afterwards. Still major complaint of pain in shoulder and elbow. Nerve cath in place with 4mL/hr cont rate and 4 mL/2hr PCA bolus. PRN percocet given as well with minimal relief. MD Aviles spoken to regarding patients unrelieved pain status and that the patient has no PRN medication for severe pain. 1mg dilaudid ordered every 2 hours PRN for severe pain. Patient states the medication helps but doesn't relieve the pain for long. Bed remais in lowest position with wheels locked and call light within reach. Will continue to monitor.

## 2022-07-15 NOTE — PROGRESS NOTES
[facility] Medicine Services  PROGRESS NOTE    Patient Name: Altagracia Rothman  : 1950  MRN: [id number]    Date of Admission: 2022  Primary Care Physician: Alvina Lloyd PA-C    Subjective   Subjective     CC:  Arm pain    HPI:  Postop from humeral surgery, having extreme difficulty with pain control.  Already seen by orthopedics who have adjusted her gabapentin.  Per nursing and patient IV Dilaudid is not making significant change.  At the time of my evaluation patient had just received her noon dose of oral hydrocortisone, she notes an appreciable improvement in her pain and feels that she needs higher dose steroids    ROS:  Gen- No fevers, chills  CV- No chest pain, palpitations  Resp- No cough, dyspnea  GI- No N/V/D, abd pain     Objective   Objective     Vital Signs:   Temp:  [97.4 °F (36.3 °C)-98.4 °F (36.9 °C)] 98.2 °F (36.8 °C)  Heart Rate:  [68-89] 86  Resp:  [14-20] 18  BP: (109-164)/() 160/91  Flow (L/min):  [2-3] 2     Physical Exam:  Constitutional: Awake, alert, laying in bed, appears in pain  HENT: NCAT, mucous membranes moist  Respiratory: Clear to auscultation bilaterally, respiratory effort normal   Cardiovascular: RRR, palpable radial pulses  Gastrointestinal: Positive bowel sounds, soft, nontender, nondistended  Musculoskeletal: RT UE in sling  Psychiatric: Appropriate affect, cooperative  Neurologic: Speech clear and fluent    Results Reviewed:  LAB RESULTS:      Lab 22  0744   WBC 8.18   HEMOGLOBIN 12.4   HEMATOCRIT 39.5   PLATELETS 304   NEUTROS ABS 5.57   IMMATURE GRANS (ABS) 0.05   LYMPHS ABS 1.65   MONOS ABS 0.73   EOS ABS 0.16   .6*         Lab 22  0744   SODIUM 139   POTASSIUM 4.1   CHLORIDE 102   CO2 29.0   ANION GAP 8.0   BUN 15   CREATININE 0.58   EGFR 95.7   GLUCOSE 97   CALCIUM 9.4                         Brief Urine Lab Results  (Last result in the past 365 days)      Color   Clarity   Blood   Leuk Est   Nitrite    Protein   CREAT   Urine HCG        07/06/22 1145 Yellow   Clear   Negative   Small (1+)   Negative   Negative                 Microbiology Results Abnormal     Procedure Component Value - Date/Time    COVID PRE-OP / PRE-PROCEDURE SCREENING ORDER (NO ISOLATION) - Swab, Nasopharynx [[id number]]  (Normal) Collected: 06/24/22 1901    Lab Status: Final result Specimen: Swab from Nasopharynx Updated: 06/24/22 1956    Narrative:      The following orders were created for panel order COVID PRE-OP / PRE-PROCEDURE SCREENING ORDER (NO ISOLATION) - Swab, Nasopharynx.  Procedure                               Abnormality         Status                     ---------                               -----------         ------                     COVID-19, FLU A/B, RSV P...[[id number]]  Normal              Final result                 Please view results for these tests on the individual orders.    COVID-19, FLU A/B, RSV PCR - Swab, Nasopharynx [[id number]]  (Normal) Collected: 06/24/22 1901    Lab Status: Final result Specimen: Swab from Nasopharynx Updated: 06/24/22 1956     COVID19 Not Detected     Influenza A PCR Not Detected     Influenza B PCR Not Detected     RSV, PCR Not Detected    Narrative:      Fact sheet for providers: https://www.fda.gov/media/862056/download    Fact sheet for patients: https://www.fda.gov/media/815107/download    Test performed by PCR.          XR Chest 1 View    Result Date: 7/15/2022  EXAMINATION: XR CHEST 1 VW DATE: 7/14/2022 11:47 PM INDICATION:  Dyspnea; COMPARISON:  June 30, 2022 FINDINGS: Moderate diffuse coarse interstitial lung markings are again demonstrated. No focal consolidation, pleural effusion, or pneumothorax. Cardiomediastinal silhouette  unchanged. There is atherosclerosis and tortuosity thoracic aorta. No acute osseous abnormality.     Impression: 1.  No acute cardiopulmonary process. Electronically signed by:  Kayleigh Fall M.D.  7/14/2022 10:41 PM Mountain Time    FL C Arm During  Surgery    Result Date: 7/14/2022  DATE OF EXAM: 7/14/2022 3:52 PM  PROCEDURE: FL C ARM DURING SURGERY-  INDICATIONS: INTRAMEDULLARY NAIL INSERTION PROXIMAL HUMERUS; HUMERUS DISTAL OPEN REDUCTION INTERNAL FIXATION; S42.291A-Other displaced fracture of upper end of right humerus, initial encounter for closed fracture; S42.409A-Unspecified fracture of lower end of unspecified humerus, initial encounter for closed fracture; W19.XXXA-Unspecified fall, initial encounter; M80.00XA-Age-related osteoporosis with current  COMPARISON: Radiograph 07/12/2022  TECHNIQUE: Intraoperative fluoroscopic guidance was utilized with a fluoroscopy time 3 minutes and 6 seconds. 4 images were obtained.  FINDINGS: Intraoperative fluoroscopic guidance was utilized for humeral fixation with intramedullary stephane placement. Plate and screw fixation changes are also present. Anatomic alignment of the fracture fragments on fluoroscopy.      Impression: Intraoperative fluoroscopic guidance for humeral fixation.  This report was finalized on 7/14/2022 8:53 PM by Mary Jacobson MD.            I have reviewed the medications:  Scheduled Meds:acetaminophen, 500 mg, Oral, Q6H  budesonide-formoterol, 2 puff, Inhalation, BID - RT  ceFAZolin, 2 g, Intravenous, Q8H  doxycycline, 100 mg, Oral, Q12H  gabapentin, 800 mg, Oral, 4x Daily  hydrocortisone, 10 mg, Oral, Daily With Lunch  hydrocortisone, 20 mg, Oral, QAM  HYDROmorphone, , ,   HYDROmorphone, , ,   levothyroxine, 88 mcg, Oral, Q AM  lisinopril, 5 mg, Oral, Q24H  Morphine, 30 mg, Oral, BID  nortriptyline, 50 mg, Oral, TID  pantoprazole, 40 mg, Oral, Q AM  pindolol, 2.5 mg, Oral, TID  saccharomyces boulardii, 250 mg, Oral, BID  sodium chloride, 10 mL, Intravenous, Q12H  spironolactone, 50 mg, Oral, Daily      Continuous Infusions:Pharmacy Consult,   lactated ringers, 9 mL/hr  lactated ringers, 9 mL/hr, Last Rate: 9 mL/hr (07/14/22 1527)  ropivacaine,   sodium chloride, 75 mL/hr, Last Rate: 75 mL/hr  (22 0105)      PRN Meds:.acetaminophen **OR** acetaminophen **OR** acetaminophen  •  albuterol  •  diphenhydrAMINE  •  HYDROmorphone  •  hydrOXYzine  •  labetalol  •  magnesium sulfate **OR** magnesium sulfate in D5W 1g/100mL (PREMIX) **OR** magnesium sulfate  •  [] Morphine **AND** naloxone  •  ondansetron **OR** ondansetron  •  oxyCODONE-acetaminophen  •  potassium chloride **OR** potassium chloride **OR** potassium chloride  •  sodium chloride    Assessment & Plan   Assessment & Plan     Active Hospital Problems    Diagnosis  POA   • **Other closed displaced fracture of proximal end of right humerus, initial encounter [S42.291A]  Yes   • Acute UTI (urinary tract infection) [N39.0]  Yes   • Moderate asthma without complication [J45.909]  Yes   • Osteoporosis [M81.0]  Yes   • Adrenal insufficiency (HCC) [E27.40]  Yes   • Hypothyroidism [E03.9]  Yes   • Essential hypertension [I10]  Yes   • H/O pheochromocytoma [Z86.018]  Yes      Resolved Hospital Problems   No resolved problems to display.        Brief Hospital Course to date:  Altagracia Rothman is a 72 y.o. female with Hx HTN, pheochromocytoma s/p LT adrenal resection, chronic adrenal insufficiency on chronic steroids, hypothyroidism, asthma who presented for evaluation of arm pain after a fall, radiographs demonstrated 2 foci of fractures and she has been planned for operative management, however per the chart endocrinology is recommended catecholamines and metanephrines to be evaluated prior to venturing into the realm of surgery which is significantly delayed operative management and prolonged hospitalization    Assessment/plan    Acute RT proximal and distal humeral fractures s/p fall  - S/p intramedullary nail insertion proximal humerus, ORIF distal humerus  with Dr. Matute  -Orthopedics continues to follow  -d/w Dr. Matute, he is increasing her gabapentin to x5 daily which she has previously had, anesthesia is going to adjust the location of her  "pain/nerve catheter; continue chronic oral morphine, scheduled Tylenol, prn Percocet and Dilaudid  -Patient noted improvement in pain with oral hydrocortisone today; will do short course burst IV hydrocortisone in the immediate postoperative period, anticipate fairly quick taper down to maximize wound healing    Hx pheochromocytoma s/p LT adrenal resection  Chronic adrenal insufficiency s/p adrenal crisis  - Previous provider d/w Dr. Nelson via phone 6/30; pheo resection 2003; unable to adequately follow labs 2/2 chronic TCA use; recommended deferring surgery if catecholamine/metanephrines abnormal- all have returned WNL  -Baseline hydrocortisone 20mg AM and 10mg noon; currently on stress dose IV hydrocortisone    Hypertension  - Initially hypotensive  - Lisinopril, pindolol (patient supplied), spironolactone  -Monitor BP perioperatively    Arthritis with chronic tetracycline use  Tooth pain  Fibromyalgia  - Chronically on tetracycline from her \"arthritis doctor\"; not on formulary here and has been on substituted doxycycline  -Patient is NOT on doxycycline for bronchitis, this is a substitution for a chronic medication  - Chronic gabapentin, oral morphine, oral oxycodone; complicates perioperative pain control    Chronic inhaler use, data deficit  -Home meds include Wixela inhaler; patient reports prior history of smoking, never formally diagnosed with COPD but told she was \"borderline\"  -RRT 6/30 for chest pain/SOB, reported presumed 2/2 nerve block/anxiety  - Symbicort substitute for Wixela, albuterol neb as needed    S/p Tx ESBL bacteriuria  - Per chart Gillespie placed in the ED, DC'd 6/26, UrCx 6/27 w/ MDR Proteus; completed fosfomycin, repeat UA 7/6 for unclear cause w/ ESBL Proteus; s/p Tx w/ ertapenem    Hypothyroidism  -Levothyroxine      Expected Discharge Location and Transportation: Postop from 7/14; anticipate need for rehab, likely medical van  Expected Discharge Date: 7/18    DVT prophylaxis:  Mechanical " DVT prophylaxis orders are present.     AM-PAC 6 Clicks Score (PT): 15 (07/13/22 1109)    CODE STATUS:   Code Status and Medical Interventions:   Ordered at: 06/24/22 5854     Level Of Support Discussed With:    Patient     Code Status (Patient has no pulse and is not breathing):    CPR (Attempt to Resuscitate)     Medical Interventions (Patient has pulse or is breathing):    Full Support       Jamari Torres, DO  07/15/22

## 2022-07-15 NOTE — PLAN OF CARE
Goal Outcome Evaluation:  Plan of Care Reviewed With: patient        Progress: no change  Outcome Evaluation: OT re-eval completed. Pt s/p R prox humerus IMN and dist ORIF. Pt cleared for WBAT and ROMAT. Pt continues to be limited by significant pain in the RUE, decreased activity tolerance/endurance, impaired balance/mobility/transfers and increased need for assist w/ ADLs. R hand ROM WFL, unable to assess elbow or shoulder d/t pain. ModA x 2 for bed mobility, DEP for LB dressing and UB dressing. Pt unable to tolerate doffing sling for transfers/ROM or functional tasks. POC initiated. Recommendation upon d/c for SNF.

## 2022-07-15 NOTE — CASE MANAGEMENT/SOCIAL WORK
Continued Stay Note  [facility]     Patient Name: Altagracia Rothman  MRN: [id number]  Today's Date: 7/15/2022    Admit Date: 6/24/2022     Discharge Plan     Row Name 07/15/22 1748       Plan    Plan TBD    Plan Comments Case mgt f/u. Plan remains to go to in rehab. She prefers [facility], they have referral and will f/u Monday 7/18 and submit to their MD for approval. Case mgt will f/u Monday am               Discharge Codes    No documentation.               Expected Discharge Date and Time     Expected Discharge Date Expected Discharge Time    Jul 18, 2022             Sonja C Kellerman, RN

## 2022-07-15 NOTE — OP NOTE
HUMERUS INTRAMEDULLARY NAIL/ITZEL INSERTION, HUMERUS DISTAL OPEN REDUCTION INTERNAL FIXATION  Procedure Report    Patient Name:  Altagracia Rothman  YOB: 1950    Date of Surgery:  7/14/2022     Indications:  73 yo female s/p fall with resultant right proximal humerus and distal humeral shaft fractures. Given the displacement and her reliance upon her RUE for weight bearing purposes, we discussed the risks and benefits of IMN R proximal humerus and ORIF R distal humeral shaft fracture. The risks and benefits were discussed with the patient to include, but not limited to: bleeding, infection, nerve injury, failure of fixation, need for further procedures, loss of limb or life.  She understood these risks and wished to proceed.    Pre-op Diagnosis:        Right proximal humerus fracture  Right humeral shaft fracture      Post-op Diagnosis:         same    Procedure/CPT® Codes:  69265  99084 - 51.  This portion of the procedure was done through a separate, posterior incision.    Procedure(s):  INTRAMEDULLARY NAIL INSERTION PROXIMAL HUMERUS  HUMERUS DISTAL OPEN REDUCTION INTERNAL FIXATION    Staff:  Surgeon(s):  Jamari Matute Jr., MD    Circulator: Rhett Luque RN; Lea Brock RN; Sonny Obrien RN; Celestino Jasso RN  Radiology Technologist: Trina Mcgowan R.T.(R)  Scrub Person: Mckenzie Espinosa; Gely Pearce Casey E  Vendor Representative: Carlito Harden Brian  Nursing Assistant: Vaen Cooper  Assistant: Nancy Zuñiga PA-C; Lloyd King PA-C     Assistant: Nancy Zuñiga PA-C; Lloyd King PA-C  was responsible for performing the following activities: Retraction, Suction, Irrigation, Suturing, Closing and Placing Dressing and their skilled assistance was necessary for the success of this case.    Anesthesia: General with Block    Estimated Blood Loss: 200ml    Implants:    Implant Name Type Inv. Item Serial No.  Lot No. LRB No. Used  Action   SCRW BLNT/TP AFFIXUS 4X42MM - [id number] Implant SCRW BLNT/TP AFFIXUS 4X42MM  [employer] [id number] Right 1 Implanted   NAIL IM/HUM PROX AFFIXUS/NATURALNAIL [id number] LNG RT - [id number] Implant NAIL IM/HUM PROX AFFIXUS/NATURALNAIL [id number] LNG RT  [employer] [id number] Right 1 Implanted   SCRW BLNT/TP AFFIXUS 4X48MM - [id number] Implant SCRW BLNT/TP AFFIXUS 4X48MM  [employer] [id number] Right 1 Implanted   SCRW BLNT/TP AFFIXUS 4X44MM - [id number] Implant SCRW BLNT/TP AFFIXUS 4X44MM  [employer] [id number] Right 1 Implanted   SCRW HUM AFFIXUS NATURALNAIL BLNT/TP 4X52MM STRL - [id number] Implant SCRW HUM AFFIXUS NATURALNAIL BLNT/TP 4X52MM STRL  [employer] [id number] Right 1 Explanted   SCRW BLNT/TP AFFIXUS 4X42MM - [id number] Implant SCRW BLNT/TP AFFIXUS 4X42MM  [employer] [id number] Right 1 Implanted   SCRW PRESTON BONE AFFIXUSNATURALNAIL 4X22MM - [id number] Implant SCRW PRESTON BONE AFFIXUSNATURALNAIL 4X22MM  [employer] [id number] Right 1 Implanted   SCRW PRESTON BONE AFFIXUSNATURALNAIL 4X26MM - [id number] Implant SCRW PRESTON BONE AFFIXUSNATURALNAIL 4X26MM  [employer] [id number] Right 1 Implanted   SCRW COMPR M/THRD TI 3.5X20MM - [id number] Implant SCRW COMPR M/THRD TI 3.5X20MM  SKELET[employer]YNAM[employer]  Right 1 Implanted   SCRW M/THRD LK TI 3.5X14MM - [id number] Implant SCRW M/THRD LK TI 3.5X14MM  SKELETAL [employer]YNAM[employer]  Right 1 Implanted   SCRW M/THRD LK TI 3.5X18MM - [id number] Implant SCRW M/THRD LK TI 3.5X18MM  SKELETAL [employer]YNAM[employer]  Right 2 Implanted   SCRW COMPR FREEFIX TI 4.5X22MM - [id number] Implant SCRW COMPR FREEFIX TI 4.5X22MM  SKELETAL DYNAMICS  Right 1 Implanted   22 screw    SMITH [employer] NEPHEW  Right 1 Implanted   24 screw    ROBBINS AND NEPHEW  Right 1 Implanted   plate    ROBBINS AND NEPHEW  Right 1 Implanted       Specimen:                None      Findings: displaced, comminuted proximal humerus and distal short, oblique humeral shaft fracture. Radial nerve overlying most proximal hole of posterior plate    Complications:  none apparent    Description of Procedure: After informed consent had been obtained, the right upper extremity was identified as the correct surgical extremity and marked. The patient had previously received a pre-operative peripheral nerve block. The patient was then taken back to the operating suite and was placed on the operating table. General anesthesia was induced and the patient was intubated. The patient was first placed in the supine position.The surgical extremity was then prepped and draped in the usual, sterile fashion. A timeout was performed with the entire surgical staff present. We then closed reduced the proximal humerus fracture. Our adequate start point was confirmed after percutaneously inserting a guide wire for placement of a straight nail in an antegrade manner. The guide wire was then passed distally down the canal. The wire was left in place and our incision was made around the guide wire. Our starting reamer and soft tissue protector were then passed over the guide wire. The reamer was passed down the proximal aspect of the humerus and then removed. We then passed a ball tipped guide wire down the canal while holding the proximal humeral shaft component reduced. A blocking wire was passed about the lateral cortex to more anatomically reduced the shaft. We then reamed to the humerus until we achieved excellent cortical chatter. A 7 x 220 mm nail was then passed down the canal. We then passed 4 proximal interlocking screws in a unicortical manner using the outrigger guide. The proximal screws were then locked into place. We then closed our wounds, closing the fascia of the proximal incision with #1 vicryl suture. The subcutaneous layers were closed with 2-0 vicryl suture and the skin with an exofin dressing. We then reprepped and draped the patient after placing her on a bean bag in the left lateral decubitus position with an axillary roll. We then made a 20 cm incision for a standard  posterolateral approach to the distal humerus. Sharp dissection was carried through the subcutaneous tissue. The fascia was then incised. The triceps was incised just lateral to its insertion on the olecranon. The triceps was then bluntly elevated off the distal humerus with a lau elevator. The radial nerve was identified 2 cm proximal to the aponeurosis of the triceps.  We followed this laterally and identified the nerve as it exited the spiral groove.  The nerve was protected throughout the case.  We then identified the fracture site readily and debrided it thoroughly of any nonviable, fibrous tissue.  After irrigating debriding the fracture site, the fracture was reduced and held in place with multiple Varinder wires.  A skeletal dynamics 3.5/4.5 posterior lateral plate was then applied.  Care was taken to avoid injury to the radial nerve as we passed the plate approximately.  Adequate plate placement was confirmed via fluoroscopy.  We then reduced the bone to the plate with a bicortically placed 4.5 millimeter screw proximally.  After confirming adequate plate placement, we then placed multiple locking and nonlocking screws distally, taking care to avoid perforation of the articular surface of the capitellum.  Next, we then passed 2 screws in a bicortical manner through both the plate and intramedullary nail to achieve added angular and torsional stability of both the proximal and distal fractures.  Finally, 1 additional bicortically fixed nonlocking screw was placed to the proximal aspect of the plate and remaining available screw hole.  The radial nerve was left intact overlying the most proximal hole of our plate.  Final fluoroscopic views demonstrated excellent reduction of both our proximal humerus and humeral shaft fractures without any signs of hardware complication.  The wound was irrigated thoroughly.  The fascia was closed with #1 Vicryl suture followed by closure of the subcutaneous tissue with 2-0  Vicryl suture and closure of her skin with staples.    The patient was then awakened from anesthesia, extubated, transferred to the [facility], and transferred to the post anesthesia care unit in stable condition.    The plan for Ms. Rothman is to remain weightbearing and range of motion as tolerated to the right upper extremity.      Jamari Matute Jr., MD     Date: 7/14/2022  Time: 20:59 EDT

## 2022-07-15 NOTE — PLAN OF CARE
Goal Outcome Evaluation:  Plan of Care Reviewed With: patient           Outcome Evaluation: significant pain this am, anesthesia replaced nerve cath, percocet given every 4hrs, dilaudid not effective and pt prefers not to take IV meds, gotten up to chair with therapy, sat up for 2 hrs, VSS, DEZ NSR on tele, cont to monitor

## 2022-07-16 LAB
BILIRUB UR QL STRIP: NEGATIVE
CLARITY UR: CLEAR
COLOR UR: YELLOW
GLUCOSE UR STRIP-MCNC: NEGATIVE MG/DL
HGB UR QL STRIP.AUTO: NEGATIVE
KETONES UR QL STRIP: NEGATIVE
LEUKOCYTE ESTERASE UR QL STRIP.AUTO: NEGATIVE
NITRITE UR QL STRIP: NEGATIVE
PH UR STRIP.AUTO: 6 [PH] (ref 5–8)
PROT UR QL STRIP: NEGATIVE
SP GR UR STRIP: 1.01 (ref 1–1.03)
UROBILINOGEN UR QL STRIP: NORMAL

## 2022-07-16 PROCEDURE — 94799 UNLISTED PULMONARY SVC/PX: CPT

## 2022-07-16 PROCEDURE — 25010000002 HYDROCORTISONE SODIUM SUCCINATE 100 MG RECONSTITUTED SOLUTION: Performed by: INTERNAL MEDICINE

## 2022-07-16 PROCEDURE — 99232 SBSQ HOSP IP/OBS MODERATE 35: CPT | Performed by: INTERNAL MEDICINE

## 2022-07-16 PROCEDURE — 97530 THERAPEUTIC ACTIVITIES: CPT

## 2022-07-16 PROCEDURE — 81003 URINALYSIS AUTO W/O SCOPE: CPT | Performed by: INTERNAL MEDICINE

## 2022-07-16 PROCEDURE — 97110 THERAPEUTIC EXERCISES: CPT

## 2022-07-16 PROCEDURE — 97110 THERAPEUTIC EXERCISES: CPT | Performed by: OCCUPATIONAL THERAPIST

## 2022-07-16 PROCEDURE — 25010000002 HYDROMORPHONE 1 MG/ML SOLUTION: Performed by: ORTHOPAEDIC SURGERY

## 2022-07-16 PROCEDURE — P9612 CATHETERIZE FOR URINE SPEC: HCPCS

## 2022-07-16 PROCEDURE — 94664 DEMO&/EVAL PT USE INHALER: CPT

## 2022-07-16 RX ADMIN — HYDROMORPHONE HYDROCHLORIDE 1 MG: 1 INJECTION, SOLUTION INTRAMUSCULAR; INTRAVENOUS; SUBCUTANEOUS at 07:04

## 2022-07-16 RX ADMIN — DOXYCYCLINE 100 MG: 100 CAPSULE ORAL at 08:28

## 2022-07-16 RX ADMIN — OXYCODONE HYDROCHLORIDE AND ACETAMINOPHEN 1 TABLET: 10; 325 TABLET ORAL at 08:27

## 2022-07-16 RX ADMIN — SPIRONOLACTONE 50 MG: 50 TABLET ORAL at 15:38

## 2022-07-16 RX ADMIN — HYDROCORTISONE SODIUM SUCCINATE 25 MG: 100 INJECTION, POWDER, FOR SOLUTION INTRAMUSCULAR; INTRAVENOUS at 15:37

## 2022-07-16 RX ADMIN — OXYCODONE HYDROCHLORIDE AND ACETAMINOPHEN 1 TABLET: 10; 325 TABLET ORAL at 16:17

## 2022-07-16 RX ADMIN — NORTRIPTYLINE HYDROCHLORIDE 50 MG: 10 CAPSULE ORAL at 08:28

## 2022-07-16 RX ADMIN — GABAPENTIN 800 MG: 400 CAPSULE ORAL at 18:37

## 2022-07-16 RX ADMIN — ACETAMINOPHEN 500 MG: 500 TABLET ORAL at 08:28

## 2022-07-16 RX ADMIN — GABAPENTIN 800 MG: 400 CAPSULE ORAL at 15:38

## 2022-07-16 RX ADMIN — BUDESONIDE AND FORMOTEROL FUMARATE DIHYDRATE 2 PUFF: 160; 4.5 AEROSOL RESPIRATORY (INHALATION) at 19:07

## 2022-07-16 RX ADMIN — GABAPENTIN 800 MG: 400 CAPSULE ORAL at 10:43

## 2022-07-16 RX ADMIN — GABAPENTIN 800 MG: 400 CAPSULE ORAL at 20:49

## 2022-07-16 RX ADMIN — SODIUM CHLORIDE 75 ML/HR: 9 INJECTION, SOLUTION INTRAVENOUS at 10:43

## 2022-07-16 RX ADMIN — NORTRIPTYLINE HYDROCHLORIDE 50 MG: 10 CAPSULE ORAL at 15:38

## 2022-07-16 RX ADMIN — ACETAMINOPHEN 500 MG: 500 TABLET ORAL at 15:38

## 2022-07-16 RX ADMIN — HYDROCORTISONE SODIUM SUCCINATE 50 MG: 100 INJECTION, POWDER, FOR SOLUTION INTRAMUSCULAR; INTRAVENOUS at 08:27

## 2022-07-16 RX ADMIN — HYDROMORPHONE HYDROCHLORIDE 1 MG: 1 INJECTION, SOLUTION INTRAMUSCULAR; INTRAVENOUS; SUBCUTANEOUS at 18:37

## 2022-07-16 RX ADMIN — MORPHINE SULFATE 30 MG: 30 TABLET, FILM COATED, EXTENDED RELEASE ORAL at 20:49

## 2022-07-16 RX ADMIN — LEVOTHYROXINE SODIUM 88 MCG: 88 TABLET ORAL at 06:31

## 2022-07-16 RX ADMIN — ACETAMINOPHEN 500 MG: 500 TABLET ORAL at 20:49

## 2022-07-16 RX ADMIN — GABAPENTIN 800 MG: 400 CAPSULE ORAL at 06:31

## 2022-07-16 RX ADMIN — Medication 10 ML: at 08:27

## 2022-07-16 RX ADMIN — ACETAMINOPHEN 500 MG: 500 TABLET ORAL at 04:06

## 2022-07-16 RX ADMIN — DOXYCYCLINE 100 MG: 100 CAPSULE ORAL at 20:49

## 2022-07-16 RX ADMIN — HYDROCORTISONE SODIUM SUCCINATE 25 MG: 100 INJECTION, POWDER, FOR SOLUTION INTRAMUSCULAR; INTRAVENOUS at 20:49

## 2022-07-16 RX ADMIN — MORPHINE SULFATE 30 MG: 30 TABLET, FILM COATED, EXTENDED RELEASE ORAL at 08:28

## 2022-07-16 RX ADMIN — NORTRIPTYLINE HYDROCHLORIDE 50 MG: 10 CAPSULE ORAL at 20:51

## 2022-07-16 RX ADMIN — PANTOPRAZOLE SODIUM 40 MG: 40 TABLET, DELAYED RELEASE ORAL at 06:31

## 2022-07-16 RX ADMIN — PINDOLOL 2.5 MG: 5 TABLET ORAL at 08:28

## 2022-07-16 RX ADMIN — OXYCODONE HYDROCHLORIDE AND ACETAMINOPHEN 1 TABLET: 10; 325 TABLET ORAL at 12:28

## 2022-07-16 RX ADMIN — BUDESONIDE AND FORMOTEROL FUMARATE DIHYDRATE 2 PUFF: 160; 4.5 AEROSOL RESPIRATORY (INHALATION) at 08:23

## 2022-07-16 RX ADMIN — Medication 250 MG: at 08:28

## 2022-07-16 RX ADMIN — OXYCODONE HYDROCHLORIDE AND ACETAMINOPHEN 1 TABLET: 10; 325 TABLET ORAL at 04:22

## 2022-07-16 RX ADMIN — Medication 250 MG: at 20:49

## 2022-07-16 NOTE — PROGRESS NOTES
"/85   Pulse 87   Temp 98.3 °F (36.8 °C) (Oral)   Resp 18   Ht 154.9 cm (61\")   Wt 81.6 kg (180 lb)   SpO2 100%   BMI 34.01 kg/m²     Lab Results (last 24 hours)     Procedure Component Value Units Date/Time    CBC & Differential [[id number]]  (Abnormal) Collected: 07/15/22 1120    Specimen: Blood Updated: 07/15/22 1233    Narrative:      The following orders were created for panel order CBC & Differential.  Procedure                               Abnormality         Status                     ---------                               -----------         ------                     CBC Auto Differential[[id number]]        Abnormal            Final result               Scan Slide[[id number]]                   Normal              Final result                 Please view results for these tests on the individual orders.    Scan Slide [[id number]]  (Normal) Collected: 07/15/22 1120    Specimen: Blood Updated: 07/15/22 1233     RBC Morphology Normal     WBC Morphology Normal     Platelet Morphology Normal    CBC Auto Differential [[id number]]  (Abnormal) Collected: 07/15/22 1120    Specimen: Blood Updated: 07/15/22 1233     WBC 8.96 10*3/mm3      RBC 3.65 10*6/mm3      Hemoglobin 12.1 g/dL      Hematocrit 38.9 %      .6 fL      MCH 33.2 pg      MCHC 31.1 g/dL      RDW 13.2 %      RDW-SD 52.1 fl      MPV 10.2 fL      Platelets 283 10*3/mm3      Neutrophil % 68.3 %      Lymphocyte % 19.3 %      Monocyte % 11.5 %      Eosinophil % 0.3 %      Basophil % 0.2 %      Immature Grans % 0.4 %      Neutrophils, Absolute 6.11 10*3/mm3      Lymphocytes, Absolute 1.73 10*3/mm3      Monocytes, Absolute 1.03 10*3/mm3      Eosinophils, Absolute 0.03 10*3/mm3      Basophils, Absolute 0.02 10*3/mm3      Immature Grans, Absolute 0.04 10*3/mm3      nRBC 0.0 /100 WBC     TSH [[id number]]  (Normal) Collected: 07/15/22 1120    Specimen: Blood Updated: 07/15/22 1158     TSH 0.743 uIU/mL     Comprehensive Metabolic Panel [[id number]]  " (Abnormal) Collected: 07/15/22 1120    Specimen: Blood Updated: 07/15/22 1154     Glucose 99 mg/dL      BUN 17 mg/dL      Creatinine 0.42 mg/dL      Sodium 134 mmol/L      Potassium 4.4 mmol/L      Comment: Slight hemolysis detected by analyzer. Results may be affected.        Chloride 98 mmol/L      CO2 26.0 mmol/L      Calcium 8.3 mg/dL      Total Protein 5.8 g/dL      Albumin 2.90 g/dL      ALT (SGPT) 18 U/L      AST (SGOT) 23 U/L      Alkaline Phosphatase 305 U/L      Total Bilirubin 0.7 mg/dL      Globulin 2.9 gm/dL      Comment: Calculated Result        A/G Ratio 1.0 g/dL      BUN/Creatinine Ratio 40.5     Anion Gap 10.0 mmol/L      eGFR 103.4 mL/min/1.73      Comment: National Kid[employer] Foundation and American Society of Nephrology (ASN) Task Force recommended calculation based on the Chronic Kidney Disease Epidemiology Collaboration (CKD-EPI) equation refit without adjustment for race.       Narrative:      GFR Normal >60  Chronic Kidney Disease <60  Kidney Failure <15            Imaging Results (Last 24 Hours)     Procedure Component Value Units Date/Time    XR Humerus Right [[id number]] Collected: 07/15/22 1110     Updated: 07/15/22 1116    Narrative:      DATE OF EXAM: 7/15/2022 10:58 AM     PROCEDURE: XR HUMERUS RIGHT-     INDICATIONS: for post op; S42.291A-Other displaced fracture of upper end  of right humerus, initial encounter for closed fracture;  S42.409A-Unspecified fracture of lower end of unspecified humerus,  initial encounter for closed fracture; W19.XXXA-Unspecified fall,  initial encounter; M80.00XA-Age-related osteoporosis with current  pathological fracture, unspecified site, initial encounter for fracture     COMPARISON: 7/12/2022     TECHNIQUE: AP and lateral radiographic views were obtained of the right  humerus.     FINDINGS:  Postoperative changes are noted status post open reduction and internal  fixation of the comminuted fracture of the proximal and distal right  humerus. A longstem  intramedullary stephane is noted. Fixation screws are  seen. Distal plate and screw hardware is also noted at the distal  humerus. Near-anatomic alignment of fracture fragments is noted  following open reduction and internal fixation. Surgical changes are  seen in the surrounding soft tissues.        Impression:      Postoperative changes are noted status post open reduction and internal  fixation of the right humeral fractures. There is near-anatomic  alignment of fracture fragments following reduction and fixation.     This report was finalized on 7/15/2022 11:13 AM by James Johns MD.             Patient Care Team:  Alvina Lloyd PA-C as PCP - General (Internal Medicine)  Heather Nelson MD as Consulting Physician (Endocrinology)  Lashawn Michele MD as Consulting Physician (Rheumatology)    SUBJECTIVE: She reports her pain is under better control.  She is in good spirits this morning.    PHYSICAL EXAM  Right shoulder incisions clean, dry and intact with mesh dressings in place  Neurovascular intact distally       Other closed displaced fracture of proximal end of right humerus, initial encounter    Adrenal insufficiency (HCC)    Hypothyroidism    Essential hypertension    H/O pheochromocytoma    Osteoporosis    Moderate asthma without complication    Acute UTI (urinary tract infection)      PLAN / DISPOSITION: 72-year-old female postop day #2 status post right humerus IM nail and distal humerus ORIF by Dr. Matute  -Continue to mobilize with PT/OT.  Weightbearing as tolerated right upper extremity  - for rehab placement  -Follow-up with Dr. Matute as scheduled    Iraj Mike MD  07/16/22  09:51 EDT

## 2022-07-16 NOTE — PROGRESS NOTES
[facility]    Acute pain service Inpatient Progress Note    Patient Name: Altagracia Rothman  :  1950  MRN:  [id number]        Acute Pain  Service Inpatient Progress Note:    Analgesia:Good  Pain Score:4/10  LOC: alert and awake  Resp Status: room air  Cardiac: VS stable  Side Effects:None  Catheter Site:clean, dry and dressing intact  Cath type: peripheral nerve cath with ON Q  Infusion rate: 6ml/hr  Dosing/Volume: ropivacaine 0.2%  Catheter Plan:Catheter to remain Insitu and Continue catheter infusion rate unchanged

## 2022-07-16 NOTE — THERAPY TREATMENT NOTE
Patient Name: Altagracia Rothman  : 1950    MRN: [id number]                              Today's Date: 2022       Admit Date: 2022    Visit Dx:     ICD-10-CM ICD-9-CM   1. Other closed displaced fracture of proximal end of right humerus, initial encounter  S42.291A 812.09   2. Closed fracture of distal end of humerus, unspecified fracture morphology, initial encounter  S42.409A 812.40   3. Fall, initial encounter  W19.XXXA E888.9   4. Osteoporosis with current pathological fracture, unspecified osteoporosis type, initial encounter  M80.00XA 733.00     733.10     Patient Active Problem List   Diagnosis   • Adrenal insufficiency (HCC)   • Hypothyroidism   • Essential hypertension   • H/O pheochromocytoma   • Chronic bilateral low back pain without sciatica   • Encounter for chronic pain management   • Osteoporosis   • Tobacco abuse   • BMI 35.0-35.9,adult   • Bronchitis   • Moderate asthma without complication   • Paresthesia   • Medicare annual wellness visit, subsequent   • Routine medical exam   • Lipid screening   • Hair loss   • Other closed displaced fracture of proximal end of right humerus, initial encounter   • Acute UTI (urinary tract infection)     Past Medical History:   Diagnosis Date   • Adrenal insufficiency (HCC)    • Fibromyalgia    • Hypertension    • Hypothyroidism    • Skin cancer      Past Surgical History:   Procedure Laterality Date   • ADRENAL GLAND SURGERY     • BREAST BIOPSY      x4   • GALLBLADDER SURGERY     • HYSTERECTOMY     • MRI ANGIOGRAM LOWER EXTREMITY UNILATERAL W CONTRAST     • TONSILLECTOMY        General Information     Row Name 22 1550          OT Time and Intention    Document Type therapy note (daily note)  -AR     Mode of Treatment individual therapy;occupational therapy  -AR     Row Name 22 2739          General Information    Existing Precautions/Restrictions fall;other (see comments)  interscalene nerve catheter, sling as needed  -AR     Row Name  07/16/22 1550          Cognition    Orientation Status (Cognition) oriented x 4  -AR     Row Name 07/16/22 1550          Safety Issues, Functional Mobility    Safety Issues Affecting Function (Mobility) awareness of need for assistance;insight into deficits/self-awareness  -AR     Impairments Affecting Function (Mobility) endurance/activity tolerance;pain;range of motion (ROM);strength  -AR           User Key  (r) = Recorded By, (t) = Taken By, (c) = Cosigned By    Initials Name Provider Type    Natalie Garibay, YOGI Occupational Therapist                 Mobility/ADL's     Row Name 07/16/22 1551          Bed Mobility    Comment, (Bed Mobility) Pt received up in chair  -AR     Row Name 07/16/22 1551          Activities of Daily Living    BADL Assessment/Intervention upper body dressing  -AR     Row Name 07/16/22 1551          Mobility    Extremity Weight-bearing Status right upper extremity  -AR     Right Upper Extremity (Weight-bearing Status) weight-bearing as tolerated (WBAT)  -AR     Row Name 07/16/22 1551          Upper Body Dressing Assessment/Training    [place] Level (Upper Body Dressing) doff;maximum assist (25% patient effort)  RUE sling  -AR     Row Name 07/16/22 1551          Self-Feeding Assessment/Training    [place] Level (Feeding) liquids to mouth;supervision  -AR     Position (Self-Feeding) supported sitting  -AR           User Key  (r) = Recorded By, (t) = Taken By, (c) = Cosigned By    Initials Name Provider Type    Natalie Garibay OT Occupational Therapist               Obj/Interventions     Row Name 07/16/22 1552          Sensory Assessment (Somatosensory)    Sensory Assessment (Somatosensory) right UE  -AR     Sensory Subjective Reports numbness  -AR     Row Name 07/16/22 1552          Shoulder (Therapeutic Exercise)    Shoulder (Therapeutic Exercise) AAROM (active assistive range of motion)  -AR     Shoulder AAROM (Therapeutic Exercise)  right;flexion;extension;aBduction;aDduction;external rotation;internal rotation;sitting;10 repetitions  -AR     Row Name 07/16/22 1552          Elbow/Forearm (Therapeutic Exercise)    Elbow/Forearm (Therapeutic Exercise) AAROM (active assistive range of motion);AROM (active range of motion)  -AR     Elbow/Forearm AROM (Therapeutic Exercise) right;supination;pronation;sitting;10 repetitions  -AR     Elbow/Forearm AAROM (Therapeutic Exercise) right;flexion;extension;sitting;10 repetitions  -AR     Row Name 07/16/22 1552          Wrist (Therapeutic Exercise)    Wrist (Therapeutic Exercise) AROM (active range of motion)  -AR     Wrist AROM (Therapeutic Exercise) right;flexion;extension;10 repetitions  -AR     Row Name 07/16/22 1552          Hand (Therapeutic Exercise)    Hand (Therapeutic Exercise) AROM (active range of motion);strengthening exercise  -AR     Hand AROM/AAROM (Therapeutic Exercise) right;finger extension;finger flexion;10 repetitions  -AR     Hand Strengthening (Therapeutic Exercise) right;finger extension;finger flexion;yellow;squeeze ball/egg;10 repetitions  -AR     Row Name 07/16/22 1552          Motor Skills    Therapeutic Exercise shoulder;elbow/forearm;wrist;hand  -AR     Row Name 07/16/22 1552          Balance    Static Sitting Balance supervision  -AR     Dynamic Sitting Balance contact guard  -AR     Position, Sitting Balance supported;sitting in chair  -AR           User Key  (r) = Recorded By, (t) = Taken By, (c) = Cosigned By    Initials Name Provider Type    Natalie Garibay, OT Occupational Therapist               Goals/Plan    No documentation.                Clinical Impression     Row Name 07/16/22 1554          Pain Assessment    Pretreatment Pain Rating 0/10 - no pain  -AR     Posttreatment Pain Rating 4/10  -AR     Pain Location - Side/Orientation Right  -AR     Pain Location upper  -AR     Pain Location - extremity  -AR     Pre/Posttreatment Pain Comment pt declined cold pack   -AR     Pain Intervention(s) Medication (See MAR);Repositioned  -AR     Row Name 07/16/22 1553          Plan of Care Review    Plan of Care Reviewed With patient  -AR     Progress improving  -AR     Outcome Evaluation Pt tolerated R shoulder AAROM , IR chest/ER 40, ABD 60 and elbow AAROM 50-85. Pt reports lack of full extension at baseline. OT issued foam block and educated on use. Educated pt on of positioning with elbow in extension while elevated as tolerates. Recommend SNF.  -AR     Row Name 07/16/22 1554          Therapy Plan Review/Discharge Plan (OT)    Anticipated Discharge Disposition (OT) skilled nursing facility  -AR     Row Name 07/16/22 1556          Vital Signs    Pre Patient Position Sitting  -AR     Intra Patient Position Sitting  -AR     Post Patient Position Sitting  -AR     Row Name 07/16/22 1558          Positioning and Restraints    Pre-Treatment Position sitting in chair/recliner  -AR     Post Treatment Position chair  -AR     In Chair reclined;call light within reach;encouraged to call for assist;exit alarm on;RUE elevated;waffle cushion;on mechanical lift sling;legs elevated  -AR           User Key  (r) = Recorded By, (t) = Taken By, (c) = Cosigned By    Initials Name Provider Type    Natalie Garibay, OT Occupational Therapist               Outcome Measures     Row Name 07/16/22 2528          How much help from another is currently needed...    Putting on and taking off regular lower body clothing? 1  -AR     Bathing (including washing, rinsing, and drying) 2  -AR     Toileting (which includes using toilet bed pan or urinal) 2  -AR     Putting on and taking off regular upper body clothing 2  -AR     Taking care of personal grooming (such as brushing teeth) 3  -AR     Eating meals 3  -AR     AM-PAC 6 Clicks Score (OT) 13  -AR     Row Name 07/16/22 1349 07/16/22 0830       How much help from another person do you currently need...    Turning from your back to your side while in  flat bed without using bedrails? 2  -JH 3  -MW    Moving from lying on back to sitting on the side of a flat bed without bedrails? 2  -JH 2  -MW    Moving to and from a bed to a chair (including a wheelchair)? 3  -JH 2  -MW    Standing up from a chair using your arms (e.g., wheelchair, bedside chair)? 3  -JH 3  -MW    Climbing 3-5 steps with a railing? 1  -JH 1  -MW    To walk in hospital room? 2  -JH 3  -MW    AM-PAC 6 Clicks Score (PT) 13  -JH 14  -MW    Highest level of mobility 4 --> Transferred to chair/commode  -JH 4 --> Transferred to chair/commode  -MW    Row Name 07/16/22 1559 07/16/22 1349       Functional Assessment    Outcome Measure Options AM-PAC 6 Clicks Daily Activity (OT)  -AR AM-PAC 6 Clicks Basic Mobility (PT)  -          User Key  (r) = Recorded By, (t) = Taken By, (c) = Cosigned By    Initials Name Provider Type    Natalie Garibay, OT Occupational Therapist    Ila Hurst, RN Registered Nurse    Yury Garcia, PT Physical Therapist                Occupational Therapy Education                 Title: PT OT SLP Therapies (Done)     Topic: Occupational Therapy (Done)     Point: ADL training (Done)     Description:   Instruct learner(s) on proper safety adaptation and remediation techniques during self care or transfers.   Instruct in proper use of assistive devices.              Learning Progress Summary           Patient Acceptance, E, NR,VU by MR at 7/15/2022 1608      Show all documentation for this point (5)                 Point: Home exercise program (Done)     Description:   Instruct learner(s) on appropriate technique for monitoring, assisting and/or progressing therapeutic exercises/activities.              Learning Progress Summary           Patient Eager, E, VU,DU by AR at 7/16/2022 1600      Show all documentation for this point (6)                 Point: Precautions (Done)     Description:   Instruct learner(s) on prescribed precautions during self-care and functional  transfers.              Learning Progress Summary           Patient PETR Gallardo, VU,DU by AR at 7/16/2022 1600      Show all documentation for this point (6)                 Point: Body mechanics (Done)     Description:   Instruct learner(s) on proper positioning and spine alignment during self-care, functional mobility activities and/or exercises.              Learning Progress Summary           Patient PETR Gallardo, VU,DU by AR at 7/16/2022 1600      Show all documentation for this point (6)                             User Key     Initials Effective Dates Name Provider Type Discipline    AR 06/16/21 -  Natalie Tellez OT Occupational Therapist OT    MR 10/06/21 -  Sandy Levi OT Occupational Therapist OT              OT Recommendation and Plan  Therapy Frequency (OT): daily  Plan of Care Review  Plan of Care Reviewed With: patient  Progress: improving  Outcome Evaluation: Pt tolerated R shoulder AAROM , IR chest/ER 40, ABD 60 and elbow AAROM 50-85. Pt reports lack of full extension at baseline. OT issued foam block and educated on use. Educated pt on of positioning with elbow in extension while elevated as tolerates. Recommend SNF.     Time Calculation:    Time Calculation- OT     Row Name 07/16/22 1600             Time Calculation- OT    OT Start Time 1414  -AR      OT Received On 07/16/22  -AR      OT Goal Re-Cert Due Date 07/25/22  -AR              Timed Charges    92422 - OT Therapeutic Exercise Minutes 41  -AR              Total Minutes    Timed Charges Total Minutes 41  -AR       Total Minutes 41  -AR            User Key  (r) = Recorded By, (t) = Taken By, (c) = Cosigned By    Initials Name Provider Type    AR Natalie Tellez OT Occupational Therapist              Therapy Charges for Today     Code Description Service Date Service Provider Modifiers Qty    24269796830 HC OT THER SUPP EA 15 MIN 7/16/2022 Natalie Tellez OT GO 1    62467503991 HC OT THER PROC EA 15 MIN 7/16/2022 Geoff  Natalie Mix, OT GO 3               Natalie Tellez, OT  7/16/2022

## 2022-07-16 NOTE — PLAN OF CARE
Goal Outcome Evaluation:  Plan of Care Reviewed With: patient        Progress: improving  Outcome Evaluation: Pt required mod A x 2 for bed mobility and min A x 2 for transfers with axillary crutch at [facility] [place]. Cont d/c recs for SNF.

## 2022-07-16 NOTE — THERAPY TREATMENT NOTE
Patient Name: Altagracia Rothman  : 1950    MRN: [id number]                              Today's Date: 2022       Admit Date: 2022    Visit Dx:     ICD-10-CM ICD-9-CM   1. Other closed displaced fracture of proximal end of right humerus, initial encounter  S42.291A 812.09   2. Closed fracture of distal end of humerus, unspecified fracture morphology, initial encounter  S42.409A 812.40   3. Fall, initial encounter  W19.XXXA E888.9   4. Osteoporosis with current pathological fracture, unspecified osteoporosis type, initial encounter  M80.00XA 733.00     733.10     Patient Active Problem List   Diagnosis   • Adrenal insufficiency (HCC)   • Hypothyroidism   • Essential hypertension   • H/O pheochromocytoma   • Chronic bilateral low back pain without sciatica   • Encounter for chronic pain management   • Osteoporosis   • Tobacco abuse   • BMI 35.0-35.9,adult   • Bronchitis   • Moderate asthma without complication   • Paresthesia   • Medicare annual wellness visit, subsequent   • Routine medical exam   • Lipid screening   • Hair loss   • Other closed displaced fracture of proximal end of right humerus, initial encounter   • Acute UTI (urinary tract infection)     Past Medical History:   Diagnosis Date   • Adrenal insufficiency (HCC)    • Fibromyalgia    • Hypertension    • Hypothyroidism    • Skin cancer      Past Surgical History:   Procedure Laterality Date   • ADRENAL GLAND SURGERY     • BREAST BIOPSY      x4   • GALLBLADDER SURGERY     • HYSTERECTOMY     • MRI ANGIOGRAM LOWER EXTREMITY UNILATERAL W CONTRAST     • TONSILLECTOMY        General Information     Row Name 22 1340          Physical Therapy Time and Intention    Document Type therapy note (daily note)  -     Mode of Treatment physical therapy  -     Row Name 22 1340          General Information    Patient Profile Reviewed yes  -     Existing Precautions/Restrictions fall;other (see comments)  s/p R humerus proximal IMN and distal  ORIF, RUE WBAT and ROMAT, interscalene nerve cath, sling for comfort  -     Row Name 07/16/22 1340          Cognition    Orientation Status (Cognition) oriented x 4  -[facility] Name 07/16/22 1340          Safety Issues, Functional Mobility    Safety Issues Affecting Function (Mobility) safety precaution awareness;safety precautions follow-through/compliance;sequencing abilities;awareness of need for assistance;insight into deficits/self-awareness;judgment;positioning of assistive device;problem-solving;ability to follow commands  -     Impairments Affecting Function (Mobility) balance;endurance/activity tolerance;range of motion (ROM);pain;strength  -           User Key  (r) = Recorded By, (t) = Taken By, (c) = Cosigned By    Initials Name Provider Type     Yury Acuna, PT Physical Therapist               Mobility     Row Name 07/16/22 1340          Bed Mobility    Bed Mobility supine-sit;scooting/bridging  -     Scooting/Bridging [place] (Bed Mobility) moderate assist (50% patient effort);2 person assist;verbal cues;nonverbal cues (demo/gesture)  -     Supine-Sit [place] (Bed Mobility) moderate assist (50% patient effort);2 person assist;verbal cues;nonverbal cues (demo/gesture)  -     Assistive Device (Bed Mobility) draw sheet;head of bed elevated;bed rails  -     Comment, (Bed Mobility) VCs for sequencing and assist and posterior trunk and LUE  -[facility] Name 07/16/22 1340          Transfers    Comment, (Transfers) Pt able to transfer to chair with axillary crutch in LUE and min A x 2. Challenged with RLE stepping, sequencing crutch, and would benefit from different AD due to difficulty sequencing however patient reports being unable to use SPC or RW at this time. VCs for upright posture but pt stands with forward flexed posture.  -     Row Name 07/16/22 1340          Bed-Chair Transfer    Bed-Chair [place] (Transfers) minimum assist (75% patient effort);2 person  assist;verbal cues;nonverbal cues (demo/gesture)  -     Assistive Device (Bed-Chair Transfers) crutches, axillary  -     Row Name 07/16/22 1340          Sit-Stand Transfer    Sit-Stand [place] (Transfers) minimum assist (75% patient effort);2 person assist;verbal cues;nonverbal cues (demo/gesture)  -     Assistive Device (Sit-Stand Transfers) crutches, axillary  -[facility] Name 07/16/22 1340          Gait/Stairs (Locomotion)    [place] Level (Gait) minimum assist (75% patient effort);2 person assist;verbal cues;nonverbal cues (demo/gesture)  -     Assistive Device (Gait) crutches, axillary  -     Distance in Feet (Gait) 2  -     Deviations/Abnormal Patterns (Gait) base of support, narrow;festinating/shuffling;gait speed decreased;stride length decreased  -     Bilateral Gait Deviations forward flexed posture;heel strike decreased  -     Comment, (Gait/Stairs) Cues provided for sequencing and posture but pt having minimal response. Sats stable 98% on room air.  -[facility] Name 07/16/22 1340          Mobility    Extremity Weight-bearing Status right upper extremity  -     Right Upper Extremity (Weight-bearing Status) weight-bearing as tolerated (WBAT)  -           User Key  (r) = Recorded By, (t) = Taken By, (c) = Cosigned By    Initials Name Provider Type     Yury Acuna, PT Physical Therapist               Obj/Interventions     [facility] Name 07/16/22 1347          Motor Skills    Therapeutic Exercise other (see comments)  ankle pumps x 20, heel slides x 10, slr x 10  -[facility] Name 07/16/22 1347          Balance    Static Standing Balance minimal assist;2-person assist;verbal cues;non-verbal cues (demo/gesture)  -     Dynamic Standing Balance minimal assist;2-person assist;verbal cues;non-verbal cues (demo/gesture)  -     Position/Device Used, Standing Balance supported;crutches, axillary  -           User Key  (r) = Recorded By, (t) = Taken By, (c) = Cosigned By    Initials  Name Provider Type     Yury Acuna, PT Physical Therapist               Goals/Plan    No documentation.                Clinical Impression     [facility] Name 07/16/22 1347          Pain    Pretreatment Pain Rating 0/10 - no pain  -     Posttreatment Pain Rating 0/10 - no pain  -[facility]     Row Name 07/16/22 1347          Plan of Care Review    Plan of Care Reviewed With patient  -     Progress improving  -     Outcome Evaluation Pt required mod A x 2 for bed mobility and min A x 2 for transfers with axillary crutch at [facility]. Cont d/c recs for SNF.  -[facility] Name 07/16/22 1347          Therapy Assessment/Plan (PT)    Rehab Potential (PT) good, to achieve stated therapy goals  -     Criteria for Skilled Interventions Met (PT) yes  -     Therapy Frequency (PT) daily  -[facility] Name 07/16/22 1347          Vital Signs    Pre SpO2 (%) 98  -     O2 Delivery Pre Treatment supplemental O2  -     Intra SpO2 (%) 96  -     O2 Delivery Intra Treatment room air  -     Post SpO2 (%) 98  -     O2 Delivery Post Treatment supplemental O2  -     Pre Patient Position Supine  -     Intra Patient Position Standing  -     Post Patient Position Sitting  -[facility] Name 07/16/22 1347          Positioning and Restraints    Pre-Treatment Position in bed  -     Post Treatment Position chair  -     In Chair notified nsg;reclined;call light within reach;encouraged to call for assist;exit alarm on;on mechanical lift sling;waffle cushion;legs elevated  RUE sling  -           User Key  (r) = Recorded By, (t) = Taken By, (c) = Cosigned By    Initials Name Provider Type     Yury Acuna, PT Physical Therapist               Outcome Measures     Row Name 07/16/22 1349 07/16/22 0830       How much help from another person do you currently need...    Turning from your back to your side while in flat bed without using bedrails? 2  - 3  -MW    Moving from lying on back to sitting on the side of a flat bed without  bedrails? 2  - 2  -MW    Moving to and from a bed to a chair (including a wheelchair)? 3  - 2  -MW    Standing up from a chair using your arms (e.g., wheelchair, bedside chair)? 3  - 3  -MW    Climbing 3-5 steps with a railing? 1  - 1  -MW    To walk in hospital room? 2  - 3  -MW    AM-PAC 6 Clicks Score (PT) 13  - 14  -    Highest level of mobility 4 --> Transferred to chair/commode  - 4 --> Transferred to chair/commode  -    Row Name 07/16/22 1349          Functional Assessment    Outcome Measure Options AM-PAC 6 Clicks Basic Mobility (PT)  -           User Key  (r) = Recorded By, (t) = Taken By, (c) = Cosigned By    Initials Name Provider Type    Ila Hurst, RN Registered Nurse    Yury Garcia, PT Physical Therapist                             Physical Therapy Education                 Title: PT OT SLP Therapies (Done)     Topic: Physical Therapy (Done)     Point: Mobility training (Done)     Learning Progress Summary           Patient Acceptance, E,TB, VU by  at 7/16/2022 1350      Show all documentation for this point (12)                 Point: Home exercise program (Done)     Learning Progress Summary           Patient Acceptance, E,TB, VU by  at 7/16/2022 1350      Show all documentation for this point (13)                 Point: Body mechanics (Done)     Learning Progress Summary           Patient Acceptance, E,TB, VU by  at 7/16/2022 1350      Show all documentation for this point (12)                 Point: Precautions (Done)     Learning Progress Summary           Patient Acceptance, E,TB, VU by  at 7/16/2022 1350      Show all documentation for this point (12)                             User Key     Initials Effective Dates Name Provider Type [facility] 09/22/20 -  Yury Acuna, FAIZA Physical Therapist PT              PT Recommendation and Plan     Plan of Care Reviewed With: patient  Progress: improving  Outcome Evaluation: Pt required mod A x 2 for bed  mobility and min A x 2 for [place] [facility] d/c recs for SNF.     Time Calculation:    PT Charges     Row Name 07/16/22 1350             Time Calculation    Start Time 1305  -      PT Received On 07/16/22  -      PT Goal Re-Cert Due Date 07/25/22  -              Time Calculation- PT    Total Timed Code Minutes- PT 28 minute(s)  -              Timed Charges    72295 - PT Therapeutic Exercise Minutes 8  -      76298 - PT Therapeutic Activity Minutes 20  -              Total Minutes    Timed Charges Total Minutes 28  -       Total Minutes 28  -            User Key  (r) = Recorded By, (t) = Taken By, (c) = Cosigned By    Initials Name Provider Type    Yury Garcia, FAIZA Physical Therapist              Therapy Charges for Today     Code Description Service Date Service Provider Modifiers Qty    45487077212 HC PT THER PROC EA 15 MIN 7/16/2022 Yury Acuna, PT GP 1    17308965271 HC PT THERAPEUTIC ACT EA 15 MIN 7/16/2022 Yury Acuna, PT GP 1    22487968523 HC PT THER SUPP EA 15 MIN 7/16/2022 Yury Acuna, PT GP 2          PT G-Codes  Outcome Measure Options: AM-PAC 6 Clicks Basic Mobility (PT)  AM-PAC 6 Clicks Score (PT): 13  AM-PAC 6 Clicks Score (OT): 13    Yury Acuna PT  7/16/2022

## 2022-07-16 NOTE — PROGRESS NOTES
[facility] Medicine Services  PROGRESS NOTE    Patient Name: Altagracia Rothman  : 1950  MRN: [id number]    Date of Admission: 2022  Primary Care Physician: Alvina Lloyd PA-C    Subjective   Subjective     CC:  Shoulder pain    HPI:  Was moving her shoulder this morning and felt like it popped out of place, now has reduced mobility in the right shoulder.  Also states that she is having dysuria and urgency with sensation of incomplete voiding.  Feels that this was starting around the time of her surgery and has worsened    ROS:  Gen- No fevers, chills  CV- No chest pain, palpitations  Resp- No cough, dyspnea  GI- No N/V/D, abd pain    Objective   Objective     Vital Signs:   Temp:  [98 °F (36.7 °C)-99.3 °F (37.4 °C)] 98.3 °F (36.8 °C)  Heart Rate:  [] 83  Resp:  [16-18] 16  BP: (105-144)/(57-85) 115/63  Flow (L/min):  [2] 2     Physical Exam:  Constitutional: Awake, alert, sitting up in bed, appears mildly uncomfortable  HENT: NCAT, mucous membranes moist  Respiratory: Clear to auscultation bilaterally, respiratory effort normal   Cardiovascular: RRR, palpable radial pulses  Gastrointestinal: Positive bowel sounds, soft, nontender, nondistended  Musculoskeletal: Limited mobility RT UE 2/2 pain  Psychiatric: Appropriate affect, cooperative  Neurologic: Speech clear and fluent    Results Reviewed:  LAB RESULTS:      Lab 07/15/22  1120   WBC 8.96   HEMOGLOBIN 12.1   HEMATOCRIT 38.9   PLATELETS 283   NEUTROS ABS 6.11   IMMATURE GRANS (ABS) 0.04   LYMPHS ABS 1.73   MONOS ABS 1.03*   EOS ABS 0.03   .6*         Lab 07/15/22  1120   SODIUM 134*   POTASSIUM 4.4   CHLORIDE 98   CO2 26.0   ANION GAP 10.0   BUN 17   CREATININE 0.42*   EGFR 103.4   GLUCOSE 99   CALCIUM 8.3*   TSH 0.743         Lab 07/15/22  1120   TOTAL PROTEIN 5.8*   ALBUMIN 2.90*   GLOBULIN 2.9   ALT (SGPT) 18   AST (SGOT) 23   BILIRUBIN 0.7   ALK PHOS 305*                     Brief Urine Lab Results  (Last  result in the past 365 days)      Color   Clarity   Blood   Leuk Est   Nitrite   Protein   CREAT   Urine HCG        07/06/22 1145 Yellow   Clear   Negative   Small (1+)   Negative   Negative                 Microbiology Results Abnormal     Procedure Component Value - Date/Time    COVID PRE-OP / PRE-PROCEDURE SCREENING ORDER (NO ISOLATION) - Swab, Nasopharynx [[id number]]  (Normal) Collected: 06/24/22 1901    Lab Status: Final result Specimen: Swab from Nasopharynx Updated: 06/24/22 1956    Narrative:      The following orders were created for panel order COVID PRE-OP / PRE-PROCEDURE SCREENING ORDER (NO ISOLATION) - Swab, Nasopharynx.  Procedure                               Abnormality         Status                     ---------                               -----------         ------                     COVID-19, FLU A/B, RSV P...[[id number]]  Normal              Final result                 Please view results for these tests on the individual orders.    COVID-19, FLU A/B, RSV PCR - Swab, Nasopharynx [[id number]]  (Normal) Collected: 06/24/22 1901    Lab Status: Final result Specimen: Swab from Nasopharynx Updated: 06/24/22 1956     COVID19 Not Detected     Influenza A PCR Not Detected     Influenza B PCR Not Detected     RSV, PCR Not Detected    Narrative:      Fact sheet for providers: https://www.fda.gov/media/999715/download    Fact sheet for patients: https://www.fda.gov/media/688798/download    Test performed by PCR.          XR Humerus Right    Result Date: 7/15/2022  DATE OF EXAM: 7/15/2022 10:58 AM  PROCEDURE: XR HUMERUS RIGHT-  INDICATIONS: for post op; S42.291A-Other displaced fracture of upper end of right humerus, initial encounter for closed fracture; S42.409A-Unspecified fracture of lower end of unspecified humerus, initial encounter for closed fracture; W19.XXXA-Unspecified fall, initial encounter; M80.00XA-Age-related osteoporosis with current pathological fracture, unspecified site, initial  encounter for fracture  COMPARISON: 7/12/2022  TECHNIQUE: AP and lateral radiographic views were obtained of the right humerus.  FINDINGS: Postoperative changes are noted status post open reduction and internal fixation of the comminuted fracture of the proximal and distal right humerus. A longstem intramedullary stephane is noted. Fixation screws are seen. Distal plate and screw hardware is also noted at the distal humerus. Near-anatomic alignment of fracture fragments is noted following open reduction and internal fixation. Surgical changes are seen in the surrounding soft tissues.      Impression: Postoperative changes are noted status post open reduction and internal fixation of the right humeral fractures. There is near-anatomic alignment of fracture fragments following reduction and fixation.  This report was finalized on 7/15/2022 11:13 AM by James Johns MD.      XR Chest 1 View    Result Date: 7/15/2022  EXAMINATION: XR CHEST 1 VW DATE: 7/14/2022 11:47 PM INDICATION:  Dyspnea; COMPARISON:  June 30, 2022 FINDINGS: Moderate diffuse coarse interstitial lung markings are again demonstrated. No focal consolidation, pleural effusion, or pneumothorax. Cardiomediastinal silhouette  unchanged. There is atherosclerosis and tortuosity thoracic aorta. No acute osseous abnormality.     Impression: 1.  No acute cardiopulmonary process. Electronically signed by:  Kayleigh Fall M.D.  7/14/2022 10:41 PM Mountain Time    Interscalene Re-block    Result Date: 7/15/2022  Latanya Aguila CRNA     7/15/2022  9:34 AM Interscalene Re-block Patient reassessed immediately prior to procedure Patient location during procedure: pre-op Reason for block: at surgeon's request and post-op pain management Performed by CRNA/CAA: Jesus Mitchell CRNA Assisted by: Siobhan Moya RN Preanesthetic Checklist Completed: patient identified, IV checked, site marked, risks and benefits discussed, surgical consent, monitors and equipment checked,  pre-op evaluation and timeout performed Prep: Pt Position: left lateral decubitus Sterile barriers:cap, gloves, mask and sterile barriers Prep: ChloraPrep Patient monitoring: blood pressure monitoring, continuous pulse oximetry and EKG Procedure Sedation: yes Performed under: local infiltration Guidance:ultrasound guided Images:still images obtained, printed/placed on chart Laterality:right Block Type:interscalene Injection Technique:catheter Needle Type:Tuohy and echogenic Needle Gauge:18 G Resistance on Injection: none Catheter Size:20 G (20g) Cath Depth at skin: 11 cm Medications Used: bupivacaine PF (MARCAINE) 0.25 % injection, 5 mL Med administered at 7/15/2022 9:34 AM Post Assessment Injection Assessment: negative aspiration for heme, no paresthesia on injection and incremental injection Patient Tolerance:comfortable throughout block Complications:no Additional Notes Procedure:               The pt was placed in semifowlers position with a slight tilt of the thorax contralateral to the insertion site.  The Insertion Site was prepped and draped in sterile fashion.  The pt was anesthetized with  IV Sedation( see meds) and  Skin and cutaneous tissue was infiltrated and anesthetized with 1% Lidocaine 3 mls via a 25g needle.  Utilizing ultrasound guidance, a BBraun 4 inch 18 g Contiplex echogenic touhy needle was advanced in-plane.  Hydro dissection of tissue was achieved with Normal saline. Major vessels(carotid and Internal Jugular) where visualized as the brachial plexus was approached at the approximate level of C-7/ T-1.  Cervical 5 and Branches of Cervical 6 nerve roots where visualized and the needle tip was placed posterior at the level of C-6 roots.  LA spread was visualized and injection was made incrementally every 5 mls with aspiration. Injection pressure was normal or little, there was no intraneural injection, no vascular injection.    The BBraun 20 g wire stylet catheter was then placed under US  guidance on the posterior aspect of the Brachial Plexus. The tuohy was removed and the location of catheter was confirmed with NS injection visualized with US . The skin was sealed with exofin tissue adhesive at catheter insertion site.  Skin was prepped with benzoin and the catheter was secured with steristrips and a CHG tegaderm. Appropriate labels applied. Thank You.     FL C Arm During Surgery    Result Date: 7/14/2022  DATE OF EXAM: 7/14/2022 3:52 PM  PROCEDURE: FL C ARM DURING SURGERY-  INDICATIONS: INTRAMEDULLARY NAIL INSERTION PROXIMAL HUMERUS; HUMERUS DISTAL OPEN REDUCTION INTERNAL FIXATION; S42.291A-Other displaced fracture of upper end of right humerus, initial encounter for closed fracture; S42.409A-Unspecified fracture of lower end of unspecified humerus, initial encounter for closed fracture; W19.XXXA-Unspecified fall, initial encounter; M80.00XA-Age-related osteoporosis with current  COMPARISON: Radiograph 07/12/2022  TECHNIQUE: Intraoperative fluoroscopic guidance was utilized with a fluoroscopy time 3 minutes and 6 seconds. 4 images were obtained.  FINDINGS: Intraoperative fluoroscopic guidance was utilized for humeral fixation with intramedullary stephane placement. Plate and screw fixation changes are also present. Anatomic alignment of the fracture fragments on fluoroscopy.      Impression: Intraoperative fluoroscopic guidance for humeral fixation.  This report was finalized on 7/14/2022 8:53 PM by Mary Jacobson MD.            I have reviewed the medications:  Scheduled Meds:acetaminophen, 500 mg, Oral, Q6H  budesonide-formoterol, 2 puff, Inhalation, BID - RT  doxycycline, 100 mg, Oral, Q12H  gabapentin, 800 mg, Oral, 5x Daily  hydrocortisone sodium succinate, 50 mg, Intravenous, Q8H  levothyroxine, 88 mcg, Oral, Q AM  lisinopril, 5 mg, Oral, Q24H  Morphine, 30 mg, Oral, BID  nortriptyline, 50 mg, Oral, TID  pantoprazole, 40 mg, Oral, Q AM  pindolol, 2.5 mg, Oral, TID  saccharomyces boulardii, 250 mg,  Oral, BID  sodium chloride, 10 mL, Intravenous, Q12H  spironolactone, 50 mg, Oral, Daily      Continuous Infusions:Pharmacy Consult,   lactated ringers, 9 mL/hr  lactated ringers, 9 mL/hr, Last Rate: 9 mL/hr (22 1527)  ropivacaine,   sodium chloride, 75 mL/hr, Last Rate: 75 mL/hr (07/15/22 1131)      PRN Meds:.acetaminophen **OR** acetaminophen **OR** acetaminophen  •  albuterol  •  diphenhydrAMINE  •  HYDROmorphone  •  hydrOXYzine  •  labetalol  •  magnesium sulfate **OR** magnesium sulfate in D5W 1g/100mL (PREMIX) **OR** magnesium sulfate  •  [] Morphine **AND** naloxone  •  ondansetron **OR** ondansetron  •  oxyCODONE-acetaminophen  •  potassium chloride **OR** potassium chloride **OR** potassium chloride  •  sodium chloride    Assessment & Plan   Assessment & Plan     Active Hospital Problems    Diagnosis  POA   • **Other closed displaced fracture of proximal end of right humerus, initial encounter [S42.291A]  Yes   • Acute UTI (urinary tract infection) [N39.0]  Yes   • Moderate asthma without complication [J45.909]  Yes   • Osteoporosis [M81.0]  Yes   • Adrenal insufficiency (HCC) [E27.40]  Yes   • Hypothyroidism [E03.9]  Yes   • Essential hypertension [I10]  Yes   • H/O pheochromocytoma [Z86.018]  Yes      Resolved Hospital Problems   No resolved problems to display.        Brief Hospital Course to date:  Altagracia Rothman is a 72 y.o. female with Hx HTN, pheochromocytoma s/p LT adrenal resection, chronic adrenal insufficiency on chronic steroids, hypothyroidism, asthma who presented for evaluation of arm pain after a fall, radiographs demonstrated 2 foci of fractures and she has been planned for operative management, however per the chart endocrinology is recommended catecholamines and metanephrines to be evaluated prior to venturing into the realm of surgery which is significantly delayed operative management and prolonged hospitalization; she is now s/p operative repair of RT humeral fractures   "with difficult pain control postoperative    Assessment/plan    Acute RT proximal and distal humeral fractures s/p fall  - S/p intramedullary nail insertion proximal humerus, ORIF distal humerus 7/14 with Dr. Matute  -Orthopedics continues to follow  -d/w Dr. Matute follows  -Continue gabapentin to x5 daily (transiently increased), anesthesia has replaced nerve catheter; cont chronic oral morphine, scheduled Tylenol, prn Percocet and Dilaudid  -Pain improved with IV hydrocortisone, feels it is wearing off early; adjust dose/timing to increase frequency but reduce dose    Recurrent dysuria 7/16  S/p Tx ESBL bacteriuria  - Per chart Gillespie placed in the ED, DC'd 6/26, UrCx 6/27 w/ MDR Proteus; completed fosfomycin, repeat UA 7/6 for unclear cause w/ ESBL Proteus; s/p Tx w/ ertapenem  -Repeat UA 7/16 for new dysuria/sensation of incomplete voiding    Hx pheochromocytoma s/p LT adrenal resection  Chronic adrenal insufficiency s/p adrenal crisis  - Previous provider d/w Dr. Nelson via phone 6/30; pheo resection 2003; unable to adequately follow labs 2/2 chronic TCA use; recommended deferring surgery if catecholamine/metanephrines abnormal- all have returned WNL  -Baseline hydrocortisone 20mg AM and 10mg noon; currently on stress dose IV hydrocortisone    Hypertension  - Initially hypotensive  - Lisinopril, pindolol (patient supplied), spironolactone  -Monitoring BP perioperatively    Arthritis with chronic tetracycline use  Tooth pain  Fibromyalgia  - Chronically on tetracycline from her \"arthritis doctor\"; not on formulary here and has been on substituted doxycycline  -Patient is NOT on doxycycline for bronchitis, this is a substitution for a chronic medication  - Chronic gabapentin, oral morphine, oral oxycodone; complicates perioperative pain control    Chronic inhaler use, data deficit  -Home meds include Wixela inhaler; patient reports prior history of smoking, never formally diagnosed with COPD but told she was " "\"borderline\"  -RRT 6/30 for chest pain/SOB, reported presumed 2/2 nerve block/anxiety  - Symbicort substitute for Wixela, albuterol neb as needed    Hypothyroidism  -Levothyroxine      Expected Discharge Location and Transportation: Postop from 7/14; anticipate need for rehab, likely medical van  Expected Discharge Date: 7/19    DVT prophylaxis:  Mechanical DVT prophylaxis orders are present.     AM-PAC 6 Clicks Score (PT): 14 (07/15/22 3204)    CODE STATUS:   Code Status and Medical Interventions:   Ordered at: 06/24/22 3247     Level Of Support Discussed With:    Patient     Code Status (Patient has no pulse and is not breathing):    CPR (Attempt to Resuscitate)     Medical Interventions (Patient has pulse or is breathing):    Full Support       Jamari Torres, DO  07/16/22              "

## 2022-07-16 NOTE — PLAN OF CARE
Goal Outcome Evaluation:  Plan of Care Reviewed With: patient        Progress: improving  Outcome Evaluation: Pt tolerated R shoulder AAROM , IR chest/ER 40, ABD 60 and elbow AAROM 50-85. Pt reports lack of full extension at baseline. OT issued foam block and educated on use. Educated pt on of positioning with elbow in extension while elevated as tolerates. Recommend SNF.

## 2022-07-17 PROCEDURE — 99232 SBSQ HOSP IP/OBS MODERATE 35: CPT | Performed by: INTERNAL MEDICINE

## 2022-07-17 PROCEDURE — 25010000002 HYDROMORPHONE 1 MG/ML SOLUTION: Performed by: ORTHOPAEDIC SURGERY

## 2022-07-17 PROCEDURE — 94799 UNLISTED PULMONARY SVC/PX: CPT

## 2022-07-17 PROCEDURE — 97535 SELF CARE MNGMENT TRAINING: CPT | Performed by: OCCUPATIONAL THERAPIST

## 2022-07-17 PROCEDURE — 97110 THERAPEUTIC EXERCISES: CPT | Performed by: OCCUPATIONAL THERAPIST

## 2022-07-17 PROCEDURE — 97530 THERAPEUTIC ACTIVITIES: CPT

## 2022-07-17 PROCEDURE — 25010000002 HYDROCORTISONE SODIUM SUCCINATE 100 MG RECONSTITUTED SOLUTION: Performed by: INTERNAL MEDICINE

## 2022-07-17 RX ORDER — FLUCONAZOLE 100 MG/1
150 TABLET ORAL ONCE
Status: COMPLETED | OUTPATIENT
Start: 2022-07-17 | End: 2022-07-17

## 2022-07-17 RX ORDER — DOCUSATE SODIUM 100 MG/1
100 CAPSULE, LIQUID FILLED ORAL 2 TIMES DAILY
Status: DISCONTINUED | OUTPATIENT
Start: 2022-07-17 | End: 2022-07-19 | Stop reason: HOSPADM

## 2022-07-17 RX ORDER — AMOXICILLIN 250 MG
2 CAPSULE ORAL 2 TIMES DAILY PRN
Status: DISCONTINUED | OUTPATIENT
Start: 2022-07-17 | End: 2022-07-19 | Stop reason: HOSPADM

## 2022-07-17 RX ADMIN — ACETAMINOPHEN 500 MG: 500 TABLET ORAL at 14:30

## 2022-07-17 RX ADMIN — OXYCODONE HYDROCHLORIDE AND ACETAMINOPHEN 1 TABLET: 10; 325 TABLET ORAL at 09:01

## 2022-07-17 RX ADMIN — OXYCODONE HYDROCHLORIDE AND ACETAMINOPHEN 1 TABLET: 10; 325 TABLET ORAL at 17:04

## 2022-07-17 RX ADMIN — GABAPENTIN 800 MG: 400 CAPSULE ORAL at 20:29

## 2022-07-17 RX ADMIN — PANTOPRAZOLE SODIUM 40 MG: 40 TABLET, DELAYED RELEASE ORAL at 05:34

## 2022-07-17 RX ADMIN — SENNOSIDES AND DOCUSATE SODIUM 2 TABLET: 50; 8.6 TABLET ORAL at 20:30

## 2022-07-17 RX ADMIN — NORTRIPTYLINE HYDROCHLORIDE 50 MG: 10 CAPSULE ORAL at 09:01

## 2022-07-17 RX ADMIN — FLUCONAZOLE 150 MG: 100 TABLET ORAL at 20:30

## 2022-07-17 RX ADMIN — GABAPENTIN 800 MG: 400 CAPSULE ORAL at 05:34

## 2022-07-17 RX ADMIN — DOXYCYCLINE 100 MG: 100 CAPSULE ORAL at 20:30

## 2022-07-17 RX ADMIN — OXYCODONE HYDROCHLORIDE AND ACETAMINOPHEN 1 TABLET: 10; 325 TABLET ORAL at 20:51

## 2022-07-17 RX ADMIN — MORPHINE SULFATE 30 MG: 30 TABLET, FILM COATED, EXTENDED RELEASE ORAL at 09:02

## 2022-07-17 RX ADMIN — BUDESONIDE AND FORMOTEROL FUMARATE DIHYDRATE 2 PUFF: 160; 4.5 AEROSOL RESPIRATORY (INHALATION) at 21:02

## 2022-07-17 RX ADMIN — GABAPENTIN 800 MG: 400 CAPSULE ORAL at 10:23

## 2022-07-17 RX ADMIN — HYDROCORTISONE SODIUM SUCCINATE 25 MG: 100 INJECTION, POWDER, FOR SOLUTION INTRAMUSCULAR; INTRAVENOUS at 03:34

## 2022-07-17 RX ADMIN — Medication 250 MG: at 09:02

## 2022-07-17 RX ADMIN — DOCUSATE SODIUM 100 MG: 100 CAPSULE, LIQUID FILLED ORAL at 20:30

## 2022-07-17 RX ADMIN — HYDROCORTISONE SODIUM SUCCINATE 25 MG: 100 INJECTION, POWDER, FOR SOLUTION INTRAMUSCULAR; INTRAVENOUS at 17:05

## 2022-07-17 RX ADMIN — GABAPENTIN 800 MG: 400 CAPSULE ORAL at 17:04

## 2022-07-17 RX ADMIN — LISINOPRIL 5 MG: 5 TABLET ORAL at 09:02

## 2022-07-17 RX ADMIN — DOXYCYCLINE 100 MG: 100 CAPSULE ORAL at 09:02

## 2022-07-17 RX ADMIN — SPIRONOLACTONE 50 MG: 50 TABLET ORAL at 09:02

## 2022-07-17 RX ADMIN — OXYCODONE HYDROCHLORIDE AND ACETAMINOPHEN 1 TABLET: 10; 325 TABLET ORAL at 12:57

## 2022-07-17 RX ADMIN — PINDOLOL 2.5 MG: 5 TABLET ORAL at 09:00

## 2022-07-17 RX ADMIN — MORPHINE SULFATE 30 MG: 30 TABLET, FILM COATED, EXTENDED RELEASE ORAL at 20:30

## 2022-07-17 RX ADMIN — Medication 250 MG: at 20:30

## 2022-07-17 RX ADMIN — ACETAMINOPHEN 500 MG: 500 TABLET ORAL at 03:33

## 2022-07-17 RX ADMIN — HYDROCORTISONE SODIUM SUCCINATE 25 MG: 100 INJECTION, POWDER, FOR SOLUTION INTRAMUSCULAR; INTRAVENOUS at 09:01

## 2022-07-17 RX ADMIN — LEVOTHYROXINE SODIUM 88 MCG: 88 TABLET ORAL at 05:34

## 2022-07-17 RX ADMIN — BUDESONIDE AND FORMOTEROL FUMARATE DIHYDRATE 2 PUFF: 160; 4.5 AEROSOL RESPIRATORY (INHALATION) at 08:49

## 2022-07-17 RX ADMIN — ACETAMINOPHEN 500 MG: 500 TABLET ORAL at 09:02

## 2022-07-17 RX ADMIN — Medication 10 ML: at 09:01

## 2022-07-17 RX ADMIN — OXYCODONE HYDROCHLORIDE AND ACETAMINOPHEN 1 TABLET: 10; 325 TABLET ORAL at 03:50

## 2022-07-17 RX ADMIN — HYDROMORPHONE HYDROCHLORIDE 1 MG: 1 INJECTION, SOLUTION INTRAMUSCULAR; INTRAVENOUS; SUBCUTANEOUS at 10:23

## 2022-07-17 RX ADMIN — ACETAMINOPHEN 500 MG: 500 TABLET ORAL at 20:30

## 2022-07-17 RX ADMIN — NORTRIPTYLINE HYDROCHLORIDE 50 MG: 10 CAPSULE ORAL at 17:04

## 2022-07-17 RX ADMIN — NORTRIPTYLINE HYDROCHLORIDE 50 MG: 10 CAPSULE ORAL at 20:29

## 2022-07-17 RX ADMIN — HYDROMORPHONE HYDROCHLORIDE 1 MG: 1 INJECTION, SOLUTION INTRAMUSCULAR; INTRAVENOUS; SUBCUTANEOUS at 15:23

## 2022-07-17 RX ADMIN — GABAPENTIN 800 MG: 400 CAPSULE ORAL at 14:30

## 2022-07-17 NOTE — THERAPY TREATMENT NOTE
Patient Name: Altagracia Rothman  : 1950    MRN: [id number]                              Today's Date: 2022       Admit Date: 2022    Visit Dx:     ICD-10-CM ICD-9-CM   1. Other closed displaced fracture of proximal end of right humerus, initial encounter  S42.291A 812.09   2. Closed fracture of distal end of humerus, unspecified fracture morphology, initial encounter  S42.409A 812.40   3. Fall, initial encounter  W19.XXXA E888.9   4. Osteoporosis with current pathological fracture, unspecified osteoporosis type, initial encounter  M80.00XA 733.00     733.10     Patient Active Problem List   Diagnosis   • Adrenal insufficiency (HCC)   • Hypothyroidism   • Essential hypertension   • H/O pheochromocytoma   • Chronic bilateral low back pain without sciatica   • Encounter for chronic pain management   • Osteoporosis   • Tobacco abuse   • BMI 35.0-35.9,adult   • Bronchitis   • Moderate asthma without complication   • Paresthesia   • Medicare annual wellness visit, subsequent   • Routine medical exam   • Lipid screening   • Hair loss   • Other closed displaced fracture of proximal end of right humerus, initial encounter   • Acute UTI (urinary tract infection)     Past Medical History:   Diagnosis Date   • Adrenal insufficiency (HCC)    • Fibromyalgia    • Hypertension    • Hypothyroidism    • Skin cancer      Past Surgical History:   Procedure Laterality Date   • ADRENAL GLAND SURGERY     • BREAST BIOPSY      x4   • GALLBLADDER SURGERY     • HYSTERECTOMY     • MRI ANGIOGRAM LOWER EXTREMITY UNILATERAL W CONTRAST     • TONSILLECTOMY        General Information     Row Name 22 1554          OT Time and Intention    Document Type therapy note (daily note)  -AR     Mode of Treatment individual therapy;occupational therapy  -AR     Row Name 22 1554          General Information    Existing Precautions/Restrictions fall  -AR     Row Name 22 1554          Cognition    Orientation Status (Cognition)  oriented x 4  -AR     Row Name 07/17/22 1554          Safety Issues, Functional Mobility    Safety Issues Affecting Function (Mobility) safety precaution awareness;safety precautions follow-through/compliance;sequencing abilities  -AR     Impairments Affecting Function (Mobility) endurance/activity tolerance;pain;range of motion (ROM);strength;balance  -AR           User Key  (r) = Recorded By, (t) = Taken By, (c) = Cosigned By    Initials Name Provider Type    Natalie Garibay OT Occupational Therapist                 Mobility/ADL's     Row Name 07/17/22 1555          Bed Mobility    Bed Mobility sit-supine;scooting/bridging  -AR     Scooting/Bridging Bent (Bed Mobility) 2 person assist;verbal cues;moderate assist (50% patient effort)  -AR     Sit-Supine Bent (Bed Mobility) moderate assist (50% patient effort);2 person assist;verbal cues  -AR     Bed Mobility, Safety Issues decreased use of arms for pushing/pulling  -AR     Assistive Device (Bed Mobility) draw sheet;head of bed elevated;bed rails  -AR     Row Name 07/17/22 1555          Transfers    Transfers sit-stand transfer;stand-sit transfer;toilet transfer  -AR     Comment, (Transfers) Pt transitioned from chair to BSC, then BSC to bed  -AR     Sit-Stand Bent (Transfers) minimum assist (75% patient effort);2 person assist;verbal cues  -AR     Stand-Sit Bent (Transfers) minimum assist (75% patient effort);2 person assist;verbal cues  -AR     Bent Level (Toilet Transfer) minimum assist (75% patient effort);2 person assist;verbal cues  -AR     Assistive Device (Toilet Transfer) crutches, axillary;commode, bedside without drop arms  -AR     Row Name 07/17/22 1555          Sit-Stand Transfer    Assistive Device (Sit-Stand Transfers) crutches, axillary  -AR     Row Name 07/17/22 1555          Stand-Sit Transfer    Assistive Device (Stand-Sit Transfers) crutches, axillary  -AR     Row Name 07/17/22 1555          Toilet  Transfer    Type (Toilet Transfer) stand-sit;sit-stand  -AR     Row Name 07/17/22 1555          Functional Mobility    Functional Mobility- Ind. Level minimum assist (75% patient effort);2 person assist required;verbal cues required  -AR     Functional Mobility- Device crutches, axillary  -AR     Functional Mobility-Distance (Feet) 20  -AR     Functional Mobility- Comment Pt requesting to ambulate to [facility]. She ambulated to feet to BSC which was placed outside alireza bathroom door, then ambulated to right side of bed.  -AR     Row Name 07/17/22 1555          Activities of Daily Living    BADL Assessment/Intervention grooming;toileting  -AR     Row Name 07/17/22 1555          Mobility    Extremity Weight-bearing Status right upper extremity  -AR     Right Upper Extremity (Weight-bearing Status) weight-bearing as tolerated (WBAT)  -AR     Row Name 07/17/22 1555          Grooming Assessment/Training    [place] Level (Grooming) wash face, hands;supervision;set up  -AR     Position (Grooming) supine  -AR     Row Name 07/17/22 1555          Toileting Assessment/Training    [place] Level (Toileting) toileting skills;dependent (less than 25% patient effort)  -AR     Assistive Devices (Toileting) commode, bedside without drop arms  -AR     Position (Toileting) supported sitting;supported standing  -AR           User Key  (r) = Recorded By, (t) = Taken By, (c) = Cosigned By    Initials Name Provider Type    Natalie Garibay, OT Occupational Therapist               Obj/Interventions     Row Name 07/17/22 1559          Sensory Assessment (Somatosensory)    Sensory Assessment (Somatosensory) UE sensation intact  -AR     Row Name 07/17/22 1559          Shoulder (Therapeutic Exercise)    Shoulder AAROM (Therapeutic Exercise) right;flexion;extension;aDduction;aBduction;external rotation;internal rotation;supine;10 repetitions  -AR     Row Name 07/17/22 1559          Elbow/Forearm (Therapeutic Exercise)    Elbow/Forearm  (Therapeutic Exercise) AROM (active range of motion)  -AR     Elbow/Forearm AROM (Therapeutic Exercise) right;supination;pronation;supine;10 repetitions  -AR     Elbow/Forearm AAROM (Therapeutic Exercise) right;flexion;extension;supination;10 repetitions  -AR     Row Name 07/17/22 1559          Wrist (Therapeutic Exercise)    Wrist (Therapeutic Exercise) AROM (active range of motion)  -AR     Wrist AROM (Therapeutic Exercise) right;flexion;extension;10 repetitions  -AR     Row Name 07/17/22 1559          Hand (Therapeutic Exercise)    Hand AROM/AAROM (Therapeutic Exercise) right;finger flexion;finger extension;10 repetitions  -AR     Row Name 07/17/22 1559          Balance    Static Sitting Balance supervision  -AR     Dynamic Sitting Balance supervision  -AR     Position, Sitting Balance other (see comments)  BSC  -AR     Static Standing Balance minimal assist;2-person assist  -AR     Dynamic Standing Balance minimal assist;2-person assist  -AR     Position/Device Used, Standing Balance supported;crutches, axillary  to L  -AR           User Key  (r) = Recorded By, (t) = Taken By, (c) = Cosigned By    Initials Name Provider Type    Natalie Garibay OT Occupational Therapist               Goals/Plan     Row Name 07/17/22 1603          Transfer Goal 1 (OT)    Progress/Outcome (Transfer Goal 1, OT) goal ongoing  -AR     Row Name 07/17/22 1603          Dressing Goal 1 (OT)    Progress/Outcome (Dressing Goal 1, OT) goal ongoing  -AR           User Key  (r) = Recorded By, (t) = Taken By, (c) = Cosigned By    Initials Name Provider Type    Natalie Garibay OT Occupational Therapist               Clinical Impression     Row Name 07/17/22 1600          Pain Assessment    Pretreatment Pain Rating 7/10  -AR     Posttreatment Pain Rating 6/10  -AR     Pain Location - Side/Orientation Right  -AR     Pain Location upper  -AR     Pain Location - extremity  -AR     Pain Intervention(s) Medication (See  MAR);Repositioned;Ambulation/increased activity;Nursing Notified  -AR     Row Name 07/17/22 1600          Plan of Care Review    Plan of Care Reviewed With patient  -AR     Progress improving  -AR     Outcome Evaluation Pt ambulated 20 feet to/from [facility] with min assist x2 using L axillary crutch. She required dependence post-toilet hygiene and clothing management. She completed bed mobility with mod assist x2. She tolerated RUE AAROM as follows: FE 90, ABD 85, IR chest/ER 40, elbow 30-95 and limited with bandage. Recommend IPR, pt in agreement.  -AR     Row Name 07/17/22 1600          Therapy Plan Review/Discharge Plan (OT)    Anticipated Discharge Disposition (OT) inpatient rehabilitation facility  -AR     Row Name 07/17/22 1600          Vital Signs    Pre Patient Position Sitting  -AR     Intra Patient Position Standing  -AR     Post Patient Position Supine  -AR     Row Name 07/17/22 1600          Positioning and Restraints    Pre-Treatment Position sitting in chair/recliner  -AR     Post Treatment Position bed  -AR     In Bed supine;call light within reach;encouraged to call for assist;exit alarm on;RUE elevated  -AR           User Key  (r) = Recorded By, (t) = Taken By, (c) = Cosigned By    Initials Name Provider Type    Natalie Garibay, OT Occupational Therapist               Outcome Measures     Row Name 07/17/22 1604          How much help from another is currently needed...    Putting on and taking off regular lower body clothing? 2  -AR     Bathing (including washing, rinsing, and drying) 2  -AR     Toileting (which includes using toilet bed pan or urinal) 1  -AR     Putting on and taking off regular upper body clothing 2  -AR     Taking care of personal grooming (such as brushing teeth) 3  -AR     Eating meals 3  -AR     AM-PAC 6 Clicks Score (OT) 13  -AR     Row Name 07/17/22 1520          How much help from another person do you currently need...    Turning from your back to your side while in flat  bed without using bedrails? 2  -JH     Moving from lying on back to sitting on the side of a flat bed without bedrails? 2  -JH     Moving to and from a bed to a chair (including a wheelchair)? 3  -JH     Standing up from a chair using your arms (e.g., wheelchair, bedside chair)? 3  -JH     Climbing 3-5 steps with a railing? 1  -JH     To walk in hospital room? 2  -JH     AM-PAC 6 Clicks Score (PT) 13  -JH     Highest level of mobility 4 --> Transferred to chair/commode  -     Row Name 07/17/22 1604 07/17/22 1520       Functional Assessment    Outcome Measure Options AM-PAC 6 Clicks Daily Activity (OT)  -AR AM-PAC 6 Clicks Basic Mobility (PT)  -          User Key  (r) = Recorded By, (t) = Taken By, (c) = Cosigned By    Initials Name Provider Type    Natalie Garibay, OT Occupational Therapist    Yury Garcia, PT Physical Therapist                Occupational Therapy Education                 Title: PT OT SLP Therapies (Done)     Topic: Occupational Therapy (Done)     Point: ADL training (Done)     Description:   Instruct learner(s) on proper safety adaptation and remediation techniques during self care or transfers.   Instruct in proper use of assistive devices.              Learning Progress Summary           Patient PETR Gallardo TB, D, VU, DU by AR at 7/17/2022 1605      Show all documentation for this point (7)                 Point: Home exercise program (Done)     Description:   Instruct learner(s) on appropriate technique for monitoring, assisting and/or progressing therapeutic exercises/activities.              Learning Progress Summary           Patient PETR Gallardo TB, D, VU, DU by AR at 7/17/2022 1605      Show all documentation for this point (8)                 Point: Precautions (Done)     Description:   Instruct learner(s) on prescribed precautions during self-care and functional transfers.              Learning Progress Summary           Patient Eager, E,TB,D, VU,DU by AR at 7/17/2022 1605      Show  all documentation for this point (8)                 Point: Body mechanics (Done)     Description:   Instruct learner(s) on proper positioning and spine alignment during self-care, functional mobility activities and/or exercises.              Learning Progress Summary           Patient Paulina, E,TB,D, VU,DU by AR at 7/17/2022 1605      Show all documentation for this point (8)                             User Key     Initials Effective Dates Name Provider Type Discipline    AR 06/16/21 -  Natalie Tellez OT Occupational Therapist OT              OT Recommendation and Plan  Therapy Frequency (OT): daily  Plan of Care Review  Plan of Care Reviewed With: patient  Progress: improving  Outcome Evaluation: Pt ambulated 20 feet to/from [facility] with min assist x2 using L axillary crutch. She required dependence post-toilet hygiene and clothing management. She completed bed mobility with mod assist x2. She tolerated RUE AAROM as follows: FE 90, ABD 85, IR chest/ER 40, elbow 30-95 and limited with bandage. Recommend IPR, pt in agreement.     Time Calculation:    Time Calculation- OT     Row Name 07/17/22 1605             Time Calculation- OT    OT Start Time 1448  -AR      OT Received On 07/17/22  -AR      OT Goal Re-Cert Due Date 07/25/22  -AR              Timed Charges    86563 - OT Therapeutic Exercise Minutes 16  -AR      90426 - OT Self Care/Mgmt Minutes 42  -AR              Total Minutes    Timed Charges Total Minutes 58  -AR       Total Minutes 58  -AR            User Key  (r) = Recorded By, (t) = Taken By, (c) = Cosigned By    Initials Name Provider Type    AR Natalie Tellez OT Occupational Therapist              Therapy Charges for Today     Code Description Service Date Service Provider Modifiers Qty    57457812012 HC OT THER SUPP EA 15 MIN 7/16/2022 Natalie Tellez OT GO 1    91488714796 HC OT THER PROC EA 15 MIN 7/16/2022 Natalie Tellez OT GO 3    52906683456 HC OT SELF CARE/MGMT/TRAIN EA 15 MIN  7/17/2022 Natalie Tellez, OT GO 3    [id number]  OT THER PROC EA 15 MIN 7/17/2022 Natalie Tellez OT GO 1               Natalie Tellez OT  7/17/2022

## 2022-07-17 NOTE — PROGRESS NOTES
[facility] Medicine Services  PROGRESS NOTE    Patient Name: Altagracia Rothman  : 1950  MRN: [id number]    Date of Admission: 2022  Primary Care Physician: Alvina Lloyd PA-C    Subjective   Subjective     CC:  Shoulder pain    HPI:  Shoulder pain is improved, feels okay to start tapering steroids.  Does note that she has tapered oral steroids at home in the past.  UA was normal, she does continue to have dysuria.    ROS:  Gen- No fevers, chills  CV- No chest pain, palpitations  Resp- No cough, dyspnea  GI- No N/V/D, abd pain    Objective   Objective     Vital Signs:   Temp:  [97.8 °F (36.6 °C)-98.5 °F (36.9 °C)] 97.8 °F (36.6 °C)  Heart Rate:  [] 88  Resp:  [16-18] 18  BP: (103-153)/(60-87) 148/87  Flow (L/min):  [1-2] 1     Physical Exam:  Constitutional: Awake, alert, sitting up in bed in no acute distress  HENT: NCAT, mucous membranes moist  Respiratory: Clear to auscultation bilaterally, respiratory effort normal   Cardiovascular: RRR, palpable radial pulses  Gastrointestinal: Positive bowel sounds, soft, nontender, nondistended  Musculoskeletal: Limited mobility RT UE 2/2 pain  Psychiatric: Appropriate affect, cooperative  Neurologic: Speech clear and fluent    Results Reviewed:  LAB RESULTS:      Lab 07/15/22  1120   WBC 8.96   HEMOGLOBIN 12.1   HEMATOCRIT 38.9   PLATELETS 283   NEUTROS ABS 6.11   IMMATURE GRANS (ABS) 0.04   LYMPHS ABS 1.73   MONOS ABS 1.03*   EOS ABS 0.03   .6*         Lab 07/15/22  1120   SODIUM 134*   POTASSIUM 4.4   CHLORIDE 98   CO2 26.0   ANION GAP 10.0   BUN 17   CREATININE 0.42*   EGFR 103.4   GLUCOSE 99   CALCIUM 8.3*   TSH 0.743         Lab 07/15/22  1120   TOTAL PROTEIN 5.8*   ALBUMIN 2.90*   GLOBULIN 2.9   ALT (SGPT) 18   AST (SGOT) 23   BILIRUBIN 0.7   ALK PHOS 305*                     Brief Urine Lab Results  (Last result in the past 365 days)      Color   Clarity   Blood   Leuk Est   Nitrite   Protein   CREAT   Urine HCG         07/16/22 1706 Yellow   Clear   Negative   Negative   Negative   Negative                 Microbiology Results Abnormal     Procedure Component Value - Date/Time    COVID PRE-OP / PRE-PROCEDURE SCREENING ORDER (NO ISOLATION) - Swab, Nasopharynx [[id number]]  (Normal) Collected: 06/24/22 1901    Lab Status: Final result Specimen: Swab from Nasopharynx Updated: 06/24/22 1956    Narrative:      The following orders were created for panel order COVID PRE-OP / PRE-PROCEDURE SCREENING ORDER (NO ISOLATION) - Swab, Nasopharynx.  Procedure                               Abnormality         Status                     ---------                               -----------         ------                     COVID-19, FLU A/B, RSV P...[[id number]]  Normal              Final result                 Please view results for these tests on the individual orders.    COVID-19, FLU A/B, RSV PCR - Swab, Nasopharynx [[id number]]  (Normal) Collected: 06/24/22 1901    Lab Status: Final result Specimen: Swab from Nasopharynx Updated: 06/24/22 1956     COVID19 Not Detected     Influenza A PCR Not Detected     Influenza B PCR Not Detected     RSV, PCR Not Detected    Narrative:      Fact sheet for providers: https://www.fda.gov/media/331919/download    Fact sheet for patients: https://www.fda.gov/media/890289/download    Test performed by PCR.          XR Humerus Right    Result Date: 7/15/2022  DATE OF EXAM: 7/15/2022 10:58 AM  PROCEDURE: XR HUMERUS RIGHT-  INDICATIONS: for post op; S42.291A-Other displaced fracture of upper end of right humerus, initial encounter for closed fracture; S42.409A-Unspecified fracture of lower end of unspecified humerus, initial encounter for closed fracture; W19.XXXA-Unspecified fall, initial encounter; M80.00XA-Age-related osteoporosis with current pathological fracture, unspecified site, initial encounter for fracture  COMPARISON: 7/12/2022  TECHNIQUE: AP and lateral radiographic views were obtained of the right  humerus.  FINDINGS: Postoperative changes are noted status post open reduction and internal fixation of the comminuted fracture of the proximal and distal right humerus. A longstem intramedullary stephane is noted. Fixation screws are seen. Distal plate and screw hardware is also noted at the distal humerus. Near-anatomic alignment of fracture fragments is noted following open reduction and internal fixation. Surgical changes are seen in the surrounding soft tissues.      Impression: Postoperative changes are noted status post open reduction and internal fixation of the right humeral fractures. There is near-anatomic alignment of fracture fragments following reduction and fixation.  This report was finalized on 7/15/2022 11:13 AM by James Johns MD.      Interscalene Re-block    Result Date: 7/15/2022  Latanya Aguila CRNA     7/15/2022  9:34 AM Interscalene Re-block Patient reassessed immediately prior to procedure Patient location during procedure: pre-op Reason for block: at surgeon's request and post-op pain management Performed by CRNA/CAA: Jesus Mitchell CRNA Assisted by: Siobhan Moya RN Preanesthetic Checklist Completed: patient identified, IV checked, site marked, risks and benefits discussed, surgical consent, monitors and equipment checked, pre-op evaluation and timeout performed Prep: Pt Position: left lateral decubitus Sterile barriers:cap, gloves, mask and sterile barriers Prep: ChloraPrep Patient monitoring: blood pressure monitoring, continuous pulse oximetry and EKG Procedure Sedation: yes Performed under: local infiltration Guidance:ultrasound guided Images:still images obtained, printed/placed on chart Laterality:right Block Type:interscalene Injection Technique:catheter Needle Type:Tuohy and echogenic Needle Gauge:18 G Resistance on Injection: none Catheter Size:20 G (20g) Cath Depth at skin: 11 cm Medications Used: bupivacaine PF (MARCAINE) 0.25 % injection, 5 mL Med administered at 7/15/2022  9:34 AM Post Assessment Injection Assessment: negative aspiration for heme, no paresthesia on injection and incremental injection Patient Tolerance:comfortable throughout block Complications:no Additional Notes Procedure:               The pt was placed in semifowlers position with a slight tilt of the thorax contralateral to the insertion site.  The Insertion Site was prepped and draped in sterile fashion.  The pt was anesthetized with  IV Sedation( see meds) and  Skin and cutaneous tissue was infiltrated and anesthetized with 1% Lidocaine 3 mls via a 25g needle.  Utilizing ultrasound guidance, a BBraun 4 inch 18 g Contiplex echogenic touhy needle was advanced in-plane.  Hydro dissection of tissue was achieved with Normal saline. Major vessels(carotid and Internal Jugular) where visualized as the brachial plexus was approached at the approximate level of C-7/ T-1.  Cervical 5 and Branches of Cervical 6 nerve roots where visualized and the needle tip was placed posterior at the level of C-6 roots.  LA spread was visualized and injection was made incrementally every 5 mls with aspiration. Injection pressure was normal or little, there was no intraneural injection, no vascular injection.    The BBraun 20 g wire stylet catheter was then placed under US guidance on the posterior aspect of the Brachial Plexus. The tuohy was removed and the location of catheter was confirmed with NS injection visualized with US . The skin was sealed with exofin tissue adhesive at catheter insertion site.  Skin was prepped with benzoin and the catheter was secured with steristrips and a CHG tegaderm. Appropriate labels applied. Thank You.           I have reviewed the medications:  Scheduled Meds:acetaminophen, 500 mg, Oral, Q6H  budesonide-formoterol, 2 puff, Inhalation, BID - RT  doxycycline, 100 mg, Oral, Q12H  gabapentin, 800 mg, Oral, 5x Daily  hydrocortisone sodium succinate, 25 mg, Intravenous, Q6H  levothyroxine, 88 mcg, Oral, Q  AM  lisinopril, 5 mg, Oral, Q24H  Morphine, 30 mg, Oral, BID  nortriptyline, 50 mg, Oral, TID  pantoprazole, 40 mg, Oral, Q AM  pindolol, 2.5 mg, Oral, TID  saccharomyces boulardii, 250 mg, Oral, BID  sodium chloride, 10 mL, Intravenous, Q12H  spironolactone, 50 mg, Oral, Daily      Continuous Infusions:Pharmacy Consult,   lactated ringers, 9 mL/hr  lactated ringers, 9 mL/hr, Last Rate: 9 mL/hr (22 1527)  ropivacaine,   sodium chloride, 75 mL/hr, Last Rate: 75 mL/hr (22 1043)      PRN Meds:.acetaminophen **OR** acetaminophen **OR** acetaminophen  •  albuterol  •  diphenhydrAMINE  •  HYDROmorphone  •  hydrOXYzine  •  labetalol  •  magnesium sulfate **OR** magnesium sulfate in D5W 1g/100mL (PREMIX) **OR** magnesium sulfate  •  [] Morphine **AND** naloxone  •  ondansetron **OR** ondansetron  •  oxyCODONE-acetaminophen  •  potassium chloride **OR** potassium chloride **OR** potassium chloride  •  sodium chloride    Assessment & Plan   Assessment & Plan     Active Hospital Problems    Diagnosis  POA   • **Other closed displaced fracture of proximal end of right humerus, initial encounter [S42.291A]  Yes   • Acute UTI (urinary tract infection) [N39.0]  Yes   • Moderate asthma without complication [J45.909]  Yes   • Osteoporosis [M81.0]  Yes   • Adrenal insufficiency (HCC) [E27.40]  Yes   • Hypothyroidism [E03.9]  Yes   • Essential hypertension [I10]  Yes   • H/O pheochromocytoma [Z86.018]  Yes      Resolved Hospital Problems   No resolved problems to display.        Brief Hospital Course to date:  Altagracia Rothman is a 72 y.o. female with Hx HTN, pheochromocytoma s/p LT adrenal resection, chronic adrenal insufficiency on chronic steroids, hypothyroidism, asthma who presented for evaluation of arm pain after a fall, radiographs demonstrated 2 foci of fractures and she has been planned for operative management, however per the chart endocrinology is recommended catecholamines and metanephrines to be evaluated  "prior to venturing into the realm of surgery which is significantly delayed operative management and prolonged hospitalization; she is now s/p operative repair of RT humeral fractures 7/14 with difficult pain control postoperative    Assessment/plan    Acute RT proximal and distal humeral fractures s/p fall  - S/p intramedullary nail insertion proximal humerus, ORIF distal humerus 7/14 with Dr. Matute  -Orthopedics continues to follow  -d/w Dr. Matute follows  -Continue gabapentin to x5 daily (transiently increased), anesthesia has replaced nerve catheter; cont chronic oral morphine, scheduled Tylenol, prn Percocet and Dilaudid  -Pain improved with IV hydrocortisone, wean from q6h to q8h today; she states she has regular experience with tapering oral steroids on an outpatient basis, can likely change to PO tomorrow    Recurrent dysuria 7/16  S/p Tx ESBL bacteriuria  - Per chart Gillespie placed in the ED, DC'd 6/26, UrCx 6/27 w/ MDR Proteus; completed fosfomycin, repeat UA 7/6 for unclear cause w/ ESBL Proteus; s/p Tx w/ ertapenem  -Repeat UA 7/16 for new dysuria bland; empiric dose of fluconazole and add topical estrogen    Hx pheochromocytoma s/p LT adrenal resection  Chronic adrenal insufficiency s/p adrenal crisis  - Previous provider d/w Dr. Nelson via phone 6/30; pheo resection 2003; unable to adequately follow labs 2/2 chronic TCA use; recommended deferring surgery if catecholamine/metanephrines abnormal- all have returned WNL  -Baseline hydrocortisone 20mg AM and 10mg noon; currently on stress dose IV hydrocortisone    Hypertension  - Initially hypotensive  - Lisinopril, pindolol (patient supplied), spironolactone  -Monitoring BP perioperatively    Arthritis with chronic tetracycline use  Tooth pain  Fibromyalgia  - Chronically on tetracycline from her \"arthritis doctor\"; not on formulary here and has been on substituted doxycycline  -Patient is NOT on doxycycline for bronchitis, this is a substitution for a " "chronic medication  - Chronic gabapentin, oral morphine, oral oxycodone; complicates perioperative pain control    Chronic inhaler use, data deficit  -Home meds include Wixela inhaler; patient reports prior history of smoking, never formally diagnosed with COPD but told she was \"borderline\"  -RRT 6/30 for chest pain/SOB, reported presumed 2/2 nerve block/anxiety  - Symbicort substitute for Wixela, albuterol neb as needed    Hypothyroidism  -Levothyroxine      Expected Discharge Location and Transportation: Postop from 7/14; anticipate need for rehab, likely medical van  Expected Discharge Date: 7/19    DVT prophylaxis:  Mechanical DVT prophylaxis orders are present.     AM-PAC 6 Clicks Score (PT): 13 (07/16/22 3359)    CODE STATUS:   Code Status and Medical Interventions:   Ordered at: 06/24/22 5597     Level Of Support Discussed With:    Patient     Code Status (Patient has no pulse and is not breathing):    CPR (Attempt to Resuscitate)     Medical Interventions (Patient has pulse or is breathing):    Full Support       Jamari Torres, DO  07/17/22              "

## 2022-07-17 NOTE — THERAPY TREATMENT NOTE
Patient Name: Altagracia Rothman  : 1950    MRN: [id number]                              Today's Date: 2022       Admit Date: 2022    Visit Dx:     ICD-10-CM ICD-9-CM   1. Other closed displaced fracture of proximal end of right humerus, initial encounter  S42.291A 812.09   2. Closed fracture of distal end of humerus, unspecified fracture morphology, initial encounter  S42.409A 812.40   3. Fall, initial encounter  W19.XXXA E888.9   4. Osteoporosis with current pathological fracture, unspecified osteoporosis type, initial encounter  M80.00XA 733.00     733.10     Patient Active Problem List   Diagnosis   • Adrenal insufficiency (HCC)   • Hypothyroidism   • Essential hypertension   • H/O pheochromocytoma   • Chronic bilateral low back pain without sciatica   • Encounter for chronic pain management   • Osteoporosis   • Tobacco abuse   • BMI 35.0-35.9,adult   • Bronchitis   • Moderate asthma without complication   • Paresthesia   • Medicare annual wellness visit, subsequent   • Routine medical exam   • Lipid screening   • Hair loss   • Other closed displaced fracture of proximal end of right humerus, initial encounter   • Acute UTI (urinary tract infection)     Past Medical History:   Diagnosis Date   • Adrenal insufficiency (HCC)    • Fibromyalgia    • Hypertension    • Hypothyroidism    • Skin cancer      Past Surgical History:   Procedure Laterality Date   • ADRENAL GLAND SURGERY     • BREAST BIOPSY      x4   • GALLBLADDER SURGERY     • HYSTERECTOMY     • MRI ANGIOGRAM LOWER EXTREMITY UNILATERAL W CONTRAST     • TONSILLECTOMY        General Information     Row Name 22 1511          Physical Therapy Time and Intention    Document Type therapy note (daily note)  -     Mode of Treatment physical therapy  -     Row Name 22 1511          General Information    Patient Profile Reviewed yes  -     Existing Precautions/Restrictions fall;other (see comments)  interscalene nerve catheter, sling as  needed  -     Row Name 07/17/22 1511          Cognition    Orientation Status (Cognition) oriented x 4  -[facility] Name 07/17/22 1511          Safety Issues, Functional Mobility    Safety Issues Affecting Function (Mobility) safety precaution awareness;safety precautions follow-through/compliance;sequencing abilities  -     Impairments Affecting Function (Mobility) endurance/activity tolerance;pain;range of motion (ROM);strength  -           User Key  (r) = Recorded By, (t) = Taken By, (c) = Cosigned By    Initials Name Provider Type    Yury Garcia, PT Physical Therapist               Mobility     Row Name 07/17/22 1513          Bed Mobility    Comment, (Bed Mobility) UIC  -[facility] Name 07/17/22 1513          Transfers    Comment, (Transfers) VCs for sequencing and safety with use of crutch. Pt performed recliner <> BSC transfers with min A.  -[facility] Name 07/17/22 1513          Sit-Stand Transfer    Sit-Stand [place] (Transfers) minimum assist (75% patient effort);verbal cues;nonverbal cues (demo/gesture);1 person assist  -     Assistive Device (Sit-Stand Transfers) crutches, axillary  -[facility] Name 07/17/22 1513          Gait/Stairs (Locomotion)    [place] Level (Gait) minimum assist (75% patient effort);verbal cues;nonverbal cues (demo/gesture)  -     Assistive Device (Gait) crutches, axillary  -     Distance in Feet (Gait) 4  -     Deviations/Abnormal Patterns (Gait) base of support, narrow;festinating/shuffling;gait speed decreased;stride length decreased  -     Bilateral Gait Deviations forward flexed posture;heel strike decreased  -     Comment, (Gait/Stairs) Cues for safe sequencing and close assist for safety but pt prefers but independence despite education on need for safety to reduce fall risk.  -           User Key  (r) = Recorded By, (t) = Taken By, (c) = Cosigned By    Initials Name Provider Type    Yury Garcia, PT Physical Therapist                Obj/Interventions     [facility] Name 07/17/22 1519          Motor Skills    Therapeutic Exercise other (see comments)  LAQ x 20, seated marching x 20, ankle pumps x 10  -[facility] Name 07/17/22 1519          Balance    Balance Assessment standing static balance;standing dynamic balance  -     Static Standing Balance minimal assist;verbal cues  -     Dynamic Standing Balance minimal assist;verbal cues  -     Position/Device Used, Standing Balance supported;crutches, axillary  -     Balance Interventions sitting;standing;sit to stand;supported;static;dynamic;minimal challenge  -           User Key  (r) = Recorded By, (t) = Taken By, (c) = Cosigned By    Initials Name Provider Type     Yury Acuna, PT Physical Therapist               Goals/Plan    No documentation.                Clinical Impression     [facility] Name 07/17/22 1519          Pain Scale: FACES Pre/Post-Treatment    Pain: FACES Scale, Pretreatment 2-->hurts little bit  -     Posttreatment Pain Rating 2-->hurts little bit  -     Pain Location - Side/Orientation Right  -     Pain Location upper  -     Pain Location - extremity  -JH     Row Name 07/17/22 1519          Plan of Care Review    Plan of Care Reviewed With patient  -     Progress improving  -     Outcome Evaluation Pt performed transfers with min A and axillary crutch in LUE. Cont d/c recs for SNF.  -[facility] Name 07/17/22 1519          Therapy Assessment/Plan (PT)    Rehab Potential (PT) good, to achieve stated therapy goals  [facility]     Criteria for Skilled Interventions Met (PT) yes  -     Therapy Frequency (PT) daily  -[facility] Name 07/17/22 1519          Vital Signs    Pre Patient Position Sitting  -     Intra Patient Position Standing  -     Post Patient Position Sitting  -JH     Row Name 07/17/22 1519          Positioning and Restraints    Pre-Treatment Position sitting in chair/recliner  -     Post Treatment Position chair  -     In Chair notified  nsg;reclined;call light within reach;encouraged to call for assist;exit alarm on;RUE elevated;legs elevated  -           User Key  (r) = Recorded By, (t) = Taken By, (c) = Cosigned By    Initials Name Provider Type    Yury Garcia, PT Physical Therapist               Outcome Measures     Row Name 07/17/22 1520          How much help from another person do you currently need...    Turning from your back to your side while in flat bed without using bedrails? 2  -JH     Moving from lying on back to sitting on the side of a flat bed without bedrails? 2  -JH     Moving to and from a bed to a chair (including a wheelchair)? 3  -JH     Standing up from a chair using your arms (e.g., wheelchair, bedside chair)? 3  -JH     Climbing 3-5 steps with a railing? 1  -JH     To walk in hospital room? 2  -     AM-PAC 6 Clicks Score (PT) 13  -     Highest level of mobility 4 --> Transferred to chair/commode  -     Row Name 07/17/22 1520          Functional Assessment    Outcome Measure Options AM-PAC 6 Clicks Basic Mobility (PT)  -           User Key  (r) = Recorded By, (t) = Taken By, (c) = Cosigned By    Initials Name Provider Type    Yury Garcia, PT Physical Therapist                             Physical Therapy Education                 Title: PT OT SLP Therapies (Done)     Topic: Physical Therapy (Done)     Point: Mobility training (Done)     Learning Progress Summary           Patient Acceptance, E,TB, VU by  at 7/17/2022 1521      Show all documentation for this point (13)                 Point: Home exercise program (Done)     Learning Progress Summary           Patient Acceptance, E,TB, VU by  at 7/17/2022 1521      Show all documentation for this point (14)                 Point: Body mechanics (Done)     Learning Progress Summary           Patient Acceptance, E,TB, VU by  at 7/17/2022 1521      Show all documentation for this point (13)                 Point: Precautions (Done)     Learning  Progress Summary           Patient Acceptance, E,TB, VU by  at 7/17/2022 1521      Show all documentation for this point (13)                             User Key     Initials Effective Dates Name Provider Type Discipline     09/22/20 -  Yury Acuna PT Physical Therapist PT              PT Recommendation and Plan     Plan of Care Reviewed With: patient  Progress: improving  Outcome Evaluation: Pt performed transfers with min A and axillary crutch in LUE. Cont d/c recs for SNF.     Time Calculation:    PT Charges     Row Name 07/17/22 1521             Time Calculation    Start Time 1430  -      PT Received On 07/17/22  -      PT Goal Re-Cert Due Date 07/25/22  -              Time Calculation- PT    Total Timed Code Minutes- PT 25 minute(s)  -              Timed Charges    70311 - PT Therapeutic Activity Minutes 25  -              Total Minutes    Timed Charges Total Minutes 25  -       Total Minutes 25  -            User Key  (r) = Recorded By, (t) = Taken By, (c) = Cosigned By    Initials Name Provider Type     Yury Acuna PT Physical Therapist              Therapy Charges for Today     Code Description Service Date Service Provider Modifiers Qty    88107665276 HC PT THER PROC EA 15 MIN 7/16/2022 Yury Acuna, PT GP 1    33711207240 HC PT THERAPEUTIC ACT EA 15 MIN 7/16/2022 Yury Acuna, PT GP 1    97091738729 HC PT THER SUPP EA 15 MIN 7/16/2022 Yury Acuna, PT GP 2    73344743831 HC PT THERAPEUTIC ACT EA 15 MIN 7/17/2022 Yury Acuna, PT GP 2          PT G-Codes  Outcome Measure Options: AM-PAC 6 Clicks Basic Mobility (PT)  AM-PAC 6 Clicks Score (PT): 13  AM-PAC 6 Clicks Score (OT): 13    Yury Acuna PT  7/17/2022

## 2022-07-17 NOTE — PROGRESS NOTES
"/87 (BP Location: Left arm, Patient Position: Lying)   Pulse 88   Temp 97.8 °F (36.6 °C) (Axillary)   Resp 18   Ht 154.9 cm (61\")   Wt 81.6 kg (180 lb)   SpO2 96%   BMI 34.01 kg/m²     Lab Results (last 24 hours)     Procedure Component Value Units Date/Time    Urinalysis With Culture If Indicated - Urine, Clean Catch [[id number]]  (Normal) Collected: 07/16/22 1706    Specimen: Urine, Clean Catch Updated: 07/16/22 1711     Color, UA Yellow     Appearance, UA Clear     pH, UA 6.0     Specific Gravity, UA 1.011     Glucose, UA Negative     Ketones, UA Negative     Bilirubin, UA Negative     Blood, UA Negative     Protein, UA Negative     Leuk Esterase, UA Negative     Nitrite, UA Negative     Urobilinogen, UA 0.2 E.U./dL    Narrative:      In absence of clinical symptoms, the presence of pyuria, bacteria, and/or nitrites on the urinalysis result does not correlate with infection.  Urine microscopic not indicated.          Imaging Results (Last 24 Hours)     ** No results found for the last 24 hours. **          Patient Care Team:  Alvina Lloyd PA-C as PCP - General (Internal Medicine)  Heather Nelson MD as Consulting Physician (Endocrinology)  Lashawn Michele MD as Consulting Physician (Rheumatology)    SUBJECTIVE: She reports her right shoulder and arm feel better today.  She did well with therapy yesterday.    PHYSICAL EXAM  Right shoulder incisions clean, dry and intact with mesh dressings in place  Neurovascular intact distally       Other closed displaced fracture of proximal end of right humerus, initial encounter    Adrenal insufficiency (HCC)    Hypothyroidism    Essential hypertension    H/O pheochromocytoma    Osteoporosis    Moderate asthma without complication    Acute UTI (urinary tract infection)      PLAN / DISPOSITION: 72-year-old female postop day #3 status post right humerus IM nail and distal humerus ORIF by Dr. Matute  -Continue to mobilize with PT/OT.  Weightbearing as " tolerated right upper extremity  - for rehab placement  -Follow-up with Dr. Matute as scheduled    Iraj Mike MD  07/17/22  09:16 EDT

## 2022-07-17 NOTE — PLAN OF CARE
Goal Outcome Evaluation:  Plan of Care Reviewed With: patient        Progress: improving  Outcome Evaluation: Pt ambulated 20 feet to/from [facility] with min assist x2 using L axillary crutch. She required dependence post-toilet hygiene and clothing management. She completed bed mobility with mod assist x2. She tolerated RUE AAROM as follows: FE 90, ABD 85, IR chest/ER 40, elbow 30-95 and limited with bandage. Recommend IPR, pt in agreement.

## 2022-07-17 NOTE — PLAN OF CARE
Goal Outcome Evaluation:  Plan of Care Reviewed With: patient           Nerve cath d/c'd this am d/t leaking, pain has been well controlled with percocet, did require two doses of dilaudid with therapy, complaining of back spasms, offered muscle relaxer but pt refused. Ambulated in room, very motivated, needs to have a BM, agreed to stool softeners, appetite improving, VSS, RA when awake, 2L at night. NSR on tele, cont to monitor

## 2022-07-17 NOTE — PLAN OF CARE
Goal Outcome Evaluation:  Plan of Care Reviewed With: patient        Progress: improving  Outcome Evaluation: Pt performed transfers with min A and axillary crutch in E. Cont d/c recs for SNF.

## 2022-07-17 NOTE — PROGRESS NOTES
[facility]    Acute pain service Inpatient Progress Note    Patient Name: Altagracia Rothman  :  1950  MRN:  [id number]        Acute Pain  Service Inpatient Progress Note:    Analgesia:Good  LOC: alert and awake  Resp Status: room air  Cardiac: VS stable  Side Effects:None  Catheter Site:clean, dressing intact and dry  Cath type: peripheral nerve cath with ON Q  Volume: 1mL,8ml, 8ml InfuSystem Pump.  Catheter Plan:Catheter removed and tip intact  Comments:

## 2022-07-17 NOTE — PLAN OF CARE
Goal Outcome Evaluation:                 Problem: Adult Inpatient Plan of Care  Goal: Absence of Hospital-Acquired Illness or Injury  Intervention: Identify and Manage Fall Risk  Recent Flowsheet Documentation  Taken 7/17/2022 0400 by Bruno Kim RN  Safety Promotion/Fall Prevention:   activity supervised   safety round/check completed   toileting scheduled  Taken 7/17/2022 0200 by Bruno Kim RN  Safety Promotion/Fall Prevention:   activity supervised   safety round/check completed   toileting scheduled  Taken 7/17/2022 0000 by Bruno Kim RN  Safety Promotion/Fall Prevention:   activity supervised   safety round/check completed   toileting scheduled  Taken 7/16/2022 2200 by Bruno Kim RN  Safety Promotion/Fall Prevention:   activity supervised   safety round/check completed   toileting scheduled  Taken 7/16/2022 2000 by Bruno Kim RN  Safety Promotion/Fall Prevention:   activity supervised   safety round/check completed   toileting scheduled  Intervention: Prevent Skin Injury  Recent Flowsheet Documentation  Taken 7/17/2022 0400 by Bruno Kim RN  Body Position: supine  Skin Protection: adhesive use limited  Taken 7/17/2022 0200 by Bruno Kim RN  Body Position: supine  Skin Protection: adhesive use limited  Taken 7/17/2022 0000 by Bruno Kim RN  Body Position: supine  Skin Protection: adhesive use limited  Taken 7/16/2022 2200 by Bruno Kim RN  Body Position: supine  Skin Protection: adhesive use limited  Taken 7/16/2022 2000 by Bruno Kim RN  Skin Protection: adhesive use limited  Intervention: Prevent Infection  Recent Flowsheet Documentation  Taken 7/17/2022 0400 by Bruno Kim RN  Infection Prevention: environmental surveillance performed  Taken 7/17/2022 0200 by Bruno Kim RN  Infection Prevention: environmental surveillance performed  Taken 7/17/2022 0000 by Bruno Kim RN  Infection Prevention: environmental surveillance  performed  Taken 7/16/2022 2200 by Bruno Kim RN  Infection Prevention: environmental surveillance performed  Taken 7/16/2022 2000 by Bruno Kim RN  Infection Prevention: environmental surveillance performed  Goal: Optimal Comfort and Wellbeing  Intervention: Monitor Pain and Promote Comfort  Recent Flowsheet Documentation  Taken 7/17/2022 0400 by Bruno Kim RN  Pain Management Interventions: quiet environment facilitated  Taken 7/17/2022 0200 by Bruno Kim RN  Pain Management Interventions: quiet environment facilitated  Taken 7/17/2022 0000 by Bruno Kim RN  Pain Management Interventions: quiet environment facilitated  Taken 7/16/2022 2200 by Bruno Kim RN  Pain Management Interventions: quiet environment facilitated  Taken 7/16/2022 2000 by Bruno Kim RN  Pain Management Interventions: quiet environment facilitated  Intervention: Provide Person-Centered Care  Recent Flowsheet Documentation  Taken 7/17/2022 0400 by Bruno Kim RN  Trust Relationship/Rapport: care explained  Taken 7/17/2022 0200 by Bruno Kim RN  Trust Relationship/Rapport: care explained  Taken 7/17/2022 0000 by Bruno Kim RN  Trust Relationship/Rapport: care explained  Taken 7/16/2022 2200 by Bruno Kim RN  Trust Relationship/Rapport: care explained  Taken 7/16/2022 2000 by Bruno Kim RN  Trust Relationship/Rapport: care explained     Problem: Skin Injury Risk Increased  Goal: Skin Health and Integrity  Intervention: Optimize Skin Protection  Recent Flowsheet Documentation  Taken 7/17/2022 0400 by Bruno Kim RN  Pressure Reduction Techniques: frequent weight shift encouraged  Head of Bed (HOB) Positioning: HOB at 20-30 degrees  Pressure Reduction Devices: pressure-redistributing mattress utilized  Skin Protection: adhesive use limited  Taken 7/17/2022 0200 by Bruno Kim RN  Pressure Reduction Techniques: frequent weight shift encouraged  Head of Bed (HOB)  Positioning: HOB at 20-30 degrees  Pressure Reduction Devices: pressure-redistributing mattress utilized  Skin Protection: adhesive use limited  Taken 7/17/2022 0000 by Bruno Kim RN  Pressure Reduction Techniques: frequent weight shift encouraged  Head of Bed (HOB) Positioning: HOB at 20-30 degrees  Pressure Reduction Devices: pressure-redistributing mattress utilized  Skin Protection: adhesive use limited  Taken 7/16/2022 2200 by Bruno Kim RN  Pressure Reduction Techniques: frequent weight shift encouraged  Head of Bed (HOB) Positioning: HOB at 30-45 degrees  Pressure Reduction Devices: pressure-redistributing mattress utilized  Skin Protection: adhesive use limited  Taken 7/16/2022 2000 by Bruno Kim RN  Pressure Reduction Techniques: frequent weight shift encouraged  Pressure Reduction Devices: pressure-redistributing mattress utilized  Skin Protection: adhesive use limited     Problem: Fall Injury Risk  Goal: Absence of Fall and Fall-Related Injury  Intervention: Identify and Manage Contributors  Recent Flowsheet Documentation  Taken 7/17/2022 0400 by Bruno Kim RN  Medication Review/Management: medications reviewed  Taken 7/17/2022 0200 by Bruno Kim RN  Medication Review/Management: medications reviewed  Taken 7/17/2022 0000 by Bruno Kim RN  Medication Review/Management: medications reviewed  Taken 7/16/2022 2200 by Bruno Kim RN  Medication Review/Management: medications reviewed  Taken 7/16/2022 2000 by Bruno Kim RN  Medication Review/Management: medications reviewed  Intervention: Promote Injury-Free Environment  Recent Flowsheet Documentation  Taken 7/17/2022 0400 by Bruno Kim RN  Safety Promotion/Fall Prevention:   activity supervised   safety round/check completed   toileting scheduled  Taken 7/17/2022 0200 by Bruno Kim RN  Safety Promotion/Fall Prevention:   activity supervised   safety round/check completed   toileting scheduled  Taken  7/17/2022 0000 by Bruno Kim RN  Safety Promotion/Fall Prevention:   activity supervised   safety round/check completed   toileting scheduled  Taken 7/16/2022 2200 by Brnuo Kim RN  Safety Promotion/Fall Prevention:   activity supervised   safety round/check completed   toileting scheduled  Taken 7/16/2022 2000 by Bruno Kim RN  Safety Promotion/Fall Prevention:   activity supervised   safety round/check completed   toileting scheduled     Problem: Functional Ability Impaired (Orthopaedic Fracture)  Goal: Optimal Functional Ability  Intervention: Optimize Functional Ability  Recent Flowsheet Documentation  Taken 7/17/2022 0400 by Bruno Kim RN  Positioning/Transfer Devices: pillows  Taken 7/17/2022 0200 by Bruno Kim RN  Positioning/Transfer Devices: pillows  Taken 7/17/2022 0000 by Bruno Kim RN  Positioning/Transfer Devices: pillows  Taken 7/16/2022 2200 by Bruno Kim RN  Positioning/Transfer Devices: pillows  Taken 7/16/2022 2000 by Bruno Kim RN  Positioning/Transfer Devices: pillows     Problem: Infection (Orthopaedic Fracture)  Goal: Absence of Infection Signs and Symptoms  Intervention: Prevent or Manage Infection  Recent Flowsheet Documentation  Taken 7/17/2022 0400 by Bruno Kim RN  Infection Prevention: environmental surveillance performed  Taken 7/17/2022 0200 by Bruno Kim RN  Infection Prevention: environmental surveillance performed  Taken 7/17/2022 0000 by Bruno Kim RN  Infection Prevention: environmental surveillance performed  Taken 7/16/2022 2200 by Bruno Kim RN  Infection Prevention: environmental surveillance performed  Taken 7/16/2022 2000 by Bruno Kim RN  Infection Prevention: environmental surveillance performed     Problem: Pain (Orthopaedic Fracture)  Goal: Acceptable Pain Control  Intervention: Manage Acute Orthopaedic-Related Pain  Recent Flowsheet Documentation  Taken 7/17/2022 0400 by Bruno Kim  RN  Pain Management Interventions: quiet environment facilitated  Taken 7/17/2022 0200 by Bruno Kim RN  Pain Management Interventions: quiet environment facilitated  Taken 7/17/2022 0000 by Bruno Kim RN  Pain Management Interventions: quiet environment facilitated  Taken 7/16/2022 2200 by Bruno Kim RN  Pain Management Interventions: quiet environment facilitated  Taken 7/16/2022 2000 by Bruno Kim RN  Pain Management Interventions: quiet environment facilitated     Problem: Respiratory Compromise (Orthopaedic Fracture)  Goal: Effective Oxygenation and Ventilation  Intervention: Promote Airway Secretion Clearance  Recent Flowsheet Documentation  Taken 7/16/2022 2000 by Bruno Kim RN  Cough And Deep Breathing: done independently per patient    VSS, voids well, rested throughout the night, pain managed with PRN medications, will continue to monitor for changes.

## 2022-07-18 PROCEDURE — 94799 UNLISTED PULMONARY SVC/PX: CPT

## 2022-07-18 PROCEDURE — 25010000002 HYDROCORTISONE SODIUM SUCCINATE 100 MG RECONSTITUTED SOLUTION: Performed by: INTERNAL MEDICINE

## 2022-07-18 PROCEDURE — 25010000002 HYDROMORPHONE 1 MG/ML SOLUTION: Performed by: ORTHOPAEDIC SURGERY

## 2022-07-18 PROCEDURE — 97110 THERAPEUTIC EXERCISES: CPT | Performed by: OCCUPATIONAL THERAPIST

## 2022-07-18 PROCEDURE — 99232 SBSQ HOSP IP/OBS MODERATE 35: CPT | Performed by: INTERNAL MEDICINE

## 2022-07-18 PROCEDURE — 97116 GAIT TRAINING THERAPY: CPT

## 2022-07-18 PROCEDURE — 97530 THERAPEUTIC ACTIVITIES: CPT

## 2022-07-18 RX ORDER — HYDROCORTISONE 10 MG/1
10 TABLET ORAL
Status: DISCONTINUED | OUTPATIENT
Start: 2022-07-18 | End: 2022-07-19 | Stop reason: HOSPADM

## 2022-07-18 RX ORDER — HYDROCORTISONE 20 MG/1
20 TABLET ORAL
Status: DISCONTINUED | OUTPATIENT
Start: 2022-07-19 | End: 2022-07-19 | Stop reason: HOSPADM

## 2022-07-18 RX ADMIN — OXYCODONE HYDROCHLORIDE AND ACETAMINOPHEN 1 TABLET: 10; 325 TABLET ORAL at 17:58

## 2022-07-18 RX ADMIN — GABAPENTIN 800 MG: 400 CAPSULE ORAL at 13:21

## 2022-07-18 RX ADMIN — Medication 250 MG: at 21:32

## 2022-07-18 RX ADMIN — DOXYCYCLINE 100 MG: 100 CAPSULE ORAL at 09:13

## 2022-07-18 RX ADMIN — CONJUGATED ESTROGENS 0.5 G: 0.62 CREAM VAGINAL at 09:11

## 2022-07-18 RX ADMIN — PINDOLOL 2.5 MG: 5 TABLET ORAL at 16:01

## 2022-07-18 RX ADMIN — GABAPENTIN 800 MG: 400 CAPSULE ORAL at 05:40

## 2022-07-18 RX ADMIN — MORPHINE SULFATE 30 MG: 30 TABLET, FILM COATED, EXTENDED RELEASE ORAL at 09:12

## 2022-07-18 RX ADMIN — PINDOLOL 2.5 MG: 5 TABLET ORAL at 21:34

## 2022-07-18 RX ADMIN — GABAPENTIN 800 MG: 400 CAPSULE ORAL at 21:31

## 2022-07-18 RX ADMIN — HYDROCORTISONE SODIUM SUCCINATE 25 MG: 100 INJECTION, POWDER, FOR SOLUTION INTRAMUSCULAR; INTRAVENOUS at 01:40

## 2022-07-18 RX ADMIN — DOXYCYCLINE 100 MG: 100 CAPSULE ORAL at 21:31

## 2022-07-18 RX ADMIN — Medication 10 ML: at 21:34

## 2022-07-18 RX ADMIN — NORTRIPTYLINE HYDROCHLORIDE 50 MG: 10 CAPSULE ORAL at 09:11

## 2022-07-18 RX ADMIN — Medication 250 MG: at 09:12

## 2022-07-18 RX ADMIN — HYDROMORPHONE HYDROCHLORIDE 1 MG: 1 INJECTION, SOLUTION INTRAMUSCULAR; INTRAVENOUS; SUBCUTANEOUS at 16:02

## 2022-07-18 RX ADMIN — HYDROCORTISONE SODIUM SUCCINATE 25 MG: 100 INJECTION, POWDER, FOR SOLUTION INTRAMUSCULAR; INTRAVENOUS at 09:12

## 2022-07-18 RX ADMIN — OXYCODONE HYDROCHLORIDE AND ACETAMINOPHEN 1 TABLET: 10; 325 TABLET ORAL at 13:21

## 2022-07-18 RX ADMIN — PINDOLOL 2.5 MG: 5 TABLET ORAL at 09:10

## 2022-07-18 RX ADMIN — BUDESONIDE AND FORMOTEROL FUMARATE DIHYDRATE 2 PUFF: 160; 4.5 AEROSOL RESPIRATORY (INHALATION) at 09:03

## 2022-07-18 RX ADMIN — DOCUSATE SODIUM 100 MG: 100 CAPSULE, LIQUID FILLED ORAL at 21:31

## 2022-07-18 RX ADMIN — GABAPENTIN 800 MG: 400 CAPSULE ORAL at 11:05

## 2022-07-18 RX ADMIN — OXYCODONE HYDROCHLORIDE AND ACETAMINOPHEN 1 TABLET: 10; 325 TABLET ORAL at 01:40

## 2022-07-18 RX ADMIN — GABAPENTIN 800 MG: 400 CAPSULE ORAL at 17:58

## 2022-07-18 RX ADMIN — OXYCODONE HYDROCHLORIDE AND ACETAMINOPHEN 1 TABLET: 10; 325 TABLET ORAL at 09:13

## 2022-07-18 RX ADMIN — OXYCODONE HYDROCHLORIDE AND ACETAMINOPHEN 1 TABLET: 10; 325 TABLET ORAL at 21:34

## 2022-07-18 RX ADMIN — SENNOSIDES AND DOCUSATE SODIUM 2 TABLET: 50; 8.6 TABLET ORAL at 21:32

## 2022-07-18 RX ADMIN — ACETAMINOPHEN 500 MG: 500 TABLET ORAL at 01:39

## 2022-07-18 RX ADMIN — HYDROXYZINE HYDROCHLORIDE 25 MG: 25 TABLET ORAL at 21:31

## 2022-07-18 RX ADMIN — DOCUSATE SODIUM 100 MG: 100 CAPSULE, LIQUID FILLED ORAL at 09:12

## 2022-07-18 RX ADMIN — NORTRIPTYLINE HYDROCHLORIDE 50 MG: 10 CAPSULE ORAL at 21:36

## 2022-07-18 RX ADMIN — Medication 10 ML: at 09:17

## 2022-07-18 RX ADMIN — NORTRIPTYLINE HYDROCHLORIDE 50 MG: 10 CAPSULE ORAL at 16:00

## 2022-07-18 RX ADMIN — HYDROCORTISONE 10 MG: 10 TABLET ORAL at 16:01

## 2022-07-18 RX ADMIN — LISINOPRIL 5 MG: 5 TABLET ORAL at 09:12

## 2022-07-18 RX ADMIN — PANTOPRAZOLE SODIUM 40 MG: 40 TABLET, DELAYED RELEASE ORAL at 05:40

## 2022-07-18 RX ADMIN — SPIRONOLACTONE 50 MG: 50 TABLET ORAL at 09:12

## 2022-07-18 RX ADMIN — MORPHINE SULFATE 30 MG: 30 TABLET, FILM COATED, EXTENDED RELEASE ORAL at 21:32

## 2022-07-18 RX ADMIN — LEVOTHYROXINE SODIUM 88 MCG: 88 TABLET ORAL at 05:40

## 2022-07-18 NOTE — THERAPY TREATMENT NOTE
Patient Name: Altagracia Rothman  : 1950    MRN: [id number]                              Today's Date: 2022       Admit Date: 2022    Visit Dx:     ICD-10-CM ICD-9-CM   1. Other closed displaced fracture of proximal end of right humerus, initial encounter  S42.291A 812.09   2. Closed fracture of distal end of humerus, unspecified fracture morphology, initial encounter  S42.409A 812.40   3. Fall, initial encounter  W19.XXXA E888.9   4. Osteoporosis with current pathological fracture, unspecified osteoporosis type, initial encounter  M80.00XA 733.00     733.10     Patient Active Problem List   Diagnosis   • Adrenal insufficiency (HCC)   • Hypothyroidism   • Essential hypertension   • H/O pheochromocytoma   • Chronic bilateral low back pain without sciatica   • Encounter for chronic pain management   • Osteoporosis   • Tobacco abuse   • BMI 35.0-35.9,adult   • Bronchitis   • Moderate asthma without complication   • Paresthesia   • Medicare annual wellness visit, subsequent   • Routine medical exam   • Lipid screening   • Hair loss   • Other closed displaced fracture of proximal end of right humerus, initial encounter   • Acute UTI (urinary tract infection)     Past Medical History:   Diagnosis Date   • Adrenal insufficiency (HCC)    • Fibromyalgia    • Hypertension    • Hypothyroidism    • Skin cancer      Past Surgical History:   Procedure Laterality Date   • ADRENAL GLAND SURGERY     • BREAST BIOPSY      x4   • GALLBLADDER SURGERY     • HYSTERECTOMY     • MRI ANGIOGRAM LOWER EXTREMITY UNILATERAL W CONTRAST     • TONSILLECTOMY        General Information     Row Name 22 1036          Physical Therapy Time and Intention    Document Type therapy note (daily note)  -HP     Mode of Treatment physical therapy  -HP     Row Name 22 1036          General Information    Patient Profile Reviewed yes  -HP     Existing Precautions/Restrictions fall  -HP     Row Name 22 1036          Cognition     Orientation Status (Cognition) oriented x 4  -HP     Row Name 07/18/22 1036          Safety Issues, Functional Mobility    Impairments Affecting Function (Mobility) endurance/activity tolerance;pain;range of motion (ROM);strength;balance  -HP           User Key  (r) = Recorded By, (t) = Taken By, (c) = Cosigned By    Initials Name Provider Type    HP Fina Landeros, FAIZA Physical Therapist               Mobility     Row Name 07/18/22 1036          Bed Mobility    Bed Mobility supine-sit  -HP     Supine-Sit [place] (Bed Mobility) minimum assist (75% patient effort);1 person assist  -HP     Assistive Device (Bed Mobility) head of bed elevated;bed rails  -     Comment, (Bed Mobility) Pt required Min A for trunk management when coming to sit EOB  -     Row Name 07/18/22 1036          Transfers    Comment, (Transfers) Pt performed STS with B axillary crutches and CGA. Assistance required for placing R crutch. Pt able to tolerate R crutch with elevated pain 7/10  -     Row Name 07/18/22 1036          Sit-Stand Transfer    Sit-Stand [place] (Transfers) contact guard  -     Assistive Device (Sit-Stand Transfers) crutches, axillary  -HP     Row Name 07/18/22 1036          Gait/Stairs (Locomotion)    [place] Level (Gait) minimum assist (75% patient effort);1 person assist  -HP     Assistive Device (Gait) crutches, axillary  -     Distance in Feet (Gait) 30  -HP     Deviations/Abnormal Patterns (Gait) base of support, narrow;festinating/shuffling;gait speed decreased;stride length decreased  -     Bilateral Gait Deviations forward flexed posture;heel strike decreased  -     Comment, (Gait/Stairs) Pt amb 30' with axillary crutches and Min Ax1. Pt demonstrated significant forward flexed posture and heavy reliance of trunk on B axillary crutches despite VC. Pt demonstrated four-point gait pattern with lateral lean to L for comfort. Pt able to navigate around turns and objects in room without LOB.  Increased time to complete task. Activity limited by fatigue and R shoulder pain.  -     Row Name 07/18/22 1036          Mobility    Extremity Weight-bearing Status right upper extremity  -     Right Upper Extremity (Weight-bearing Status) weight-bearing as tolerated (WBAT)  -           User Key  (r) = Recorded By, (t) = Taken By, (c) = Cosigned By    Initials Name Provider Type     Fina Landeros PT Physical Therapist               Obj/Interventions     [facility] Name 07/18/22 1040          Balance    Balance Assessment sitting static balance;sitting dynamic balance;sit to stand dynamic balance;standing static balance;standing dynamic balance  -     Static Sitting Balance standby assist  -     Dynamic Sitting Balance standby assist  -     Position, Sitting Balance sitting edge of bed  -     Static Standing Balance contact guard  -     Dynamic Standing Balance minimal assist  -     Position/Device Used, Standing Balance supported;crutches, axillary  -     Balance Interventions sitting;standing;sit to stand;occupation based/functional task  -           User Key  (r) = Recorded By, (t) = Taken By, (c) = Cosigned By    Initials Name Provider Type     Fina Landeros PT Physical Therapist               Goals/Plan    No documentation.                Clinical Impression     Row Name 07/18/22 1043          Pain    Pretreatment Pain Rating 7/10  -     Posttreatment Pain Rating 7/10  -     Pain Location - Side/Orientation Right  -     Pain Location upper  -     Pain Location - extremity  -     Pre/Posttreatment Pain Comment tolerated R crutches  -HP     Row Name 07/18/22 1043          Plan of Care Review    Plan of Care Reviewed With patient  -     Progress no change  -     Outcome Evaluation Pt demonstrated improvement in functional mobility this session. pt performed bed mobility with Min A. Pt performed STS with CGA and assist to place B axillary crutches. pt increased distance amb to  30' with Min A and B crutches. Pt demonstrated good effort today. Recommend d/c to IPR for best functional outcome.  -HP     Row Name 07/18/22 1043          Vital Signs    Pre Systolic BP Rehab --  VSS  -HP     Pre Patient Position Supine  -HP     Intra Patient Position Standing  -HP     Post Patient Position Sitting  -HP     Row Name 07/18/22 1043          Positioning and Restraints    Pre-Treatment Position in bed  -HP     Post Treatment Position chair  -HP     In Chair notified nsg;reclined;sitting;call light within reach;encouraged to call for assist;exit alarm on;with family/caregiver;legs elevated;on mechanical lift sling;waffle cushion  -HP           User Key  (r) = Recorded By, (t) = Taken By, (c) = Cosigned By    Initials Name Provider Type    Fina Reyna PT Physical Therapist               Outcome Measures     Row Name 07/18/22 1051          How much help from another person do you currently need...    Turning from your back to your side while in flat bed without using bedrails? 3  -HP     Moving from lying on back to sitting on the side of a flat bed without bedrails? 2  -HP     Moving to and from a bed to a chair (including a wheelchair)? 3  -HP     Standing up from a chair using your arms (e.g., wheelchair, bedside chair)? 3  -HP     Climbing 3-5 steps with a railing? 1  -HP     To walk in hospital room? 2  -HP     AM-PAC 6 Clicks Score (PT) 14  -HP     Highest level of mobility 4 --> Transferred to chair/commode  -HP     Row Name 07/18/22 1051          Functional Assessment    Outcome Measure Options AM-PAC 6 Clicks Basic Mobility (PT)  -HP           User Key  (r) = Recorded By, (t) = Taken By, (c) = Cosigned By    Initials Name Provider Type    Fina Reyna PT Physical Therapist                             Physical Therapy Education                 Title: PT OT SLP Therapies (Done)     Topic: Physical Therapy (Done)     Point: Mobility training (Done)     Learning Progress Summary            Patient Acceptance, E,D, VU by  at 7/18/2022 1051      Show all documentation for this point (14)                 Point: Home exercise program (Done)     Learning Progress Summary           Patient Acceptance, E,D, VU by  at 7/18/2022 1051      Show all documentation for this point (15)                 Point: Body mechanics (Done)     Learning Progress Summary           Patient Acceptance, E,D, VU by  at 7/18/2022 1051      Show all documentation for this point (14)                 Point: Precautions (Done)     Learning Progress Summary           Patient Acceptance, E,D, VU by  at 7/18/2022 1051      Show all documentation for this point (14)                             User Key     Initials Effective Dates Name Provider Type Discipline     06/01/21 -  Fina Landeros PT Physical Therapist PT              PT Recommendation and Plan     Plan of Care Reviewed With: patient  Progress: no change  Outcome Evaluation: Pt demonstrated improvement in functional mobility this session. pt performed bed mobility with Min A. Pt performed STS with CGA and assist to place B axillary crutches. pt increased distance amb to 30' with Min A and B crutches. Pt demonstrated good effort today. Recommend d/c to [facility] for best functional outcome.     Time Calculation:    PT Charges     Row Name 07/18/22 1000             Time Calculation    Start Time 1000  -HP      PT Received On 07/18/22  -              Timed Charges    12629 - Gait Training Minutes  15  -HP      76829 - PT Therapeutic Activity Minutes 10  -HP              Total Minutes    Timed Charges Total Minutes 25  -HP       Total Minutes 25  -HP            User Key  (r) = Recorded By, (t) = Taken By, (c) = Cosigned By    Initials Name Provider Type     Fina Landeros PT Physical Therapist              Therapy Charges for Today     Code Description Service Date Service Provider Modifiers Qty    16222587726 HC GAIT TRAINING EA 15 MIN 7/18/2022 Fina Landeros, PT GP 1     [id number]  PT THERAPEUTIC ACT EA 15 MIN 7/18/2022 Fina Landeros, PT GP 1          PT G-Codes  Outcome Measure Options: AM-PAC 6 Clicks Basic Mobility (PT)  AM-PAC 6 Clicks Score (PT): 14  AM-PAC 6 Clicks Score (OT): 13    Fina Landeros, FAIZA  7/18/2022

## 2022-07-18 NOTE — PROGRESS NOTES
[facility] Medicine Services  PROGRESS NOTE    Patient Name: Altagracia Rothman  : 1950  MRN: [id number]    Date of Admission: 2022  Primary Care Physician: Alvina Lloyd PA-C    Subjective   Subjective     CC: Follow-up shoulder pain    HPI: No acute events overnight, patient does endorse some low-grade temp overnight, pain is relatively well controlled    ROS:  Gen- No fevers, chills  CV- No chest pain, palpitations  Resp- No cough, dyspnea  GI- No N/V/D, abd pain      Objective   Objective     Vital Signs:   Temp:  [98 °F (36.7 °C)-98.4 °F (36.9 °C)] 98 °F (36.7 °C)  Heart Rate:  [71-82] 82  Resp:  [18] 18  BP: (108-133)/(64-93) 133/93  Flow (L/min):  [1] 1     Physical Exam:  Constitutional: Elderly lady, in no acute distress, awake, alert, seated in chair  HENT: NCAT, mucous membranes moist  Respiratory: Clear to auscultation bilaterally, respiratory effort normal   Cardiovascular: RRR, no murmurs, rubs, or gallops  Gastrointestinal: Positive bowel sounds, soft, nontender, nondistended  Musculoskeletal: No bilateral ankle edema, right shoulder incisions CDI, sling in place  Psychiatric: Appropriate affect, cooperative  Neurologic: Nonfocal  Skin: No rashes    Results Reviewed:  LAB RESULTS:      Lab 07/15/22  1120   WBC 8.96   HEMOGLOBIN 12.1   HEMATOCRIT 38.9   PLATELETS 283   NEUTROS ABS 6.11   IMMATURE GRANS (ABS) 0.04   LYMPHS ABS 1.73   MONOS ABS 1.03*   EOS ABS 0.03   .6*         Lab 07/15/22  1120   SODIUM 134*   POTASSIUM 4.4   CHLORIDE 98   CO2 26.0   ANION GAP 10.0   BUN 17   CREATININE 0.42*   EGFR 103.4   GLUCOSE 99   CALCIUM 8.3*   TSH 0.743         Lab 07/15/22  1120   TOTAL PROTEIN 5.8*   ALBUMIN 2.90*   GLOBULIN 2.9   ALT (SGPT) 18   AST (SGOT) 23   BILIRUBIN 0.7   ALK PHOS 305*                     Brief Urine Lab Results  (Last result in the past 365 days)      Color   Clarity   Blood   Leuk Est   Nitrite   Protein   CREAT   Urine HCG         07/16/22 1706 Yellow   Clear   Negative   Negative   Negative   Negative                 Microbiology Results Abnormal     Procedure Component Value - Date/Time    COVID PRE-OP / PRE-PROCEDURE SCREENING ORDER (NO ISOLATION) - Swab, Nasopharynx [[id number]]  (Normal) Collected: 06/24/22 1901    Lab Status: Final result Specimen: Swab from Nasopharynx Updated: 06/24/22 1956    Narrative:      The following orders were created for panel order COVID PRE-OP / PRE-PROCEDURE SCREENING ORDER (NO ISOLATION) - Swab, Nasopharynx.  Procedure                               Abnormality         Status                     ---------                               -----------         ------                     COVID-19, FLU A/B, RSV P...[[id number]]  Normal              Final result                 Please view results for these tests on the individual orders.    COVID-19, FLU A/B, RSV PCR - Swab, Nasopharynx [[id number]]  (Normal) Collected: 06/24/22 1901    Lab Status: Final result Specimen: Swab from Nasopharynx Updated: 06/24/22 1956     COVID19 Not Detected     Influenza A PCR Not Detected     Influenza B PCR Not Detected     RSV, PCR Not Detected    Narrative:      Fact sheet for providers: https://www.fda.gov/media/155314/download    Fact sheet for patients: https://www.fda.gov/media/951391/download    Test performed by PCR.          No radiology results from the last 24 hrs        I have reviewed the medications:  Scheduled Meds:acetaminophen, 500 mg, Oral, Q6H  budesonide-formoterol, 2 puff, Inhalation, BID - RT  conjugated estrogens, 0.5 g, Vaginal, Once per day on Mon Thu  docusate sodium, 100 mg, Oral, BID  doxycycline, 100 mg, Oral, Q12H  gabapentin, 800 mg, Oral, 5x Daily  hydrocortisone sodium succinate, 25 mg, Intravenous, Q8H  levothyroxine, 88 mcg, Oral, Q AM  lisinopril, 5 mg, Oral, Q24H  Morphine, 30 mg, Oral, BID  nortriptyline, 50 mg, Oral, TID  pantoprazole, 40 mg, Oral, Q AM  pindolol, 2.5 mg, Oral,  TID  saccharomyces boulardii, 250 mg, Oral, BID  sodium chloride, 10 mL, Intravenous, Q12H  spironolactone, 50 mg, Oral, Daily      Continuous Infusions:Pharmacy Consult,   lactated ringers, 9 mL/hr  lactated ringers, 9 mL/hr, Last Rate: 9 mL/hr (22 1527)  ropivacaine, , Last Rate: Stopped (22 0945)  sodium chloride, 75 mL/hr, Last Rate: 75 mL/hr (22 1043)      PRN Meds:.acetaminophen **OR** acetaminophen **OR** acetaminophen  •  albuterol  •  diphenhydrAMINE  •  HYDROmorphone  •  hydrOXYzine  •  labetalol  •  magnesium sulfate **OR** magnesium sulfate in D5W 1g/100mL (PREMIX) **OR** magnesium sulfate  •  [] Morphine **AND** naloxone  •  ondansetron **OR** ondansetron  •  oxyCODONE-acetaminophen  •  potassium chloride **OR** potassium chloride **OR** potassium chloride  •  senna-docusate sodium  •  sodium chloride    Assessment & Plan   Assessment & Plan     Active Hospital Problems    Diagnosis  POA   • **Other closed displaced fracture of proximal end of right humerus, initial encounter [S42.291A]  Yes   • Acute UTI (urinary tract infection) [N39.0]  Yes   • Moderate asthma without complication [J45.909]  Yes   • Osteoporosis [M81.0]  Yes   • Adrenal insufficiency (HCC) [E27.40]  Yes   • Hypothyroidism [E03.9]  Yes   • Essential hypertension [I10]  Yes   • H/O pheochromocytoma [Z86.018]  Yes      Resolved Hospital Problems   No resolved problems to display.        Brief Hospital Course to date:  Altagarcia Rothman is a 72 y.o. female with Hx HTN, pheochromocytoma s/p LT adrenal resection, chronic adrenal insufficiency on chronic steroids, hypothyroidism, asthma who presented for evaluation of arm pain after a fall, radiographs demonstrated 2 foci of fractures and she has been planned for operative management, however per the chart endocrinology is recommended catecholamines and metanephrines to be evaluated prior to venturing into the realm of surgery which is significantly delayed operative  "management and prolonged hospitalization; she is now s/p operative repair of RT humeral fractures 7/14 with difficult pain control postoperative     Assessment/plan     Acute RT proximal and distal humeral fractures s/p fall  - S/p intramedullary nail insertion proximal humerus, ORIF distal humerus 7/14 with Dr. Matute  -Orthopedics continues to follow  -Continue gabapentin to x5 daily (transiently increased), anesthesia has replaced nerve catheter; cont chronic oral morphine, scheduled Tylenol, prn Percocet and Dilaudid  -Pain improved with IV hydrocortisone,  currently on 25 mg q8h;  plan to transition to PO  steroids today and continue weaning as outpatient as she reports having done this before.     Recurrent dysuria 7/16  S/p Tx ESBL bacteriuria  - Per chart Gillespie placed in the ED, DC'd 6/26, UrCx 6/27 w/ MDR Proteus; completed fosfomycin, repeat UA 7/6 for unclear cause w/ ESBL Proteus; s/p Tx w/ ertapenem  -Repeat UA 7/16 for new dysuria bland; empiric dose of fluconazole and add topical estrogen     Hx pheochromocytoma s/p LT adrenal resection  Chronic adrenal insufficiency s/p adrenal crisis  -  My partner previously d/w Dr. Nelson via phone 6/30; pheo resection 2003; unable to adequately follow labs 2/2 chronic TCA use; recommended deferring surgery if catecholamine/metanephrines abnormal-  is however did return WNL  -Baseline hydrocortisone 20mg AM and 10mg noon; currently on stress dose IV hydrocortisone, continue     Hypertension  - Initially hypotensive  - Lisinopril, pindolol (patient supplied), spironolactone  -Monitoring BP perioperatively     Arthritis with chronic tetracycline use  Tooth pain  Fibromyalgia  - Chronically on tetracycline from her \"arthritis doctor\"; not on formulary here and has been on substituted doxycycline  -Patient is NOT on doxycycline for bronchitis, this is a substitution for a chronic medication  - Chronic gabapentin, oral morphine, oral oxycodone; complicates " "perioperative pain control     Chronic inhaler use, data deficit  -Home meds include Wixela inhaler; patient reports prior history of smoking, never formally diagnosed with COPD but told she was \"borderline\"  -RRT 6/30 for chest pain/SOB, reported presumed 2/2 nerve block/anxiety  - Symbicort substitute for Wixela, albuterol neb as needed     Hypothyroidism  -Levothyroxine     Expected Discharge Location and Transportation: [facility], [place] medical van  Expected Discharge Date: 7/19    DVT prophylaxis:  Mechanical DVT prophylaxis orders are present.     AM-PAC 6 Clicks Score (PT): 13 (07/17/22 1520)    CODE STATUS:   Code Status and Medical Interventions:   Ordered at: 06/24/22 8137     Level Of Support Discussed With:    Patient     Code Status (Patient has no pulse and is not breathing):    CPR (Attempt to Resuscitate)     Medical Interventions (Patient has pulse or is breathing):    Full Support       Reanna Thomas MD  07/18/22            "

## 2022-07-18 NOTE — PLAN OF CARE
Goal Outcome Evaluation:  Plan of Care Reviewed With: patient           Outcome Evaluation: Pt SHUBHAMO, FLOR.  C/o pain that is relieved with PRN pain medications.  Voids well.  Needs to have a BM - refused offered suppository and laxatives stating she was already on stool softeners.  eating and drinking well.  NSR on tele.

## 2022-07-18 NOTE — PLAN OF CARE
Goal Outcome Evaluation:  Plan of Care Reviewed With: patient        Progress: no change  Outcome Evaluation: Pt demonstrated improvement in functional mobility this session. pt performed bed mobility with Min A. Pt performed STS with CGA and assist to place B axillary crutches. pt increased distance amb to 30' with Min A and B crutches. Pt demonstrated good effort today. Recommend d/c to IPR for best functional outcome.

## 2022-07-18 NOTE — PROGRESS NOTES
Orthopedic Daily Progress Note      CC: INDIRA overnight, states she walked with both crutches with therapy today    Pain well controlled  General: no fevers, chills  Abdomen: no nausea, vomiting, or diarrhea    No other complaints    Physical Exam:  I have reviewed the vital signs.  Temp:  [97.7 °F (36.5 °C)-98.5 °F (36.9 °C)] 98.5 °F (36.9 °C)  Heart Rate:  [65-90] 65  Resp:  [18] 18  BP: (112-169)/(53-95) 145/53    Objective  General Appearance:    Alert, cooperative, no distress  Extremities: No clubbing, cyanosis, or edema to lower extremities  Pulses:  2+ in distal surgical extremity  Skin: Dressing Clean/dry/intact      Results Review:    I have reviewed the labs, radiology results and diagnostic studies:no new labs yet    Results from last 7 days   Lab Units 07/15/22  1120   WBC 10*3/mm3 8.96   HEMOGLOBIN g/dL 12.1   PLATELETS 10*3/mm3 283     Results from last 7 days   Lab Units 07/15/22  1120   SODIUM mmol/L 134*   POTASSIUM mmol/L 4.4   CO2 mmol/L 26.0   CREATININE mg/dL 0.42*   GLUCOSE mg/dL 99       I have reviewed the medications.    Assessment/Problem List  POD# 4 Day Post-Op   S/p R IMN and ORIF R humerus fractures    Plan  WBAT RUE  PT/OT      Discharge Planning: I expect patient to be discharged to [facility] in 1-2 days.    Nancy Zuñiga PA-C  07/18/22  15:45 EDT

## 2022-07-18 NOTE — PLAN OF CARE
Goal Outcome Evaluation:  Plan of Care Reviewed With: patient        Progress: improving  Outcome Evaluation: Pt tolerated R shoulder AAROM: , IR chest/ER 30, ABD 80 and elbow 25-95. Pt has been weaning from sling, issued new sling for use as needed as old one soiled. She completed grooming with supervision and demo improved BUE integration. Recommend IPR.

## 2022-07-18 NOTE — CASE MANAGEMENT/SOCIAL WORK
Continued Stay Note  [facility]     Patient Name: Altagracia Rothman  MRN: [id number]  Today's Date: 7/18/2022    Admit Date: 6/24/2022     Discharge Plan     Row Name 07/18/22 1734       Plan    Plan [facility]    Plan Comments Per Myrna at [facility], they can accept Ms Rothman into an acute rehab bed tomorrow 7/19. [facility] wheelchair van will transport at 1130, Ms Rothman is in agreement with plan.    Final Discharge Disposition Code 62 - inpatient rehab facility               Discharge Codes    No documentation.               Expected Discharge Date and Time     Expected Discharge Date Expected Discharge Time    Jul 18, 2022             Sonja C Kellerman, RN

## 2022-07-18 NOTE — THERAPY TREATMENT NOTE
Patient Name: Altagracia Rothman  : 1950    MRN: [id number]                              Today's Date: 2022       Admit Date: 2022    Visit Dx:     ICD-10-CM ICD-9-CM   1. Other closed displaced fracture of proximal end of right humerus, initial encounter  S42.291A 812.09   2. Closed fracture of distal end of humerus, unspecified fracture morphology, initial encounter  S42.409A 812.40   3. Fall, initial encounter  W19.XXXA E888.9   4. Osteoporosis with current pathological fracture, unspecified osteoporosis type, initial encounter  M80.00XA 733.00     733.10     Patient Active Problem List   Diagnosis   • Adrenal insufficiency (HCC)   • Hypothyroidism   • Essential hypertension   • H/O pheochromocytoma   • Chronic bilateral low back pain without sciatica   • Encounter for chronic pain management   • Osteoporosis   • Tobacco abuse   • BMI 35.0-35.9,adult   • Bronchitis   • Moderate asthma without complication   • Paresthesia   • Medicare annual wellness visit, subsequent   • Routine medical exam   • Lipid screening   • Hair loss   • Other closed displaced fracture of proximal end of right humerus, initial encounter   • Acute UTI (urinary tract infection)     Past Medical History:   Diagnosis Date   • Adrenal insufficiency (HCC)    • Fibromyalgia    • Hypertension    • Hypothyroidism    • Skin cancer      Past Surgical History:   Procedure Laterality Date   • ADRENAL GLAND SURGERY     • BREAST BIOPSY      x4   • GALLBLADDER SURGERY     • HYSTERECTOMY     • MRI ANGIOGRAM LOWER EXTREMITY UNILATERAL W CONTRAST     • TONSILLECTOMY        General Information     Row Name 22 1409          OT Time and Intention    Document Type therapy note (daily note)  -AR     Mode of Treatment individual therapy;occupational therapy  -AR     Row Name 22 1409          General Information    Existing Precautions/Restrictions fall  -AR     Row Name 22 1409          Cognition    Orientation Status (Cognition)  oriented x 4  -AR     Row Name 07/18/22 1409          Safety Issues, Functional Mobility    Safety Issues Affecting Function (Mobility) safety precaution awareness;safety precautions follow-through/compliance  -AR     Impairments Affecting Function (Mobility) endurance/activity tolerance;pain;range of motion (ROM);strength  -AR           User Key  (r) = Recorded By, (t) = Taken By, (c) = Cosigned By    Initials Name Provider Type    Natalie Garibay OT Occupational Therapist                 Mobility/ADL's     Row Name 07/18/22 1409          Transfers    Comment, (Transfers) Pt declined need to toilet. OT messaged surgeon to clarify if pt can use R axillary crutch. Dr. Matute said this was allowed and OT to notified PT of such in the am.  -AR     Row Name 07/18/22 1409          Activities of Daily Living    BADL Assessment/Intervention upper body dressing  -AR     Row Name 07/18/22 1409          Mobility    Extremity Weight-bearing Status right upper extremity  -AR     Right Upper Extremity (Weight-bearing Status) weight-bearing as tolerated (WBAT)  -AR     Row Name 07/18/22 1409          Upper Body Dressing Assessment/Training    Comment, (Upper Body Dressing) Pt using sling as needed for comfort, and required it overnight d/t pain. Her sling is soiled, issued new Rolyan sling.  -AR     Row Name 07/18/22 1409          Grooming Assessment/Training    [place] Level (Grooming) hair care, combing/brushing;supervision  -AR     Position (Grooming) supported sitting  -AR           User Key  (r) = Recorded By, (t) = Taken By, (c) = Cosigned By    Initials Name Provider Type    Natalie Garibay OT Occupational Therapist               Obj/Interventions     Row Name 07/18/22 1412          Sensory Assessment (Somatosensory)    Sensory Assessment (Somatosensory) UE sensation intact  -AR     Row Name 07/18/22 1412          Shoulder (Therapeutic Exercise)    Shoulder AAROM (Therapeutic Exercise)  right;flexion;extension;aBduction;aDduction;external rotation;internal rotation;scapular retraction;scapular protraction;scapular elevation;sitting;10 repetitions  -AR     Row Name 07/18/22 1412          Elbow/Forearm (Therapeutic Exercise)    Elbow/Forearm AROM (Therapeutic Exercise) right;supination;pronation;sitting;10 repetitions  -AR     Elbow/Forearm AAROM (Therapeutic Exercise) right;flexion;extension;sitting;10 repetitions  -AR     Row Name 07/18/22 1412          Wrist (Therapeutic Exercise)    Wrist (Therapeutic Exercise) AROM (active range of motion)  -AR     Wrist AROM (Therapeutic Exercise) right;flexion;extension;10 repetitions  -AR     Row Name 07/18/22 1412          Hand (Therapeutic Exercise)    Hand AROM/AAROM (Therapeutic Exercise) right;AROM (active range of motion);finger flexion;finger extension;10 repetitions  -AR     Row Name 07/18/22 1412          Motor Skills    Therapeutic Exercise shoulder;elbow/forearm;wrist;hand  -AR     Row Name 07/18/22 1412          Balance    Static Sitting Balance supervision  -AR     Dynamic Sitting Balance supervision  -AR     Position, Sitting Balance sitting in chair  -AR           User Key  (r) = Recorded By, (t) = Taken By, (c) = Cosigned By    Initials Name Provider Type    AR Natalie Tellez, OT Occupational Therapist               Goals/Plan    No documentation.                Clinical Impression     Row Name 07/18/22 1414          Pain Assessment    Pretreatment Pain Rating 6/10  -AR     Posttreatment Pain Rating 6/10  -AR     Pain Location - Side/Orientation Right  -AR     Pain Location upper  -AR     Pain Location - extremity  -AR     Pain Intervention(s) Medication (See MAR);Repositioned  -AR     Row Name 07/18/22 1414          Plan of Care Review    Plan of Care Reviewed With patient  -AR     Progress improving  -AR     Outcome Evaluation Pt tolerated R shoulder AAROM: , IR chest/ER 30, ABD 80 and elbow 25-95. Pt has been weaning from sling,  issued new sling for use as needed as old one soiled. She completed grooming with supervision and demo improved BUE integration. Recommend IPR.  -AR     Row Name 07/18/22 1414          Therapy Plan Review/Discharge Plan (OT)    Anticipated Discharge Disposition (OT) inpatient rehabilitation facility  -AR     Row Name 07/18/22 1414          Vital Signs    Pre Patient Position Sitting  -AR     Intra Patient Position Sitting  -AR     Post Patient Position Sitting  -AR     Row Name 07/18/22 1414          Positioning and Restraints    Pre-Treatment Position sitting in chair/recliner  -AR     Post Treatment Position chair  -AR     In Chair reclined;call light within reach;encouraged to call for assist;exit alarm on;legs elevated;RUE elevated;waffle cushion;on mechanical lift sling  -AR           User Key  (r) = Recorded By, (t) = Taken By, (c) = Cosigned By    Initials Name Provider Type    Natalie Garibay, OT Occupational Therapist               Outcome Measures     Row Name 07/18/22 1418          How much help from another is currently needed...    Putting on and taking off regular lower body clothing? 2  -AR     Bathing (including washing, rinsing, and drying) 2  -AR     Toileting (which includes using toilet bed pan or urinal) 1  -AR     Putting on and taking off regular upper body clothing 2  -AR     Taking care of personal grooming (such as brushing teeth) 3  -AR     Eating meals 3  -AR     AM-PAC 6 Clicks Score (OT) 13  -AR     Row Name 07/18/22 1051 07/18/22 0800       How much help from another person do you currently need...    Turning from your back to your side while in flat bed without using bedrails? 3  -HP 3  -AH    Moving from lying on back to sitting on the side of a flat bed without bedrails? 2  -HP 2  -AH    Moving to and from a bed to a chair (including a wheelchair)? 3  -HP 3  -AH    Standing up from a chair using your arms (e.g., wheelchair, bedside chair)? 3  -HP 3  -AH    Climbing 3-5 steps  with a railing? 1  -HP 1  -    To walk in hospital room? 2  -HP 2  -AH    AM-PAC 6 Clicks Score (PT) 14  -HP 14  -AH    Highest level of mobility 4 --> Transferred to chair/commode  -HP 4 --> Transferred to chair/commode  -    Row Name 07/18/22 1418 07/18/22 1051       Functional Assessment    Outcome Measure Options AM-PAC 6 Clicks Daily Activity (OT)  -AR AM-PAC 6 Clicks Basic Mobility (PT)  -HP          User Key  (r) = Recorded By, (t) = Taken By, (c) = Cosigned By    Initials Name Provider Type    Natalie Garibay, OT Occupational Therapist    Natalie Saleem, RN Registered Nurse    Fina Reyna, PT Physical Therapist                Occupational Therapy Education                 Title: PT OT SLP Therapies (Done)     Topic: Occupational Therapy (Done)     Point: ADL training (Done)     Description:   Instruct learner(s) on proper safety adaptation and remediation techniques during self care or transfers.   Instruct in proper use of assistive devices.              Learning Progress Summary           Patient PETR Gallardo,JANA,MACHO, JAMES,ANGELA by AR at 7/18/2022 1418      Show all documentation for this point (8)                 Point: Home exercise program (Done)     Description:   Instruct learner(s) on appropriate technique for monitoring, assisting and/or progressing therapeutic exercises/activities.              Learning Progress Summary           Patient PETR Gallardo,JANA,MACHO, JAMES,ANGELA by AR at 7/18/2022 1418      Show all documentation for this point (9)                 Point: Precautions (Done)     Description:   Instruct learner(s) on prescribed precautions during self-care and functional transfers.              Learning Progress Summary           Patient PETR Gallardo,JANA,MACHO, JAMES,ANGELA by AR at 7/18/2022 1418      Show all documentation for this point (9)                 Point: Body mechanics (Done)     Description:   Instruct learner(s) on proper positioning and spine alignment during self-care, functional mobility  activities and/or exercises.              Learning Progress Summary           Patient Mter, E,TB,D, VU,DU by AR at 7/18/2022 1418      Show all documentation for this point (9)                             User Key     Initials Effective Dates Name Provider Type Discipline    AR 06/16/21 -  Natalie Tellez OT Occupational Therapist OT              OT Recommendation and Plan  Therapy Frequency (OT): daily  Plan of Care Review  Plan of Care Reviewed With: patient  Progress: improving  Outcome Evaluation: Pt tolerated R shoulder AAROM: , IR chest/ER 30, ABD 80 and elbow 25-95. Pt has been weaning from sling, issued new sling for use as needed as old one soiled. She completed grooming with supervision and demo improved BUE integration. Recommend IPR.     Time Calculation:    Time Calculation- OT     Row Name 07/18/22 1418 07/18/22 1000          Time Calculation- OT    OT Start Time 1331  -AR --     OT Received On 07/18/22  -AR --     OT Goal Re-Cert Due Date 07/25/22  -AR --            Timed Charges    42027 - OT Therapeutic Exercise Minutes 29  -AR --     70401 - Gait Training Minutes  -- 15  -HP            Total Minutes    Timed Charges Total Minutes 29  -AR 15  -HP      Total Minutes 29  -AR 15  -HP           User Key  (r) = Recorded By, (t) = Taken By, (c) = Cosigned By    Initials Name Provider Type    AR Natalie Tellez OT Occupational Therapist     Fina Landeros PT Physical Therapist              Therapy Charges for Today     Code Description Service Date Service Provider Modifiers Qty    27284756802 HC OT SELF CARE/MGMT/TRAIN EA 15 MIN 7/17/2022 Natalie Tellez OT GO 3    42382434580 HC OT THER PROC EA 15 MIN 7/17/2022 Natalie Telelz OT GO 1    95253477314 HC OT THER PROC EA 15 MIN 7/18/2022 Natalie Tellez OT GO 2               Natalie Tellez OT  7/18/2022

## 2022-07-19 VITALS
WEIGHT: 180 LBS | TEMPERATURE: 98.4 F | RESPIRATION RATE: 19 BRPM | DIASTOLIC BLOOD PRESSURE: 88 MMHG | BODY MASS INDEX: 33.99 KG/M2 | HEART RATE: 70 BPM | HEIGHT: 61 IN | SYSTOLIC BLOOD PRESSURE: 158 MMHG | OXYGEN SATURATION: 98 %

## 2022-07-19 PROCEDURE — 99239 HOSP IP/OBS DSCHRG MGMT >30: CPT | Performed by: INTERNAL MEDICINE

## 2022-07-19 PROCEDURE — 94761 N-INVAS EAR/PLS OXIMETRY MLT: CPT

## 2022-07-19 PROCEDURE — 97110 THERAPEUTIC EXERCISES: CPT

## 2022-07-19 PROCEDURE — 97535 SELF CARE MNGMENT TRAINING: CPT

## 2022-07-19 PROCEDURE — 94799 UNLISTED PULMONARY SVC/PX: CPT

## 2022-07-19 RX ADMIN — Medication 10 ML: at 09:22

## 2022-07-19 RX ADMIN — DOCUSATE SODIUM 100 MG: 100 CAPSULE, LIQUID FILLED ORAL at 09:16

## 2022-07-19 RX ADMIN — NORTRIPTYLINE HYDROCHLORIDE 50 MG: 10 CAPSULE ORAL at 09:16

## 2022-07-19 RX ADMIN — OXYCODONE HYDROCHLORIDE AND ACETAMINOPHEN 1 TABLET: 10; 325 TABLET ORAL at 02:14

## 2022-07-19 RX ADMIN — PANTOPRAZOLE SODIUM 40 MG: 40 TABLET, DELAYED RELEASE ORAL at 06:19

## 2022-07-19 RX ADMIN — LISINOPRIL 5 MG: 5 TABLET ORAL at 09:25

## 2022-07-19 RX ADMIN — ACETAMINOPHEN 500 MG: 500 TABLET ORAL at 02:00

## 2022-07-19 RX ADMIN — Medication 250 MG: at 09:16

## 2022-07-19 RX ADMIN — DOXYCYCLINE 100 MG: 100 CAPSULE ORAL at 09:16

## 2022-07-19 RX ADMIN — PINDOLOL 2.5 MG: 5 TABLET ORAL at 09:16

## 2022-07-19 RX ADMIN — BUDESONIDE AND FORMOTEROL FUMARATE DIHYDRATE 2 PUFF: 160; 4.5 AEROSOL RESPIRATORY (INHALATION) at 09:33

## 2022-07-19 RX ADMIN — OXYCODONE HYDROCHLORIDE AND ACETAMINOPHEN 1 TABLET: 10; 325 TABLET ORAL at 10:21

## 2022-07-19 RX ADMIN — BUDESONIDE AND FORMOTEROL FUMARATE DIHYDRATE 2 PUFF: 160; 4.5 AEROSOL RESPIRATORY (INHALATION) at 02:45

## 2022-07-19 RX ADMIN — GABAPENTIN 800 MG: 400 CAPSULE ORAL at 10:21

## 2022-07-19 RX ADMIN — HYDROCORTISONE 20 MG: 20 TABLET ORAL at 09:26

## 2022-07-19 RX ADMIN — GABAPENTIN 800 MG: 400 CAPSULE ORAL at 06:19

## 2022-07-19 RX ADMIN — OXYCODONE HYDROCHLORIDE AND ACETAMINOPHEN 1 TABLET: 10; 325 TABLET ORAL at 06:19

## 2022-07-19 RX ADMIN — ACETAMINOPHEN 500 MG: 500 TABLET ORAL at 09:16

## 2022-07-19 RX ADMIN — SPIRONOLACTONE 50 MG: 50 TABLET ORAL at 09:15

## 2022-07-19 RX ADMIN — MORPHINE SULFATE 30 MG: 30 TABLET, FILM COATED, EXTENDED RELEASE ORAL at 09:26

## 2022-07-19 RX ADMIN — LEVOTHYROXINE SODIUM 88 MCG: 88 TABLET ORAL at 06:19

## 2022-07-19 NOTE — DISCHARGE SUMMARY
[facility] Medicine Services  DISCHARGE SUMMARY    Patient Name: Altagracia Rothman  : 1950  MRN: [id number]    Date of Admission: 2022  3:47 PM  Date of Discharge: 2022  Primary Care Physician: Alvina Lloyd PA-C    Consults     No orders found from 2022 to 2022.          Hospital Course     Presenting Problem:   Other closed displaced fracture of proximal end of right humerus, initial encounter [S42.291A]    Active Hospital Problems    Diagnosis  POA   • **Other closed displaced fracture of proximal end of right humerus, initial encounter [S42.291A]  Yes   • Acute UTI (urinary tract infection) [N39.0]  Yes   • Moderate asthma without complication [J45.909]  Yes   • Osteoporosis [M81.0]  Yes   • Adrenal insufficiency (HCC) [E27.40]  Yes   • Hypothyroidism [E03.9]  Yes   • Essential hypertension [I10]  Yes   • H/O pheochromocytoma [Z86.018]  Yes      Resolved Hospital Problems   No resolved problems to display.      Hospital Course:  Altagracia Rothman is a 72 y.o. female with Hx HTN, pheochromocytoma s/p LT adrenal resection, chronic adrenal insufficiency on chronic steroids, hypothyroidism, asthma who presented for evaluation of arm pain after a fall, radiographs demonstrated 2 foci of fractures and she has been planned for operative management, however per the chart endocrinology is recommended catecholamines and metanephrines to be evaluated prior to venturing into the realm of surgery which is significantly delayed operative management and prolonged hospitalization; she is now s/p operative repair of RT humeral fractures .     Acute RT proximal and distal humeral fractures s/p fall  - S/p intramedullary nail insertion proximal humerus, ORIF distal humerus  with Dr. Matute  -Orthopedics continues to follow  -Continue gabapentin to x5 daily (transiently increased), anesthesia has replaced nerve catheter; cont chronic oral morphine, scheduled Tylenol, prn  "Percocet and Dilaudid     Recurrent dysuria 7/16  S/p Tx ESBL bacteriuria  - Per chart Gillespie placed in the ED, DC'd 6/26, UrCx 6/27 w/ MDR Proteus; completed fosfomycin, repeat UA 7/6 for unclear cause w/ ESBL Proteus; s/p Tx w/ ertapenem  -Repeat UA 7/16 for new dysuria bland; s/p empiric dose of fluconazole and add topical estrogen     Hx pheochromocytoma s/p LT adrenal resection  Chronic adrenal insufficiency s/p adrenal crisis  -  My partner previously d/w Dr. Nelson via phone 6/30; pheo resection 2003; unable to adequately follow labs 2/2 chronic TCA use; recommended deferring surgery if catecholamine/metanephrines abnormal-  this however did return WNL  -Resume home hydrocortisone of 15mg BID, she is s/p stress dose IV hydrocortisone.     Hypertension  - Initially hypotensive, BP now stable  - Resume home meds of quinapril, pindolol and, spironolactone     Arthritis with chronic tetracycline use  Tooth pain  Fibromyalgia  - Chronically on tetracycline from her \"arthritis doctor\"; not on formulary here and has been on substituted doxycycline, can resume home med at d/c  - Chronic gabapentin, oral morphine, oral oxycodone     Chronic inhaler use, data deficit  -Home meds include Wixela inhaler; patient reports prior history of smoking, never formally diagnosed with COPD but told she was \"borderline\"  - Resume home meds     Hypothyroidism  -Levothyroxine    Discharge Follow Up Recommendations for outpatient labs/diagnostics:  Follow-up with Dr. Matute as previously scheduled    Day of Discharge     HPI: No acute events overnight, patient notes she slept well, however was woken up with some right neck discomfort    Review of Systems  Gen- No fevers, chills  CV- No chest pain, palpitations  Resp- No cough, dyspnea  GI- No N/V/D, abd pain      Vital Signs:   Temp:  [97.7 °F (36.5 °C)-98.6 °F (37 °C)] 97.9 °F (36.6 °C)  Heart Rate:  [62-90] 68  Resp:  [18] 18  BP: (144-169)/(53-95) 146/84    Physical " Exam:  Constitutional: No acute distress, awake, alert  HENT: NCAT, mucous membranes moist  Respiratory: Clear to auscultation bilaterally, respiratory effort normal   Cardiovascular: RRR, no murmurs, rubs, or gallops  Gastrointestinal: Positive bowel sounds, soft, nontender, nondistended  Musculoskeletal: No bilateral ankle edema, rue in sling, right shoulder incisions cdi  Psychiatric: Appropriate affect, cooperative  Neurologic: Oriented x 3,nonfocal  Skin: No rashes    Pertinent  and/or Most Recent Results     LAB RESULTS:      Lab 07/15/22  1120   WBC 8.96   HEMOGLOBIN 12.1   HEMATOCRIT 38.9   PLATELETS 283   NEUTROS ABS 6.11   IMMATURE GRANS (ABS) 0.04   LYMPHS ABS 1.73   MONOS ABS 1.03*   EOS ABS 0.03   .6*         Lab 07/15/22  1120   SODIUM 134*   POTASSIUM 4.4   CHLORIDE 98   CO2 26.0   ANION GAP 10.0   BUN 17   CREATININE 0.42*   EGFR 103.4   GLUCOSE 99   CALCIUM 8.3*   TSH 0.743         Lab 07/15/22  1120   TOTAL PROTEIN 5.8*   ALBUMIN 2.90*   GLOBULIN 2.9   ALT (SGPT) 18   AST (SGOT) 23   BILIRUBIN 0.7   ALK PHOS 305*                     Brief Urine Lab Results  (Last result in the past 365 days)      Color   Clarity   Blood   Leuk Est   Nitrite   Protein   CREAT   Urine HCG        07/16/22 1706 Yellow   Clear   Negative   Negative   Negative   Negative               Microbiology Results (last 10 days)     ** No results found for the last 240 hours. **          XR Humerus Right    Result Date: 7/15/2022  DATE OF EXAM: 7/15/2022 10:58 AM  PROCEDURE: XR HUMERUS RIGHT-  INDICATIONS: for post op; S42.291A-Other displaced fracture of upper end of right humerus, initial encounter for closed fracture; S42.409A-Unspecified fracture of lower end of unspecified humerus, initial encounter for closed fracture; W19.XXXA-Unspecified fall, initial encounter; M80.00XA-Age-related osteoporosis with current pathological fracture, unspecified site, initial encounter for fracture  COMPARISON: 7/12/2022  TECHNIQUE:  AP and lateral radiographic views were obtained of the right humerus.  FINDINGS: Postoperative changes are noted status post open reduction and internal fixation of the comminuted fracture of the proximal and distal right humerus. A longstem intramedullary stephane is noted. Fixation screws are seen. Distal plate and screw hardware is also noted at the distal humerus. Near-anatomic alignment of fracture fragments is noted following open reduction and internal fixation. Surgical changes are seen in the surrounding soft tissues.      Postoperative changes are noted status post open reduction and internal fixation of the right humeral fractures. There is near-anatomic alignment of fracture fragments following reduction and fixation.  This report was finalized on 7/15/2022 11:13 AM by James Johns MD.      XR Humerus Right    Result Date: 7/12/2022  DATE OF EXAM: 7/12/2022 9:51 AM  PROCEDURE: XR HUMERUS RIGHT-  INDICATIONS: R humerus fracture; S42.291A-Other displaced fracture of upper end of right humerus, initial encounter for closed fracture; S42.409A-Unspecified fracture of lower end of unspecified humerus, initial encounter for closed fracture; W19.XXXA-Unspecified fall, initial encounter; M80.00XA-Age-related osteoporosis with current pathological fracture, unspecified site, initial encounter for fracture  COMPARISON: Right shoulder radiographs 6/24/2022  TECHNIQUE: AP and lateral radiographic views were obtained of the right humerus.  FINDINGS: Redemonstration of a mildly comminuted spiral fracture of the proximal humeral shaft, with approximately 2 cm of foreshortening with mild apex medial angulation. There is a vague linear lucency extending to the surgical neck only well seen on the angled image. There is an additional nearly transverse fracture through the distal humeral metadiaphysis which appears mildly displaced with approximately one half shaft width medial displacement of the distal fragment and mild apex  lateral angulation. No definite evidence of fracture extension to the humeral head articular surface. No definite evidence of fracture extension to the elbow joint, although the elbow is not well assessed on this humeral radiograph study. These findings could be further characterized with CT of the right arm. There is chondrocalcinosis of the subacromial bursa. There is expected soft tissue swelling and subcutaneous fat stranding about the fractures.      Redemonstration of a mildly comminuted spiral fracture of the proximal humeral shaft, with approximately 2 cm of foreshortening with mild apex medial angulation. There is a vague linear lucency extending to the surgical neck only well seen on the angled image. There is an additional nearly transverse fracture through the distal humeral metadiaphysis which appears mildly displaced with approximately one half shaft width medial displacement of the distal fragment and mild apex lateral angulation. No definite evidence of fracture extension to the humeral head articular surface. No definite evidence of fracture extension to the elbow joint, although the elbow is not well assessed on this humeral radiograph study. These findings could be further characterized with CT of the right arm.  This report was finalized on 7/12/2022 10:36 AM by Stefan Jansen MD.      XR Chest 1 View    Result Date: 7/15/2022  EXAMINATION: XR CHEST 1 VW DATE: 7/14/2022 11:47 PM INDICATION:  Dyspnea; COMPARISON:  June 30, 2022 FINDINGS: Moderate diffuse coarse interstitial lung markings are again demonstrated. No focal consolidation, pleural effusion, or pneumothorax. Cardiomediastinal silhouette  unchanged. There is atherosclerosis and tortuosity thoracic aorta. No acute osseous abnormality.     1.  No acute cardiopulmonary process. Electronically signed by:  Kayleigh Fall M.D.  7/14/2022 10:41 PM Mountain Time    Interscalene Re-block    Result Date: 7/15/2022  Latanya Aguila CRNA      7/15/2022  9:34 AM Interscalene Re-block Patient reassessed immediately prior to procedure Patient location during procedure: pre-op Reason for block: at surgeon's request and post-op pain management Performed by CRNA/CAA: Jesus Mitchell CRNA Assisted by: Siobhan Moya RN Preanesthetic Checklist Completed: patient identified, IV checked, site marked, risks and benefits discussed, surgical consent, monitors and equipment checked, pre-op evaluation and timeout performed Prep: Pt Position: left lateral decubitus Sterile barriers:cap, gloves, mask and sterile barriers Prep: ChloraPrep Patient monitoring: blood pressure monitoring, continuous pulse oximetry and EKG Procedure Sedation: yes Performed under: local infiltration Guidance:ultrasound guided Images:still images obtained, printed/placed on chart Laterality:right Block Type:interscalene Injection Technique:catheter Needle Type:Tuohy and echogenic Needle Gauge:18 G Resistance on Injection: none Catheter Size:20 G (20g) Cath Depth at skin: 11 cm Medications Used: bupivacaine PF (MARCAINE) 0.25 % injection, 5 mL Med administered at 7/15/2022 9:34 AM Post Assessment Injection Assessment: negative aspiration for heme, no paresthesia on injection and incremental injection Patient Tolerance:comfortable throughout block Complications:no Additional Notes Procedure:               The pt was placed in semifowlers position with a slight tilt of the thorax contralateral to the insertion site.  The Insertion Site was prepped and draped in sterile fashion.  The pt was anesthetized with  IV Sedation( see meds) and  Skin and cutaneous tissue was infiltrated and anesthetized with 1% Lidocaine 3 mls via a 25g needle.  Utilizing ultrasound guidance, a BBraun 4 inch 18 g Contiplex echogenic touhy needle was advanced in-plane.  Hydro dissection of tissue was achieved with Normal saline. Major vessels(carotid and Internal Jugular) where visualized as the brachial plexus was  approached at the approximate level of C-7/ T-1.  Cervical 5 and Branches of Cervical 6 nerve roots where visualized and the needle tip was placed posterior at the level of C-6 roots.  LA spread was visualized and injection was made incrementally every 5 mls with aspiration. Injection pressure was normal or little, there was no intraneural injection, no vascular injection.    The BBraun 20 g wire stylet catheter was then placed under US guidance on the posterior aspect of the Brachial Plexus. The tuohy was removed and the location of catheter was confirmed with NS injection visualized with US . The skin was sealed with exofin tissue adhesive at catheter insertion site.  Skin was prepped with benzoin and the catheter was secured with steristrips and a CHG tegaderm. Appropriate labels applied. Thank You.     Right ISB Catheter re-block    Result Date: 7/11/2022  Tash Eduardo CRNA     7/11/2022 10:47 AM Right ISB Catheter re-block Patient reassessed immediately prior to procedure Patient location during procedure: floor Reason for block: at surgeon's request and post-op pain management Performed by CRNA/CAA: Tash Eduardo CRNA Assisted by: Capri Whittaker RN Preanesthetic Checklist Completed: patient identified, IV checked, site marked, risks and benefits discussed, surgical consent, monitors and equipment checked, pre-op evaluation and timeout performed Prep: Patient position: sitting. Sterile barriers:cap, gloves, mask and sterile barriers Prep: ChloraPrep Patient monitoring: blood pressure monitoring, continuous pulse oximetry and EKG Procedure Sedation: yes Performed under: local infiltration Guidance:ultrasound guided Images:still images obtained, printed/placed on chart Laterality:right Block Type:interscalene Injection Technique:catheter Needle Type:Tuohy and echogenic Needle Gauge:18 G Resistance on Injection: none Catheter Size:20 G (20g) Cath Depth at skin: 8 cm Med administered at 7/11/2022  10:40 AM Post Assessment Injection Assessment: negative aspiration for heme, no paresthesia on injection and incremental injection Patient Tolerance:comfortable throughout block Complications:no Additional Notes Procedure:               The pt was placed in semifowlers position with a slight tilt of the thorax contralateral to the insertion site.  The Insertion Site was prepped and draped in sterile fashion.  The pt was anesthetized with  IV Sedation( see meds) and  Skin and cutaneous tissue was infiltrated and anesthetized with 1% Lidocaine 3 mls via a 25g needle.  Utilizing ultrasound guidance, a BBraun 4 inch 18 g Contiplex echogenic touhy needle was advanced in-plane.  Hydro dissection of tissue was achieved with Normal saline. Major vessels(carotid and Internal Jugular) where visualized as the brachial plexus was approached at the approximate level of C-7/ T-1.  Cervical 5 and Branches of Cervical 6 nerve roots where visualized and the needle tip was placed posterior at the level of C-6 roots.  LA spread was visualized and injection was made incrementally every 5 mls with aspiration. Injection pressure was normal or little, there was no intraneural injection, no vascular injection.    The BBraun 20 g wire stylet catheter was then placed under US guidance on the posterior aspect of the Brachial Plexus. The tuohy was removed and the location of catheter was confirmed with NS injection visualized with US . The skin was sealed with exofin tissue adhesive at catheter insertion site.  Skin was prepped with benzoin and the catheter was secured with steristrips and a CHG tegaderm. Appropriate labels applied. Thank You.     Peripheral Block    Result Date: 7/8/2022  Nirmal De La Torre, CRNA     7/8/2022 12:52 PM Peripheral Block Patient reassessed immediately prior to procedure Patient location during procedure: pre-op Start time: 7/8/2022 12:36 PM Stop time: 7/8/2022 12:52 PM Reason for block: at surgeon's request and  post-op pain management Performed by CRNA/CAA: Nirmal De La Torre CRNA Assisted by: Siobhan Moya RN Preanesthetic Checklist Completed: patient identified, IV checked, site marked, risks and benefits discussed, surgical consent, monitors and equipment checked, pre-op evaluation and timeout performed Prep: Pt Position: left lateral decubitus Sterile barriers:cap, gloves, mask and sterile barriers Prep: ChloraPrep Patient monitoring: blood pressure monitoring, continuous pulse oximetry and EKG Procedure Sedation: yes Performed under: local infiltration Guidance:ultrasound guided Images:still images obtained, printed/placed on chart Laterality:right Block Type:interscalene Injection Technique:catheter Needle Type:Tuohy and echogenic Needle Gauge:18 G Resistance on Injection: none Catheter Size:20 G (20g) Cath Depth at skin: 9 cm Medications Used: bupivacaine PF (MARCAINE) 0.25 % injection, 5 mL Med administered at 7/8/2022 12:36 PM Medications Preservative Free Saline:5ml Post Assessment Injection Assessment: negative aspiration for heme, no paresthesia on injection and incremental injection Patient Tolerance:comfortable throughout block Complications:no Additional Notes Procedure:               The pt was placed in semifowlers position with a slight tilt of the thorax contralateral to the insertion site.  The Insertion Site was prepped and draped in sterile fashion.  The pt was anesthetized with  IV Sedation( see meds) and  Skin and cutaneous tissue was infiltrated and anesthetized with 1% Lidocaine 3 mls via a 25g needle.  Utilizing ultrasound guidance, a BBraun 4 inch 18 g Contiplex echogenic touhy needle was advanced in-plane.  Hydro dissection of tissue was achieved with Normal saline. Major vessels(carotid and Internal Jugular) where visualized as the brachial plexus was approached at the approximate level of C-7/ T-1.  Cervical 5 and Branches of Cervical 6 nerve roots where visualized and the needle tip was  placed posterior at the level of C-6 roots.  LA spread was visualized and injection was made incrementally every 5 mls with aspiration. Injection pressure was normal or little, there was no intraneural injection, no vascular injection.    The BBraun 20 g wire stylet catheter was then placed under US guidance on the posterior aspect of the Brachial Plexus. The tuohy was removed and the location of catheter was confirmed with NS injection visualized with US . The skin was sealed with exofin tissue adhesive at catheter insertion site.  Skin was prepped with benzoin and the catheter was secured with steristrips and a CHG tegaderm. Appropriate labels applied. Thank You.     Peripheral Block    Result Date: 7/8/2022  Nirmal De La Torre CRNA     7/8/2022 12:13 PM Peripheral Block Patient reassessed immediately prior to procedure Patient location during procedure: pre-op Reason for block: at surgeon's request and post-op pain management Performed by ZAINAB/CAA: Nirmal De La Torre, ZAINAB Assisted by: Siobhan Moya RN Preanesthetic Checklist Completed: patient identified, IV checked, site marked, risks and benefits discussed, surgical consent, monitors and equipment checked, pre-op evaluation and timeout performed Prep: Pt Position: left lateral decubitus Sterile barriers:cap, gloves, mask and sterile barriers Prep: ChloraPrep Patient monitoring: blood pressure monitoring, continuous pulse oximetry and EKG Procedure Sedation: yes Performed under: local infiltration Guidance:ultrasound guided Images:still images obtained, printed/placed on chart Laterality:right Block Type:interscalene Injection Technique:catheter Needle Type:Tuohy and echogenic Needle Gauge:18 G Resistance on Injection: none Catheter Size:20 G (20g) Cath Depth at skin: 10 cm Medications Used: bupivacaine PF (MARCAINE) 0.25 % injection, 5 mL Medications Preservative Free Saline:5ml Post Assessment Injection Assessment: negative aspiration for heme, no  paresthesia on injection and incremental injection Patient Tolerance:comfortable throughout block Complications:no Additional Notes Procedure:               The pt was placed in semifowlers position with a slight tilt of the thorax contralateral to the insertion site.  The Insertion Site was prepped and draped in sterile fashion.  The pt was anesthetized with  IV Sedation( see meds) and  Skin and cutaneous tissue was infiltrated and anesthetized with 1% Lidocaine 3 mls via a 25g needle.  Utilizing ultrasound guidance, a BBraun 4 inch 18 g Contiplex echogenic touhy needle was advanced in-plane.  Hydro dissection of tissue was achieved with Normal saline. Major vessels(carotid and Internal Jugular) where visualized as the brachial plexus was approached at the approximate level of C-7/ T-1.  Cervical 5 and Branches of Cervical 6 nerve roots where visualized and the needle tip was placed posterior at the level of C-6 roots.  LA spread was visualized and injection was made incrementally every 5 mls with aspiration. Injection pressure was normal or little, there was no intraneural injection, no vascular injection.    The BBraun 20 g wire stylet catheter was then placed under US guidance on the posterior aspect of the Brachial Plexus. The tuohy was removed and the location of catheter was confirmed with NS injection visualized with US . The skin was sealed with exofin tissue adhesive at catheter insertion site.  Skin was prepped with benzoin and the catheter was secured with steristrips and a CHG tegaderm. Appropriate labels applied. Thank You.     FL C Arm During Surgery    Result Date: 7/14/2022  DATE OF EXAM: 7/14/2022 3:52 PM  PROCEDURE: FL C ARM DURING SURGERY-  INDICATIONS: INTRAMEDULLARY NAIL INSERTION PROXIMAL HUMERUS; HUMERUS DISTAL OPEN REDUCTION INTERNAL FIXATION; S42.291A-Other displaced fracture of upper end of right humerus, initial encounter for closed fracture; S42.409A-Unspecified fracture of lower end of  unspecified humerus, initial encounter for closed fracture; W19.XXXA-Unspecified fall, initial encounter; M80.00XA-Age-related osteoporosis with current  COMPARISON: Radiograph 07/12/2022  TECHNIQUE: Intraoperative fluoroscopic guidance was utilized with a fluoroscopy time 3 minutes and 6 seconds. 4 images were obtained.  FINDINGS: Intraoperative fluoroscopic guidance was utilized for humeral fixation with intramedullary stephane placement. Plate and screw fixation changes are also present. Anatomic alignment of the fracture fragments on fluoroscopy.      Intraoperative fluoroscopic guidance for humeral fixation.  This report was finalized on 7/14/2022 8:53 PM by Mary Jacobson MD.            Plan for Follow-up of Pending Labs/Results:     Discharge Details        Discharge Medications      New Medications      Instructions Start Date   conjugated estrogens 0.625 MG/GM vaginal cream  Commonly known as: PREMARIN   0.5 g, Vaginal, 2 Times Weekly   Start Date: July 21, 2022        Continue These Medications      Instructions Start Date   albuterol sulfate  (90 Base) MCG/ACT inhaler  Commonly known as: PROVENTIL HFA;VENTOLIN HFA;PROAIR HFA   1 puff, Inhalation, Every 4 Hours PRN      furosemide 20 MG tablet  Commonly known as: Lasix   20 mg, Oral, 2 Times Daily      gabapentin 600 MG tablet  Commonly known as: NEURONTIN   800 mg, Oral, Every 6 Hours, 800 qid      hydrocortisone 10 MG tablet  Commonly known as: CORTEF   15 mg, Oral, 2 Times Daily      hydrocortisone sodium succinate 100 MG injection  Commonly known as: Solu-CORTEF   100 mg, Intramuscular, As Needed      levothyroxine 88 MCG tablet  Commonly known as: SYNTHROID, LEVOTHROID   88 mcg, Oral, Daily      Morphine 30 MG 12 hr tablet  Commonly known as: MS CONTIN   30 mg, Oral, 2 Times Daily      mupirocin 2 % ointment  Commonly known as: BACTROBAN   APPLY TID      nortriptyline 50 MG capsule  Commonly known as: PAMELOR   50 mg, Oral, 3 Times Daily       oxyCODONE-acetaminophen  MG per tablet  Commonly known as: PERCOCET   1 tablet, Oral, Every 8 Hours      pindolol 5 MG tablet  Commonly known as: VISKEN   2.5 mg, Oral, 3 Times Daily      quinapril 5 MG tablet  Commonly known as: ACCUPRIL   5 mg, Oral, Daily      spironolactone 50 MG tablet  Commonly known as: Aldactone   50 mg, Oral, Daily      tetracycline 500 MG capsule  Commonly known as: ACHROMYCIN,SUMYCIN   500 mg, Oral, 2 Times Daily      Wixela Inhub 250-50 MCG/ACT DISKUS  Generic drug: Fluticasone-Salmeterol   INHALE 1 PUFF BY MOUTH TWICE DAILY             Allergies   Allergen Reactions   • Daypro [Oxaprozin] Anaphylaxis   • Fentanyl Other (See Comments)     sweating   • Iodine Swelling   • Keflex [Cephalexin] Hives     Tolerated ceftriaxone and cefazolin 7/2022   • Levaquin [Levofloxacin] Hives   • Lyrica [Pregabalin] Other (See Comments)     Sweating   • Other Swelling     Tranormin   • Penicillins Rash     Tolerated ceftriaxone & cefazolin 7/2022   • Sulfa Antibiotics Hives and Rash         Discharge Disposition:[facility]  Rehab Facility or Unit (DC - External)    Diet:  Hospital:  Diet Order   Procedures   • Diet Regular       Activity:WBAT RUE      Restrictions or Other Recommendations:       CODE STATUS:    Code Status and Medical Interventions:   Ordered at: 06/24/22 2121     Level Of Support Discussed With:    Patient     Code Status (Patient has no pulse and is not breathing):    CPR (Attempt to Resuscitate)     Medical Interventions (Patient has pulse or is breathing):    Full Support       Future Appointments   Date Time Provider Department Center   9/22/2022  2:30 PM Alvina Lloyd PA-C [facility]   10/11/2022  2:15 PM Heather Nelson MD [facility]       Reanna Thomas MD  07/19/22      Time Spent on Discharge:  I spent  35  minutes on this discharge activity which included: face-to-face encounter with the patient, reviewing the data in the system, coordination of the care  with the nursing staff as well as consultants, documentation, and entering orders.

## 2022-07-19 NOTE — PLAN OF CARE
Goal Outcome Evaluation:  Plan of Care Reviewed With: patient        Progress: no change  Outcome Evaluation: Pt requiring more assist this date. ModAx1 to advance to EOB. Pt requests bed be raised until knees are lower than hips for sit to stand with axillary crutch. Pt completed with CGA however requrie maxAx1 to stand from [facility] due to lower surface. Pt maxA for all UBD, LBD, and management of sling. Pt completed AAROM/AROM x10 reps shoulder/elbow/wrist/hand. Limited by pain and only tolerating grossly 80 degrees AROM this date. Continue IP OT per POC.

## 2022-07-19 NOTE — THERAPY TREATMENT NOTE
Patient Name: Altagracia Rothman  : 1950    MRN: [id number]                              Today's Date: 2022       Admit Date: 2022    Visit Dx:     ICD-10-CM ICD-9-CM   1. Other closed displaced fracture of proximal end of right humerus, initial encounter  S42.291A 812.09   2. Closed fracture of distal end of humerus, unspecified fracture morphology, initial encounter  S42.409A 812.40   3. Fall, initial encounter  W19.XXXA E888.9   4. Osteoporosis with current pathological fracture, unspecified osteoporosis type, initial encounter  M80.00XA 733.00     733.10     Patient Active Problem List   Diagnosis   • Adrenal insufficiency (HCC)   • Hypothyroidism   • Essential hypertension   • H/O pheochromocytoma   • Chronic bilateral low back pain without sciatica   • Encounter for chronic pain management   • Osteoporosis   • Tobacco abuse   • BMI 35.0-35.9,adult   • Bronchitis   • Moderate asthma without complication   • Paresthesia   • Medicare annual wellness visit, subsequent   • Routine medical exam   • Lipid screening   • Hair loss   • Other closed displaced fracture of proximal end of right humerus, initial encounter   • Acute UTI (urinary tract infection)     Past Medical History:   Diagnosis Date   • Adrenal insufficiency (HCC)    • Fibromyalgia    • Hypertension    • Hypothyroidism    • Skin cancer      Past Surgical History:   Procedure Laterality Date   • ADRENAL GLAND SURGERY     • BREAST BIOPSY      x4   • GALLBLADDER SURGERY     • HYSTERECTOMY     • MRI ANGIOGRAM LOWER EXTREMITY UNILATERAL W CONTRAST     • TONSILLECTOMY        General Information     Row Name 22 1015          OT Time and Intention    Document Type therapy note (daily note)  -HK     Mode of Treatment occupational therapy  -HK     Row Name 22 1015          General Information    Patient Profile Reviewed yes  -HK     Existing Precautions/Restrictions fall  -HK     Barriers to Rehab medically complex;previous functional  deficit;cognitive status;ineffective coping;physical barrier  -     Row Name 07/19/22 1015          Cognition    Orientation Status (Cognition) oriented x 4  -     Row Name 07/19/22 1015          Safety Issues, Functional Mobility    Safety Issues Affecting Function (Mobility) safety precautions follow-through/compliance;safety precaution awareness;insight into deficits/self-awareness;awareness of need for assistance;judgment;at risk behavior observed;sequencing abilities  -     Impairments Affecting Function (Mobility) endurance/activity tolerance;pain;range of motion (ROM);strength  -           User Key  (r) = Recorded By, (t) = Taken By, (c) = Cosigned By    Initials Name Provider Type     Fina Ho, OT Occupational Therapist                 Mobility/ADL's     Row Name 07/19/22 1019          Bed Mobility    Bed Mobility scooting/bridging;supine-sit  -HK     Scooting/Bridging [place] (Bed Mobility) moderate assist (50% patient effort);1 person assist;verbal cues  -     Supine-Sit [place] (Bed Mobility) moderate assist (50% patient effort);1 person assist;verbal cues  -     Bed Mobility, Safety Issues decreased use of arms for pushing/pulling  -     Assistive Device (Bed Mobility) head of bed elevated;bed rails  -     Comment, (Bed Mobility) modA to advance to EOB.  -     Row Name 07/19/22 1019          Transfers    Transfers sit-stand transfer;toilet transfer  -     Sit-Stand [place] (Transfers) contact guard;verbal cues  -HK     [place] Level (Toilet Transfer) maximum assist (25% patient effort);1 person assist;verbal cues  -     Assistive Device (Toilet Transfer) crutches, axillary;commode, bedside without drop arms  -     Row Name 07/19/22 1019          Sit-Stand Transfer    Assistive Device (Sit-Stand Transfers) crutches, axillary  -     Comment, (Sit-Stand Transfer) Pt insists on bed being raised high and does not compelte formal sit to stand with hips lower  than knees. Pt requests bed be raised until knees are lower than hips.  -[facility] Name 07/19/22 1019          Toilet Transfer    Type (Toilet Transfer) stand-sit;sit-stand  -     Comment, (Toilet Transfer) maxAx1 for sit to stand from toilet. On first attempt to stand pt held on to [facility] pulling it out from behind herself. Cues for safe hand placement and sequencing.  -HK     Row Name 07/19/22 1019          Functional Mobility    Functional Mobility- Ind. Level not tested  -HK     Row Name 07/19/22 1019          Activities of Daily Living    BADL Assessment/Intervention upper body dressing;lower body dressing;toileting  -HK     Row Name 07/19/22 1019          Mobility    Extremity Weight-bearing Status right upper extremity  -     Right Upper Extremity (Weight-bearing Status) weight-bearing as tolerated (WBAT)  -HK     Row Name 07/19/22 1019          Upper Body Dressing Assessment/Training    Crisp Level (Upper Body Dressing) doff;don;other (see comments);bra/undergarment;pull-over garment;maximum assist (25% patient effort)  sling  -HK     Position (Upper Body Dressing) unsupported sitting  -     Comment, (Upper Body Dressing) Sling for comfort. Requires maxA to dof/don. Pt maxA for donning pullover shirt and front opening bra via raimundo dressing technique.  -[facility] Name 07/19/22 1019          Grooming Assessment/Training    Crisp Level (Grooming) hair care, combing/brushing;maximum assist (25% patient effort);oral care regimen;set up  -HK     Position (Grooming) supported sitting  -HK     Row Name 07/19/22 1019          Toileting Assessment/Training    Crisp Level (Toileting) adjust/manage clothing;perform perineal hygiene;dependent (less than 25% patient effort);change pad/brief  -HK     Position (Toileting) unsupported sitting;supported standing  -HK     Row Name 07/19/22 1019          Lower Body Dressing Assessment/Training    Crisp Level (Lower Body Dressing)  don;pants/bottoms;shoes/slippers;socks;other (see comments);maximum assist (25% patient effort)  underwear  -     Position (Lower Body Dressing) supine;supported standing  -           User Key  (r) = Recorded By, (t) = Taken By, (c) = Cosigned By    Initials Name Provider Type     Fina Ho, OT Occupational Therapist               Obj/Interventions     [facility] Name 07/19/22 1027          Sensory Assessment (Somatosensory)    Sensory Assessment (Somatosensory) sensation intact  -[facility] Name 07/19/22 1027          Shoulder (Therapeutic Exercise)    Shoulder (Therapeutic Exercise) AAROM (active assistive range of motion)  -     Shoulder AAROM (Therapeutic Exercise) right;flexion;extension;10 repetitions  Pt tolerating minimal AAFE this date. Only tolerated grossly 80 degrees AROM .  -[facility] Name 07/19/22 1027          Elbow/Forearm (Therapeutic Exercise)    Elbow/Forearm (Therapeutic Exercise) AAROM (active assistive range of motion)  -     Elbow/Forearm AROM (Therapeutic Exercise) right;flexion;extension;supination;pronation;10 repetitions  -[facility] Name 07/19/22 1027          Wrist (Therapeutic Exercise)    Wrist (Therapeutic Exercise) AROM (active range of motion)  -     Wrist AROM (Therapeutic Exercise) right;flexion;extension;10 repetitions  -[facility] Name 07/19/22 1027          Hand (Therapeutic Exercise)    Hand (Therapeutic Exercise) AROM (active range of motion)  -     Hand AROM/AAROM (Therapeutic Exercise) right;AROM (active range of motion);finger flexion;finger extension  -[facility] Name 07/19/22 1027          Motor Skills    Therapeutic Exercise shoulder;elbow/forearm;wrist;hand  -HK     Row Name 07/19/22 1027          Balance    Balance Assessment sitting static balance;standing static balance;standing dynamic balance;sitting dynamic balance  -     Static Sitting Balance supervision  -     Dynamic Sitting Balance supervision  -     Position, Sitting Balance  unsupported;sitting edge of bed  -HK     Static Standing Balance contact guard;verbal cues  -HK     Dynamic Standing Balance minimal assist;verbal cues  -HK     Position/Device Used, Standing Balance supported;crutches, axillary  -HK     Balance Interventions sitting;standing;occupation based/functional task  -           User Key  (r) = Recorded By, (t) = Taken By, (c) = Cosigned By    Initials Name Provider Type     Fina Ho, OT Occupational Therapist               Goals/Plan    No documentation.                Clinical Impression     Row Name 07/19/22 1035          Pain Scale: FACES Pre/Post-Treatment    Pain: FACES Scale, Pretreatment 4-->hurts little more  -HK     Posttreatment Pain Rating 6-->hurts even more  -HK     Pain Location - Side/Orientation Right  -HK     Pain Location generalized  -HK     Pain Location - shoulder  -HK     [facility] Name 07/19/22 1035          Plan of Care Review    Plan of Care Reviewed With patient  -HK     Progress no change  -HK     Outcome Evaluation Pt requiring more assist this date. ModAx1 to advance to EOB. Pt requests bed be raised until knees are lower than hips for sit to stand with axillary crutch. Pt completed with CGA however requrie maxAx1 to stand from [facility] due to lower surface. Pt maxA for all UBD, LBD, and management of sling. Pt completed AAROM/AROM x10 reps shoulder/elbow/wrist/hand. Limited by pain and only tolerating grossly 80 degrees AROM this date. Continue IP OT per POC.  -     Row Name 07/19/22 1035          Therapy Assessment/Plan (OT)    Rehab Potential (OT) good, to achieve stated therapy goals  -     Criteria for Skilled Therapeutic Interventions Met (OT) yes;meets criteria;skilled treatment is necessary  -     Therapy Frequency (OT) daily  -     Row Name 07/19/22 1035          Therapy Plan Review/Discharge Plan (OT)    Anticipated Discharge Disposition (OT) inpatient rehabilitation facility  -     Row Name 07/19/22 1035          Vital Signs     Pre Systolic BP Rehab --  RN cleared for tx; VSS  -HK     O2 Delivery Pre Treatment room air  -HK     O2 Delivery Intra Treatment room air  -HK     O2 Delivery Post Treatment room air  -HK     Pre Patient Position Supine  -HK     Intra Patient Position Standing  -HK     Post Patient Position Sitting  -HK     Row Name 07/19/22 1035          Positioning and Restraints    Pre-Treatment Position in bed  -HK     Post Treatment Position chair  -HK     In Chair notified nsg;reclined;call light within reach;encouraged to call for assist;exit alarm on  -HK           User Key  (r) = Recorded By, (t) = Taken By, (c) = Cosigned By    Initials Name Provider Type    Fina Padilla, OT Occupational Therapist               Outcome Measures     Row Name 07/19/22 1037          How much help from another is currently needed...    Putting on and taking off regular lower body clothing? 2  -HK     Bathing (including washing, rinsing, and drying) 2  -HK     Toileting (which includes using toilet bed pan or urinal) 1  -HK     Putting on and taking off regular upper body clothing 2  -HK     Taking care of personal grooming (such as brushing teeth) 3  -HK     Eating meals 3  -HK     AM-PAC 6 Clicks Score (OT) 13  -HK     Row Name 07/19/22 1037          Functional Assessment    Outcome Measure Options AM-PAC 6 Clicks Daily Activity (OT)  -HK           User Key  (r) = Recorded By, (t) = Taken By, (c) = Cosigned By    Initials Name Provider Type    Fina Padilla, OT Occupational Therapist                Occupational Therapy Education                 Title: PT OT SLP Therapies (Done)     Topic: Occupational Therapy (Done)     Point: ADL training (Done)     Description:   Instruct learner(s) on proper safety adaptation and remediation techniques during self care or transfers.   Instruct in proper use of assistive devices.              Learning Progress Summary           Patient Acceptance, TB,E,D, VU,NR by  at 7/19/2022 1037      Show  all documentation for this point (9)                 Point: Home exercise program (Done)     Description:   Instruct learner(s) on appropriate technique for monitoring, assisting and/or progressing therapeutic exercises/activities.              Learning Progress Summary           Patient Acceptance, TB,E,D, VU,NR by  at 7/19/2022 1037      Show all documentation for this point (10)                 Point: Precautions (Done)     Description:   Instruct learner(s) on prescribed precautions during self-care and functional transfers.              Learning Progress Summary           Patient Acceptance, TB,E,D, VU,NR by  at 7/19/2022 1037      Show all documentation for this point (10)                 Point: Body mechanics (Done)     Description:   Instruct learner(s) on proper positioning and spine alignment during self-care, functional mobility activities and/or exercises.              Learning Progress Summary           Patient Acceptance, TB,E,D, VU,NR by  at 7/19/2022 1037      Show all documentation for this point (10)                             User Key     Initials Effective Dates Name Provider Type Discipline     06/16/21 -  Fina Ho, OT Occupational Therapist OT              OT Recommendation and Plan  Therapy Frequency (OT): daily  Plan of Care Review  Plan of Care Reviewed With: patient  Progress: no change  Outcome Evaluation: Pt requiring more assist this date. ModAx1 to advance to EOB. Pt requests bed be raised until knees are lower than hips for sit to stand with axillary crutch. Pt completed with CGA however requrie maxAx1 to stand from [facility] due to lower surface. Pt maxA for all UBD, LBD, and management of sling. Pt completed AAROM/AROM x10 reps shoulder/elbow/wrist/hand. Limited by pain and only tolerating grossly 80 degrees AROM this date. Continue IP OT per POC.     Time Calculation:    Time Calculation- OT     Row Name 07/19/22 3673             Time Calculation- OT    OT Start Time 0945   -HK      OT Received On 07/19/22  -HK              Timed Charges    25379 - OT Therapeutic Exercise Minutes 10  -HK      19798 - OT Self Care/Mgmt Minutes 18  -HK              Total Minutes    Timed Charges Total Minutes 28  -HK       Total Minutes 28  -HK            User Key  (r) = Recorded By, (t) = Taken By, (c) = Cosigned By    Initials Name Provider Type     Fina Ho, OT Occupational Therapist              Therapy Charges for Today     Code Description Service Date Service Provider Modifiers Qty    [id number]  OT THER PROC EA 15 MIN 7/19/2022 Fina Ho OT GO 1    [id number]  OT SELF CARE/MGMT/TRAIN EA 15 MIN 7/19/2022 Fina Ho OT GO 1               Fina Ho OT  7/19/2022

## 2022-07-19 NOTE — DISCHARGE INSTRUCTIONS
EXOFIN CARING FOR YOUR WOUND    AFTER exofin Fusion SKIN CLOSURE SYSTEM HAS BEEN APPLIED    YOUR HEALTHCARE PROFESSIONAL HAS CHOSEN TO USE exofin Fusion SKIN CLOSURE SYSTEM TO CLOSE YOUR WOUND.    exocin Fusion is the combination of a mesh and liquid adhesive that allows the incision or wound to be held together during the healing process.    exocin Fusion should remain in place until your healthcare professional; has determined that adequate healing has occurred, which is usually anywhere between 7 to 14 days. In most cases, exofin Fusion is easily removed with little or no discomfort.    In the event that you notice that exofin Fusion is beginning to loosen or may be coming off, contact your healthcare professional.    The following information is provided to help you understand how to care for your incision and is based on the FDA-cleared product labeling.    You should always follow the instructions of your healthcare provider.    THINGS TO KNOW    BATHING OR SHOWERING    If directed by your healthcare professional, you may occasionally and briefly wet your incision or wound that was treated with exofin Fusion in the shower or bath. Do not soak or scrub your incision or wound. Do not swim or soak your incision or wound in water. After showering or bathing, gently blot your incision or wound dry with a soft towel. If a dry protective dressing is being used over exofin Fusion, it should be replaced with a fresh, dry protective dressing after showering or bathing as directed by your healthcare practitioner. Care should also be taken so that any tape that may be part of the dry protective dressing does not come into contact with exofin Fusion because when the tape is removed, it may also remove exofin Fusion.    WOUND HEALING  If you experience any redness, swelling, discomfort, warmth or pus, contact your healthcare professional and he or she will determine how your incision or wound is healing and take the  necessary steps to address any issues.    EXERCISE  Do not engage in strenuous exercise that may cause additional stress on your incision or wound. Follow your healthcare professional's guidance about when you can return to your normal activities.    OINTMENTS OR LIQUIDS  Topical ointments, liquids or any other products (other than dry bandages) should not be applied to the incision while exofin Fusion is in place. These may loosen exofin Fusion from the skin before it has completely healed.    REMOVING exofin Fusion  Your healthcare professional will determine when the healing process of your incision or wound has been completed and exofin Fusion is ready to be removed, which is usually between 7 to 14 days. When healing is complete, your healthcare professional will carefully peel off the exofin Fusion.    Prior to removal, do not scratch, rub or pick at the mesh. This may loosen the adhesive and mesh before the skin is healed.  In the event that you notice the exofin Fusion is beginning to loosen and may be coming off or comes off with the skin/wound, contract your healthcare professional.    For complete directions on the use of exofin Fusion, please refer to instructions for Use (IFU) included in the product packaging.  IF YOU HAVE ANY QUESTIONS OR CONCERNS ABOUT exofin Fusion PLEASE CONTACT YOUR HEALTHCARE PROFESSIONAL. [facility] COLD THERAPY - PATIENT INSTRUCTION SHEET    Cold Compression Therapy for your comfort and rehabilitation  Your caregivers want you to be productive in your rehab and comfortable during your stay. In keeping with those goals, you will be receiving an [facility] Cold Therapy Wrap to help ease post-operative pain and swelling that might keep you from getting back on track! Your SMI Cold Therapy Wrap is effective and simple-to-use, and you will be encouraged to apply it throughout your hospital stay and at home through the duration of your recovery.    When you are ready to go home  Be sure to take  your SMI Cold Therapy Wrap and both sets of Gel Bags with you for continued comfort and use throughout your rehabilitation. If you don't already have them, ask your nurse or aide to retrieve your SMI Gel Bags from the patient freezer.    Home use precautions  Always follow your medical professional's application instructions upon discharge. Your SMI Cold Therapy Wrap and Gel Bags are designed to last for months following your surgery. Never heat the Gel Bags unless specified by your healthcare provider. Supervision is advised when using this product on children or geriatric patients. To avoid danger of suffocation, please keep the outer plastic packaging away from children & pets.    Cold Therapy Instructions  Place Gel Bags in a freezer set ¾ of the way to max temperature for at least (4) hours. For best results, lay the Gel Bags flat and [id number] in the freezer. Once frozen, slide Gel Bags into the gel pouch and secure your wrap to the affected area with the straps.  Gel wraps that have been stored in a freezer for an extended period of time may require a (10) minute period of softening up in a room temperature environment before application.  The gel pouch acts as a protective barrier. NEVER place frozen bags directly onto skin, as this may cause frostbite injury.  The SMI Cold Therapy Wrap is designed to be able to be worm while ambulating. The compression straps can be secured well enough so that the Wrap won't fall off while moving.  Wrap Application Videos can be viewed at [email or web address][email or web address].  An additional protective barrier such as clothing, a washcloth, hand-towel or pillowcase may be used during prolonged treatment applications.  The Gel-Pouch and Wrap are both Latex-Free and the Gel Bag ingredients are non toxic.    SMI Wrap care instructions  The SMI Cold Therapy Wrap may be hand washed and hung to dry when needed.    [facility] re-order information  Additional SMI body specific wraps and/or Gel Bags  can be re-ordered from [email or web address].[email or web address] or call 383-ICE-WRAP ([phone])   [facility] COLD THERAPY - PATIENT INSTRUCTION SHEET    Cold Compression Therapy for your comfort and rehabilitation  Your caregivers want you to be productive in your rehab and comfortable during your stay. In keeping with those goals, you will be receiving an SMI Cold Therapy Wrap to help ease post-operative pain and swelling that might keep you from getting back on track! Your SMI Cold Therapy Wrap is effective and simple-to-use, and you will be encouraged to apply it throughout your hospital stay and at home through the duration of your recovery.    When you are ready to go home  Be sure to take your SMI Cold Therapy Wrap and both sets of Gel Bags with you for continued comfort and use throughout your rehabilitation. If you don't already have them, ask your nurse or aide to retrieve your SMI Gel Bags from the patient freezer.    Home use precautions  Always follow your medical professional's application instructions upon discharge. Your SMI Cold Therapy Wrap and Gel Bags are designed to last for months following your surgery. Never heat the Gel Bags unless specified by your healthcare provider. Supervision is advised when using this product on children or geriatric patients. To avoid danger of suffocation, please keep the outer plastic packaging away from children & pets.    Cold Therapy Instructions  Place Gel Bags in a freezer set ¾ of the way to max temperature for at least (4) hours. For best results, lay the Gel Bags flat and [id number] in the freezer. Once frozen, slide Gel Bags into the gel pouch and secure your wrap to the affected area with the straps.  Gel wraps that have been stored in a freezer for an extended period of time may require a (10) minute period of softening up in a room temperature environment before application.  The gel pouch acts as a protective barrier. NEVER place frozen bags directly onto skin, as this  may cause frostbite injury.  The [facility] Cold Therapy Wrap is designed to be able to be worm while ambulating. The compression straps can be secured well enough so that the Wrap won't fall off while moving.  Wrap Application Videos can be viewed at [employer].  An additional protective barrier such as clothing, a washcloth, hand-towel or pillowcase may be used during prolonged treatment applications.  The Gel-Pouch and Wrap are both Latex-Free and the Gel Bag ingredients are non toxic.    [facility] Wrap care instructions  The [facility] Cold Therapy Wrap may be hand washed and hung to dry when needed.    [facility] re-order information  Additional [facility] body specific wraps and/or Gel Bags can be re-ordered from [email or web address] or call Mimoco3-ICE-WRAP ([phone])

## 2022-07-19 NOTE — PROGRESS NOTES
"Orthopedic Daily Progress Note      CC: INDIRA overnight. Patient to [facility] Today.    Pain well controlled  General: no fevers, chills  Abdomen: no nausea, vomiting, or diarrhea    No other complaints    Physical Exam:  I have reviewed the vital signs.  Temp:  [97.9 °F (36.6 °C)-98.6 °F (37 °C)] 98.4 °F (36.9 °C)  Heart Rate:  [62-79] 67  Resp:  [18-19] 19  BP: (142-149)/(53-86) 142/83    Objective:  Vital signs: (most recent): Blood pressure 142/83, pulse 67, temperature 98.4 °F (36.9 °C), temperature source Oral, resp. rate 19, height 154.9 cm (61\"), weight 81.6 kg (180 lb), SpO2 99 %, not currently breastfeeding.            General Appearance:    Alert, cooperative, no distress  Extremities: No clubbing, cyanosis, or edema to lower extremities  Pulses:  2+ in distal surgical extremity  Skin: Dressing Clean/dry/intact      Results Review:    I have reviewed the labs, radiology results and diagnostic studies:no new labs yet    Results from last 7 days   Lab Units 07/15/22  1120   WBC 10*3/mm3 8.96   HEMOGLOBIN g/dL 12.1   PLATELETS 10*3/mm3 283     Results from last 7 days   Lab Units 07/15/22  1120   SODIUM mmol/L 134*   POTASSIUM mmol/L 4.4   CO2 mmol/L 26.0   CREATININE mg/dL 0.42*   GLUCOSE mg/dL 99       I have reviewed the medications.    Assessment/Problem List  POD# 5 Day Post-Op   S/p R IMN and ORIF R humerus fractures    Plan  WBAT RUE  PT/OT      Discharge Planning: I expect patient to be discharged to [facility] in 0 days.    Jamari Matute Jr., MD  07/19/22  09:14 EDT            "

## 2022-07-19 NOTE — CASE MANAGEMENT/SOCIAL WORK
Case Management Discharge Note      Final Note: Arrangements complete for [facility] at [facility]. [facility] wheelchair van will transport at 1130. Ms Rothman is in agreement with plan. Call report to [phone]         Selected Continued Care - Admitted Since 6/24/2022     Destination Coordination complete.    Service Provider Selected Services Address Phone Fax Patient Preferred    [facility] [address] [id number] [phone] --          Durable Medical Equipment    No services have been selected for the patient.              Dialysis/Infusion    No services have been selected for the patient.              Home Medical Care    No services have been selected for the patient.              Therapy    No services have been selected for the patient.              Community Resources    No services have been selected for the patient.              Community & DME    No services have been selected for the patient.                  Transportation Services  W/C Van:  ([facility])    Final Discharge Disposition Code: 62 - inpatient rehab facility

## 2022-07-19 NOTE — PLAN OF CARE
Goal Outcome Evaluation:      Pt able to ambulate from chair to bed with 1 assist, crutches, and gait belt. RUE sling in place. Pain well controlled with PRNs. NSR on tele. Two small BMs overnight. Pt looking forward to d/c to [facility].

## 2022-07-20 DIAGNOSIS — I10 ESSENTIAL HYPERTENSION: ICD-10-CM

## 2022-07-20 RX ORDER — SPIRONOLACTONE 50 MG/1
TABLET, FILM COATED ORAL
Qty: 30 TABLET | Refills: 1 | Status: SHIPPED | OUTPATIENT
Start: 2022-07-20 | End: 2022-09-16 | Stop reason: SDUPTHER

## 2022-07-20 NOTE — TELEPHONE ENCOUNTER
Rx Refill Note  Requested Prescriptions     Pending Prescriptions Disp Refills   • spironolactone (ALDACTONE) 50 MG tablet [Pharmacy Med Name: SPIRONOLACTONE 50 MG TABLET] 30 tablet 1     Sig: TAKE ONE TABLET BY MOUTH ONCE DAILY      Last office visit with prescribing clinician: 5/3/2022      Next office visit with prescribing clinician: 9/22/2022            Laya Garcias RN  07/20/22, 09:01 EDT

## 2022-07-23 DIAGNOSIS — I10 ESSENTIAL HYPERTENSION: ICD-10-CM

## 2022-07-25 RX ORDER — PINDOLOL 5 MG/1
TABLET ORAL
Qty: 45 TABLET | Refills: 5 | Status: SHIPPED | OUTPATIENT
Start: 2022-07-25 | End: 2022-10-10

## 2022-08-11 ENCOUNTER — READMISSION MANAGEMENT (OUTPATIENT)
Dept: CALL CENTER | Facility: HOSPITAL | Age: 72
End: 2022-08-11

## 2022-08-11 ENCOUNTER — TELEPHONE (OUTPATIENT)
Dept: INTERNAL MEDICINE | Facility: CLINIC | Age: 72
End: 2022-08-11

## 2022-08-11 NOTE — OUTREACH NOTE
Prep Survey    Flowsheet Row Responses   [facility] facility patient discharged from? Non-BH   Is LACE score < 7 ? Non-BH Discharge   Emergency Room discharge w/ pulse ox? No   Eligibility [facility]   Date of Discharge 08/10/22   Discharge diagnosis right arm fracture   Does the patient have one of the following disease processes/diagnoses(primary or secondary)? Other   Prep survey completed? Yes          QUINCY GRAYSON - Registered Nurse

## 2022-08-11 NOTE — TELEPHONE ENCOUNTER
Lynn from [facility] called to schedule a hospital follow up visit for the patient    D/C from [facility] 08/11/2022, TCM Sent    Patient did not want to make the appointment with Yola, scheduled appointment with Alvina at her first available past 1 PM on 08/23/2022 at 1:45 PM

## 2022-08-12 ENCOUNTER — TRANSITIONAL CARE MANAGEMENT TELEPHONE ENCOUNTER (OUTPATIENT)
Dept: CALL CENTER | Facility: HOSPITAL | Age: 72
End: 2022-08-12

## 2022-08-12 NOTE — OUTREACH NOTE
Call Center TCM Note    Flowsheet Row Responses   [facility] facility patient discharged from? Non-BH   Does the patient have one of the following disease processes/diagnoses(primary or secondary)? Other   TCM attempt successful? No   Unsuccessful attempts Attempt 2   Does the patient have a primary care provider?  Yes   Comments regarding PCP HOSP/REHAB [facility] 8/23/22 @ 1:45 pm.           Germaine Thomas RN    8/12/2022, 15:03 EDT

## 2022-08-12 NOTE — OUTREACH NOTE
Call Center TCM Note    Flowsheet Row Responses   [facility] patient discharged from? Non-BH   Does the patient have one of the following disease processes/diagnoses(primary or secondary)? Other   TCM attempt successful? No  [no current verbal release]   Unsuccessful attempts Attempt 1          Germaine Thomas RN    8/12/2022, 10:34 EDT

## 2022-08-14 ENCOUNTER — TRANSITIONAL CARE MANAGEMENT TELEPHONE ENCOUNTER (OUTPATIENT)
Dept: CALL CENTER | Facility: HOSPITAL | Age: 72
End: 2022-08-14

## 2022-08-14 NOTE — OUTREACH NOTE
Call Center TCM Note    Flowsheet Row Responses   [facility] patient discharged from? Non-BH   Does the patient have one of the following disease processes/diagnoses(primary or secondary)? Other   TCM attempt successful? No   Unsuccessful attempts Attempt 3   Revoked Reason Phone Issues  [No answer ]          Renetta Ospina RN    8/14/2022, 11:21 EDT

## 2022-08-23 ENCOUNTER — OFFICE VISIT (OUTPATIENT)
Dept: INTERNAL MEDICINE | Facility: CLINIC | Age: 72
End: 2022-08-23

## 2022-08-23 ENCOUNTER — LAB (OUTPATIENT)
Dept: LAB | Facility: HOSPITAL | Age: 72
End: 2022-08-23

## 2022-08-23 VITALS
SYSTOLIC BLOOD PRESSURE: 110 MMHG | HEART RATE: 60 BPM | DIASTOLIC BLOOD PRESSURE: 62 MMHG | HEIGHT: 61 IN | TEMPERATURE: 98 F | BODY MASS INDEX: 33.99 KG/M2 | WEIGHT: 180 LBS | OXYGEN SATURATION: 92 %

## 2022-08-23 DIAGNOSIS — M54.50 CHRONIC BILATERAL LOW BACK PAIN WITHOUT SCIATICA: ICD-10-CM

## 2022-08-23 DIAGNOSIS — S42.421S: Primary | ICD-10-CM

## 2022-08-23 DIAGNOSIS — I10 ESSENTIAL HYPERTENSION: ICD-10-CM

## 2022-08-23 DIAGNOSIS — E27.40 ADRENAL INSUFFICIENCY: ICD-10-CM

## 2022-08-23 DIAGNOSIS — F51.01 PRIMARY INSOMNIA: ICD-10-CM

## 2022-08-23 DIAGNOSIS — G89.29 CHRONIC BILATERAL LOW BACK PAIN WITHOUT SCIATICA: ICD-10-CM

## 2022-08-23 DIAGNOSIS — Z86.018 H/O PHEOCHROMOCYTOMA: ICD-10-CM

## 2022-08-23 DIAGNOSIS — E03.9 ACQUIRED HYPOTHYROIDISM: ICD-10-CM

## 2022-08-23 LAB
BASOPHILS # BLD AUTO: 0.02 10*3/MM3 (ref 0–0.2)
BASOPHILS NFR BLD AUTO: 0.2 % (ref 0–1.5)
DEPRECATED RDW RBC AUTO: 40.9 FL (ref 37–54)
EOSINOPHIL # BLD AUTO: 0.05 10*3/MM3 (ref 0–0.4)
EOSINOPHIL NFR BLD AUTO: 0.6 % (ref 0.3–6.2)
ERYTHROCYTE [DISTWIDTH] IN BLOOD BY AUTOMATED COUNT: 11.8 % (ref 12.3–15.4)
HCT VFR BLD AUTO: 40.7 % (ref 34–46.6)
HGB BLD-MCNC: 13.2 G/DL (ref 12–15.9)
IMM GRANULOCYTES # BLD AUTO: 0.04 10*3/MM3 (ref 0–0.05)
IMM GRANULOCYTES NFR BLD AUTO: 0.5 % (ref 0–0.5)
LYMPHOCYTES # BLD AUTO: 1.19 10*3/MM3 (ref 0.7–3.1)
LYMPHOCYTES NFR BLD AUTO: 13.5 % (ref 19.6–45.3)
MCH RBC QN AUTO: 30.6 PG (ref 26.6–33)
MCHC RBC AUTO-ENTMCNC: 32.4 G/DL (ref 31.5–35.7)
MCV RBC AUTO: 94.4 FL (ref 79–97)
MONOCYTES # BLD AUTO: 0.58 10*3/MM3 (ref 0.1–0.9)
MONOCYTES NFR BLD AUTO: 6.6 % (ref 5–12)
NEUTROPHILS NFR BLD AUTO: 6.95 10*3/MM3 (ref 1.7–7)
NEUTROPHILS NFR BLD AUTO: 78.6 % (ref 42.7–76)
NRBC BLD AUTO-RTO: 0 /100 WBC (ref 0–0.2)
PLATELET # BLD AUTO: 330 10*3/MM3 (ref 140–450)
PMV BLD AUTO: 10.7 FL (ref 6–12)
RBC # BLD AUTO: 4.31 10*6/MM3 (ref 3.77–5.28)
WBC NRBC COR # BLD: 8.83 10*3/MM3 (ref 3.4–10.8)

## 2022-08-23 PROCEDURE — 85025 COMPLETE CBC W/AUTO DIFF WBC: CPT

## 2022-08-23 PROCEDURE — 99495 TRANSJ CARE MGMT MOD F2F 14D: CPT | Performed by: PHYSICIAN ASSISTANT

## 2022-08-23 PROCEDURE — 80053 COMPREHEN METABOLIC PANEL: CPT

## 2022-08-23 PROCEDURE — 1111F DSCHRG MED/CURRENT MED MERGE: CPT | Performed by: PHYSICIAN ASSISTANT

## 2022-08-23 RX ORDER — DOXYCYCLINE HYCLATE 100 MG
100 TABLET ORAL 2 TIMES DAILY
COMMUNITY
Start: 2022-08-20 | End: 2022-08-23 | Stop reason: SDUPTHER

## 2022-08-23 RX ORDER — GABAPENTIN 800 MG/1
800 TABLET ORAL EVERY 6 HOURS
COMMUNITY
Start: 2022-08-19

## 2022-08-23 RX ORDER — FLURBIPROFEN SODIUM 0.3 MG/ML
SOLUTION/ DROPS OPHTHALMIC
COMMUNITY
Start: 2022-08-05

## 2022-08-23 RX ORDER — TERIPARATIDE 250 UG/ML
20 INJECTION, SOLUTION SUBCUTANEOUS DAILY
COMMUNITY
Start: 2022-08-01

## 2022-08-23 RX ORDER — DOXYCYCLINE HYCLATE 100 MG
100 TABLET ORAL 2 TIMES DAILY
Qty: 14 TABLET | Refills: 0 | Status: SHIPPED | OUTPATIENT
Start: 2022-08-23 | End: 2023-01-11

## 2022-08-23 NOTE — PROGRESS NOTES
Transitional Care Follow Up Visit  Subjective     Altagracia Rothman is a 72 y.o. female who presents for a transitional care management visit.    Within 48 business hours after discharge our office contacted her via telephone to coordinate her care and needs.      I reviewed and discussed the details of that call along with the discharge summary, hospital problems, inpatient lab results, inpatient diagnostic studies, and consultation reports with Altagracia.     Current outpatient and discharge medications have been reconciled for the patient.  Reviewed by: Alvina Lloyd PA-C      Date of TCM Phone Call 8/11/2022   [facility]   Date of Discharge 8/10/2022     Risk for Readmission (LACE) No data recorded    History of Present Illness   The patient states she was admitted to the [facility] from 06/24/2022 until 07/2022. She reports that she crushed her humerus bone when she was catapulted up against her house after placing her crutches too close together as she was going up a step. The patient notes that she landed on her stomach and hands, and was unable to move her right arm while moving her right shoulder. She confirms that the humerus bone was poking up and nearly breaking through the skin; although she did not realize it was broken at that time. The patient was informed that there were chunks of bone with fragments throughout her arm, and her elbow was broken. She states that the hospital waited to perform surgery for 3 weeks after she fractured her humerus bone in order to evaluate her laboratory work related to her history of pheochromocytoma. The patient reports that Dr. Matute performed her surgery the day after her laboratory work came back negative for pheochromocytoma. He placed pins in her arm, a nail in her shoulder, and plates in her arm and shoulder. The patient notes that she may have to get the screw removed from her shoulder plate secondary to it coming loose. She is right  "handed.  She affirms that she has some range of motion now. The patient states that the swelling has decreased while moving her arm around; although her arm is swollen today. She is able to dress herself with a sock aid and \"grabbers\" to put her pants on. She is no longer attending pain management in [place]. The patient states she has a nurse, occupational therapy and physical therapy coming to her house once a week; although she was not able to see them this last week. The patient has not felt like cooking, and has been ordering food to the house. She notes some relief with her pain medication; although there are nights when she wakes up with restlessness and pain after she has taken her allotted dosage of medication. The patient has not tried taking melatonin. She states that Dr. Nguyen informed her that the heat radiating from her arm, and low-grade temperature she was previously experiencing may possibly be due to an infection from bone fragments in her arm. The patient notes that her blood pressure decreases when she is injured due to the lack of production of cortisone.     The patient reports that her white blood cell count doubled while she was in [facility]. She was placed on a 10-day treatment for a bacterial infection. The patient states that the infectious disease specialist, Dr. Nguyen, was informed of her tooth infection at that time and the patient does not believe it is related to the bacterial infection. She will follow up with Dr. Nguyen again in 1 week. The patient was also treated for a severe urinary tract infection at [facility]. She was re-evaluated upon no improvement in her symptoms and treated for a urinary E. coli infection until her urine was clean. She was discharged from [facility] on 08/10/2022. The patient denies chest pain or shortness of breath.    She affirms that she switched her tetracycline to doxycycline 100 mg twice daily secondary to not being able to have her " "tooth removed at this time. She would like a refill on her doxycycline. The patient is taking gabapentin 800 mg 4 times a day. She was prescribed MS Contin 30 mg twice a day by her pain management specialist, Dr. Júnior Alex. She states she takes oxycodone 10/325 mg as needed for breakthrough. Dr. Matute increased her oxycodone to 15 mg for 5 or 6 days to manage her pain. The patient states she is still taking quinapril and pindolol for hypertension, furosemide 20 mg twice a day, and Aldactone 50 mg every day. The patient states she is taking nortriptyline 3 times a day. She does not want to cut it back. The patient notes that her catecholamine and metanephrine levels were evaluated while she was taking her usual dose of nortriptyline in the hospital with normal results. She affirms that she is doing well mentally.    The following portions of the patient's history were reviewed and updated as appropriate: allergies, current medications, past family history, past medical history, past social history, past surgical history and problem list.    Vitals:    08/23/22 1324   BP: 110/62   BP Location: Left arm   Patient Position: Sitting   Cuff Size: Large Adult   Pulse: 60   Temp: 98 °F (36.7 °C)   TempSrc: Temporal   SpO2: 92%   Weight: 81.6 kg (180 lb)  Comment: unsble to weigh   Height: 154.9 cm (60.98\")   PainSc:   5   PainLoc: Arm     Body mass index is 34.03 kg/m².  BMI is >= 30 and <35. (Class 1 Obesity). The following options were offered after discussion;: nutrition counseling/recommendations     Advance Care Planning   ACP discussion was held with the patient during this visit. Patient does not have an advance directive, information provided.     Review of Systems   Constitutional: Negative for chills, fatigue and fever.   HENT: Negative for congestion, ear pain and sinus pressure.    Respiratory: Negative for cough, chest tightness, shortness of breath and wheezing.    Cardiovascular: Negative for chest pain " and palpitations.   Gastrointestinal: Negative for abdominal pain, blood in stool and constipation.   Musculoskeletal: Positive for arthralgias.   Skin: Negative for color change.   Allergic/Immunologic: Negative for environmental allergies.   Neurological: Positive for weakness. Negative for dizziness, speech difficulty and headaches.   Psychiatric/Behavioral: Negative for confusion. The patient is not nervous/anxious.        Social History     Socioeconomic History   • Marital status:    Tobacco Use   • Smoking status: Current Every Day Smoker     Packs/day: 0.50     Types: Cigarettes   • Smokeless tobacco: Never Used   Vaping Use   • Vaping Use: Never used   Substance and Sexual Activity   • Alcohol use: Never   • Drug use: Never   • Sexual activity: Defer       Objective   Physical Exam  Vitals reviewed.   Constitutional:       Appearance: Normal appearance. She is well-developed.   HENT:      Head: Normocephalic and atraumatic.      Right Ear: Hearing, tympanic membrane, ear canal and external ear normal.      Left Ear: Hearing, tympanic membrane, ear canal and external ear normal.      Nose: Nose normal.      Mouth/Throat:      Pharynx: Uvula midline.   Eyes:      General: Lids are normal.      Conjunctiva/sclera: Conjunctivae normal.      Pupils: Pupils are equal, round, and reactive to light.   Cardiovascular:      Rate and Rhythm: Normal rate and regular rhythm.      Heart sounds: Normal heart sounds.   Pulmonary:      Effort: Pulmonary effort is normal.      Breath sounds: Normal breath sounds.   Abdominal:      General: Bowel sounds are normal.      Palpations: Abdomen is soft.   Musculoskeletal:         General: Tenderness, deformity and signs of injury present.      Right upper arm: Tenderness and bony tenderness present.        Arms:       Cervical back: Full passive range of motion without pain, normal range of motion and neck supple.   Skin:     General: Skin is warm and dry.   Neurological:       Mental Status: She is alert and oriented to person, place, and time.      Deep Tendon Reflexes: Reflexes are normal and symmetric.   Psychiatric:         Speech: Speech normal.         Behavior: Behavior normal.         Thought Content: Thought content normal.         Judgment: Judgment normal.         Assessment & Plan   Problem List Items Addressed This Visit        Cardiac and Vasculature    Essential hypertension    Overview     History of essential hypertension on Visken 5 mg tablets half tablet 3 times a day Accupril 5 mg tablet half tablet 3 times a day with blood pressure currently stable continue therapy         Relevant Medications    quinapril (ACCUPRIL) 5 MG tablet    furosemide (Lasix) 20 MG tablet    spironolactone (ALDACTONE) 50 MG tablet    pindolol (VISKEN) 5 MG tablet    Other Relevant Orders    CBC & Differential (Completed)    Comprehensive Metabolic Panel (Completed)       Endocrine and Metabolic    Adrenal insufficiency (HCC)    Overview     Continue hydrocortisone 10mg tablets as directed.  Follow up with Dr. Nelson.         Relevant Medications    hydrocortisone sodium succinate (Solu-CORTEF) 100 MG injection    hydrocortisone (CORTEF) 10 MG tablet    Hypothyroidism    Overview       Continue levothyroxine 88mcg daily.  Follow up with Dr. Nelson         Relevant Medications    hydrocortisone sodium succinate (Solu-CORTEF) 100 MG injection    hydrocortisone (CORTEF) 10 MG tablet    levothyroxine (SYNTHROID, LEVOTHROID) 88 MCG tablet    pindolol (VISKEN) 5 MG tablet    H/O pheochromocytoma    Overview     2003            Musculoskeletal and Injuries    Chronic bilateral low back pain without sciatica    Overview     She is seeing Dr. Alex for pain management. Continue  gabapentin 800mg qid,  percocet 10-325mg tid, MS Contin 30mg BID.              Closed displaced comminuted supracondylar fracture of right humerus without intercondylar fracture - Primary    Overview     Surgery performed by  Dr. Matute.  Patient will follow up as directed.  Continue in home PT and OT.               There are no Patient Instructions on file for this visit.    Alvina Lloyd PA-C    I spent 26 minutes caring for Altagracia on this date of service. This time includes time spent by me in the following activities:preparing for the visit, reviewing tests, obtaining and/or reviewing a separately obtained history, performing a medically appropriate examination and/or evaluation , counseling and educating the patient/family/caregiver, ordering medications, tests, or procedures, referring and communicating with other health care professionals  and documenting information in the medical record     Transcribed from ambient dictation for Alvina Lloyd PA-C by Brittany Crum.  08/23/22   17:15 EDT    Patient verbalized consent to the visit recording.  I have personally performed the services described in this document as transcribed by the above individual, and it is both accurate and complete.  Alvina Lloyd PA-C  8/27/2022  19:30 EDT

## 2022-08-24 LAB
ALBUMIN SERPL-MCNC: 3.7 G/DL (ref 3.5–5.2)
ALBUMIN/GLOB SERPL: 1.2 G/DL
ALP SERPL-CCNC: 287 U/L (ref 39–117)
ALT SERPL W P-5'-P-CCNC: 11 U/L (ref 1–33)
ANION GAP SERPL CALCULATED.3IONS-SCNC: 11.9 MMOL/L (ref 5–15)
AST SERPL-CCNC: 15 U/L (ref 1–32)
BILIRUB SERPL-MCNC: 0.3 MG/DL (ref 0–1.2)
BUN SERPL-MCNC: 17 MG/DL (ref 8–23)
BUN/CREAT SERPL: 22.1 (ref 7–25)
CALCIUM SPEC-SCNC: 9.2 MG/DL (ref 8.6–10.5)
CHLORIDE SERPL-SCNC: 98 MMOL/L (ref 98–107)
CO2 SERPL-SCNC: 30.1 MMOL/L (ref 22–29)
CREAT SERPL-MCNC: 0.77 MG/DL (ref 0.57–1)
EGFRCR SERPLBLD CKD-EPI 2021: 81.6 ML/MIN/1.73
GLOBULIN UR ELPH-MCNC: 3 GM/DL
GLUCOSE SERPL-MCNC: 118 MG/DL (ref 65–99)
POTASSIUM SERPL-SCNC: 3.7 MMOL/L (ref 3.5–5.2)
PROT SERPL-MCNC: 6.7 G/DL (ref 6–8.5)
SODIUM SERPL-SCNC: 140 MMOL/L (ref 136–145)

## 2022-08-27 PROBLEM — A49.8 ESCHERICHIA COLI INFECTION: Status: ACTIVE | Noted: 2022-08-27

## 2022-08-27 PROBLEM — S42.421A: Status: ACTIVE | Noted: 2022-06-24

## 2022-08-27 PROBLEM — F51.01 PRIMARY INSOMNIA: Status: ACTIVE | Noted: 2022-08-27

## 2022-09-01 ENCOUNTER — LAB (OUTPATIENT)
Dept: LAB | Facility: HOSPITAL | Age: 72
End: 2022-09-01

## 2022-09-01 ENCOUNTER — TRANSCRIBE ORDERS (OUTPATIENT)
Dept: LAB | Facility: HOSPITAL | Age: 72
End: 2022-09-01

## 2022-09-01 DIAGNOSIS — B96.4 INTESTINAL INFECTION DUE TO PROTEUS MIRABILIS: ICD-10-CM

## 2022-09-01 DIAGNOSIS — L03.113 CELLULITIS OF RIGHT HAND EXCLUDING FINGERS AND THUMB: ICD-10-CM

## 2022-09-01 DIAGNOSIS — T84.610D INFLAMMATORY REACTION DUE TO INTERNAL FIXATION DEVICE OF RIGHT HUMERUS, SUBSEQUENT ENCOUNTER: ICD-10-CM

## 2022-09-01 DIAGNOSIS — N39.0 URINARY TRACT INFECTION WITHOUT HEMATURIA, SITE UNSPECIFIED: Primary | ICD-10-CM

## 2022-09-01 DIAGNOSIS — A04.8 INTESTINAL INFECTION DUE TO PROTEUS MIRABILIS: ICD-10-CM

## 2022-09-01 LAB
CRP SERPL-MCNC: 0.68 MG/DL (ref 0–0.5)
ERYTHROCYTE [SEDIMENTATION RATE] IN BLOOD: 37 MM/HR (ref 0–30)

## 2022-09-01 PROCEDURE — 86140 C-REACTIVE PROTEIN: CPT | Performed by: INTERNAL MEDICINE

## 2022-09-01 PROCEDURE — 36415 COLL VENOUS BLD VENIPUNCTURE: CPT | Performed by: INTERNAL MEDICINE

## 2022-09-01 PROCEDURE — 85652 RBC SED RATE AUTOMATED: CPT | Performed by: INTERNAL MEDICINE

## 2022-09-14 RX ORDER — NORTRIPTYLINE HYDROCHLORIDE 50 MG/1
50 CAPSULE ORAL 3 TIMES DAILY
Qty: 90 CAPSULE | Refills: 0 | Status: SHIPPED | OUTPATIENT
Start: 2022-09-14 | End: 2022-10-03 | Stop reason: SDUPTHER

## 2022-09-14 NOTE — TELEPHONE ENCOUNTER
Caller: Altagracia Rothman    Relationship: Self    Best call back number:  [phone]    Requested Prescriptions:   Requested Prescriptions     Pending Prescriptions Disp Refills   • nortriptyline (PAMELOR) 50 MG capsule 90 capsule 0     Sig: Take 1 capsule by mouth 3 (Three) Times a Day.    LASIX  ALDACTONE    Pharmacy where request should be sent: [facility]/PHARMACY #[id number] - [address] [phone] [facility] [phone] FX     Additional details provided by patient: PATIENT THINKS THE LASIX IS SUPPOSE TO BE 10 MG TWICE A DAY   ALSO SHE THINKS ALDACTONE SHOULD BE 25 MG A DAY   SHE NEEDS THESE REFILLED ALSO       Does the patient have less than a 3 day supply:  [x] Yes  [] No

## 2022-09-15 ENCOUNTER — HOSPITAL ENCOUNTER (OUTPATIENT)
Dept: GENERAL RADIOLOGY | Facility: HOSPITAL | Age: 72
Discharge: HOME | End: 2022-09-15
Admitting: INTERNAL MEDICINE

## 2022-09-15 ENCOUNTER — TRANSCRIBE ORDERS (OUTPATIENT)
Dept: GENERAL RADIOLOGY | Facility: HOSPITAL | Age: 72
End: 2022-09-15

## 2022-09-15 DIAGNOSIS — A49.01 GLYCOPEPTIDE INTERMEDIATE STAPH AUREUS INFECTN, MULTI-DRUG RESISTANCE: ICD-10-CM

## 2022-09-15 DIAGNOSIS — Z16.24 GLYCOPEPTIDE INTERMEDIATE STAPH AUREUS INFECTN, MULTI-DRUG RESISTANCE: ICD-10-CM

## 2022-09-15 DIAGNOSIS — A49.01 GLYCOPEPTIDE INTERMEDIATE STAPH AUREUS INFECTN, MULTI-DRUG RESISTANCE: Primary | ICD-10-CM

## 2022-09-15 DIAGNOSIS — Z16.24 GLYCOPEPTIDE INTERMEDIATE STAPH AUREUS INFECTN, MULTI-DRUG RESISTANCE: Primary | ICD-10-CM

## 2022-09-15 PROCEDURE — 73030 X-RAY EXAM OF SHOULDER: CPT

## 2022-09-16 DIAGNOSIS — I10 ESSENTIAL HYPERTENSION: ICD-10-CM

## 2022-09-16 RX ORDER — FUROSEMIDE 20 MG/1
20 TABLET ORAL 2 TIMES DAILY
Qty: 10 TABLET | Refills: 1 | Status: SHIPPED | OUTPATIENT
Start: 2022-09-16 | End: 2022-09-22 | Stop reason: SDUPTHER

## 2022-09-16 RX ORDER — SPIRONOLACTONE 50 MG/1
50 TABLET, FILM COATED ORAL DAILY
Qty: 30 TABLET | Refills: 1 | Status: SHIPPED | OUTPATIENT
Start: 2022-09-16 | End: 2022-10-10

## 2022-09-16 NOTE — TELEPHONE ENCOUNTER
Rx Refill Note  Requested Prescriptions     Pending Prescriptions Disp Refills   • furosemide (Lasix) 20 MG tablet 10 tablet 1     Sig: Take 1 tablet by mouth 2 (Two) Times a Day.   • spironolactone (ALDACTONE) 50 MG tablet 30 tablet 1     Sig: Take 1 tablet by mouth Daily.      Last office visit with prescribing clinician: 8/23/2022      Next office visit with prescribing clinician: 10/13/2022            Leanna Reddy LPN  09/16/22, 14:10 EDT

## 2022-09-16 NOTE — TELEPHONE ENCOUNTER
Caller: Altagracia Rothman    Relationship: Self    Best call back number: [phone]    Requested Prescriptions:   Requested Prescriptions     Pending Prescriptions Disp Refills   • furosemide (Lasix) 20 MG tablet 10 tablet 1     Sig: Take 1 tablet by mouth 2 (Two) Times a Day.   • spironolactone (ALDACTONE) 50 MG tablet 30 tablet 1     Sig: Take 1 tablet by mouth Daily.        Pharmacy where request should be sent: [facility]/PHARMACY #[id number] [address] [phone] [facility] [phone]      Additional details provided by patient:  PATIENT OUT OF LASIX.      Does the patient have less than a 3 day supply:  [x] Yes  [] No    Andria Roblero   09/16/22 13:50 EDT

## 2022-09-18 DIAGNOSIS — E03.9 HYPOTHYROIDISM, UNSPECIFIED TYPE: ICD-10-CM

## 2022-09-19 RX ORDER — LEVOTHYROXINE SODIUM 88 UG/1
TABLET ORAL
Qty: 90 TABLET | Refills: 0 | Status: SHIPPED | OUTPATIENT
Start: 2022-09-19 | End: 2022-10-11 | Stop reason: SDUPTHER

## 2022-09-22 RX ORDER — FUROSEMIDE 20 MG/1
20 TABLET ORAL 2 TIMES DAILY
Qty: 10 TABLET | Refills: 1 | Status: SHIPPED | OUTPATIENT
Start: 2022-09-22 | End: 2022-09-28 | Stop reason: SDUPTHER

## 2022-09-28 RX ORDER — FUROSEMIDE 20 MG/1
20 TABLET ORAL 2 TIMES DAILY
Qty: 10 TABLET | Refills: 1 | Status: SHIPPED | OUTPATIENT
Start: 2022-09-28 | End: 2022-10-07 | Stop reason: SDUPTHER

## 2022-09-28 NOTE — TELEPHONE ENCOUNTER
Caller: Altagracia Rothman    Relationship: Self    Best call back number: [phone]    Requested Prescriptions:   Requested Prescriptions     Pending Prescriptions Disp Refills   • furosemide (Lasix) 20 MG tablet 10 tablet 1     Sig: Take 1 tablet by mouth 2 (Two) Times a Day.        Pharmacy where request should be sent:      Additional details provided by patient: PATIENT ONLY HAS 1 DAY LEFT OF MEDICATION AND DOES NOT COME IN FOR APPOINTMENT UNTIL 10/13/2022  Does the patient have less than a 3 day supply:  [x] Yes  [] No    Allan Judge Rep   09/28/22 14:38 EDT

## 2022-10-03 ENCOUNTER — ANESTHESIA EVENT (OUTPATIENT)
Dept: PERIOP | Facility: HOSPITAL | Age: 72
End: 2022-10-03

## 2022-10-03 RX ORDER — FAMOTIDINE 10 MG/ML
20 INJECTION, SOLUTION INTRAVENOUS ONCE
Status: CANCELLED | OUTPATIENT
Start: 2022-10-03 | End: 2022-10-03

## 2022-10-03 NOTE — TELEPHONE ENCOUNTER
Caller: Altagracia Rothman    Relationship: Self    Best call back number: [phone]    Requested Prescriptions:   Requested Prescriptions     Pending Prescriptions Disp Refills   • nortriptyline (PAMELOR) 50 MG capsule 90 capsule 0     Sig: Take 1 capsule by mouth 3 (Three) Times a Day.        Pharmacy where request should be sent: [facility]/PHARMACY #[id number] - [address] [phone] [facility] [phone] FX     Additional details provided by patient:  PATIENT SPILLED THE MEDICATION AND LOST MOST OF THE BOTTLE WHEN SHE COULDN'T GET THE CAP OFF.  NEEDS REFILL.      Does the patient have less than a 3 day supply:  [x] Yes  [] No    Andria Roblero   10/03/22 14:10 EDT

## 2022-10-03 NOTE — TELEPHONE ENCOUNTER
Rx Refill Note  Requested Prescriptions     Pending Prescriptions Disp Refills   • nortriptyline (PAMELOR) 50 MG capsule 90 capsule 0     Sig: Take 1 capsule by mouth 3 (Three) Times a Day.      Last office visit with prescribing clinician: 8/23/2022      Next office visit with prescribing clinician: 10/13/2022            Laya Garcias RN  10/03/22, 14:54 EDT

## 2022-10-04 ENCOUNTER — HOSPITAL ENCOUNTER (OUTPATIENT)
Facility: HOSPITAL | Age: 72
Discharge: HOME | End: 2022-10-05
Attending: ORTHOPAEDIC SURGERY | Admitting: ORTHOPAEDIC SURGERY

## 2022-10-04 ENCOUNTER — ANESTHESIA EVENT CONVERTED (OUTPATIENT)
Dept: ANESTHESIOLOGY | Facility: HOSPITAL | Age: 72
End: 2022-10-04

## 2022-10-04 ENCOUNTER — APPOINTMENT (OUTPATIENT)
Dept: GENERAL RADIOLOGY | Facility: HOSPITAL | Age: 72
End: 2022-10-04

## 2022-10-04 ENCOUNTER — ANESTHESIA (OUTPATIENT)
Dept: PERIOP | Facility: HOSPITAL | Age: 72
End: 2022-10-04

## 2022-10-04 DIAGNOSIS — T84.84XA PAINFUL ORTHOPAEDIC HARDWARE: Primary | ICD-10-CM

## 2022-10-04 PROBLEM — Z98.890 S/P HARDWARE REMOVAL: Status: ACTIVE | Noted: 2022-10-04

## 2022-10-04 PROBLEM — E66.9 OBESITY: Status: ACTIVE | Noted: 2022-10-04

## 2022-10-04 LAB
GLUCOSE BLDC GLUCOMTR-MCNC: 90 MG/DL (ref 70–130)
POTASSIUM SERPL-SCNC: 4.5 MMOL/L (ref 3.5–5.2)

## 2022-10-04 PROCEDURE — 94799 UNLISTED PULMONARY SVC/PX: CPT

## 2022-10-04 PROCEDURE — A9270 NON-COVERED ITEM OR SERVICE: HCPCS | Performed by: ORTHOPAEDIC SURGERY

## 2022-10-04 PROCEDURE — 25010000002 HYDROCORTISONE SOD SUC (PF) 100 MG RECONSTITUTED SOLUTION: Performed by: INTERNAL MEDICINE

## 2022-10-04 PROCEDURE — 25010000002 HYDROCORTISONE SOD SUC (PF) 100 MG RECONSTITUTED SOLUTION: Performed by: ANESTHESIOLOGY

## 2022-10-04 PROCEDURE — 63710000001 DOXYCYCLINE 100 MG CAPSULE: Performed by: ORTHOPAEDIC SURGERY

## 2022-10-04 PROCEDURE — 94640 AIRWAY INHALATION TREATMENT: CPT

## 2022-10-04 PROCEDURE — 63710000001 OXYCODONE-ACETAMINOPHEN 10-325 MG TABLET: Performed by: ORTHOPAEDIC SURGERY

## 2022-10-04 PROCEDURE — 76000 FLUOROSCOPY <1 HR PHYS/QHP: CPT

## 2022-10-04 PROCEDURE — 84132 ASSAY OF SERUM POTASSIUM: CPT | Performed by: ANESTHESIOLOGY

## 2022-10-04 PROCEDURE — 25010000002 PROPOFOL 10 MG/ML EMULSION: Performed by: NURSE ANESTHETIST, CERTIFIED REGISTERED

## 2022-10-04 PROCEDURE — 94761 N-INVAS EAR/PLS OXIMETRY MLT: CPT

## 2022-10-04 PROCEDURE — 63710000001 MORPHINE 15 MG TABLET CONTROLLED-RELEASE: Performed by: ORTHOPAEDIC SURGERY

## 2022-10-04 PROCEDURE — 63710000001 FUROSEMIDE 20 MG TABLET: Performed by: ORTHOPAEDIC SURGERY

## 2022-10-04 PROCEDURE — 63710000001 GABAPENTIN 400 MG CAPSULE: Performed by: ORTHOPAEDIC SURGERY

## 2022-10-04 PROCEDURE — 82962 GLUCOSE BLOOD TEST: CPT

## 2022-10-04 PROCEDURE — 63710000001 BUDESONIDE-FORMOTEROL 160-4.5 MCG/ACT AEROSOL 6 G INHALER: Performed by: ORTHOPAEDIC SURGERY

## 2022-10-04 PROCEDURE — 63710000001 NORTRIPTYLINE 25 MG CAPSULE: Performed by: ORTHOPAEDIC SURGERY

## 2022-10-04 RX ORDER — PROPOFOL 10 MG/ML
VIAL (ML) INTRAVENOUS AS NEEDED
Status: DISCONTINUED | OUTPATIENT
Start: 2022-10-04 | End: 2022-10-04 | Stop reason: SURG

## 2022-10-04 RX ORDER — FAMOTIDINE 20 MG/1
20 TABLET, FILM COATED ORAL ONCE
Status: COMPLETED | OUTPATIENT
Start: 2022-10-04 | End: 2022-10-04

## 2022-10-04 RX ORDER — FUROSEMIDE 20 MG/1
20 TABLET ORAL 2 TIMES DAILY
Status: DISCONTINUED | OUTPATIENT
Start: 2022-10-04 | End: 2022-10-05 | Stop reason: HOSPADM

## 2022-10-04 RX ORDER — LABETALOL HYDROCHLORIDE 5 MG/ML
10 INJECTION, SOLUTION INTRAVENOUS EVERY 4 HOURS PRN
Status: DISCONTINUED | OUTPATIENT
Start: 2022-10-04 | End: 2022-10-05 | Stop reason: HOSPADM

## 2022-10-04 RX ORDER — LIDOCAINE HYDROCHLORIDE 10 MG/ML
0.5 INJECTION, SOLUTION EPIDURAL; INFILTRATION; INTRACAUDAL; PERINEURAL ONCE AS NEEDED
Status: COMPLETED | OUTPATIENT
Start: 2022-10-04 | End: 2022-10-04

## 2022-10-04 RX ORDER — DOXYCYCLINE 100 MG/1
100 CAPSULE ORAL EVERY 12 HOURS SCHEDULED
Status: DISCONTINUED | OUTPATIENT
Start: 2022-10-04 | End: 2022-10-05 | Stop reason: HOSPADM

## 2022-10-04 RX ORDER — ALBUTEROL SULFATE 2.5 MG/3ML
2.5 SOLUTION RESPIRATORY (INHALATION) EVERY 6 HOURS PRN
Status: DISCONTINUED | OUTPATIENT
Start: 2022-10-04 | End: 2022-10-05 | Stop reason: HOSPADM

## 2022-10-04 RX ORDER — IPRATROPIUM BROMIDE AND ALBUTEROL SULFATE 2.5; .5 MG/3ML; MG/3ML
3 SOLUTION RESPIRATORY (INHALATION) ONCE
Status: COMPLETED | OUTPATIENT
Start: 2022-10-04 | End: 2022-10-04

## 2022-10-04 RX ORDER — MAGNESIUM HYDROXIDE 1200 MG/15ML
LIQUID ORAL AS NEEDED
Status: DISCONTINUED | OUTPATIENT
Start: 2022-10-04 | End: 2022-10-04 | Stop reason: HOSPADM

## 2022-10-04 RX ORDER — OXYCODONE AND ACETAMINOPHEN 10; 325 MG/1; MG/1
1 TABLET ORAL EVERY 8 HOURS
Status: DISCONTINUED | OUTPATIENT
Start: 2022-10-04 | End: 2022-10-05 | Stop reason: HOSPADM

## 2022-10-04 RX ORDER — ALBUTEROL SULFATE 90 UG/1
1 AEROSOL, METERED RESPIRATORY (INHALATION) EVERY 4 HOURS PRN
Status: DISCONTINUED | OUTPATIENT
Start: 2022-10-04 | End: 2022-10-04 | Stop reason: CLARIF

## 2022-10-04 RX ORDER — NORTRIPTYLINE HYDROCHLORIDE 50 MG/1
50 CAPSULE ORAL 3 TIMES DAILY
Qty: 90 CAPSULE | Refills: 2 | Status: SHIPPED | OUTPATIENT
Start: 2022-10-04 | End: 2022-11-18 | Stop reason: SDUPTHER

## 2022-10-04 RX ORDER — LIDOCAINE HYDROCHLORIDE 10 MG/ML
INJECTION, SOLUTION EPIDURAL; INFILTRATION; INTRACAUDAL; PERINEURAL AS NEEDED
Status: DISCONTINUED | OUTPATIENT
Start: 2022-10-04 | End: 2022-10-04 | Stop reason: SURG

## 2022-10-04 RX ORDER — MORPHINE SULFATE 15 MG/1
30 TABLET, FILM COATED, EXTENDED RELEASE ORAL 2 TIMES DAILY
Status: DISCONTINUED | OUTPATIENT
Start: 2022-10-04 | End: 2022-10-05 | Stop reason: HOSPADM

## 2022-10-04 RX ORDER — OXYCODONE HYDROCHLORIDE 5 MG/1
5 TABLET ORAL EVERY 4 HOURS PRN
Status: DISCONTINUED | OUTPATIENT
Start: 2022-10-04 | End: 2022-10-05 | Stop reason: HOSPADM

## 2022-10-04 RX ORDER — FENTANYL CITRATE 50 UG/ML
50 INJECTION, SOLUTION INTRAMUSCULAR; INTRAVENOUS
Status: DISCONTINUED | OUTPATIENT
Start: 2022-10-04 | End: 2022-10-04 | Stop reason: HOSPADM

## 2022-10-04 RX ORDER — NORTRIPTYLINE HYDROCHLORIDE 25 MG/1
50 CAPSULE ORAL 3 TIMES DAILY
Status: DISCONTINUED | OUTPATIENT
Start: 2022-10-04 | End: 2022-10-05 | Stop reason: HOSPADM

## 2022-10-04 RX ORDER — SODIUM CHLORIDE, SODIUM LACTATE, POTASSIUM CHLORIDE, CALCIUM CHLORIDE 600; 310; 30; 20 MG/100ML; MG/100ML; MG/100ML; MG/100ML
9 INJECTION, SOLUTION INTRAVENOUS CONTINUOUS
Status: DISCONTINUED | OUTPATIENT
Start: 2022-10-04 | End: 2022-10-05 | Stop reason: HOSPADM

## 2022-10-04 RX ORDER — MIDAZOLAM HYDROCHLORIDE 1 MG/ML
0.5 INJECTION INTRAMUSCULAR; INTRAVENOUS
Status: DISCONTINUED | OUTPATIENT
Start: 2022-10-04 | End: 2022-10-04 | Stop reason: HOSPADM

## 2022-10-04 RX ORDER — BUPIVACAINE HCL/0.9 % NACL/PF 0.125 %
PLASTIC BAG, INJECTION (ML) EPIDURAL AS NEEDED
Status: DISCONTINUED | OUTPATIENT
Start: 2022-10-04 | End: 2022-10-04 | Stop reason: SURG

## 2022-10-04 RX ORDER — LEVOTHYROXINE SODIUM 88 UG/1
88 TABLET ORAL DAILY
Status: DISCONTINUED | OUTPATIENT
Start: 2022-10-05 | End: 2022-10-05 | Stop reason: HOSPADM

## 2022-10-04 RX ORDER — SPIRONOLACTONE 25 MG/1
50 TABLET ORAL DAILY
Status: DISCONTINUED | OUTPATIENT
Start: 2022-10-05 | End: 2022-10-05 | Stop reason: HOSPADM

## 2022-10-04 RX ORDER — OXYCODONE HYDROCHLORIDE 5 MG/1
5 TABLET ORAL EVERY 4 HOURS PRN
Qty: 24 TABLET | Refills: 0 | Status: SHIPPED | OUTPATIENT
Start: 2022-10-04 | End: 2023-01-11

## 2022-10-04 RX ORDER — BUPIVACAINE HYDROCHLORIDE AND EPINEPHRINE 2.5; 5 MG/ML; UG/ML
INJECTION, SOLUTION INFILTRATION; PERINEURAL AS NEEDED
Status: DISCONTINUED | OUTPATIENT
Start: 2022-10-04 | End: 2022-10-04 | Stop reason: HOSPADM

## 2022-10-04 RX ORDER — HYDROMORPHONE HYDROCHLORIDE 1 MG/ML
0.5 INJECTION, SOLUTION INTRAMUSCULAR; INTRAVENOUS; SUBCUTANEOUS
Status: DISCONTINUED | OUTPATIENT
Start: 2022-10-04 | End: 2022-10-04 | Stop reason: HOSPADM

## 2022-10-04 RX ORDER — SODIUM CHLORIDE 0.9 % (FLUSH) 0.9 %
10 SYRINGE (ML) INJECTION AS NEEDED
Status: DISCONTINUED | OUTPATIENT
Start: 2022-10-04 | End: 2022-10-04 | Stop reason: HOSPADM

## 2022-10-04 RX ORDER — BUDESONIDE AND FORMOTEROL FUMARATE DIHYDRATE 160; 4.5 UG/1; UG/1
2 AEROSOL RESPIRATORY (INHALATION)
Status: DISCONTINUED | OUTPATIENT
Start: 2022-10-04 | End: 2022-10-05 | Stop reason: HOSPADM

## 2022-10-04 RX ORDER — SODIUM CHLORIDE 0.9 % (FLUSH) 0.9 %
10 SYRINGE (ML) INJECTION EVERY 12 HOURS SCHEDULED
Status: DISCONTINUED | OUTPATIENT
Start: 2022-10-04 | End: 2022-10-04 | Stop reason: HOSPADM

## 2022-10-04 RX ORDER — GABAPENTIN 400 MG/1
800 CAPSULE ORAL EVERY 8 HOURS SCHEDULED
Status: DISCONTINUED | OUTPATIENT
Start: 2022-10-04 | End: 2022-10-05 | Stop reason: HOSPADM

## 2022-10-04 RX ORDER — LISINOPRIL 5 MG/1
5 TABLET ORAL
Status: DISCONTINUED | OUTPATIENT
Start: 2022-10-04 | End: 2022-10-05 | Stop reason: HOSPADM

## 2022-10-04 RX ADMIN — LIDOCAINE HYDROCHLORIDE 0.5 ML: 10 INJECTION, SOLUTION EPIDURAL; INFILTRATION; INTRACAUDAL; PERINEURAL at 13:48

## 2022-10-04 RX ADMIN — HYDROCORTISONE SODIUM SUCCINATE 50 MG: 100 INJECTION, POWDER, FOR SOLUTION INTRAMUSCULAR; INTRAVENOUS at 20:04

## 2022-10-04 RX ADMIN — NORTRIPTYLINE HYDROCHLORIDE 50 MG: 25 CAPSULE ORAL at 20:40

## 2022-10-04 RX ADMIN — Medication 100 MCG: at 14:52

## 2022-10-04 RX ADMIN — HYDROCORTISONE SODIUM SUCCINATE 100 MG: 100 INJECTION, POWDER, FOR SOLUTION INTRAMUSCULAR; INTRAVENOUS at 14:01

## 2022-10-04 RX ADMIN — DOXYCYCLINE 100 MG: 100 CAPSULE ORAL at 20:41

## 2022-10-04 RX ADMIN — FUROSEMIDE 20 MG: 20 TABLET ORAL at 20:41

## 2022-10-04 RX ADMIN — IPRATROPIUM BROMIDE AND ALBUTEROL SULFATE 3 ML: .5; 3 SOLUTION RESPIRATORY (INHALATION) at 13:20

## 2022-10-04 RX ADMIN — PROPOFOL 40 MG: 10 INJECTION, EMULSION INTRAVENOUS at 14:50

## 2022-10-04 RX ADMIN — FAMOTIDINE 20 MG: 20 TABLET ORAL at 13:20

## 2022-10-04 RX ADMIN — PROPOFOL 70 MG: 10 INJECTION, EMULSION INTRAVENOUS at 14:35

## 2022-10-04 RX ADMIN — LIDOCAINE HYDROCHLORIDE 50 MG: 10 INJECTION, SOLUTION EPIDURAL; INFILTRATION; INTRACAUDAL; PERINEURAL at 14:35

## 2022-10-04 RX ADMIN — SODIUM CHLORIDE, POTASSIUM CHLORIDE, SODIUM LACTATE AND CALCIUM CHLORIDE: 600; 310; 30; 20 INJECTION, SOLUTION INTRAVENOUS at 14:25

## 2022-10-04 RX ADMIN — SODIUM CHLORIDE, POTASSIUM CHLORIDE, SODIUM LACTATE AND CALCIUM CHLORIDE 9 ML/HR: 600; 310; 30; 20 INJECTION, SOLUTION INTRAVENOUS at 13:48

## 2022-10-04 RX ADMIN — PROPOFOL 75 MCG/KG/MIN: 10 INJECTION, EMULSION INTRAVENOUS at 14:36

## 2022-10-04 RX ADMIN — GABAPENTIN 800 MG: 400 CAPSULE ORAL at 20:41

## 2022-10-04 RX ADMIN — OXYCODONE HYDROCHLORIDE AND ACETAMINOPHEN 1 TABLET: 10; 325 TABLET ORAL at 20:04

## 2022-10-04 RX ADMIN — BUDESONIDE AND FORMOTEROL FUMARATE DIHYDRATE 2 PUFF: 160; 4.5 AEROSOL RESPIRATORY (INHALATION) at 20:36

## 2022-10-04 RX ADMIN — MORPHINE SULFATE 30 MG: 15 TABLET, EXTENDED RELEASE ORAL at 20:40

## 2022-10-04 NOTE — H&P
Patient Name: Altagracia Rothman  MRN: [id number]  : 1950  DOS: 10/4/2022    Attending: Jamari Matute Jr., MD    Primary Care Provider: Alvina Lloyd PA-C      Chief complaint: Right shoulder pain due to symptomatic orthopedic hardware    Subjective   Patient is a pleasant 72 y.o. female presented for scheduled surgery by Dr. Matute.    She fractured her right humerus in a fall on 22. She had a prolonged hospitalization.     She underwent intramedullary nail insertion proximal humerus, ORIF distal humerus 22. She was discharged to [facility] on 22.     She reports an extended stay at [facility] as well. She reports while at rehab she noted hardware moving around in her right shoulder. She has pain and limited ROM. She uses crutches or a wheelchair for mobility.    He underwent removal of right shoulder symptomatic hardware  ,  tolerated surgery well, was admitted for further management.     Reviewed with patient her past medical history including COPD, tobacco abuse, adrenal insufficiency with chronic steroid replacement.  She also has history of hypothyroidism for which she is on replacement.  Allergies   Allergen Reactions   • Daypro [Oxaprozin] Anaphylaxis   • Fentanyl Other (See Comments)     sweating   • Iodine Swelling   • Keflex [Cephalexin] Hives     Tolerated ceftriaxone and cefazolin 2022   • Levaquin [Levofloxacin] Hives   • Lyrica [Pregabalin] Other (See Comments)     Sweating   • Other Swelling     Tranormin   • Penicillins Rash     Tolerated ceftriaxone & cefazolin 2022   • Sulfa Antibiotics Hives and Rash          Medications Prior to Admission   Medication Sig Dispense Refill Last Dose   • albuterol sulfate  (90 Base) MCG/ACT inhaler Inhale 1 puff Every 4 (Four) Hours As Needed for Wheezing. 8.5 g 5 10/4/2022 at 0930   • doxycycline (VIBRAMYICN) 100 MG tablet Take 1 tablet by mouth 2 (Two) Times a Day. 14 tablet 0 10/4/2022 at 1045   • furosemide (Lasix) 20 MG tablet  Take 1 tablet by mouth 2 (Two) Times a Day. 10 tablet 1 10/4/2022 at 1045   • gabapentin (NEURONTIN) 800 MG tablet Take 800 mg by mouth Every 6 (Six) Hours.   10/4/2022 at 1045   • hydrocortisone (CORTEF) 10 MG tablet Take 1.5 tablets by mouth 2 (Two) Times a Day. 135 tablet 1 10/4/2022 at 1045   • levothyroxine (SYNTHROID, LEVOTHROID) 88 MCG tablet TAKE 1 TABLET BY MOUTH EVERY DAY 90 tablet 0 10/4/2022 at 1045   • Morphine (MS CONTIN) 30 MG 12 hr tablet Take 30 mg by mouth 2 (Two) Times a Day.   10/4/2022 at 1045   • nortriptyline (PAMELOR) 50 MG capsule Take 1 capsule by mouth 3 (Three) Times a Day. 90 capsule 2 10/4/2022 at 1045   • oxyCODONE-acetaminophen (PERCOCET)  MG per tablet Take 1 tablet by mouth Every 8 (Eight) Hours.   10/4/2022 at 1045   • pindolol (VISKEN) 5 MG tablet TAKE 1/2 TABLET BY MOUTH THREE TIMES DAILY 45 tablet 5 10/4/2022 at 1045   • spironolactone (ALDACTONE) 50 MG tablet Take 1 tablet by mouth Daily. 30 tablet 1 10/4/2022 at 1045   • Wixela Inhub 250-50 MCG/DOSE DISKUS INHALE 1 PUFF BY MOUTH TWICE DAILY 60 each 1 10/4/2022 at 1045   • B-D UF III MINI PEN NEEDLES 31G X 5 MM misc USE AS DIRECTED WITH FORTEO INJECTIONS   9/13/2022   • hydrocortisone sodium succinate (Solu-CORTEF) 100 MG injection Inject 100 mg into the appropriate muscle as directed by prescriber As Needed (for adrenal crisis). 100 mg 0    • mupirocin (BACTROBAN) 2 % ointment APPLY TID      • quinapril (ACCUPRIL) 5 MG tablet TAKE 1 TABLET BY MOUTH DAILY 30 tablet 5 10/1/2022   • Teriparatide, Recombinant, (Forteo) 600 MCG/2.4ML injection Inject 20 mcg under the skin into the appropriate area as directed Daily.   9/13/2022           Past Medical History:   Diagnosis Date   • Adrenal insufficiency (HCC)    • Fibromyalgia    • Hypertension    • Hypothyroidism    • Skin cancer      Past Surgical History:   Procedure Laterality Date   • ADRENAL GLAND SURGERY     • BREAST BIOPSY      x4   • GALLBLADDER SURGERY     • HUMERUS  "IM NAILING/RODDING Right 7/14/2022    Procedure: INTRAMEDULLARY NAIL INSERTION PROXIMAL HUMERUS RIGHT;  Surgeon: Jamari Matute Jr., MD;  Location: [facility];  Service: Orthopedics;  Laterality: Right;   • HYSTERECTOMY     • MRI ANGIOGRAM LOWER EXTREMITY UNILATERAL W CONTRAST     • ORIF HUMERUS FRACTURE Right 7/14/2022    Procedure: HUMERUS DISTAL OPEN REDUCTION INTERNAL FIXATION RIGHT;  Surgeon: Jamari Matute Jr., MD;  Location: [facility];  Service: Orthopedics;  Laterality: Right;   • TONSILLECTOMY       Family History   Problem Relation Age of Onset   • Thyroid disease Mother    • Hypertension Mother    • Heart disease Father      Social History     Tobacco Use   • Smoking status: Current Every Day Smoker     Packs/day: 0.50     Types: Cigarettes   • Smokeless tobacco: Never Used   Vaping Use   • Vaping Use: Never used   Substance Use Topics   • Alcohol use: Never   • Drug use: Never       Review of Systems  Pertinent items are noted in HPI, all other systems reviewed and negative    Vital Signs  /65 (BP Location: Left leg, Patient Position: Lying)   Pulse 74   Temp 99.2 °F (37.3 °C) Comment: forced warm air turned off  Resp 18   Ht 154.9 cm (61\")   Wt 81.6 kg (180 lb)   SpO2 92%   BMI 34.01 kg/m²     Physical Exam:    General Appearance:    Alert, cooperative, in no acute distress   Head:    Normocephalic, without obvious abnormality, atraumatic   Eyes:            Lids and lashes normal, conjunctivae and sclerae normal, no   icterus, no pallor, corneas clear,    Ears:    Ears appear intact with no abnormalities noted   Throat:   No oral lesions, no thrush, oral mucosa moist   Neck:   No adenopathy, supple, trachea midline, no thyromegaly         Lungs:     Clear to auscultation,respirations regular, even and                   unlabored    Heart:    Regular rhythm and normal rate, normal S1 and S2, no      murmur    Abdomen:     Normal bowel sounds, no masses, no organomegaly, soft        " non-tender, non-distended, no guarding, no rebound                 tenderness   Genitalia:    Deferred   Extremities:  Right UE with clean shoulder area surgical incisions.   Distal pulses, cap refill, movements of fingers, wrist, intact.     Pulses:   Pulses palpable and equal bilaterally   Skin:   No bleeding, bruising or rash   Neurologic:   Cranial nerves 2 - 12 grossly intact      I reviewed the patient's new clinical results.             Invalid input(s): NEUTOPHILPCT,  EOSPCT  Results from last 7 days   Lab Units 10/04/22  1351   POTASSIUM mmol/L 4.5     Lab Results   Component Value Date    HGBA1C 5.30 06/27/2022           Assessment and Plan:       S/P hardware removal, right shoulder    Painful orthopaedic hardware (HCC)    Adrenal insufficiency (HCC)    Hypothyroidism    Essential hypertension    Chronic bilateral low back pain without sciatica    Tobacco abuse    Moderate asthma without complication    Obesity      Plan    1.  Activity and range of motion of right upper extremity per Dr. Matute's orders.  2. Pain control-prns    3. IS-encourage  4. DVT proph- Mech/ mobilize.  5. Bowel regimen  6. Resume home medications as appropriate  7. Monitor post-op labs  8. DC planning     -Adrenal insufficiency: Stress dosing of steroids overnight, resume home dosing evening of postop day 1.    -Asthma: Maintained on home regimen, as needed bronchodilators.    - Hypertension:  Resume home medications as appropriate, formulary substitution when indicated.  Holding parameters.  Prn medications for elevated blood pressure.    -Hypothyroidism: Maintain home replacement of Synthroid.      Dragon disclaimer:  Part of this encounter note is an electronic transcription/translation of spoken language to printed text. The electronic translation of spoken language may permit erroneous, or at times, nonsensical words or phrases to be inadvertently transcribed; Although I have reviewed the note for such errors, some may still  exist.    Robinson Davis MD  10/04/22  16:48 EDT

## 2022-10-04 NOTE — H&P
Pre-Op H&P  Altagracia Rothman  [id number]  1950      Chief complaint: Right shoulder pain      Subjective:  Patient is a 72 y.o.female presents for scheduled surgery by Dr. Matute. She anticipates a SHOULDER HARDWARE REMOVAL- RIGHT today. She fractured her right humerus in a fall on 6/24/22. She had a prolonged hospitalization. She underwent intramedullary nail insertion proximal humerus, ORIF distal humerus 7/14/22. She was discharged to [facility] on 7/19/22. She reports an extended stay at [facility] as well. She reports while at rehab she noted hardware moving around in her right shoulder. She has pain and limited ROM. She uses crutches or a wheelchair for mobility.  Of note, she reports respiratory congestion with productive cough. She denies recent fevers, chills or night sweats. She has been on doxycyline since 6/24/22. Abx management by Dr. Nguyen, [facility]. Will have duoneb in preop.      Review of Systems:  Constitutional-- No fever, chills or sweats. + fatigue.  CV-- No chest pain, palpitation or syncope. +HTN  Resp-- + SOB, cough  Skin--No rashes or lesions      Allergies:   Allergies   Allergen Reactions   • Daypro [Oxaprozin] Anaphylaxis   • Fentanyl Other (See Comments)     sweating   • Iodine Swelling   • Keflex [Cephalexin] Hives     Tolerated ceftriaxone and cefazolin 7/2022   • Levaquin [Levofloxacin] Hives   • Lyrica [Pregabalin] Other (See Comments)     Sweating   • Other Swelling     Tranormin   • Penicillins Rash     Tolerated ceftriaxone & cefazolin 7/2022   • Sulfa Antibiotics Hives and Rash         Home Meds:  Medications Prior to Admission   Medication Sig Dispense Refill Last Dose   • albuterol sulfate  (90 Base) MCG/ACT inhaler Inhale 1 puff Every 4 (Four) Hours As Needed for Wheezing. 8.5 g 5    • B-D UF III MINI PEN NEEDLES 31G X 5 MM misc USE AS DIRECTED WITH FORTEO INJECTIONS      • doxycycline (VIBRAMYICN) 100 MG tablet Take 1 tablet by mouth 2 (Two) Times a Day. 14 tablet 0    •  furosemide (Lasix) 20 MG tablet Take 1 tablet by mouth 2 (Two) Times a Day. 10 tablet 1    • gabapentin (NEURONTIN) 800 MG tablet Take 800 mg by mouth Every 6 (Six) Hours.      • hydrocortisone (CORTEF) 10 MG tablet Take 1.5 tablets by mouth 2 (Two) Times a Day. 135 tablet 1    • hydrocortisone sodium succinate (Solu-CORTEF) 100 MG injection Inject 100 mg into the appropriate muscle as directed by prescriber As Needed (for adrenal crisis). 100 mg 0    • levothyroxine (SYNTHROID, LEVOTHROID) 88 MCG tablet TAKE 1 TABLET BY MOUTH EVERY DAY 90 tablet 0    • Morphine (MS CONTIN) 30 MG 12 hr tablet Take 30 mg by mouth 2 (Two) Times a Day.      • mupirocin (BACTROBAN) 2 % ointment APPLY TID      • nortriptyline (PAMELOR) 50 MG capsule Take 1 capsule by mouth 3 (Three) Times a Day. 90 capsule 2    • oxyCODONE-acetaminophen (PERCOCET)  MG per tablet Take 1 tablet by mouth Every 8 (Eight) Hours.      • pindolol (VISKEN) 5 MG tablet TAKE 1/2 TABLET BY MOUTH THREE TIMES DAILY 45 tablet 5    • quinapril (ACCUPRIL) 5 MG tablet TAKE 1 TABLET BY MOUTH DAILY 30 tablet 5    • spironolactone (ALDACTONE) 50 MG tablet Take 1 tablet by mouth Daily. 30 tablet 1    • Teriparatide, Recombinant, (Forteo) 600 MCG/2.4ML injection Inject 20 mcg under the skin into the appropriate area as directed Daily.      • Wixela Inhub 250-50 MCG/DOSE DISKUS INHALE 1 PUFF BY MOUTH TWICE DAILY 60 each 1          PMH:   Past Medical History:   Diagnosis Date   • Adrenal insufficiency (HCC)    • Fibromyalgia    • Hypertension    • Hypothyroidism    • Skin cancer      PSH:    Past Surgical History:   Procedure Laterality Date   • ADRENAL GLAND SURGERY     • BREAST BIOPSY      x4   • GALLBLADDER SURGERY     • HUMERUS IM NAILING/RODDING Right 7/14/2022    Procedure: INTRAMEDULLARY NAIL INSERTION PROXIMAL HUMERUS RIGHT;  Surgeon: Jamari Matute Jr., MD;  Location: [facility];  Service: Orthopedics;  Laterality: Right;   • HYSTERECTOMY     • MRI ANGIOGRAM  LOWER EXTREMITY UNILATERAL W CONTRAST     • ORIF HUMERUS FRACTURE Right 7/14/2022    Procedure: HUMERUS DISTAL OPEN REDUCTION INTERNAL FIXATION RIGHT;  Surgeon: Jamari Matute Jr., MD;  Location: [facility];  Service: Orthopedics;  Laterality: Right;   • TONSILLECTOMY         Immunization History:  Influenza: No  Pneumococcal: UTD  Tetanus: UTD  Covid x4: 2022    Social History:   Tobacco:   Social History     Tobacco Use   Smoking Status Current Every Day Smoker   • Packs/day: 0.50   • Types: Cigarettes   Smokeless Tobacco Never Used      Alcohol:     Social History     Substance and Sexual Activity   Alcohol Use Never         Physical Exam: VS: /86  HR 79  RR 16  T 97.6  Sat 99%RA      General Appearance:    Alert, cooperative, no distress, appears stated age   Head:    Normocephalic, without obvious abnormality, atraumatic   Lungs:     Clear to auscultation bilaterally, respirations unlabored    Heart:   Regular rate and rhythm, S1 and S2 normal    Abdomen:    Soft without tenderness   Extremities:   Extremities normal, atraumatic, no cyanosis or edema   Skin:   Skin color, texture, turgor normal, no rashes or lesions   Neurologic:   Grossly intact     Results Review:     LABS:  Lab Results   Component Value Date    WBC 8.83 08/23/2022    HGB 13.2 08/23/2022    HCT 40.7 08/23/2022    MCV 94.4 08/23/2022     08/23/2022    NEUTROABS 6.95 08/23/2022    GLUCOSE 118 (H) 08/23/2022    BUN 17 08/23/2022    CREATININE 0.77 08/23/2022    EGFRIFNONA >60 04/11/2022    EGFRIFAFRI >60 04/11/2022     08/23/2022    K 3.7 08/23/2022    CL 98 08/23/2022    CO2 30.1 (H) 08/23/2022    MG 2.1 07/05/2022    CALCIUM 9.2 08/23/2022    ALBUMIN 3.70 08/23/2022    AST 15 08/23/2022    ALT 11 08/23/2022    BILITOT 0.3 08/23/2022       RADIOLOGY:  Imaging Results (Last 72 Hours)     ** No results found for the last 72 hours. **          I reviewed the patient's new clinical results.    Cancer Staging (if  applicable)  Cancer Patient: __ yes __no __unknown; If yes, clinical stage T:__ N:__M:__, stage group or __N/A      Impression: Right shoulder pain      Plan: SHOULDER HARDWARE REMOVAL- RIGHT      MICHELE Hernandez   10/4/2022   12:56 EDT

## 2022-10-04 NOTE — ANESTHESIA PROCEDURE NOTES
RIGHt Interscalene cath      Patient reassessed immediately prior to procedure    Patient location during procedure: pre-op  Reason for block: at surgeon's request and post-op pain management  Performed by  CRNA/CAA: Mando Lawrence CRNA  Assisted by: Siobhan Moya RN  Preanesthetic Checklist  Completed: patient identified, IV checked, site marked, risks and benefits discussed, surgical consent, monitors and equipment checked, pre-op evaluation and timeout performed  Prep:  Pt Position: left lateral decubitus  Sterile barriers:cap, gloves, mask and washed/disinfected hands  Prep: ChloraPrep  Patient monitoring: blood pressure monitoring, continuous pulse oximetry and EKG  Procedure    Sedation: yes  Performed under: local infiltration  Guidance:ultrasound guided    ULTRASOUND INTERPRETATION. Using ultrasound guidance a 20 G gauge needle was placed in close proximity to the nerve, at which point, under ultrasound guidance anesthetic was injected in the area of the nerve and spread of the anesthesia was seen on ultrasound in close proximity thereto.  There were no abnormalities seen on ultrasound; a digital image was taken; and the patient tolerated the procedure with no complications. Images:still images obtained, printed/placed on chart    Laterality:right  Block Type:interscalene  Injection Technique:catheter  Needle Type:Tuohy and echogenic  Needle Gauge:18 G  Resistance on Injection: none  Catheter Size:20 G (20g)          Post Assessment  Injection Assessment: negative aspiration for heme, no paresthesia on injection and incremental injection  Patient Tolerance:comfortable throughout block  Complications:no  Additional Notes  SINGLE shot   A high-frequency linear transducer, with sterile cover, was placed in the supraclavicular fossa to identify the subclavian artery and trunks and divisions of the brachial plexus. The transducer was then moved in a cephalad orientation with a slight rotation to continue  "visualization of the brachial plexus from the trunks and divisions, on to the C5-C7 roots. The insertion site was prepped and draped in sterile fashion. Skin and cutaneous tissue was infiltrated with 2-5 ml of 1% Lidocaine. Using ultrasound-guidance, a 20-gauge B-Link 4\" Ultraplex 360 non-stimulating echogenic needle was advanced in plane from lateral to medial. Preservative-free normal saline was utilized for hydro-dissection of tissue, and to confirm final needle placement at the fascial plane between the middle scalene muscle and sheath of the brachial plexus (C5-C7). Local anesthetic in incremental 3-5 ml injections. Aspiration every 5 ml to prevent intravascular injection. Injection was completed with negative aspiration of blood and negative intravascular injection. Injection pressures were normal with minimal resistance.    CATHETER  A high-frequency linear transducer, with sterile cover, was placed in the supraclavicular fossa to identify the subclavian artery and trunks and divisions of the brachial plexus. The transducer was then moved in a cephalad orientation with a slight rotation to continue visualization of the brachial plexus from the trunks and divisions, on to the C5-C7 roots. The insertion site was prepped and draped in sterile fashion. Skin and cutaneous tissue was infiltrated with 2-5 ml of 1% Lidocaine. Using ultrasound-guidance, an 18-gauge Contiplex Ultra 360 Touhy needle was advanced in plane from lateral to medial. Preservative-free normal saline was utilized for hydro-dissection of tissue, advancement of Touhy, and to confirm final needle placement at the fascial plane between the middle scalene muscle and sheath of the brachial plexus (C5-C7). A 20-gauge Contiplex Echo catheter was placed through the needle and advance out the tip of the Touhy 3-5 cm with the \"March Flip\". The Touhy needle was then removed, and final catheter position verified lateral to the brachial plexus with local " "anesthetic (LA) and ultrasound visualization. The catheter was secured in the usual fashion with skin glue, benzoin, steri-strips, CHG tegaderm and label noting \"Nerve Block Catheter\". Jerk tape applied at yellow connector and catheter connection. All LA was injected in increments of 3-5 ml after catheter secured. Aspiration every 5 ml to prevent intravascular injection. Injection was completed with negative aspiration of blood and negative intravascular injection. Injection pressures were normal with minimal resistance.              "

## 2022-10-04 NOTE — ANESTHESIA POSTPROCEDURE EVALUATION
Patient: Altagracia Rothman    Procedure Summary     Date: 10/04/22 Room / Location:  [facility]  /  [facility]    Anesthesia Start: 1425 Anesthesia Stop: 1511    Procedure: SHOULDER HARDWARE REMOVAL- RIGHT (Right Arm Upper) Diagnosis:     Surgeons: Jamari Matute Jr., MD Provider: Demarco Brantley MD    Anesthesia Type: general ASA Status: 3          Anesthesia Type: general    Vitals  No vitals data found for the desired time range.          Post Anesthesia Care and Evaluation    Patient location during evaluation: PACU  Patient participation: complete - patient participated  Level of consciousness: awake and alert  Pain management: adequate    Airway patency: patent  Anesthetic complications: No anesthetic complications  PONV Status: none  Cardiovascular status: hemodynamically stable and acceptable  Respiratory status: nonlabored ventilation, acceptable and nasal cannula  Hydration status: acceptable

## 2022-10-04 NOTE — PLAN OF CARE
Goal Outcome Evaluation:    Patient from [facility], could not discharge home-no ride until tomorrow. Patient voided, ambulated, RA VSS. Home tomorrow with son.

## 2022-10-04 NOTE — OP NOTE
SHOULDER HARDWARE REMOVAL  Procedure Report    Patient Name:  Altagracia Rothman  YOB: 1950    Date of Surgery:  10/4/2022     Indications:  73 yo female s/p IMN R humerus with symptomatic hardware, presents for removal of anterior interlocking screw.    Pre-op Diagnosis:        Right shoulder symptomatic orthopedic hardware      Post-op Diagnosis:         same    Procedure/CPT® Codes:  03432    Procedure(s):  SHOULDER HARDWARE REMOVAL- RIGHT    Staff:  Surgeon(s):  Jamari Matute Jr., MD    Circulator: Andria Jacob RN  Radiology Technologist: Trina Nieto RT  Scrub Person: Kyle Jo  Nursing Assistant: Sandy Ward PCT  Assistant: Nancy Zuñiga PA-C     Assistant: Nancy Zuñiga PA-C  was responsible for performing the following activities: Retraction, Suction, Irrigation, Suturing, Closing and Placing Dressing and their skilled assistance was necessary for the success of this case.    Anesthesia: Monitored Anesthesia Care with Regional    Estimated Blood Loss: minimal    Implants:    Nothing was implanted during the procedure    Specimen:                None      Findings: symptomatic anterior interlocking screw    Complications: none    Description of Procedure: After informed consent had been obtained, the right upper extremity was identified as the correct surgical extremity and marked.The patient was then taken back to the operating suite and was placed on the operating table. We then performed a timeout with the entire surgical staff. We then injected 20 cc of 0.25% marcaine with epinephrine into the anterior surgical scar. The patient underwent monitored anesthesia care with anesthesia. The surgical extremity was then prepped and draped in the usual, sterile fashion. We then made an incision through the old anterior surgical scar to a length of 1 cm. Blunt dissection was performed down to the screw. The screw was then removed uneventfully. We then irrigated the  wound thoroughly. The subcutaneous tissue was closed with 2-0 vicryl. The skin closed with exofin.         Jamari Matute Jr., MD     Date: 10/4/2022  Time: 15:00 EDT

## 2022-10-04 NOTE — ANESTHESIA PREPROCEDURE EVALUATION
Anesthesia Evaluation     Patient summary reviewed and Nursing notes reviewed   NPO Solid Status: > 8 hours  NPO Liquid Status: > 2 hours           Airway   Mallampati: I  TM distance: >3 FB  Neck ROM: full  No difficulty expected  Dental      Pulmonary    (+) asthma,  Cardiovascular     (+) hypertension,       Neuro/Psych  GI/Hepatic/Renal/Endo    (+)   thyroid problem hypothyroidism    Musculoskeletal     Abdominal    Substance History      OB/GYN          Other        ROS/Med Hx Other: Adrenal insufficiency                Anesthesia Plan    ASA 3     general     (No block. Discussed with Dr. Matute. )    Anesthetic plan, risks, benefits, and alternatives have been provided, discussed and informed consent has been obtained with: patient.    Plan discussed with CRNA.        CODE STATUS:

## 2022-10-05 VITALS
HEIGHT: 61 IN | WEIGHT: 180 LBS | RESPIRATION RATE: 18 BRPM | BODY MASS INDEX: 33.99 KG/M2 | HEART RATE: 76 BPM | TEMPERATURE: 97.8 F | OXYGEN SATURATION: 92 % | DIASTOLIC BLOOD PRESSURE: 80 MMHG | SYSTOLIC BLOOD PRESSURE: 131 MMHG

## 2022-10-05 PROCEDURE — 63710000001 OXYCODONE-ACETAMINOPHEN 10-325 MG TABLET: Performed by: ORTHOPAEDIC SURGERY

## 2022-10-05 PROCEDURE — 94761 N-INVAS EAR/PLS OXIMETRY MLT: CPT

## 2022-10-05 PROCEDURE — 63710000001 SPIRONOLACTONE 25 MG TABLET: Performed by: ORTHOPAEDIC SURGERY

## 2022-10-05 PROCEDURE — 63710000001 DOXYCYCLINE 100 MG CAPSULE: Performed by: ORTHOPAEDIC SURGERY

## 2022-10-05 PROCEDURE — 63710000001 MORPHINE 15 MG TABLET CONTROLLED-RELEASE: Performed by: ORTHOPAEDIC SURGERY

## 2022-10-05 PROCEDURE — 63710000001 OXYCODONE 5 MG TABLET: Performed by: ORTHOPAEDIC SURGERY

## 2022-10-05 PROCEDURE — A9270 NON-COVERED ITEM OR SERVICE: HCPCS | Performed by: ORTHOPAEDIC SURGERY

## 2022-10-05 PROCEDURE — 94799 UNLISTED PULMONARY SVC/PX: CPT

## 2022-10-05 PROCEDURE — 25010000002 HYDROCORTISONE SOD SUC (PF) 100 MG RECONSTITUTED SOLUTION: Performed by: INTERNAL MEDICINE

## 2022-10-05 PROCEDURE — 63710000001 LISINOPRIL 5 MG TABLET: Performed by: ORTHOPAEDIC SURGERY

## 2022-10-05 PROCEDURE — 63710000001 GABAPENTIN 400 MG CAPSULE: Performed by: ORTHOPAEDIC SURGERY

## 2022-10-05 PROCEDURE — 63710000001 LEVOTHYROXINE 88 MCG TABLET: Performed by: ORTHOPAEDIC SURGERY

## 2022-10-05 PROCEDURE — 63710000001 NORTRIPTYLINE 25 MG CAPSULE: Performed by: ORTHOPAEDIC SURGERY

## 2022-10-05 PROCEDURE — 63710000001 FUROSEMIDE 20 MG TABLET: Performed by: ORTHOPAEDIC SURGERY

## 2022-10-05 RX ORDER — DOCUSATE SODIUM 100 MG/1
100 CAPSULE, LIQUID FILLED ORAL 2 TIMES DAILY
Qty: 60 CAPSULE | Refills: 0 | Status: SHIPPED | OUTPATIENT
Start: 2022-10-05 | End: 2022-10-13

## 2022-10-05 RX ADMIN — DOXYCYCLINE 100 MG: 100 CAPSULE ORAL at 08:16

## 2022-10-05 RX ADMIN — GABAPENTIN 800 MG: 400 CAPSULE ORAL at 05:06

## 2022-10-05 RX ADMIN — MORPHINE SULFATE 30 MG: 15 TABLET, EXTENDED RELEASE ORAL at 08:15

## 2022-10-05 RX ADMIN — HYDROCORTISONE SODIUM SUCCINATE 50 MG: 100 INJECTION, POWDER, FOR SOLUTION INTRAMUSCULAR; INTRAVENOUS at 08:16

## 2022-10-05 RX ADMIN — NORTRIPTYLINE HYDROCHLORIDE 50 MG: 25 CAPSULE ORAL at 08:15

## 2022-10-05 RX ADMIN — OXYCODONE HYDROCHLORIDE AND ACETAMINOPHEN 1 TABLET: 10; 325 TABLET ORAL at 11:53

## 2022-10-05 RX ADMIN — BUDESONIDE AND FORMOTEROL FUMARATE DIHYDRATE 2 PUFF: 160; 4.5 AEROSOL RESPIRATORY (INHALATION) at 06:59

## 2022-10-05 RX ADMIN — SPIRONOLACTONE 50 MG: 25 TABLET ORAL at 08:17

## 2022-10-05 RX ADMIN — LISINOPRIL 5 MG: 5 TABLET ORAL at 08:16

## 2022-10-05 RX ADMIN — LEVOTHYROXINE SODIUM 88 MCG: 88 TABLET ORAL at 08:16

## 2022-10-05 RX ADMIN — OXYCODONE HYDROCHLORIDE AND ACETAMINOPHEN 1 TABLET: 10; 325 TABLET ORAL at 04:30

## 2022-10-05 RX ADMIN — FUROSEMIDE 20 MG: 20 TABLET ORAL at 08:17

## 2022-10-05 RX ADMIN — OXYCODONE 5 MG: 5 TABLET ORAL at 02:24

## 2022-10-05 RX ADMIN — HYDROCORTISONE SODIUM SUCCINATE 50 MG: 100 INJECTION, POWDER, FOR SOLUTION INTRAMUSCULAR; INTRAVENOUS at 02:19

## 2022-10-05 NOTE — PROGRESS NOTES
Orthopedic Daily Progress Note      CC: INDIRA overnight.    Pain well controlled  General: no fevers, chills  Abdomen: no nausea, vomiting, or diarrhea    No other complaints    Physical Exam:  I have reviewed the vital signs.  Temp:  [97.4 °F (36.3 °C)-99.3 °F (37.4 °C)] 97.8 °F (36.6 °C)  Heart Rate:  [67-99] 76  Resp:  [18-20] 18  BP: (105-144)/(65-90) 131/80    Objective  General Appearance:    Alert, cooperative, no distress  Extremities: No clubbing, cyanosis, or edema to lower extremities  Pulses:  2+ in distal surgical extremity  Skin: Incision Clean/dry/intact      Results Review:    I have reviewed the labs, radiology results and diagnostic studies:        Results from last 7 days   Lab Units 10/04/22  1351   POTASSIUM mmol/L 4.5       I have reviewed the medications.    Assessment/Problem List  POD# 1 Day Post-Op   S/p R shoulder RADHA    Plan  WBAT RUE      Discharge Planning: I expect patient to be discharged to home in 0 days.    Jamari Matute Jr., MD  10/05/22  07:57 EDT

## 2022-10-05 NOTE — DISCHARGE SUMMARY
Patient Name: Altagracia Rothman  MRN: [id number]  : 1950  DOS: 10/5/2022    Attending: Jamari Matute Jr., MD    Primary Care Provider: Alvina Lloyd PA-C    Date of Admission:.10/4/2022 11:51 AM    Date of Discharge:  10/5/2022    Discharge Diagnosis:   S/P hardware removal, right shoulder    Adrenal insufficiency (HCC)    Hypothyroidism    Essential hypertension    Chronic bilateral low back pain without sciatica    Tobacco abuse    Moderate asthma without complication    Painful orthopaedic hardware (HCC)    Obesity      Hospital Course    At admit:    Patient is a pleasant 72 y.o. female presented for scheduled surgery by .  She underwent right shoulder hardware removal under GA. She tolerated surgery well, was admitted for further management.    After admit:    Patient was provided pain medications as needed for pain control.  Adjustments were made to pain medications to optimize postop pain management.   Risks and benefits of opiate medications discussed with patient.  Pipo report in chart was reviewed prior to discharge.    The patient used an IS for atelectasis prophylaxis and mechanicals for DVT prophylaxis.  Home medications were resumed as appropriate, and labs were monitored and remained fairly stable.     With the progress she has made, she is ready for DC home today.    Discussed with patient regarding plan and she shows understanding and agreement.        Procedures Performed    Pre-op Diagnosis:        Right shoulder symptomatic orthopedic hardware       Post-op Diagnosis:         same     Procedure/CPT® Codes:  15218     Procedure(s):  SHOULDER HARDWARE REMOVAL- RIGHT     Staff:  Surgeon(s):  Jamari Matute Jr., MD    Pertinent Test Results:    I reviewed the patient's new clinical results.         Invalid input(s): NEUTOPHILPCT,  EOSPCT  Results from last 7 days   Lab Units 10/04/22  1351   POTASSIUM mmol/L 4.5     I reviewed the patient's new imaging including  "images and reports.    Discharge Assessment:    Vital Signs  Visit Vitals  /80 (BP Location: Left arm, Patient Position: Lying)   Pulse 76   Temp 97.8 °F (36.6 °C) (Oral)   Resp 18   Ht 154.9 cm (61\")   Wt 81.6 kg (180 lb)   SpO2 92%   BMI 34.01 kg/m²     Temp (24hrs), Av.2 °F (36.8 °C), Min:97.4 °F (36.3 °C), Max:99.3 °F (37.4 °C)      General Appearance:    Alert, cooperative, in no acute distress   Lungs:     Clear to auscultation,respirations regular, even and   unlabored    Heart:    Regular rhythm and normal rate, normal S1 and S2    Abdomen:     Normal bowel sounds, no masses, no organomegaly, soft   non-tender, non-distended, no guarding, no rebound tenderness   Extremities:   RUE CDI Prineo dressing. Distal pulses, cap refill, movements of fingers, wrist, intact.   Pulses:   Pulses palpable and equal bilaterally   Skin:   No bleeding, bruising or rash   Neurologic:   Cranial nerves 2 - 12 grossly intact       Discharge Disposition: Home          Discharge Medications      New Medications      Instructions Start Date   docusate sodium 100 MG capsule  Commonly known as: Colace   100 mg, Oral, 2 Times Daily      oxyCODONE 5 MG immediate release tablet  Commonly known as: ROXICODONE   Take 1 tablet by mouth Every 4 (Four) Hours As Needed for Moderate post-op pain         Continue These Medications      Instructions Start Date   albuterol sulfate  (90 Base) MCG/ACT inhaler  Commonly known as: PROVENTIL HFA;VENTOLIN HFA;PROAIR HFA   1 puff, Inhalation, Every 4 Hours PRN      B-D UF III MINI PEN NEEDLES 31G X 5 MM misc  Generic drug: Insulin Pen Needle   USE AS DIRECTED WITH FORTEO INJECTIONS      doxycycline 100 MG tablet  Commonly known as: VIBRAMYICN   100 mg, Oral, 2 Times Daily      Forteo 600 MCG/2.4ML injection  Generic drug: Teriparatide (Recombinant)   20 mcg, Subcutaneous, Daily      furosemide 20 MG tablet  Commonly known as: Lasix   20 mg, Oral, 2 Times Daily      gabapentin 800 MG " tablet  Commonly known as: NEURONTIN   800 mg, Oral, Every 6 Hours      hydrocortisone 10 MG tablet  Commonly known as: CORTEF   15 mg, Oral, 2 Times Daily      levothyroxine 88 MCG tablet  Commonly known as: SYNTHROID, LEVOTHROID   TAKE 1 TABLET BY MOUTH EVERY DAY      Morphine 30 MG 12 hr tablet  Commonly known as: MS CONTIN   30 mg, Oral, 2 Times Daily      nortriptyline 50 MG capsule  Commonly known as: PAMELOR   50 mg, Oral, 3 Times Daily      pindolol 5 MG tablet  Commonly known as: VISKEN   TAKE 1/2 TABLET BY MOUTH THREE TIMES DAILY      quinapril 5 MG tablet  Commonly known as: ACCUPRIL   5 mg, Oral, Daily      spironolactone 50 MG tablet  Commonly known as: ALDACTONE   50 mg, Oral, Daily      Wixela Inhub 250-50 MCG/ACT DISKUS  Generic drug: Fluticasone-Salmeterol   INHALE 1 PUFF BY MOUTH TWICE DAILY         Stop These Medications    hydrocortisone sodium succinate 100 MG injection  Commonly known as: Solu-CORTEF     mupirocin 2 % ointment  Commonly known as: BACTROBAN     oxyCODONE-acetaminophen  MG per tablet  Commonly known as: PERCOCET            Discharge Diet: Regular diet      Activity at Discharge: WBAT RUE      Follow-up Appointments  Dr. Matute per his orders      MICHELE Hernandez  10/05/22  11:23 EDT

## 2022-10-05 NOTE — PLAN OF CARE
Problem: Adult Inpatient Plan of Care  Goal: Patient-Specific Goal (Individualized)  Outcome: Ongoing, Progressing   Goal Outcome Evaluation:  Plan of Care Reviewed With: patient        Progress: no change  Outcome Evaluation: a/o x 4; VSS, RA, 2L n.c. while asleep; pleasant, assist x 1 with crutches to commode; PRNs for pain control, R arm sling utilized overnight; no changes to report from overnight; incision clean and intact; will continue POC

## 2022-10-07 ENCOUNTER — TELEPHONE (OUTPATIENT)
Dept: INTERNAL MEDICINE | Facility: CLINIC | Age: 72
End: 2022-10-07

## 2022-10-07 RX ORDER — FUROSEMIDE 20 MG/1
20 TABLET ORAL 2 TIMES DAILY
Qty: 10 TABLET | Refills: 1 | Status: SHIPPED | OUTPATIENT
Start: 2022-10-07 | End: 2022-10-21 | Stop reason: SDUPTHER

## 2022-10-07 NOTE — TELEPHONE ENCOUNTER
Caller: Rothman Altagracia Jones    Relationship: Self     Best call back number:  [phone]    Requested Prescriptions:   Requested Prescriptions     Pending Prescriptions Disp Refills   • furosemide (Lasix) 20 MG tablet 10 tablet 1     Sig: Take 1 tablet by mouth 2 (Two) Times a Day.        Pharmacy where request should be sent: [facility]/PHARMACY #[id number] [address] [phone] [facility] [phone] [facility]     Additional details provided by patient: PATIENT HAS AN APPOINTMENT ON 10-13-22  SHE WILL BE OUT OF THE MEDICATION THIS WEEKEND       Does the patient have less than a 3 day supply:  [x] Yes  [] No

## 2022-10-07 NOTE — TELEPHONE ENCOUNTER
Caller: SRAVANTHI - PHYSICAL THERAPIST    Relationship:     Best call back number: [phone]    What orders are you requesting (i.e. lab or imaging): PT    Additional notes:       PHYSICAL THERAPIST CALLED TO GET VERBAL ORDERS TO CONTINUE HER IN HOME PHYSICAL THERAPY TWICE A WEEK FOR SIX WEEKS THERAPEUTIC EXERCISE, BALANCE AND TRANSFER TRAINING, STRENGTH

## 2022-10-09 DIAGNOSIS — I10 ESSENTIAL HYPERTENSION: ICD-10-CM

## 2022-10-10 DIAGNOSIS — I10 ESSENTIAL HYPERTENSION: ICD-10-CM

## 2022-10-10 RX ORDER — SPIRONOLACTONE 50 MG/1
TABLET, FILM COATED ORAL
Qty: 30 TABLET | Refills: 1 | Status: SHIPPED | OUTPATIENT
Start: 2022-10-10 | End: 2022-10-10 | Stop reason: SDUPTHER

## 2022-10-10 RX ORDER — SPIRONOLACTONE 50 MG/1
50 TABLET, FILM COATED ORAL DAILY
Qty: 30 TABLET | Refills: 1 | Status: SHIPPED | OUTPATIENT
Start: 2022-10-10 | End: 2022-12-06 | Stop reason: SDUPTHER

## 2022-10-10 RX ORDER — QUINAPRIL 5 1/1
TABLET ORAL
Qty: 30 TABLET | Refills: 1 | Status: SHIPPED | OUTPATIENT
Start: 2022-10-10 | End: 2022-10-13

## 2022-10-10 RX ORDER — PINDOLOL 5 MG/1
TABLET ORAL
Qty: 45 TABLET | Refills: 1 | Status: SHIPPED | OUTPATIENT
Start: 2022-10-10 | End: 2022-11-07

## 2022-10-10 NOTE — TELEPHONE ENCOUNTER
Rx Refill Note  Requested Prescriptions     Pending Prescriptions Disp Refills   • quinapril (ACCUPRIL) 5 MG tablet [Pharmacy Med Name: QUINAPRIL 5 MG TABLET] 30 tablet 1     Sig: TAKE 1 TABLET BY MOUTH EVERY DAY.      Last office visit with prescribing clinician: Visit date not found      Next office visit with prescribing clinician: Visit date not found            Leanna Reddy LPN  10/10/22, 09:07 EDT

## 2022-10-10 NOTE — TELEPHONE ENCOUNTER
Rx Refill Note  Requested Prescriptions     Pending Prescriptions Disp Refills   • spironolactone (ALDACTONE) 50 MG tablet [Pharmacy Med Name: SPIRONOLACTONE 50 MG TABLET] 30 tablet 1     Sig: TAKE 1 TABLET BY MOUTH EVERY DAY      Last office visit with prescribing clinician: Visit date not found      Next office visit with prescribing clinician: Visit date not found            Leanna Reddy LPN  10/10/22, 09:04 EDT

## 2022-10-11 ENCOUNTER — OFFICE VISIT (OUTPATIENT)
Dept: ENDOCRINOLOGY | Facility: CLINIC | Age: 72
End: 2022-10-11

## 2022-10-11 VITALS
DIASTOLIC BLOOD PRESSURE: 80 MMHG | OXYGEN SATURATION: 95 % | HEART RATE: 70 BPM | HEIGHT: 61 IN | SYSTOLIC BLOOD PRESSURE: 128 MMHG | BODY MASS INDEX: 33.04 KG/M2 | WEIGHT: 175 LBS

## 2022-10-11 DIAGNOSIS — E03.9 HYPOTHYROIDISM, UNSPECIFIED TYPE: ICD-10-CM

## 2022-10-11 DIAGNOSIS — E27.40 ADRENAL INSUFFICIENCY: ICD-10-CM

## 2022-10-11 DIAGNOSIS — Z86.018 H/O PHEOCHROMOCYTOMA: ICD-10-CM

## 2022-10-11 PROCEDURE — 99214 OFFICE O/P EST MOD 30 MIN: CPT | Performed by: INTERNAL MEDICINE

## 2022-10-11 RX ORDER — HYDROCORTISONE 10 MG/1
15 TABLET ORAL 2 TIMES DAILY
Qty: 135 TABLET | Refills: 1 | Status: SHIPPED | OUTPATIENT
Start: 2022-10-11 | End: 2022-10-24

## 2022-10-11 RX ORDER — LEVOTHYROXINE SODIUM 88 UG/1
88 TABLET ORAL DAILY
Qty: 90 TABLET | Refills: 1 | Status: SHIPPED | OUTPATIENT
Start: 2022-10-11

## 2022-10-11 NOTE — PROGRESS NOTES
"Chief Complaint   Patient presents with   • Hypothyroidism   • Adrenal Insufficiency        HPI   Altagracia Rothman is a 72 y.o. female had concerns including Hypothyroidism and Adrenal Insufficiency.      Patient was hospitalized in the interim since last visit after she suffered a humerus fracture.  She did have repeat urinary testing for catecholamines and metanephrines during hospitalization prior to surgical repair which were normal.    Patient does report that she had stress dose steroids at the time of her initial surgery and following recent hardware removal.  She is currently taking hydrocortisone 20 mg in the morning and 10 mg in the afternoon.  She denies missed doses.    Patient is currently taking levothyroxine 88 mcg daily for hypothyroidism.  She denies missed doses of medication.    The following portions of the patient's history were reviewed and updated as appropriate: allergies, current medications and past social history.    Review of Systems   Cardiovascular: Negative for palpitations.   Gastrointestinal: Negative for constipation and diarrhea.   Musculoskeletal: Positive for arthralgias and gait problem.      /80 (BP Location: Right arm, Patient Position: Sitting, Cuff Size: Adult)   Pulse 70   Ht 154.9 cm (61\")   Wt 79.4 kg (175 lb)   SpO2 95%   BMI 33.07 kg/m²      Physical Exam      Constitutional:  well developed; well nourished  no acute distress   ENT/Thyroid: not examined   Eyes: Conjunctiva: clear   Respiratory:  breathing is unlabored  clear to auscultation bilaterally   Cardiovascular:  regular rate and rhythm   Chest:  Not performed.   Abdomen: Not performed.   : Not performed.   Musculoskeletal: Not performed   Skin: not performed.   Neuro: mental status, speech normal   Psych: mood and affect are within normal limits       Labs/Imaging   Latest Reference Range & Units 05/05/22 10:37 06/27/22 05:57 07/15/22 11:20   Hemoglobin A1C 4.80 - 5.60 % 5.60 5.30    TSH Baseline " 0.270 - 4.200 uIU/mL 4.540 (H)  0.743   Free T4 0.93 - 1.70 ng/dL 1.12     T3, Free 2.00 - 4.40 pg/mL 3.24     (H): Data is abnormally high     Latest Reference Range & Units 07/01/22 18:10   Epinephrine, Urine Undefined ug/L 2   Metanephrines, Urine Undefined ug/L 27   Normetanephrine, Urine Undefined ug/L 201   Dopamine , 24H Ur 0 - 510 ug/24 hr 120       Diagnoses and all orders for this visit:    1. Hypothyroidism, unspecified type  TSH was normal during July hospitalization  Patient is clinically euthyroid  Continue levothyroxine 88 mcg daily  -     levothyroxine (SYNTHROID, LEVOTHROID) 88 MCG tablet; Take 1 tablet by mouth Daily.  Dispense: 90 tablet; Refill: 1    2. Adrenal insufficiency (HCC)  Patient is currently taking hydrocortisone 20 mg in the morning and 10 mg in the afternoon  Reviewed the patient's dose of hydrocortisone supraphysiologic.  Reviewed potential long-term adverse effects of supraphysiologic steroids including but not limited to weight gain, increased blood pressure, rising blood sugar, osteoporosis.  In light of recent fracture, patient is willing to attempt a slight dose decrease to 15 mg in the morning and 10 mg in the afternoon.  Reviewed signs and symptoms of adrenal insufficiency and adrenal crisis and when to seek care.  Sick day rules and stress dosing reviewed.  -     hydrocortisone (CORTEF) 10 MG tablet; Take 1.5 tablets by mouth 2 (Two) Times a Day.  Dispense: 135 tablet; Refill: 1    3. H/O pheochromocytoma  Patient had repeat urinary testing for catecholamines and metanephrines during hospitalization which were reviewed      Return in about 6 months (around 4/11/2023). The patient was instructed to contact the clinic with any interval questions or concerns.    Heather Nelson MD   Endocrinologist    Dictated Utilizing Dragon Dictation

## 2022-10-13 ENCOUNTER — OFFICE VISIT (OUTPATIENT)
Dept: INTERNAL MEDICINE | Facility: CLINIC | Age: 72
End: 2022-10-13

## 2022-10-13 ENCOUNTER — LAB (OUTPATIENT)
Dept: LAB | Facility: HOSPITAL | Age: 72
End: 2022-10-13

## 2022-10-13 VITALS
DIASTOLIC BLOOD PRESSURE: 82 MMHG | SYSTOLIC BLOOD PRESSURE: 122 MMHG | HEART RATE: 78 BPM | BODY MASS INDEX: 33.07 KG/M2 | HEIGHT: 61 IN

## 2022-10-13 DIAGNOSIS — Z98.890 S/P HARDWARE REMOVAL: Primary | ICD-10-CM

## 2022-10-13 DIAGNOSIS — I10 ESSENTIAL HYPERTENSION: ICD-10-CM

## 2022-10-13 DIAGNOSIS — E03.9 ACQUIRED HYPOTHYROIDISM: ICD-10-CM

## 2022-10-13 DIAGNOSIS — M54.50 CHRONIC BILATERAL LOW BACK PAIN WITHOUT SCIATICA: ICD-10-CM

## 2022-10-13 DIAGNOSIS — E27.40 ADRENAL INSUFFICIENCY: ICD-10-CM

## 2022-10-13 DIAGNOSIS — G89.29 CHRONIC BILATERAL LOW BACK PAIN WITHOUT SCIATICA: ICD-10-CM

## 2022-10-13 LAB
ALBUMIN SERPL-MCNC: 3.8 G/DL (ref 3.5–5.2)
ALBUMIN/GLOB SERPL: 1.4 G/DL
ALP SERPL-CCNC: 129 U/L (ref 39–117)
ALT SERPL W P-5'-P-CCNC: 13 U/L (ref 1–33)
ANION GAP SERPL CALCULATED.3IONS-SCNC: 11.1 MMOL/L (ref 5–15)
AST SERPL-CCNC: 19 U/L (ref 1–32)
BILIRUB SERPL-MCNC: <0.2 MG/DL (ref 0–1.2)
BUN SERPL-MCNC: 19 MG/DL (ref 8–23)
BUN/CREAT SERPL: 23.5 (ref 7–25)
CALCIUM SPEC-SCNC: 9 MG/DL (ref 8.6–10.5)
CHLORIDE SERPL-SCNC: 103 MMOL/L (ref 98–107)
CO2 SERPL-SCNC: 27.9 MMOL/L (ref 22–29)
CREAT SERPL-MCNC: 0.81 MG/DL (ref 0.57–1)
EGFRCR SERPLBLD CKD-EPI 2021: 76.8 ML/MIN/1.73
GLOBULIN UR ELPH-MCNC: 2.7 GM/DL
GLUCOSE SERPL-MCNC: 92 MG/DL (ref 65–99)
POTASSIUM SERPL-SCNC: 4.2 MMOL/L (ref 3.5–5.2)
PROT SERPL-MCNC: 6.5 G/DL (ref 6–8.5)
SODIUM SERPL-SCNC: 142 MMOL/L (ref 136–145)
T3FREE SERPL-MCNC: 2.93 PG/ML (ref 2–4.4)
T4 FREE SERPL-MCNC: 1.28 NG/DL (ref 0.93–1.7)
TSH SERPL DL<=0.05 MIU/L-ACNC: 1.08 UIU/ML (ref 0.27–4.2)

## 2022-10-13 PROCEDURE — 99495 TRANSJ CARE MGMT MOD F2F 14D: CPT | Performed by: PHYSICIAN ASSISTANT

## 2022-10-13 PROCEDURE — 91312 COVID-19 (PFIZER) BIVALENT BOOSTER 12+YRS: CPT | Performed by: PHYSICIAN ASSISTANT

## 2022-10-13 PROCEDURE — 1111F DSCHRG MED/CURRENT MED MERGE: CPT | Performed by: PHYSICIAN ASSISTANT

## 2022-10-13 PROCEDURE — 0124A PR ADM SARSCOV2 30MCG/0.3ML BST: CPT | Performed by: PHYSICIAN ASSISTANT

## 2022-10-13 PROCEDURE — 84439 ASSAY OF FREE THYROXINE: CPT

## 2022-10-13 PROCEDURE — 85025 COMPLETE CBC W/AUTO DIFF WBC: CPT

## 2022-10-13 PROCEDURE — 84443 ASSAY THYROID STIM HORMONE: CPT

## 2022-10-13 PROCEDURE — 80053 COMPREHEN METABOLIC PANEL: CPT

## 2022-10-13 PROCEDURE — 84481 FREE ASSAY (FT-3): CPT

## 2022-10-13 RX ORDER — OXYCODONE HCL 10 MG/1
10 TABLET, FILM COATED, EXTENDED RELEASE ORAL EVERY 8 HOURS
COMMUNITY
End: 2023-04-02

## 2022-10-13 NOTE — PROGRESS NOTES
[facility] Internal Medicine    Altagracia Rothman  1950   [id number]      Patient Care Team:  Alvina Lloyd PA-C as PCP - General (Internal Medicine)  Heather Nelson MD as Consulting Physician (Endocrinology)  Lashawn Michele MD as Consulting Physician (Rheumatology)    Chief Complaint::   Chief Complaint   Patient presents with   • Hospital Follow Up Visit     Discharged 10/5/22 after having hardware removed from shoulder - right   • Med Refill     Lasix - 30 day supply requested; nortriptyline, pindolol        Subjective     Altagracia Rothman is a 72 y.o. female who presents for a transitional care management visit.    Right shoulder pain  She was admitted to the hospital on 10/04/2022 and was discharged on 10/05/2022 after she underwent surgery with Dr. Matute to remove 1 of the screws she previously had placed in her right shoulder that was sticking out, and ripping her muscle every time she moved it. She states that there is still a metal plate in her shoulder. The patient notes that she did not want to have hardware placed in her shoulder if her bones were not well due to her osteoporosis. She reports pain in her right shoulder that has been moving around for the last week. She has full range of motion with her right arm while actively moving it, and it has improved since the screw was removed; however, she does feel that something is not right. The patient states that she is afraid that something is going to come undone in her right shoulder, noting that has changed since last week when she left the hospital. She denies any fever, or body aches, since she has been discharged. She had a fever that ran between 99.5 degrees fahrenheit and 100.6 degrees fahrenheit before she had the screw removed. She denies any urinary tract infections since she had 1 in the hospital, or any more falls since she fell at her front door. She has not been driving a lot. The patient affirms that she is able to have bowel  movements which she attributes to being on other medications for 10 to 20 years, which do not affect them. She notes that she is taking morphine sulfate, Percocet 10/325 mg 3 times a day, and gabapentin which is prescribed by Dr. Alex. She states that Dr. Matute increased her gabapentin by 5 mg at night; although, she has not had to use the amount that he has prescribed. The patient reports that she is doing physical therapy twice a week; although, she has not worked much with her right arm. She reports that she has an appointment with Dr. Matute's physician's assistant next week. She is followed by pain management bimonthly, and she has an appointment on 10/17/2022.    Hypertension  She states that she was recently prescribed Lasix twice daily, and decreased her spironolactone 25 mg daily. The patient reports her right arm has been a horrible stress to her body system which causes her to produce more cortisone to keep her blood pressure going, and her blood pressure medication dosage may not be enough after recuperating from the physical stress. She notes that she may need a blood pressure medicine to go back to the normal dose. She states that her ankles have not been swollen in 20 years, and she was previously given something to decrease her potassium when it was elevated at 5 mEq/L.    Thyroid   The patient affirms that she had her most laboratory work for her thyroid levels secondary to her hair falling out. She continues to experience fatigue.     Health maintenance  She states that she has received her COVID-19 vaccination in her left arm, and she will have her influenza vaccination in a few days. She notes that she has been walking with crutches.     Within 48 business hours after discharge our office contacted her via telephone to coordinate her care and needs.      I reviewed and discussed the details of that call along with the discharge summary, hospital problems, inpatient lab results, inpatient  diagnostic studies, and consultation reports with Altagracia.     Current outpatient and discharge medications have been reconciled for the patient.  Reviewed by: Alvina Lloyd PA-C      Date of TCM Phone Call 8/11/2022   Former[facility]   Date of Discharge 8/10/2022     Risk for Readmission (LACE) Score: 4 (10/5/2022  6:00 AM)      Patient Active Problem List   Diagnosis   • Adrenal insufficiency (HCC)   • Hypothyroidism   • Essential hypertension   • H/O pheochromocytoma   • Chronic bilateral low back pain without sciatica   • Encounter for chronic pain management   • Osteoporosis   • Tobacco abuse   • BMI 35.0-35.9,adult   • Bronchitis   • Moderate asthma without complication   • Paresthesia   • Medicare annual wellness visit, subsequent   • Routine medical exam   • Lipid screening   • Hair loss   • Closed displaced comminuted supracondylar fracture of right humerus without intercondylar fracture   • Acute UTI (urinary tract infection)   • Primary insomnia   • Escherichia coli infection   • Painful orthopaedic hardware (HCC)   • Obesity   • S/P hardware removal, right shoulder        Past Medical History:   Diagnosis Date   • Adrenal insufficiency (HCC)    • Fibromyalgia    • Hypertension    • Hypothyroidism    • Skin cancer        Past Surgical History:   Procedure Laterality Date   • ADRENAL GLAND SURGERY     • BREAST BIOPSY      x4   • GALLBLADDER SURGERY     • HARDWARE REMOVAL Right 10/4/2022    Procedure: SHOULDER HARDWARE REMOVAL- RIGHT;  Surgeon: Jamari Matute Jr., MD;  Location: [facility] OR;  Service: Orthopedics;  Laterality: Right;   • HUMERUS IM NAILING/RODDING Right 7/14/2022    Procedure: INTRAMEDULLARY NAIL INSERTION PROXIMAL HUMERUS RIGHT;  Surgeon: Jamari Matute Jr., MD;  Location: [facility] OR;  Service: Orthopedics;  Laterality: Right;   • HYSTERECTOMY     • MRI ANGIOGRAM LOWER EXTREMITY UNILATERAL W CONTRAST     • ORIF HUMERUS FRACTURE Right 7/14/2022    Procedure: HUMERUS  DISTAL OPEN REDUCTION INTERNAL FIXATION RIGHT;  Surgeon: Jamari Matute Jr., MD;  Location: [facility];  Service: Orthopedics;  Laterality: Right;   • TONSILLECTOMY         Family History   Problem Relation Age of Onset   • Thyroid disease Mother    • Hypertension Mother    • Heart disease Father        Social History     Socioeconomic History   • Marital status:    Tobacco Use   • Smoking status: Every Day     Packs/day: 0.50     Types: Cigarettes   • Smokeless tobacco: Never   Vaping Use   • Vaping Use: Never used   Substance and Sexual Activity   • Alcohol use: Never   • Drug use: Never   • Sexual activity: Defer       Allergies   Allergen Reactions   • Daypro [Oxaprozin] Anaphylaxis   • Fentanyl Other (See Comments)     sweating   • Iodine Swelling   • Keflex [Cephalexin] Hives     Tolerated ceftriaxone and cefazolin 7/2022   • Levaquin [Levofloxacin] Hives   • Lyrica [Pregabalin] Other (See Comments)     Sweating   • Other Swelling     Tranormin   • Penicillins Rash     Tolerated ceftriaxone & cefazolin 7/2022   • Sulfa Antibiotics Hives and Rash       Review of Systems   Constitutional: Negative for activity change, appetite change, diaphoresis, fatigue, unexpected weight gain and unexpected weight loss.   HENT: Negative for hearing loss.    Eyes: Negative for blurred vision, double vision and visual disturbance.   Respiratory: Negative for cough, chest tightness and shortness of breath.    Cardiovascular: Negative for chest pain, palpitations and leg swelling.   Gastrointestinal: Negative for abdominal pain, blood in stool, GERD and indigestion.   Endocrine: Negative for cold intolerance and heat intolerance.   Genitourinary: Negative for dysuria and hematuria.   Musculoskeletal: Positive for arthralgias and joint swelling. Negative for myalgias.   Skin: Negative for skin lesions.   Neurological: Positive for weakness. Negative for tremors, seizures, syncope, speech difficulty, headache, memory  "problem and confusion.   Hematological: Bruises/bleeds easily.   Psychiatric/Behavioral: Negative for sleep disturbance and depressed mood. The patient is not nervous/anxious.         Vital Signs  Vitals:    10/13/22 1314   BP: 122/82   BP Location: Left arm   Patient Position: Sitting   Cuff Size: Adult   Pulse: 78   Weight: Comment: unable to stand to weigh   Height: 154.9 cm (61\")   PainSc:   4   PainLoc: Shoulder     Body mass index is 33.07 kg/m².  BMI is >= 30 and <35. (Class 1 Obesity). The following options were offered after discussion;: weight loss educational material (shared in after visit summary), exercise counseling/recommendations and nutrition counseling/recommendations     Advance Care Planning   ACP discussion was held with the patient during this visit. Patient does not have an advance directive, information provided.       Current Outpatient Medications:   •  albuterol sulfate  (90 Base) MCG/ACT inhaler, Inhale 1 puff Every 4 (Four) Hours As Needed for Wheezing., Disp: 8.5 g, Rfl: 5  •  B-D UF III MINI PEN NEEDLES 31G X 5 MM misc, USE AS DIRECTED WITH FORTEO INJECTIONS, Disp: , Rfl:   •  doxycycline (VIBRAMYICN) 100 MG tablet, Take 1 tablet by mouth 2 (Two) Times a Day., Disp: 14 tablet, Rfl: 0  •  furosemide (Lasix) 20 MG tablet, Take 1 tablet by mouth 2 (Two) Times a Day., Disp: 10 tablet, Rfl: 1  •  gabapentin (NEURONTIN) 800 MG tablet, Take 800 mg by mouth Every 6 (Six) Hours., Disp: , Rfl:   •  hydrocortisone (CORTEF) 10 MG tablet, Take 1.5 tablets by mouth 2 (Two) Times a Day., Disp: 135 tablet, Rfl: 1  •  levothyroxine (SYNTHROID, LEVOTHROID) 88 MCG tablet, Take 1 tablet by mouth Daily., Disp: 90 tablet, Rfl: 1  •  Morphine (MS CONTIN) 30 MG 12 hr tablet, Take 30 mg by mouth 2 (Two) Times a Day., Disp: , Rfl:   •  nortriptyline (PAMELOR) 50 MG capsule, Take 1 capsule by mouth 3 (Three) Times a Day., Disp: 90 capsule, Rfl: 2  •  oxyCODONE (oxyCONTIN) 10 MG 12 hr tablet, Take 1 " tablet by mouth Every 8 (Eight) Hours., Disp: , Rfl:   •  oxyCODONE (ROXICODONE) 5 MG immediate release tablet, Take 1 tablet by mouth Every 4 (Four) Hours As Needed for Moderate post-op pain, Disp: 24 tablet, Rfl: 0  •  pindolol (VISKEN) 5 MG tablet, TAKE 1/2 TABLET BY MOUTH 3 TIMES A DAY, Disp: 45 tablet, Rfl: 1  •  spironolactone (ALDACTONE) 50 MG tablet, Take 1 tablet by mouth Daily. (Patient taking differently: Take 25 mg by mouth Daily.), Disp: 30 tablet, Rfl: 1  •  Teriparatide, Recombinant, (Forteo) 600 MCG/2.4ML injection, Inject 20 mcg under the skin into the appropriate area as directed Daily., Disp: , Rfl:   •  Wixela Inhub 250-50 MCG/DOSE DISKUS, INHALE 1 PUFF BY MOUTH TWICE DAILY, Disp: 60 each, Rfl: 1    Physical Exam  Constitutional:       Appearance: She is obese.   HENT:      Head: Normocephalic and atraumatic.      Mouth/Throat:      Mouth: Mucous membranes are moist.      Pharynx: Oropharynx is clear. No posterior oropharyngeal erythema.   Eyes:      Conjunctiva/sclera: Conjunctivae normal.      Pupils: Pupils are equal, round, and reactive to light.   Cardiovascular:      Rate and Rhythm: Normal rate and regular rhythm.      Pulses: Normal pulses.      Heart sounds: Normal heart sounds.   Pulmonary:      Effort: Pulmonary effort is normal.      Breath sounds: Normal breath sounds. No wheezing.   Abdominal:      Tenderness: There is no right CVA tenderness or left CVA tenderness.   Musculoskeletal:         General: Deformity and signs of injury present.        Arms:       Cervical back: Normal range of motion and neck supple.      Comments: Incision-clean dry with no erythema noted.   Skin:     Findings: Bruising present.   Neurological:      Mental Status: She is alert. Mental status is at baseline.      Motor: Weakness present.   Psychiatric:         Mood and Affect: Mood normal.         Thought Content: Thought content normal.         Judgment: Judgment normal.          ACE III MINI           Results Review:    Recent Results (from the past 672 hour(s))   POC Glucose Once    Collection Time: 10/04/22  1:45 PM    Specimen: Blood   Result Value Ref Range    Glucose 90 70 - 130 mg/dL   Potassium    Collection Time: 10/04/22  1:51 PM    Specimen: Blood   Result Value Ref Range    Potassium 4.5 3.5 - 5.2 mmol/L   Comprehensive Metabolic Panel    Collection Time: 10/13/22  2:04 PM    Specimen: Blood   Result Value Ref Range    Glucose 92 65 - 99 mg/dL    BUN 19 8 - 23 mg/dL    Creatinine 0.81 0.57 - 1.00 mg/dL    Sodium 142 136 - 145 mmol/L    Potassium 4.2 3.5 - 5.2 mmol/L    Chloride 103 98 - 107 mmol/L    CO2 27.9 22.0 - 29.0 mmol/L    Calcium 9.0 8.6 - 10.5 mg/dL    Total Protein 6.5 6.0 - 8.5 g/dL    Albumin 3.80 3.50 - 5.20 g/dL    ALT (SGPT) 13 1 - 33 U/L    AST (SGOT) 19 1 - 32 U/L    Alkaline Phosphatase 129 (H) 39 - 117 U/L    Total Bilirubin <0.2 0.0 - 1.2 mg/dL    Globulin 2.7 gm/dL    A/G Ratio 1.4 g/dL    BUN/Creatinine Ratio 23.5 7.0 - 25.0    Anion Gap 11.1 5.0 - 15.0 mmol/L    eGFR 76.8 >60.0 mL/min/1.73   TSH    Collection Time: 10/13/22  2:04 PM    Specimen: Blood   Result Value Ref Range    TSH 1.080 0.270 - 4.200 uIU/mL   T4, Free    Collection Time: 10/13/22  2:04 PM    Specimen: Blood   Result Value Ref Range    Free T4 1.28 0.93 - 1.70 ng/dL   T3, Free    Collection Time: 10/13/22  2:04 PM    Specimen: Blood   Result Value Ref Range    T3, Free 2.93 2.00 - 4.40 pg/mL   CBC Auto Differential    Collection Time: 10/13/22  2:04 PM    Specimen: Blood   Result Value Ref Range    WBC 6.94 3.40 - 10.80 10*3/mm3    RBC 4.50 3.77 - 5.28 10*6/mm3    Hemoglobin 13.8 12.0 - 15.9 g/dL    Hematocrit 42.7 34.0 - 46.6 %    MCV 94.9 79.0 - 97.0 fL    MCH 30.7 26.6 - 33.0 pg    MCHC 32.3 31.5 - 35.7 g/dL    RDW 12.3 12.3 - 15.4 %    RDW-SD 42.8 37.0 - 54.0 fl    MPV 11.4 6.0 - 12.0 fL    Platelets 253 140 - 450 10*3/mm3    Neutrophil % 67.7 42.7 - 76.0 %    Lymphocyte % 22.2 19.6 - 45.3 %    Monocyte  % 8.1 5.0 - 12.0 %    Eosinophil % 1.3 0.3 - 6.2 %    Basophil % 0.4 0.0 - 1.5 %    Immature Grans % 0.3 0.0 - 0.5 %    Neutrophils, Absolute 4.70 1.70 - 7.00 10*3/mm3    Lymphocytes, Absolute 1.54 0.70 - 3.10 10*3/mm3    Monocytes, Absolute 0.56 0.10 - 0.90 10*3/mm3    Eosinophils, Absolute 0.09 0.00 - 0.40 10*3/mm3    Basophils, Absolute 0.03 0.00 - 0.20 10*3/mm3    Immature Grans, Absolute 0.02 0.00 - 0.05 10*3/mm3    nRBC 0.0 0.0 - 0.2 /100 WBC     Procedures    Medication Review: Medications reviewed and noted    Social History     Socioeconomic History   • Marital status:    Tobacco Use   • Smoking status: Every Day     Packs/day: 0.50     Types: Cigarettes   • Smokeless tobacco: Never   Vaping Use   • Vaping Use: Never used   Substance and Sexual Activity   • Alcohol use: Never   • Drug use: Never   • Sexual activity: Defer        Assessment/Plan:    Problem List Items Addressed This Visit        Advance Directives and General Issues    S/P hardware removal, right shoulder - Primary    Overview     Has started physical therapy.  She has full ROM with her arm.  She will follow up with ortho next week.  Dr. Matute added oxycodone 5mg at night as needed for pain.              Cardiac and Vasculature    Essential hypertension    Overview     History of essential hypertension on Visken 5 mg tablets.  Recently discontinued quinapril 5 mg tablets.  Now taking furosemide 20mg BID, but less spironolactone, now at 25mg daily.         Relevant Medications    furosemide (Lasix) 20 MG tablet    pindolol (VISKEN) 5 MG tablet    spironolactone (ALDACTONE) 50 MG tablet    Other Relevant Orders    CBC & Differential (Completed)    Comprehensive Metabolic Panel (Completed)       Endocrine and Metabolic    Adrenal insufficiency (HCC)    Overview     Continue hydrocortisone 10mg tablets as directed.  Follow up with Dr. Nelson.         Relevant Medications    hydrocortisone (CORTEF) 10 MG tablet    Hypothyroidism     Overview       Continue levothyroxine 88mcg daily.  Follow up with Dr. Nelson         Relevant Medications    pindolol (VISKEN) 5 MG tablet    levothyroxine (SYNTHROID, LEVOTHROID) 88 MCG tablet    hydrocortisone (CORTEF) 10 MG tablet    Other Relevant Orders    TSH (Completed)    T4, Free (Completed)    T3, Free (Completed)       Musculoskeletal and Injuries    Chronic bilateral low back pain without sciatica    Overview     She is seeing Dr. Alex for pain management. Continue  gabapentin 800mg qid,  percocet 10-325mg tid, MS Contin 30mg BID.                  Patient Instructions   Fall Prevention in the Home, Adult  Falls can cause injuries and can happen to people of all ages. There are many things you can do to make your home safe and to help prevent falls. Ask for help when making these changes.  What actions can I take to prevent falls?  General Instructions  1. Use good lighting in all rooms. Replace any light bulbs that burn out.  2. Turn on the lights in dark areas. Use night-lights.  3. Keep items that you use often in easy-to-reach places. Lower the shelves around your home if needed.  4. Set up your furniture so you have a clear path. Avoid moving your furniture around.  5. Do not have throw rugs or other things on the floor that can make you trip.  6. Avoid walking on wet floors.  7. If any of your floors are uneven, fix them.  8. Add color or contrast paint or tape to clearly whitley and help you see:  ? Grab bars or handrails.  ? First and last steps of staircases.  ? Where the edge of each step is.  9. If you use a stepladder:  ? Make sure that it is fully opened. Do not climb a closed stepladder.  ? Make sure the sides of the stepladder are locked in place.  ? Ask someone to hold the stepladder while you use it.  10. Know where your pets are when moving through your home.  What can I do in the bathroom?     • Keep the floor dry. Clean up any water on the floor right away.  • Remove soap buildup in the  tub or shower.  • Use nonskid mats or decals on the floor of the tub or shower.  • Attach bath mats securely with double-sided, nonslip rug tape.  • If you need to sit down in the shower, use a plastic, nonslip stool.  • Install grab bars by the toilet and in the tub and shower. Do not use towel bars as grab bars.  What can I do in the bedroom?  • Make sure that you have a light by your bed that is easy to reach.  • Do not use any sheets or blankets for your bed that hang to the floor.  • Have a firm chair with side arms that you can use for support when you get dressed.  What can I do in the kitchen?  • Clean up any spills right away.  • If you need to reach something above you, use a step stool with a grab bar.  • Keep electrical cords out of the way.  • Do not use floor polish or wax that makes floors slippery.  What can I do with my stairs?  • Do not leave any items on the stairs.  • Make sure that you have a light switch at the top and the bottom of the stairs.  • Make sure that there are handrails on both sides of the stairs. Fix handrails that are broken or loose.  • Install nonslip stair treads on all your stairs.  • Avoid having throw rugs at the top or bottom of the stairs.  • Choose a carpet that does not hide the edge of the steps on the stairs.  • Check carpeting to make sure that it is firmly attached to the stairs. Fix carpet that is loose or worn.  What can I do on the outside of my home?  • Use bright outdoor lighting.  • Fix the edges of walkways and driveways and fix any cracks.  • Remove anything that might make you trip as you walk through a door, such as a raised step or threshold.  • Trim any bushes or trees on paths to your home.  • Check to see if handrails are loose or broken and that both sides of all steps have handrails.  • Install guardrails along the edges of any raised decks and porches.  • Clear paths of anything that can make you trip, such as tools or rocks.  • Have leaves, snow,  or ice cleared regularly.  • Use sand or salt on paths during winter.  • Clean up any spills in your garage right away. This includes grease or oil spills.  What other actions can I take?  1. Wear shoes that:  ? Have a low heel. Do not wear high heels.  ? Have rubber bottoms.  ? Feel good on your feet and fit well.  ? Are closed at the toe. Do not wear open-toe sandals.  2. Use tools that help you move around if needed. These include:  ? Canes.  ? Walkers.  ? Scooters.  ? Crutches.  3. Review your medicines with your doctor. Some medicines can make you feel dizzy. This can increase your chance of falling.  Ask your doctor what else you can do to help prevent falls.  Where to find more information  • Centers for Disease Control and Prevention, [place]: www.cdc.gov  • National [place] on Aging: www.bro.nih.gov  Contact a doctor if:  • You are afraid of falling at home.  • You feel weak, drowsy, or dizzy at home.  • You fall at home.  Summary  • There are many simple things that you can do to make your home safe and to help prevent falls.  • Ways to make your home safe include removing things that can make you trip and installing grab bars in the bathroom.  • Ask for help when making these changes in your home.  This information is not intended to replace advice given to you by your health care provider. Make sure you discuss any questions you have with your health care provider.  Document Revised: 07/21/2021 Document Reviewed: 07/21/2021  [employer] Patient Education © 2022 [employer]         Plan of care reviewed with patient at the conclusion of today's visit. Education was provided regarding diagnosis, management, and any prescribed or recommended OTC medications.Patient verbalizes understanding of and agreement with management plan.       I spent 20 minutes caring for Altagracia on this date of service. This time includes time spent by me in the following activities:preparing for the visit, reviewing tests, obtaining  and/or reviewing a separately obtained history, performing a medically appropriate examination and/or evaluation , counseling and educating the patient/family/caregiver, ordering medications, tests, or procedures, referring and communicating with other health care professionals  and documenting information in the medical record    Alvina Lloyd PA-C      Note: Part of this note may be an electronic transcription/translation of spoken language to printed text using the Dragon Dictation system.    Transcribed from ambient dictation for Alvina Lloyd PA-C by Brittany Crum.  10/13/22   17:18 EDT    Patient or patient representative verbalized consent to the visit recording.  I have personally performed the services described in this document as transcribed by the above individual, and it is both accurate and complete.  Alvina Lloyd PA-C  10/14/2022  13:36 EDT

## 2022-10-13 NOTE — PROGRESS NOTES
Transitional Care Follow Up Visit  Subjective     Altagracia Rothman is a 72 y.o. female who presents for a transitional care management visit.    Within 48 business hours after discharge our office contacted her via telephone to coordinate her care and needs.      I reviewed and discussed the details of that call along with the discharge summary, hospital problems, inpatient lab results, inpatient diagnostic studies, and consultation reports with Altagracia.     Current outpatient and discharge medications have been reconciled for the patient.  Reviewed by: Alvina Lloyd PA-C      Date of TCM Phone Call 8/11/2022   Former[facility]   Date of Discharge 8/10/2022     Risk for Readmission (LACE) Score: 4 (10/5/2022  6:00 AM)      History of Present Illness   Course During Hospital Stay:  ***     {Common H&P Review Areas:36414}    Review of Systems   Musculoskeletal: Positive for arthralgias and joint swelling.   Skin: Positive for color change.       Objective   Physical Exam    Assessment & Plan   {Assess/PlanSmartLinks:35691}

## 2022-10-14 LAB
BASOPHILS # BLD AUTO: 0.03 10*3/MM3 (ref 0–0.2)
BASOPHILS NFR BLD AUTO: 0.4 % (ref 0–1.5)
DEPRECATED RDW RBC AUTO: 42.8 FL (ref 37–54)
EOSINOPHIL # BLD AUTO: 0.09 10*3/MM3 (ref 0–0.4)
EOSINOPHIL NFR BLD AUTO: 1.3 % (ref 0.3–6.2)
ERYTHROCYTE [DISTWIDTH] IN BLOOD BY AUTOMATED COUNT: 12.3 % (ref 12.3–15.4)
HCT VFR BLD AUTO: 42.7 % (ref 34–46.6)
HGB BLD-MCNC: 13.8 G/DL (ref 12–15.9)
IMM GRANULOCYTES # BLD AUTO: 0.02 10*3/MM3 (ref 0–0.05)
IMM GRANULOCYTES NFR BLD AUTO: 0.3 % (ref 0–0.5)
LYMPHOCYTES # BLD AUTO: 1.54 10*3/MM3 (ref 0.7–3.1)
LYMPHOCYTES NFR BLD AUTO: 22.2 % (ref 19.6–45.3)
MCH RBC QN AUTO: 30.7 PG (ref 26.6–33)
MCHC RBC AUTO-ENTMCNC: 32.3 G/DL (ref 31.5–35.7)
MCV RBC AUTO: 94.9 FL (ref 79–97)
MONOCYTES # BLD AUTO: 0.56 10*3/MM3 (ref 0.1–0.9)
MONOCYTES NFR BLD AUTO: 8.1 % (ref 5–12)
NEUTROPHILS NFR BLD AUTO: 4.7 10*3/MM3 (ref 1.7–7)
NEUTROPHILS NFR BLD AUTO: 67.7 % (ref 42.7–76)
NRBC BLD AUTO-RTO: 0 /100 WBC (ref 0–0.2)
PLATELET # BLD AUTO: 253 10*3/MM3 (ref 140–450)
PMV BLD AUTO: 11.4 FL (ref 6–12)
RBC # BLD AUTO: 4.5 10*6/MM3 (ref 3.77–5.28)
WBC NRBC COR # BLD: 6.94 10*3/MM3 (ref 3.4–10.8)

## 2022-10-14 NOTE — PATIENT INSTRUCTIONS
Fall Prevention in the Home, Adult  Falls can cause injuries and can happen to people of all ages. There are many things you can do to make your home safe and to help prevent falls. Ask for help when making these changes.  What actions can I take to prevent falls?  General Instructions  Use good lighting in all rooms. Replace any light bulbs that burn out.  Turn on the lights in dark areas. Use night-lights.  Keep items that you use often in easy-to-reach places. Lower the shelves around your home if needed.  Set up your furniture so you have a clear path. Avoid moving your furniture around.  Do not have throw rugs or other things on the floor that can make you trip.  Avoid walking on wet floors.  If any of your floors are uneven, fix them.  Add color or contrast paint or tape to clearly whitley and help you see:  Grab bars or handrails.  First and last steps of staircases.  Where the edge of each step is.  If you use a stepladder:  Make sure that it is fully opened. Do not climb a closed stepladder.  Make sure the sides of the stepladder are locked in place.  Ask someone to hold the stepladder while you use it.  Know where your pets are when moving through your home.  What can I do in the bathroom?     Keep the floor dry. Clean up any water on the floor right away.  Remove soap buildup in the tub or shower.  Use nonskid mats or decals on the floor of the tub or shower.  Attach bath mats securely with double-sided, nonslip rug tape.  If you need to sit down in the shower, use a plastic, nonslip stool.  Install grab bars by the toilet and in the tub and shower. Do not use towel bars as grab bars.  What can I do in the bedroom?  Make sure that you have a light by your bed that is easy to reach.  Do not use any sheets or blankets for your bed that hang to the floor.  Have a firm chair with side arms that you can use for support when you get dressed.  What can I do in the kitchen?  Clean up any spills right away.  If you  need to reach something above you, use a step stool with a grab bar.  Keep electrical cords out of the way.  Do not use floor polish or wax that makes floors slippery.  What can I do with my stairs?  Do not leave any items on the stairs.  Make sure that you have a light switch at the top and the bottom of the stairs.  Make sure that there are handrails on both sides of the stairs. Fix handrails that are broken or loose.  Install nonslip stair treads on all your stairs.  Avoid having throw rugs at the top or bottom of the stairs.  Choose a carpet that does not hide the edge of the steps on the stairs.  Check carpeting to make sure that it is firmly attached to the stairs. Fix carpet that is loose or worn.  What can I do on the outside of my home?  Use bright outdoor lighting.  Fix the edges of walkways and driveways and fix any cracks.  Remove anything that might make you trip as you walk through a door, such as a raised step or threshold.  Trim any bushes or trees on paths to your home.  Check to see if handrails are loose or broken and that both sides of all steps have handrails.  Install guardrails along the edges of any raised decks and porches.  Clear paths of anything that can make you trip, such as tools or rocks.  Have leaves, snow, or ice cleared regularly.  Use sand or salt on paths during winter.  Clean up any spills in your garage right away. This includes grease or oil spills.  What other actions can I take?  Wear shoes that:  Have a low heel. Do not wear high heels.  Have rubber bottoms.  Feel good on your feet and fit well.  Are closed at the toe. Do not wear open-toe sandals.  Use tools that help you move around if needed. These include:  Canes.  Walkers.  Scooters.  Crutches.  Review your medicines with your doctor. Some medicines can make you feel dizzy. This can increase your chance of falling.  Ask your doctor what else you can do to help prevent falls.  Where to find more information  Centers for  Disease Control and Prevention, [employer]: www.cdc.gov  [employer] [place] [employer]: www.bro.nih.gov  Contact a doctor if:  You are afraid of falling at home.  You feel weak, drowsy, or dizzy at home.  You fall at home.  Summary  There are many simple things that you can do to make your home safe and to help prevent falls.  Ways to make your home safe include removing things that can make you trip and installing grab bars in the bathroom.  Ask for help when making these changes in your home.  This information is not intended to replace advice given to you by your health care provider. Make sure you discuss any questions you have with your health care provider.  Document Revised: 07/21/2021 Document Reviewed: 07/21/2021  Elsevier Patient Education © 2022 Elsevier Inc.

## 2022-10-17 ENCOUNTER — TELEPHONE (OUTPATIENT)
Dept: INTERNAL MEDICINE | Facility: CLINIC | Age: 72
End: 2022-10-17

## 2022-10-17 NOTE — TELEPHONE ENCOUNTER
Received a refill request for Quinapril 5 mg tablet: Take 1 tab DQ. This medication is not on the current med list.

## 2022-10-19 ENCOUNTER — TELEPHONE (OUTPATIENT)
Dept: INTERNAL MEDICINE | Facility: CLINIC | Age: 72
End: 2022-10-19

## 2022-10-19 RX ORDER — FUROSEMIDE 20 MG/1
20 TABLET ORAL 2 TIMES DAILY
Qty: 10 TABLET | Refills: 1 | Status: CANCELLED | OUTPATIENT
Start: 2022-10-19

## 2022-10-19 NOTE — TELEPHONE ENCOUNTER
Caller: Stephan Altagracia Jones    Relationship: Self    Best call back number: [phone]    Requested Prescriptions:   Requested Prescriptions     Pending Prescriptions Disp Refills   • furosemide (Lasix) 20 MG tablet 10 tablet 1     Sig: Take 1 tablet by mouth 2 (Two) Times a Day.        Pharmacy where request should be sent: [facility]/PHARMACY #[id number] [address] [phone] [facility] [phone]      Additional details provided by patient: PATIENT WOULD LIKE TO SEE ABOUT GETTING MORE THAN TEN TABLETS    Does the patient have less than a 3 day supply:  [x] Yes  [] No    Allan Felix Rep   10/19/22 12:24 EDT

## 2022-10-21 RX ORDER — FUROSEMIDE 20 MG/1
20 TABLET ORAL 2 TIMES DAILY
Qty: 30 TABLET | Refills: 3 | Status: SHIPPED | OUTPATIENT
Start: 2022-10-21 | End: 2022-11-06

## 2022-10-22 DIAGNOSIS — E27.40 ADRENAL INSUFFICIENCY: ICD-10-CM

## 2022-10-24 RX ORDER — HYDROCORTISONE 10 MG/1
TABLET ORAL
Qty: 270 TABLET | Refills: 1 | Status: SHIPPED | OUTPATIENT
Start: 2022-10-24

## 2022-10-25 NOTE — TELEPHONE ENCOUNTER
Patient called and I let her know that Alvina did put in a larger prescription for her, she verbalized understanding

## 2022-11-02 RX ORDER — FLUTICASONE PROPIONATE AND SALMETEROL 250; 50 UG/1; UG/1
POWDER RESPIRATORY (INHALATION)
Qty: 60 EACH | Refills: 1 | Status: SHIPPED | OUTPATIENT
Start: 2022-11-02

## 2022-11-06 RX ORDER — FUROSEMIDE 20 MG/1
TABLET ORAL
Qty: 30 TABLET | Refills: 3 | Status: SHIPPED | OUTPATIENT
Start: 2022-11-06 | End: 2022-11-17

## 2022-11-07 ENCOUNTER — TELEPHONE (OUTPATIENT)
Dept: INTERNAL MEDICINE | Facility: CLINIC | Age: 72
End: 2022-11-07

## 2022-11-07 DIAGNOSIS — I10 ESSENTIAL HYPERTENSION: ICD-10-CM

## 2022-11-07 RX ORDER — PINDOLOL 5 MG/1
TABLET ORAL
Qty: 45 TABLET | Refills: 1 | Status: SHIPPED | OUTPATIENT
Start: 2022-11-07 | End: 2022-11-11 | Stop reason: SDUPTHER

## 2022-11-09 ENCOUNTER — TELEPHONE (OUTPATIENT)
Dept: INTERNAL MEDICINE | Facility: CLINIC | Age: 72
End: 2022-11-09

## 2022-11-09 RX ORDER — NITROFURANTOIN 25; 75 MG/1; MG/1
100 CAPSULE ORAL 2 TIMES DAILY
Qty: 10 CAPSULE | Refills: 0 | Status: SHIPPED | OUTPATIENT
Start: 2022-11-09 | End: 2023-01-11

## 2022-11-09 NOTE — TELEPHONE ENCOUNTER
Caller: Stephan Altagarcia Jones    Relationship: Self    Best call back number: [phone]     What medication are you requesting: SOMETHING TO TREAT A UTI    What are your current symptoms: FREQUENT URINATION, BURNING, AND PRESSURE    How long have you been experiencing symptoms: TODAY    Have you had these symptoms before:    [x] Yes  [] No    Have you been treated for these symptoms before:   [x] Yes  [] No    If a prescription is needed, what is your preferred pharmacy and phone number:   [facility] [address] [phone]    Additional notes: PATIENT HAS CALLED REQUESTING IF SHE CAN HAVE SOMETHING CALLED INTO THE PHARMACY IMMEDIATELY TO HELP WITH UTI. PATIENT STATES SHE HAS NO TRANSPORTATION TO GET TO OFFICE AND HAS SCHEDULED VIDEO VISIT TOMORROW WITH RAFFAELE FARLEY. PATIENT STATES SHE IS NEEDING SOMETHING CALLED IN TODAY IF ALL POSSIBLE.

## 2022-11-10 ENCOUNTER — TELEMEDICINE (OUTPATIENT)
Dept: INTERNAL MEDICINE | Facility: CLINIC | Age: 72
End: 2022-11-10

## 2022-11-10 DIAGNOSIS — N30.00 ACUTE CYSTITIS WITHOUT HEMATURIA: ICD-10-CM

## 2022-11-10 DIAGNOSIS — R30.0 DYSURIA: ICD-10-CM

## 2022-11-10 DIAGNOSIS — R39.15 URINARY URGENCY: Primary | ICD-10-CM

## 2022-11-10 DIAGNOSIS — E27.40 ADRENAL INSUFFICIENCY: ICD-10-CM

## 2022-11-10 DIAGNOSIS — R35.0 URINARY FREQUENCY: ICD-10-CM

## 2022-11-10 PROCEDURE — 99213 OFFICE O/P EST LOW 20 MIN: CPT | Performed by: NURSE PRACTITIONER

## 2022-11-10 RX ORDER — ONDANSETRON 8 MG/1
8 TABLET, ORALLY DISINTEGRATING ORAL EVERY 8 HOURS PRN
Qty: 15 TABLET | Refills: 0 | Status: SHIPPED | OUTPATIENT
Start: 2022-11-10

## 2022-11-10 NOTE — PROGRESS NOTES
"  Follow Up Office Visit      Patient Name: Altagracia Rothman  : 1950   MRN: [id number]   Care Team: Patient Care Team:  [facility]vina Lloyd PA-C as PCP - General (Internal Medicine)  Heather Nelson MD as Consulting Physician (Endocrinology)  Lashawn Michele MD as Consulting Physician (Rheumatology)    Chief Complaint:    Chief Complaint   Patient presents with   • Urinary Tract Infection     You have chosen to receive care through a telehealth visit.  Do you consent to use a video/audio connection for your medical care today?      YES      Patient is located in the living room of her home.  I am located in the office setting at [address] [place] [address] in [facility].      History of Present Illness: Altagracia Rothman is a 72 y.o. female who presents today via A/V visit for the issue of urinary urgency/frequency and burning which started yesterday.  Notes that she had associated chills and feeling feverish but did not monitor her temperature.    Notes that she has adrenal insufficiency, taking Cortef as prescribed.  She monitors her blood pressure closely and reports that her BP has been \"good\".    She denies any associated vomiting, low back pain or abdominal pain.  She does endorse some low-grade nausea.    Reports that she had a broken tooth last week for which she was placed on doxycycline for her.  She has been taking this medication and has approximately 2 days left to complete the course.    Noted that Macrobid was sent in per Dr. Eckert yesterday and patient states she was able to get 1 dose in yesterday.    She feels as though the symptoms of burning are much improved this morning.  She still has some ongoing issues of urinary frequency and urgency.        Subjective        I have reviewed and the following portions of the patient's history were updated as appropriate: past family history, past medical history, past social history, past surgical history and problem list.    Medications: "     Current Outpatient Medications:   •  albuterol sulfate  (90 Base) MCG/ACT inhaler, Inhale 1 puff Every 4 (Four) Hours As Needed for Wheezing., Disp: 8.5 g, Rfl: 5  •  B-D UF III MINI PEN NEEDLES 31G X 5 MM misc, USE AS DIRECTED WITH FORTEO INJECTIONS, Disp: , Rfl:   •  doxycycline (VIBRAMYICN) 100 MG tablet, Take 1 tablet by mouth 2 (Two) Times a Day., Disp: 14 tablet, Rfl: 0  •  furosemide (LASIX) 20 MG tablet, TAKE 1 TABLET BY MOUTH TWICE A DAY, Disp: 30 tablet, Rfl: 3  •  gabapentin (NEURONTIN) 800 MG tablet, Take 800 mg by mouth Every 6 (Six) Hours., Disp: , Rfl:   •  hydrocortisone (CORTEF) 10 MG tablet, TAKE 1.5 TABLETS BY MOUTH TWICE A DAY, Disp: 270 tablet, Rfl: 1  •  levothyroxine (SYNTHROID, LEVOTHROID) 88 MCG tablet, Take 1 tablet by mouth Daily., Disp: 90 tablet, Rfl: 1  •  Morphine (MS CONTIN) 30 MG 12 hr tablet, Take 30 mg by mouth 2 (Two) Times a Day., Disp: , Rfl:   •  nitrofurantoin, macrocrystal-monohydrate, (Macrobid) 100 MG capsule, Take 1 capsule by mouth 2 (Two) Times a Day., Disp: 10 capsule, Rfl: 0  •  nortriptyline (PAMELOR) 50 MG capsule, Take 1 capsule by mouth 3 (Three) Times a Day., Disp: 90 capsule, Rfl: 2  •  ondansetron ODT (Zofran ODT) 8 MG disintegrating tablet, Place 1 tablet on the tongue Every 8 (Eight) Hours As Needed for Nausea or Vomiting., Disp: 15 tablet, Rfl: 0  •  oxyCODONE (oxyCONTIN) 10 MG 12 hr tablet, Take 1 tablet by mouth Every 8 (Eight) Hours., Disp: , Rfl:   •  oxyCODONE (ROXICODONE) 5 MG immediate release tablet, Take 1 tablet by mouth Every 4 (Four) Hours As Needed for Moderate post-op pain, Disp: 24 tablet, Rfl: 0  •  pindolol (VISKEN) 5 MG tablet, TAKE 1/2 TABLET BY MOUTH 3 TIMES A DAY, Disp: 45 tablet, Rfl: 1  •  spironolactone (ALDACTONE) 50 MG tablet, Take 1 tablet by mouth Daily. (Patient taking differently: Take 25 mg by mouth Daily.), Disp: 30 tablet, Rfl: 1  •  Teriparatide, Recombinant, (Forteo) 600 MCG/2.4ML injection, Inject 20 mcg under  the skin into the appropriate area as directed Daily., Disp: , Rfl:   •  Wixela Inhub 250-50 MCG/ACT DISKUS, INHALE 1 PUFF BY MOUTH TWICE A DAY, Disp: 60 each, Rfl: 1    Allergies:   Allergies   Allergen Reactions   • Daypro [Oxaprozin] Anaphylaxis   • Fentanyl Other (See Comments)     sweating   • Iodine Swelling   • Keflex [Cephalexin] Hives     Tolerated ceftriaxone and cefazolin 7/2022   • Levaquin [Levofloxacin] Hives   • Lyrica [Pregabalin] Other (See Comments)     Sweating   • Other Swelling     Tranormin   • Penicillins Rash     Tolerated ceftriaxone & cefazolin 7/2022   • Sulfa Antibiotics Hives and Rash       Objective     Physical Exam:  Vital Signs:   Vitals:    11/10/22 1112   PainSc:   3   PainLoc: Abdomen     There is no height or weight on file to calculate BMI.     Physical Exam  Constitutional: No acute distress, awake, alert, nontoxic, normal body habitus  HENT: NCAT, MMM, no conjunctival injection  Respiratory: Good effort, nonlabored respirations   Psychiatric: Appropriate affect, good insight and judgement, cooperative  Neurologic: Oriented x 3, movements symmetric BUE, speech clear and fluent  Skin: No visible rashes, no jaundice seen on exposed skin through video window      Assessment / Plan      Assessment/Plan:   Problems Addressed This Visit    ICD-10-CM ICD-9-CM   1. Urinary urgency  R39.15 788.63   2. Urinary frequency  R35.0 788.41   3. Dysuria  R30.0 788.1   4. Acute cystitis without hematuria  N30.00 595.0   5. Adrenal insufficiency ([facility])  E27.40 255.41      Urinary urgency/frequency  Dysuria  Suspect acute cystitis  -Symptoms improved with dose of Macrobid  -Plan to complete course of Macrobid 100 mg twice daily  -Advised patient to follow-up if symptoms progress or return    Adrenal insufficiency  -Continue Cortef as prescribed  -Courage patient to monitor her blood pressure at home especially during this time while she is ill with above listed symptoms    Plan of care reviewed  with patient at the conclusion of today's visit. Education was provided regarding diagnosis and management.  Patient verbalizes understanding of and agreement with management plan.      Follow Up:   Return for Next scheduled follow up.        MICHELE Ashby  [facility] [address] [facility]    Please note that portions of this note were completed with a voice recognition program.

## 2022-11-11 ENCOUNTER — TELEPHONE (OUTPATIENT)
Dept: INTERNAL MEDICINE | Facility: CLINIC | Age: 72
End: 2022-11-11

## 2022-11-11 DIAGNOSIS — I10 ESSENTIAL HYPERTENSION: ICD-10-CM

## 2022-11-11 RX ORDER — PINDOLOL 5 MG/1
2.5 TABLET ORAL 3 TIMES DAILY
Qty: 45 TABLET | Refills: 1 | Status: SHIPPED | OUTPATIENT
Start: 2022-11-11

## 2022-11-17 RX ORDER — FUROSEMIDE 20 MG/1
TABLET ORAL
Qty: 60 TABLET | Refills: 3 | Status: SHIPPED | OUTPATIENT
Start: 2022-11-17 | End: 2022-11-18 | Stop reason: SDUPTHER

## 2022-11-18 ENCOUNTER — TELEMEDICINE (OUTPATIENT)
Dept: INTERNAL MEDICINE | Facility: CLINIC | Age: 72
End: 2022-11-18

## 2022-11-18 DIAGNOSIS — F51.01 PRIMARY INSOMNIA: ICD-10-CM

## 2022-11-18 DIAGNOSIS — M25.562 ARTHRALGIA OF BOTH KNEES: ICD-10-CM

## 2022-11-18 DIAGNOSIS — I10 ESSENTIAL HYPERTENSION: Primary | ICD-10-CM

## 2022-11-18 DIAGNOSIS — M25.561 ARTHRALGIA OF BOTH KNEES: ICD-10-CM

## 2022-11-18 PROCEDURE — 99214 OFFICE O/P EST MOD 30 MIN: CPT | Performed by: PHYSICIAN ASSISTANT

## 2022-11-18 RX ORDER — FUROSEMIDE 20 MG/1
20 TABLET ORAL 2 TIMES DAILY
Qty: 60 TABLET | Refills: 3 | Status: SHIPPED | OUTPATIENT
Start: 2022-11-18

## 2022-11-18 RX ORDER — NORTRIPTYLINE HYDROCHLORIDE 50 MG/1
50 CAPSULE ORAL 3 TIMES DAILY
Qty: 90 CAPSULE | Refills: 5 | Status: SHIPPED | OUTPATIENT
Start: 2022-11-18 | End: 2022-11-29 | Stop reason: SDUPTHER

## 2022-11-18 RX ORDER — TETRACYCLINE HYDROCHLORIDE 500 MG/1
500 CAPSULE ORAL 2 TIMES DAILY
Qty: 180 CAPSULE | Refills: 1 | Status: SHIPPED | OUTPATIENT
Start: 2022-11-18

## 2022-11-18 NOTE — PROGRESS NOTES
[facility] Internal Medicine    Altagracia Rothman  1950   [id number]      Patient Care Team:  Alvina Lloyd PA-C as PCP - General (Internal Medicine)  Heather Nelson MD as Consulting Physician (Endocrinology)  Lashawn Michele MD as Consulting Physician (Rheumatology)    Chief Complaint::   Chief Complaint   Patient presents with   • Arthritis   • Hypertension      You have chosen to receive care through a video visit with Alvina Lloyd at [facility]. Do you consent to use a video visit for your medical care today? Yes    HPI  Altagracia Rothman is a 72-year-old female date of birth 1950 who presents today for telemedicine visit.  Past medical history significant for hypertension, status post hardware removal of right shoulder, adrenal insufficiency, and arthritis.  She has been a patient of Dr. Lashawn Michele, has been taking tetracycline for her arthritis which was excellent choice for her.  She is unable to take colchicine or other anti-inflammatories.  She had to stop her tetracycline during course of doxycycline.  She would like a prescription for when she stops the doxycycline she can then moved to the tetracycline.   Also has been using furosemide 20 mg for ongoing edema in her extremities and help with blood pressure management.  She denies chest pain, shortness of breath, palpitations, or headaches.  She has been compliant with her medication.  She has not had labs including CMP since October 21.  She denies dizziness.  Unfortunately, she did have a fall over the summer resulting in humerus fracture. She uses crutches to ambulate throughout her home.       Patient Active Problem List   Diagnosis   • Adrenal insufficiency (HCC)   • Hypothyroidism   • Essential hypertension   • H/O pheochromocytoma   • Chronic bilateral low back pain without sciatica   • Encounter for chronic pain management   • Osteoporosis   • Tobacco abuse   • BMI 35.0-35.9,adult   • Bronchitis   • Moderate asthma without  complication   • Paresthesia   • Medicare annual wellness visit, subsequent   • Routine medical exam   • Lipid screening   • Hair loss   • Closed displaced comminuted supracondylar fracture of right humerus without intercondylar fracture   • Acute UTI (urinary tract infection)   • Primary insomnia   • Escherichia coli infection   • Painful orthopaedic hardware (HCC)   • Obesity   • S/P hardware removal, right shoulder   • Arthralgia of both knees        Past Medical History:   Diagnosis Date   • Adrenal insufficiency (HCC)    • Fibromyalgia    • Hypertension    • Hypothyroidism    • Skin cancer        Past Surgical History:   Procedure Laterality Date   • ADRENAL GLAND SURGERY     • BREAST BIOPSY      x4   • GALLBLADDER SURGERY     • HARDWARE REMOVAL Right 10/4/2022    Procedure: SHOULDER HARDWARE REMOVAL- RIGHT;  Surgeon: Jamari Matute Jr., MD;  Location:  [facility];  Service: Orthopedics;  Laterality: Right;   • HUMERUS IM NAILING/RODDING Right 7/14/2022    Procedure: INTRAMEDULLARY NAIL INSERTION PROXIMAL HUMERUS RIGHT;  Surgeon: Jamari Matute Jr., MD;  Location:  [facility];  Service: Orthopedics;  Laterality: Right;   • HYSTERECTOMY     • MRI ANGIOGRAM LOWER EXTREMITY UNILATERAL W CONTRAST     • ORIF HUMERUS FRACTURE Right 7/14/2022    Procedure: HUMERUS DISTAL OPEN REDUCTION INTERNAL FIXATION RIGHT;  Surgeon: Jamari Matute Jr., MD;  Location:  [facility];  Service: Orthopedics;  Laterality: Right;   • TONSILLECTOMY         Family History   Problem Relation Age of Onset   • Thyroid disease Mother    • Hypertension Mother    • Heart disease Father        Social History     Socioeconomic History   • Marital status:    Tobacco Use   • Smoking status: Every Day     Packs/day: 0.50     Types: Cigarettes   • Smokeless tobacco: Never   Vaping Use   • Vaping Use: Never used   Substance and Sexual Activity   • Alcohol use: Never   • Drug use: Never   • Sexual activity: Defer       Allergies   Allergen  Reactions   • Daypro [Oxaprozin] Anaphylaxis   • Fentanyl Other (See Comments)     sweating   • Iodine Swelling   • Keflex [Cephalexin] Hives     Tolerated ceftriaxone and cefazolin 7/2022   • Levaquin [Levofloxacin] Hives   • Lyrica [Pregabalin] Other (See Comments)     Sweating   • Other Swelling     Tranormin   • Penicillins Rash     Tolerated ceftriaxone & cefazolin 7/2022   • Sulfa Antibiotics Hives and Rash       Review of Systems   Constitutional: Negative for chills, fatigue and fever.   Eyes: Negative for blurred vision and double vision.   Cardiovascular: Negative for chest pain, palpitations and leg swelling.   Musculoskeletal: Positive for arthralgias and gait problem.   Neurological: Negative for dizziness, weakness and headache.        Vital Signs  There were no vitals filed for this visit.  There is no height or weight on file to calculate BMI.  BMI is >= 30 and <35. (Class 1 Obesity). The following options were offered after discussion;: weight loss educational material (shared in after visit summary), exercise counseling/recommendations and nutrition counseling/recommendations     Advance Care Planning   ACP discussion was held with the patient during this visit. Patient does not have an advance directive, information provided.       Current Outpatient Medications:   •  furosemide (LASIX) 20 MG tablet, Take 1 tablet by mouth 2 (Two) Times a Day., Disp: 60 tablet, Rfl: 3  •  nortriptyline (PAMELOR) 50 MG capsule, Take 1 capsule by mouth 3 (Three) Times a Day., Disp: 90 capsule, Rfl: 5  •  pindolol (VISKEN) 5 MG tablet, Take 0.5 tablets by mouth 3 (Three) Times a Day., Disp: 45 tablet, Rfl: 1  •  tetracycline (ACHROMYCIN,SUMYCIN) 500 MG capsule, Take 1 capsule by mouth 2 (Two) Times a Day., Disp: 180 capsule, Rfl: 1  •  albuterol sulfate  (90 Base) MCG/ACT inhaler, Inhale 1 puff Every 4 (Four) Hours As Needed for Wheezing., Disp: 8.5 g, Rfl: 5  •  B-D UF III MINI PEN NEEDLES 31G X 5 MM misc,  USE AS DIRECTED WITH FORTEO INJECTIONS, Disp: , Rfl:   •  doxycycline (VIBRAMYICN) 100 MG tablet, Take 1 tablet by mouth 2 (Two) Times a Day., Disp: 14 tablet, Rfl: 0  •  gabapentin (NEURONTIN) 800 MG tablet, Take 800 mg by mouth Every 6 (Six) Hours., Disp: , Rfl:   •  hydrocortisone (CORTEF) 10 MG tablet, TAKE 1.5 TABLETS BY MOUTH TWICE A DAY, Disp: 270 tablet, Rfl: 1  •  levothyroxine (SYNTHROID, LEVOTHROID) 88 MCG tablet, Take 1 tablet by mouth Daily., Disp: 90 tablet, Rfl: 1  •  Morphine (MS CONTIN) 30 MG 12 hr tablet, Take 30 mg by mouth 2 (Two) Times a Day., Disp: , Rfl:   •  nitrofurantoin, macrocrystal-monohydrate, (Macrobid) 100 MG capsule, Take 1 capsule by mouth 2 (Two) Times a Day., Disp: 10 capsule, Rfl: 0  •  ondansetron ODT (Zofran ODT) 8 MG disintegrating tablet, Place 1 tablet on the tongue Every 8 (Eight) Hours As Needed for Nausea or Vomiting., Disp: 15 tablet, Rfl: 0  •  oxyCODONE (oxyCONTIN) 10 MG 12 hr tablet, Take 1 tablet by mouth Every 8 (Eight) Hours., Disp: , Rfl:   •  oxyCODONE (ROXICODONE) 5 MG immediate release tablet, Take 1 tablet by mouth Every 4 (Four) Hours As Needed for Moderate post-op pain, Disp: 24 tablet, Rfl: 0  •  spironolactone (ALDACTONE) 50 MG tablet, Take 1 tablet by mouth Daily. (Patient taking differently: Take 25 mg by mouth Daily.), Disp: 30 tablet, Rfl: 1  •  Teriparatide, Recombinant, (Forteo) 600 MCG/2.4ML injection, Inject 20 mcg under the skin into the appropriate area as directed Daily., Disp: , Rfl:   •  Wixela Inhub 250-50 MCG/ACT DISKUS, INHALE 1 PUFF BY MOUTH TWICE A DAY, Disp: 60 each, Rfl: 1    Physical Exam  Psychiatric:         Mood and Affect: Mood normal.         Behavior: Behavior normal.         Thought Content: Thought content normal.         Judgment: Judgment normal.          ACE III MINI            Results Review:    Recent Results (from the past 672 hour(s))   VITAMIN D,25-HYDROXY    Collection Time: 10/21/22  3:43 PM    Specimen: Blood, Venous  Line   Result Value Ref Range    25 Hydroxy, Vitamin D 43.7 20.0 - 80.0 ng/mL   ALKALINE PHOSPHATASE, BONE SPECIFIC    Collection Time: 10/21/22  3:43 PM    Specimen: Blood, Venous Line   Result Value Ref Range    Alk Phos Bone Fract 26.6 10.5 - 44.8 ug/L     Procedures    Medication Review: Medications reviewed and noted    Social History     Socioeconomic History   • Marital status:    Tobacco Use   • Smoking status: Every Day     Packs/day: 0.50     Types: Cigarettes   • Smokeless tobacco: Never   Vaping Use   • Vaping Use: Never used   Substance and Sexual Activity   • Alcohol use: Never   • Drug use: Never   • Sexual activity: Defer        Assessment/Plan:    Problem List Items Addressed This Visit        Cardiac and Vasculature    Essential hypertension - Primary    Overview     History of essential hypertension on Visken 5 mg tablets.  Recently discontinued quinapril 5 mg tablets.  Now taking furosemide 20mg BID, but less spironolactone, now at 25mg daily.  Patient request refill.  I will go ahead and send in.  She will need to stop and repeat CMP to check sodium and potassium.         Relevant Medications    spironolactone (ALDACTONE) 50 MG tablet    pindolol (VISKEN) 5 MG tablet    furosemide (LASIX) 20 MG tablet    Other Relevant Orders    Comprehensive Metabolic Panel       Musculoskeletal and Injuries    Arthralgia of both knees    Overview     Seen Dr. Lashawn Michele in rheumatology.  Failed colchicine trial and Plaquenil.  Has responded well to tetracycline.  Patient request prescription for this and would like to restart after she completes doxycycline.            Sleep    Primary insomnia    Overview     The patient will try melatonin 3mg to 5 mg at night.         Relevant Medications    nortriptyline (PAMELOR) 50 MG capsule    This visit has been rescheduled as a video visit to comply with patient safety concerns in accordance with CDC recommendations. Total time of discussion was 20  minutes.      There are no Patient Instructions on file for this visit.     Plan of care reviewed with patient at the conclusion of today's visit. Education was provided regarding diagnosis, management, and any prescribed or recommended OTC medications.Patient verbalizes understanding of and agreement with management plan.       I spent 20 minutes caring for Altagracia on this date of service. This time includes time spent by me in the following activities:preparing for the visit, reviewing tests, obtaining and/or reviewing a separately obtained history, performing a medically appropriate examination and/or evaluation , counseling and educating the patient/family/caregiver, ordering medications, tests, or procedures, referring and communicating with other health care professionals  and documenting information in the medical record    Alvina Lloyd PA-C      Note: Part of this note may be an electronic transcription/translation of spoken language to printed text using the Dragon Dictation system.

## 2022-11-22 ENCOUNTER — TRANSCRIBE ORDERS (OUTPATIENT)
Dept: LAB | Facility: HOSPITAL | Age: 72
End: 2022-11-22

## 2022-11-22 ENCOUNTER — LAB (OUTPATIENT)
Dept: LAB | Facility: HOSPITAL | Age: 72
End: 2022-11-22

## 2022-11-22 DIAGNOSIS — N39.0 URINARY TRACT INFECTION WITHOUT HEMATURIA, SITE UNSPECIFIED: Primary | ICD-10-CM

## 2022-11-22 DIAGNOSIS — B96.4 INTESTINAL INFECTION DUE TO PROTEUS MIRABILIS: ICD-10-CM

## 2022-11-22 DIAGNOSIS — A49.01 GLYCOPEPTIDE INTERMEDIATE STAPH AUREUS INFECTN, MULTI-DRUG RESISTANCE: ICD-10-CM

## 2022-11-22 DIAGNOSIS — Z16.24 GLYCOPEPTIDE INTERMEDIATE STAPH AUREUS INFECTN, MULTI-DRUG RESISTANCE: ICD-10-CM

## 2022-11-22 DIAGNOSIS — A04.8 INTESTINAL INFECTION DUE TO PROTEUS MIRABILIS: ICD-10-CM

## 2022-11-22 DIAGNOSIS — T84.610D INFLAMMATORY REACTION DUE TO INTERNAL FIXATION DEVICE OF RIGHT HUMERUS, SUBSEQUENT ENCOUNTER: ICD-10-CM

## 2022-11-22 LAB
ANION GAP SERPL CALCULATED.3IONS-SCNC: 9 MMOL/L (ref 5–15)
BUN SERPL-MCNC: 17 MG/DL (ref 8–23)
BUN/CREAT SERPL: 23.6 (ref 7–25)
CALCIUM SPEC-SCNC: 9.7 MG/DL (ref 8.6–10.5)
CHLORIDE SERPL-SCNC: 101 MMOL/L (ref 98–107)
CO2 SERPL-SCNC: 29 MMOL/L (ref 22–29)
CREAT SERPL-MCNC: 0.72 MG/DL (ref 0.57–1)
EGFRCR SERPLBLD CKD-EPI 2021: 88.4 ML/MIN/1.73
GLUCOSE SERPL-MCNC: 107 MG/DL (ref 65–99)
POTASSIUM SERPL-SCNC: 4.3 MMOL/L (ref 3.5–5.2)
SODIUM SERPL-SCNC: 139 MMOL/L (ref 136–145)

## 2022-11-22 PROCEDURE — 36415 COLL VENOUS BLD VENIPUNCTURE: CPT | Performed by: INTERNAL MEDICINE

## 2022-11-22 PROCEDURE — 80048 BASIC METABOLIC PNL TOTAL CA: CPT | Performed by: INTERNAL MEDICINE

## 2022-11-29 ENCOUNTER — TELEPHONE (OUTPATIENT)
Dept: INTERNAL MEDICINE | Facility: CLINIC | Age: 72
End: 2022-11-29

## 2022-11-29 DIAGNOSIS — F51.01 PRIMARY INSOMNIA: ICD-10-CM

## 2022-11-29 RX ORDER — NORTRIPTYLINE HYDROCHLORIDE 50 MG/1
50 CAPSULE ORAL 3 TIMES DAILY
Qty: 270 CAPSULE | Refills: 1 | Status: SHIPPED | OUTPATIENT
Start: 2022-11-29 | End: 2023-04-05 | Stop reason: SDUPTHER

## 2022-12-06 ENCOUNTER — TELEPHONE (OUTPATIENT)
Dept: INTERNAL MEDICINE | Facility: CLINIC | Age: 72
End: 2022-12-06

## 2022-12-06 DIAGNOSIS — I10 ESSENTIAL HYPERTENSION: ICD-10-CM

## 2022-12-06 RX ORDER — SPIRONOLACTONE 50 MG/1
25 TABLET, FILM COATED ORAL DAILY
Qty: 45 TABLET | Refills: 2 | Status: SHIPPED | OUTPATIENT
Start: 2022-12-06

## 2022-12-06 NOTE — TELEPHONE ENCOUNTER
Pt requesting spironolactone 50 mg 90 day supply.     Lf-10/10/22  Rx Refill Note  Requested Prescriptions      No prescriptions requested or ordered in this encounter      Last office visit with prescribing clinician: 10/13/2022   Last telemedicine visit with prescribing clinician: Visit date not found   Next office visit with prescribing clinician: Visit date not found                         Would you like a call back once the refill request has been completed: [] Yes [x] No    If the office needs to give you a call back, can they leave a voicemail: [] Yes [x] No    Alexa Valverde MA  12/06/22, 14:55 EST

## 2023-01-06 ENCOUNTER — TELEMEDICINE (OUTPATIENT)
Dept: INTERNAL MEDICINE | Facility: CLINIC | Age: 73
End: 2023-01-06
Payer: MEDICARE

## 2023-01-06 DIAGNOSIS — M25.562 ARTHRALGIA OF BOTH KNEES: ICD-10-CM

## 2023-01-06 DIAGNOSIS — R26.2 DIFFICULTY WALKING: Primary | ICD-10-CM

## 2023-01-06 DIAGNOSIS — Z98.890 S/P HARDWARE REMOVAL: ICD-10-CM

## 2023-01-06 DIAGNOSIS — M54.50 CHRONIC BILATERAL LOW BACK PAIN WITHOUT SCIATICA: ICD-10-CM

## 2023-01-06 DIAGNOSIS — M16.0 PRIMARY OSTEOARTHRITIS OF BOTH HIPS: ICD-10-CM

## 2023-01-06 DIAGNOSIS — M25.561 ARTHRALGIA OF BOTH KNEES: ICD-10-CM

## 2023-01-06 DIAGNOSIS — G89.29 CHRONIC BILATERAL LOW BACK PAIN WITHOUT SCIATICA: ICD-10-CM

## 2023-01-06 PROCEDURE — 99214 OFFICE O/P EST MOD 30 MIN: CPT | Performed by: PHYSICIAN ASSISTANT

## 2023-01-06 NOTE — PROGRESS NOTES
[facility]    Altagracia Rothman  1950   [id number]      Patient Care Team:  Alvina Lloyd PA-C as PCP - General (Internal Medicine)  Heather Nelson MD as Consulting Physician (Endocrinology)  Lashawn Michele MD as Consulting Physician (Rheumatology)    Chief Complaint::   Chief Complaint   Patient presents with   • Leg Pain   • Extremity Weakness   • Shoulder Injury      You have chosen to receive care through a video visit with Alvina Lloyd at [facility]. Do you consent to use a video visit for your medical care today? Yes  HPI  Altagracia Rothman is a 72 year old female who presents today for evaluation of wheelchair.  She cannot walk without assistance. She has advanced degenerative disease in her hips and knees.  She has been using crutches for 4 years. Since she broke her right arm in October, this has made walking with crutches even more difficult. 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    Physical therapist has suggested she has an electric wheelchair. She also needs a shower seat.    Since her broken arm, she has purchased toilet lift seat to help her sit over toilet.     She lives alone in a one story home.          Patient Active Problem List   Diagnosis   • Adrenal insufficiency (HCC)   • Hypothyroidism   • Essential hypertension   • H/O pheochromocytoma   • Chronic bilateral low back pain without sciatica   • Encounter for chronic pain management   • Osteoporosis   • Tobacco abuse   • BMI 35.0-35.9,adult   • Bronchitis   • Moderate asthma without complication   • Paresthesia   • Medicare annual wellness visit, subsequent   • Routine medical exam   • Lipid screening   • Hair loss   • Closed displaced comminuted supracondylar fracture of right humerus without intercondylar fracture   • Acute UTI (urinary tract infection)   • Primary insomnia   • Escherichia coli infection   • Painful orthopaedic hardware (HCC)   •  Obesity   • S/P hardware removal, right shoulder   • Arthralgia of both knees   • Primary osteoarthritis of both hips   • Difficulty walking        Past Medical History:   Diagnosis Date   • Adrenal insufficiency (HCC)    • Fibromyalgia    • Hypertension    • Hypothyroidism    • Skin cancer        Past Surgical History:   Procedure Laterality Date   • ADRENAL GLAND SURGERY     • BREAST BIOPSY      x4   • GALLBLADDER SURGERY     • HARDWARE REMOVAL Right 10/4/2022    Procedure: SHOULDER HARDWARE REMOVAL- RIGHT;  Surgeon: Jamari Matute Jr., MD;  Location:  [facility];  Service: Orthopedics;  Laterality: Right;   • HUMERUS IM NAILING/RODDING Right 7/14/2022    Procedure: INTRAMEDULLARY NAIL INSERTION PROXIMAL HUMERUS RIGHT;  Surgeon: Jamari Matute Jr., MD;  Location:  [facility];  Service: Orthopedics;  Laterality: Right;   • HYSTERECTOMY     • MRI ANGIOGRAM LOWER EXTREMITY UNILATERAL W CONTRAST     • ORIF HUMERUS FRACTURE Right 7/14/2022    Procedure: HUMERUS DISTAL OPEN REDUCTION INTERNAL FIXATION RIGHT;  Surgeon: Jamari Matute Jr., MD;  Location:  [facility];  Service: Orthopedics;  Laterality: Right;   • TONSILLECTOMY         Family History   Problem Relation Age of Onset   • Thyroid disease Mother    • Hypertension Mother    • Heart disease Father        Social History     Socioeconomic History   • Marital status:    Tobacco Use   • Smoking status: Every Day     Packs/day: 0.50     Types: Cigarettes   • Smokeless tobacco: Never   Vaping Use   • Vaping Use: Never used   Substance and Sexual Activity   • Alcohol use: Never   • Drug use: Never   • Sexual activity: Defer       Allergies   Allergen Reactions   • Daypro [Oxaprozin] Anaphylaxis   • Fentanyl Other (See Comments)     sweating   • Iodine Swelling   • Keflex [Cephalexin] Hives     Tolerated ceftriaxone and cefazolin 7/2022   • Levaquin [Levofloxacin] Hives   • Lyrica [Pregabalin] Other (See Comments)     Sweating   • Other Swelling     Tranormin    • Penicillins Rash     Tolerated ceftriaxone & cefazolin 7/2022   • Sulfa Antibiotics Hives and Rash       Review of Systems   Constitutional: Positive for activity change.   Musculoskeletal: Positive for arthralgias, gait problem, joint swelling and myalgias.   Neurological: Positive for weakness.        Vital Signs  There were no vitals filed for this visit.  There is no height or weight on file to calculate BMI.  BMI is >= 30 and <35. (Class 1 Obesity). The following options were offered after discussion;: weight loss educational material (shared in after visit summary), exercise counseling/recommendations and nutrition counseling/recommendations     Advance Care Planning   ACP discussion was held with the patient during this visit. Patient does not have an advance directive, information provided.       Current Outpatient Medications:   •  pindolol (VISKEN) 5 MG tablet, Take 0.5 tablets by mouth 3 (Three) Times a Day., Disp: 45 tablet, Rfl: 1  •  albuterol sulfate  (90 Base) MCG/ACT inhaler, Inhale 1 puff Every 4 (Four) Hours As Needed for Wheezing., Disp: 8.5 g, Rfl: 5  •  B-D UF III MINI PEN NEEDLES 31G X 5 MM misc, USE AS DIRECTED WITH FORTEO INJECTIONS, Disp: , Rfl:   •  furosemide (LASIX) 20 MG tablet, Take 1 tablet by mouth 2 (Two) Times a Day., Disp: 60 tablet, Rfl: 3  •  gabapentin (NEURONTIN) 800 MG tablet, Take 800 mg by mouth Every 6 (Six) Hours., Disp: , Rfl:   •  hydrocortisone (CORTEF) 10 MG tablet, TAKE 1.5 TABLETS BY MOUTH TWICE A DAY, Disp: 270 tablet, Rfl: 1  •  levothyroxine (SYNTHROID, LEVOTHROID) 88 MCG tablet, Take 1 tablet by mouth Daily., Disp: 90 tablet, Rfl: 1  •  Morphine (MS CONTIN) 30 MG 12 hr tablet, Take 30 mg by mouth 2 (Two) Times a Day., Disp: , Rfl:   •  nortriptyline (PAMELOR) 50 MG capsule, Take 1 capsule by mouth 3 (Three) Times a Day., Disp: 270 capsule, Rfl: 1  •  ondansetron ODT (Zofran ODT) 8 MG disintegrating tablet, Place 1 tablet on the tongue Every 8 (Eight)  Hours As Needed for Nausea or Vomiting., Disp: 15 tablet, Rfl: 0  •  oxyCODONE (oxyCONTIN) 10 MG 12 hr tablet, Take 1 tablet by mouth Every 8 (Eight) Hours., Disp: , Rfl:   •  spironolactone (ALDACTONE) 50 MG tablet, Take 0.5 tablets by mouth Daily., Disp: 45 tablet, Rfl: 2  •  Teriparatide, Recombinant, (Forteo) 600 MCG/2.4ML injection, Inject 20 mcg under the skin into the appropriate area as directed Daily., Disp: , Rfl:   •  tetracycline (ACHROMYCIN,SUMYCIN) 500 MG capsule, Take 1 capsule by mouth 2 (Two) Times a Day., Disp: 180 capsule, Rfl: 1  •  Wixela Inhub 250-50 MCG/ACT DISKUS, INHALE 1 PUFF BY MOUTH TWICE A DAY, Disp: 60 each, Rfl: 1    Physical Exam  Constitutional:       Appearance: Normal appearance.   Neurological:      Mental Status: She is alert and oriented to person, place, and time.   Psychiatric:         Mood and Affect: Mood normal.         Behavior: Behavior normal.          ACE III MINI            Results Review:    No results found for this or any previous visit (from the past 672 hour(s)).  Procedures    Medication Review: Medications reviewed and noted    Social History     Socioeconomic History   • Marital status:    Tobacco Use   • Smoking status: Every Day     Packs/day: 0.50     Types: Cigarettes   • Smokeless tobacco: Never   Vaping Use   • Vaping Use: Never used   Substance and Sexual Activity   • Alcohol use: Never   • Drug use: Never   • Sexual activity: Defer        Assessment/Plan:    Problem List Items Addressed This Visit        Advance Directives and General Issues    S/P hardware removal, right shoulder    Overview     Surgery performed by Dr. Matute.              Musculoskeletal and Injuries    Chronic bilateral low back pain without sciatica    Overview     She is seeing Dr. Alex for pain management. Continue  gabapentin 800mg qid,  percocet 10-325mg tid, MS Contin 30mg BID.              Relevant Orders    Motorized Wheelchair    Arthralgia of both knees     Overview     Seen Dr. Lashawn Michele in rheumatology.  Failed colchicine trial and Plaquenil.  Has responded well to tetracycline.           Relevant Orders    Motorized Wheelchair    Primary osteoarthritis of both hips    Relevant Orders    Motorized Wheelchair       Symptoms and Signs    Difficulty walking - Primary    Relevant Orders    Motorized Wheelchair        This visit has been rescheduled as a video visit to comply with patient safety concerns in accordance with CDC recommendations. Total time of discussion was 20 minutes.    Plan of care reviewed with patient at the conclusion of today's visit. Education was provided regarding diagnosis, management, and any prescribed or recommended OTC medications.Patient verbalizes understanding of and agreement with management plan.         I spent 20 minutes caring for Altagracia on this date of service. This time includes time spent by me in the following activities:preparing for the visit, reviewing tests, obtaining and/or reviewing a separately obtained history, performing a medically appropriate examination and/or evaluation , counseling and educating the patient/family/caregiver, ordering medications, tests, or procedures, referring and communicating with other health care professionals  and documenting information in the medical record    Alvina Lloyd PA-C      Note: Part of this note may be an electronic transcription/translation of spoken language to printed text using the Dragon Dictation system.

## 2023-01-11 PROBLEM — M16.0 PRIMARY OSTEOARTHRITIS OF BOTH HIPS: Status: ACTIVE | Noted: 2023-01-11

## 2023-01-11 PROBLEM — R26.2 DIFFICULTY WALKING: Status: ACTIVE | Noted: 2023-01-11

## 2023-01-12 ENCOUNTER — TRANSCRIBE ORDERS (OUTPATIENT)
Dept: LAB | Facility: HOSPITAL | Age: 73
End: 2023-01-12
Payer: MEDICARE

## 2023-01-12 ENCOUNTER — LAB (OUTPATIENT)
Dept: LAB | Facility: HOSPITAL | Age: 73
End: 2023-01-12
Payer: MEDICARE

## 2023-01-12 DIAGNOSIS — B96.4 INTESTINAL INFECTION DUE TO PROTEUS MIRABILIS: ICD-10-CM

## 2023-01-12 DIAGNOSIS — Z16.24 GLYCOPEPTIDE INTERMEDIATE STAPH AUREUS INFECTN, MULTI-DRUG RESISTANCE: ICD-10-CM

## 2023-01-12 DIAGNOSIS — T84.610D INFLAMMATORY REACTION DUE TO INTERNAL FIXATION DEVICE OF RIGHT HUMERUS, SUBSEQUENT ENCOUNTER: ICD-10-CM

## 2023-01-12 DIAGNOSIS — N39.0 URINARY TRACT INFECTION WITHOUT HEMATURIA, SITE UNSPECIFIED: Primary | ICD-10-CM

## 2023-01-12 DIAGNOSIS — A49.01 GLYCOPEPTIDE INTERMEDIATE STAPH AUREUS INFECTN, MULTI-DRUG RESISTANCE: ICD-10-CM

## 2023-01-12 DIAGNOSIS — N39.0 URINARY TRACT INFECTION WITHOUT HEMATURIA, SITE UNSPECIFIED: ICD-10-CM

## 2023-01-12 DIAGNOSIS — A04.8 INTESTINAL INFECTION DUE TO PROTEUS MIRABILIS: ICD-10-CM

## 2023-01-12 LAB
BACTERIA UR QL AUTO: ABNORMAL /HPF
BILIRUB UR QL STRIP: NEGATIVE
CLARITY UR: ABNORMAL
COD CRY URNS QL: ABNORMAL /HPF
COLOR UR: YELLOW
GLUCOSE UR STRIP-MCNC: NEGATIVE MG/DL
HGB UR QL STRIP.AUTO: ABNORMAL
HYALINE CASTS UR QL AUTO: ABNORMAL /LPF
KETONES UR QL STRIP: NEGATIVE
LEUKOCYTE ESTERASE UR QL STRIP.AUTO: ABNORMAL
NITRITE UR QL STRIP: NEGATIVE
PH UR STRIP.AUTO: 5.5 [PH] (ref 5–8)
PROT UR QL STRIP: NEGATIVE
RBC # UR STRIP: ABNORMAL /HPF
REF LAB TEST METHOD: ABNORMAL
SP GR UR STRIP: 1.03 (ref 1–1.03)
SQUAMOUS #/AREA URNS HPF: ABNORMAL /HPF
UROBILINOGEN UR QL STRIP: ABNORMAL
WBC # UR STRIP: ABNORMAL /HPF

## 2023-01-12 PROCEDURE — 81001 URINALYSIS AUTO W/SCOPE: CPT

## 2023-01-12 PROCEDURE — 87186 SC STD MICRODIL/AGAR DIL: CPT

## 2023-01-12 PROCEDURE — 87077 CULTURE AEROBIC IDENTIFY: CPT

## 2023-01-12 PROCEDURE — 87086 URINE CULTURE/COLONY COUNT: CPT

## 2023-01-16 LAB
BACTERIA SPEC AEROBE CULT: ABNORMAL
BACTERIA SPEC AEROBE CULT: ABNORMAL

## 2023-01-23 ENCOUNTER — TELEPHONE (OUTPATIENT)
Dept: INTERNAL MEDICINE | Facility: CLINIC | Age: 73
End: 2023-01-23
Payer: MEDICARE

## 2023-01-23 NOTE — TELEPHONE ENCOUNTER
[facility] IS FOLLOWING PT FOR HOME HEALTH FOR DR POLY AVILA AT [facility] AND THEY WOULD LIKE [place] [facility]ETT TO TAKE OVER FOLLOWING THE PT FOR HOME HEALTH.      PT IS HAVING SIGNS AND SYMPTOMS OF KIDNEY STONES, AND UTI.      VERBAL ORDER TO GET HOME HEALTH NURSE IN TO MAKE SURE THEY ARE NOT MISSING SOMETHING.     PLEASE CALL NOEMI BACK [phone].

## 2023-01-30 DIAGNOSIS — F51.01 PRIMARY INSOMNIA: ICD-10-CM

## 2023-01-30 RX ORDER — NORTRIPTYLINE HYDROCHLORIDE 50 MG/1
50 CAPSULE ORAL 3 TIMES DAILY
Qty: 270 CAPSULE | Refills: 1 | Status: CANCELLED | OUTPATIENT
Start: 2023-01-30

## 2023-02-02 ENCOUNTER — TELEPHONE (OUTPATIENT)
Dept: INTERNAL MEDICINE | Facility: CLINIC | Age: 73
End: 2023-02-02

## 2023-02-02 NOTE — TELEPHONE ENCOUNTER
Caller: [facility]    Relationship: [facility]     Best call back number: [phone]    What is the best time to reach you: ANYTIME     Who are you requesting to speak with (clinical staff, provider,  specific staff member): CLINICAL STAFF     What was the call regarding: GERMÁN WITH [facility] IS CALLING TO ASK IF SHE CAN PERFORM A URINE ANALYSIS ON THE PATIENT. THE PATIENT FINISHED THE ANTIBIOTICS FOR THE UTI, BUT IS STILL EXPERIENCING BURNING URINATION AND DIFFICULTY.     Do you require a callback: YES

## 2023-02-13 ENCOUNTER — OUTSIDE FACILITY SERVICE (OUTPATIENT)
Dept: INTERNAL MEDICINE | Facility: CLINIC | Age: 73
End: 2023-02-13
Payer: MEDICARE

## 2023-02-13 PROCEDURE — G0179 MD RECERTIFICATION HHA PT: HCPCS | Performed by: PHYSICIAN ASSISTANT

## 2023-02-16 ENCOUNTER — LAB (OUTPATIENT)
Dept: LAB | Facility: HOSPITAL | Age: 73
End: 2023-02-16
Payer: MEDICARE

## 2023-02-16 ENCOUNTER — TRANSCRIBE ORDERS (OUTPATIENT)
Dept: LAB | Facility: HOSPITAL | Age: 73
End: 2023-02-16
Payer: MEDICARE

## 2023-02-16 DIAGNOSIS — Z16.24 GLYCOPEPTIDE INTERMEDIATE STAPH AUREUS INFECTN, MULTI-DRUG RESISTANCE: ICD-10-CM

## 2023-02-16 DIAGNOSIS — A49.01 GLYCOPEPTIDE INTERMEDIATE STAPH AUREUS INFECTN, MULTI-DRUG RESISTANCE: ICD-10-CM

## 2023-02-16 DIAGNOSIS — B96.4 INTESTINAL INFECTION DUE TO PROTEUS MIRABILIS: ICD-10-CM

## 2023-02-16 DIAGNOSIS — T84.610D INFLAMMATORY REACTION DUE TO INTERNAL FIXATION DEVICE OF RIGHT HUMERUS, SUBSEQUENT ENCOUNTER: ICD-10-CM

## 2023-02-16 DIAGNOSIS — N39.0 URINARY TRACT INFECTION WITHOUT HEMATURIA, SITE UNSPECIFIED: ICD-10-CM

## 2023-02-16 DIAGNOSIS — A04.8 INTESTINAL INFECTION DUE TO PROTEUS MIRABILIS: ICD-10-CM

## 2023-02-16 DIAGNOSIS — N39.0 URINARY TRACT INFECTION WITHOUT HEMATURIA, SITE UNSPECIFIED: Primary | ICD-10-CM

## 2023-02-16 LAB
BACTERIA UR QL AUTO: NORMAL /HPF
BILIRUB UR QL STRIP: NEGATIVE
CLARITY UR: CLEAR
COLOR UR: YELLOW
GLUCOSE UR STRIP-MCNC: NEGATIVE MG/DL
HGB UR QL STRIP.AUTO: ABNORMAL
HYALINE CASTS UR QL AUTO: NORMAL /LPF
KETONES UR QL STRIP: NEGATIVE
LEUKOCYTE ESTERASE UR QL STRIP.AUTO: NEGATIVE
NITRITE UR QL STRIP: NEGATIVE
PH UR STRIP.AUTO: 6 [PH] (ref 5–8)
PROT UR QL STRIP: NEGATIVE
RBC # UR STRIP: NORMAL /HPF
REF LAB TEST METHOD: NORMAL
SP GR UR STRIP: 1.02 (ref 1–1.03)
SQUAMOUS #/AREA URNS HPF: NORMAL /HPF
UROBILINOGEN UR QL STRIP: ABNORMAL
WBC # UR STRIP: NORMAL /HPF

## 2023-02-16 PROCEDURE — 81001 URINALYSIS AUTO W/SCOPE: CPT

## 2023-02-17 ENCOUNTER — TELEPHONE (OUTPATIENT)
Dept: INTERNAL MEDICINE | Facility: CLINIC | Age: 73
End: 2023-02-17

## 2023-02-17 NOTE — TELEPHONE ENCOUNTER
Provider: LEW FLORES    Caller: CLAUDIA    Phone Number: [phone]    Reason for Call: CHECKING ON THE STATUS OF A FAX SENT ON 2/15/23 FOR A POWERED CHAIR. FAX WAS ADDRESSED ATTN: [place] ALVARES

## 2023-02-17 NOTE — TELEPHONE ENCOUNTER
Spoke with Johanne and informed her that pt PCP will be out until Thursday of next week and will have PCP look at when she returns . Johanne verbalized understanding.

## 2023-02-27 ENCOUNTER — TELEPHONE (OUTPATIENT)
Dept: INTERNAL MEDICINE | Facility: CLINIC | Age: 73
End: 2023-02-27
Payer: MEDICARE

## 2023-03-02 NOTE — TELEPHONE ENCOUNTER
Gardenia with [facility] called to follow up on the order, I let her know that I spoke with Alexa and we would have this sent to her by the end of the day    [phone]  [phone]

## 2023-03-24 ENCOUNTER — TELEPHONE (OUTPATIENT)
Dept: INTERNAL MEDICINE | Facility: CLINIC | Age: 73
End: 2023-03-24
Payer: MEDICARE

## 2023-03-24 NOTE — TELEPHONE ENCOUNTER
ARMANI WITH [facility] REQUESTING A VERBAL ORDER TO MOVE THIS WEEKS VISIT TO NEXT WEEK AT PATIENT'S REQUEST.

## 2023-03-31 ENCOUNTER — OFFICE VISIT (OUTPATIENT)
Dept: INTERNAL MEDICINE | Facility: CLINIC | Age: 73
End: 2023-03-31
Payer: MEDICARE

## 2023-03-31 VITALS
HEART RATE: 76 BPM | BODY MASS INDEX: 33.08 KG/M2 | SYSTOLIC BLOOD PRESSURE: 142 MMHG | DIASTOLIC BLOOD PRESSURE: 76 MMHG | HEIGHT: 61 IN

## 2023-03-31 DIAGNOSIS — M16.0 PRIMARY OSTEOARTHRITIS OF BOTH HIPS: ICD-10-CM

## 2023-03-31 DIAGNOSIS — N30.00 ACUTE CYSTITIS WITHOUT HEMATURIA: ICD-10-CM

## 2023-03-31 DIAGNOSIS — S42.421S: ICD-10-CM

## 2023-03-31 DIAGNOSIS — N90.89 LABIAL LESION: Primary | ICD-10-CM

## 2023-03-31 LAB
BILIRUB BLD-MCNC: NEGATIVE MG/DL
CLARITY, POC: CLEAR
COLOR UR: YELLOW
EXPIRATION DATE: ABNORMAL
GLUCOSE UR STRIP-MCNC: NEGATIVE MG/DL
KETONES UR QL: NEGATIVE
LEUKOCYTE EST, POC: NEGATIVE
Lab: ABNORMAL
NITRITE UR-MCNC: NEGATIVE MG/ML
PH UR: 5.5 [PH] (ref 5–8)
PROT UR STRIP-MCNC: NEGATIVE MG/DL
RBC # UR STRIP: ABNORMAL /UL
SP GR UR: 1.03 (ref 1–1.03)
UROBILINOGEN UR QL: ABNORMAL

## 2023-03-31 PROCEDURE — 87086 URINE CULTURE/COLONY COUNT: CPT | Performed by: PHYSICIAN ASSISTANT

## 2023-03-31 RX ORDER — LIDOCAINE 50 MG/G
1 PATCH TOPICAL EVERY 12 HOURS
COMMUNITY
Start: 2023-02-15

## 2023-03-31 RX ORDER — OXYCODONE AND ACETAMINOPHEN 10; 325 MG/1; MG/1
1 TABLET ORAL 3 TIMES DAILY
COMMUNITY
Start: 2023-03-17

## 2023-04-01 LAB — BACTERIA SPEC AEROBE CULT: NO GROWTH

## 2023-04-02 PROBLEM — N90.89 LABIAL LESION: Status: ACTIVE | Noted: 2023-04-02

## 2023-04-02 PROBLEM — N30.00 ACUTE CYSTITIS WITHOUT HEMATURIA: Status: ACTIVE | Noted: 2023-04-02

## 2023-04-05 DIAGNOSIS — F51.01 PRIMARY INSOMNIA: ICD-10-CM

## 2023-04-05 RX ORDER — NORTRIPTYLINE HYDROCHLORIDE 50 MG/1
50 CAPSULE ORAL 3 TIMES DAILY
Qty: 270 CAPSULE | Refills: 0 | Status: SHIPPED | OUTPATIENT
Start: 2023-04-05

## 2023-04-05 RX ORDER — NORTRIPTYLINE HYDROCHLORIDE 50 MG/1
50 CAPSULE ORAL 3 TIMES DAILY
Qty: 270 CAPSULE | Refills: 0 | Status: SHIPPED | OUTPATIENT
Start: 2023-04-05 | End: 2023-04-05 | Stop reason: SDUPTHER

## 2023-04-11 ENCOUNTER — OFFICE VISIT (OUTPATIENT)
Dept: ENDOCRINOLOGY | Facility: CLINIC | Age: 73
End: 2023-04-11
Payer: MEDICARE

## 2023-04-11 VITALS
WEIGHT: 175 LBS | DIASTOLIC BLOOD PRESSURE: 82 MMHG | HEIGHT: 60 IN | BODY MASS INDEX: 34.36 KG/M2 | SYSTOLIC BLOOD PRESSURE: 130 MMHG | OXYGEN SATURATION: 99 % | HEART RATE: 64 BPM

## 2023-04-11 DIAGNOSIS — E27.40 ADRENAL INSUFFICIENCY: Primary | ICD-10-CM

## 2023-04-11 DIAGNOSIS — Z86.018 HISTORY OF PHEOCHROMOCYTOMA: ICD-10-CM

## 2023-04-11 DIAGNOSIS — E03.9 HYPOTHYROIDISM, UNSPECIFIED TYPE: ICD-10-CM

## 2023-04-11 PROCEDURE — 3075F SYST BP GE 130 - 139MM HG: CPT | Performed by: INTERNAL MEDICINE

## 2023-04-11 PROCEDURE — 99213 OFFICE O/P EST LOW 20 MIN: CPT | Performed by: INTERNAL MEDICINE

## 2023-04-11 PROCEDURE — 3079F DIAST BP 80-89 MM HG: CPT | Performed by: INTERNAL MEDICINE

## 2023-04-11 RX ORDER — HYDROCORTISONE 10 MG/1
15 TABLET ORAL 2 TIMES DAILY
Qty: 270 TABLET | Refills: 1 | Status: SHIPPED | OUTPATIENT
Start: 2023-04-11

## 2023-04-11 NOTE — PROGRESS NOTES
"Chief Complaint   Patient presents with   • Adrenal insufficiency        HPI   Altagracia Rothman is a 73 y.o. female had concerns including Adrenal insufficiency.      Patient reports that her arm is not healing appropriately and states she is scheduled to follow-up with orthopedics later this month.  She does report that she has been trying to reduce hydrocortisone and some days will take 10 mg twice daily instead of 15 mg twice daily but states she can generally only tolerate this for a few days at a time.  She has not recently had to utilize stress dosing.    Patient continues on Levothyroxine 88 mcg daily for hypothyroidism.  She does report that she feels she has gained weight.  She does state this may be due to reduced activity.    The following portions of the patient's history were reviewed and updated as appropriate: allergies, current medications and past social history.    Review of Systems   Constitutional: Positive for unexpected weight gain.   Cardiovascular: Negative for palpitations.   Gastrointestinal: Negative for constipation and diarrhea.      /82 (BP Location: Left arm, Patient Position: Sitting, Cuff Size: Adult)   Pulse 64   Ht 152.4 cm (60\")   Wt 79.4 kg (175 lb)   SpO2 99%   BMI 34.18 kg/m²      Physical Exam      Constitutional:  well developed; well nourished  no acute distress  obese - Body mass index is 34.18 kg/m².   ENT/Thyroid: not examined   Eyes: Conjunctiva: clear   Respiratory:  breathing is unlabored  clear to auscultation bilaterally   Cardiovascular:  regular rate and rhythm   Chest:  Not performed.   Abdomen: Not performed.   : Not performed.   Musculoskeletal: Not performed   Skin: dry and warm   Neuro: mental status, speech normal   Psych: mood and affect are within normal limits       Labs/Imaging   Latest Reference Range & Units 10/13/22 14:04 11/22/22 09:25   Glucose 65 - 99 mg/dL 92 107 (H)   Sodium 136 - 145 mmol/L 142 139   Potassium 3.5 - 5.2 mmol/L 4.2 " 4.3   CO2 22.0 - 29.0 mmol/L 27.9 29.0   Chloride 98 - 107 mmol/L 103 101   Anion Gap 5.0 - 15.0 mmol/L 11.1 9.0   Creatinine 0.57 - 1.00 mg/dL 0.81 0.72   BUN 8 - 23 mg/dL 19 17   BUN/Creatinine Ratio 7.0 - 25.0  23.5 23.6   Calcium 8.6 - 10.5 mg/dL 9.0 9.7   eGFR >60.0 mL/min/1.73 76.8 88.4   Alkaline Phosphatase 39 - 117 U/L 129 (H)    Total Protein 6.0 - 8.5 g/dL 6.5    ALT (SGPT) 1 - 33 U/L 13    AST (SGOT) 1 - 32 U/L 19    Total Bilirubin 0.0 - 1.2 mg/dL <0.2    Albumin 3.50 - 5.20 g/dL 3.80    Globulin gm/dL 2.7    A/G Ratio g/dL 1.4    TSH Baseline 0.270 - 4.200 uIU/mL 1.080    Free T4 0.93 - 1.70 ng/dL 1.28    T3, Free 2.00 - 4.40 pg/mL 2.93    (H): Data is abnormally high    Diagnoses and all orders for this visit:    1. Adrenal insufficiency (Primary)  Patient generally takes hydrocortisone 15 mg twice daily.  She does report that she has made intermittent attempts to reduce this to 10 mg twice daily but generally only tolerates this for a few days.  We did discuss benefits of slowly weaning hydrocortisone given physiologic dosing. Discussed taking 15 mg BID and 10 mg BID on alternating days, if tolerated. Reviewed signs and symptoms of adrenal insufficiency and adrenal crisis and when to seek care.  Sick day rules and stress dosing reviewed.  -     hydrocortisone (CORTEF) 10 MG tablet; Take 1.5 tablets by mouth 2 (Two) Times a Day.  Dispense: 270 tablet; Refill: 1    2. Hypothyroidism, unspecified type  Thyroid function testing normal in October 2022  Patient reports weight gain, she is aware this may be multifactorial.  We did discuss option of repeating thyroid function testing.  Patient elected to defer.  Patient to continue levothyroxine 88 mcg daily  Symptoms of thyroid hormone dysregulation reviewed, patient will contact the clinic in the interim between visits with any concerning changes    3. History of pheochromocytoma  Patient underwent adrenalectomy in 2003.  See prior note for  details.  Patient completed 24-hour urine collection for catecholamine and metanephrines during hospitalization summer 2022 which were normal.  Reviewed recommendation for annual screening.     Return in about 6 months (around 10/11/2023). The patient was instructed to contact the clinic with any interval questions or concerns.    Heather Nelson MD   Endocrinologist    Dictated Utilizing Dragon Dictation

## 2023-04-13 ENCOUNTER — TELEPHONE (OUTPATIENT)
Dept: INTERNAL MEDICINE | Facility: CLINIC | Age: 73
End: 2023-04-13
Payer: MEDICARE

## 2023-04-13 NOTE — TELEPHONE ENCOUNTER
Kalpana with [facility] called requesting VO to reschedule the patient's OT eval to next week per the patient's request    [phone]

## 2023-04-14 ENCOUNTER — OUTSIDE FACILITY SERVICE (OUTPATIENT)
Dept: INTERNAL MEDICINE | Facility: CLINIC | Age: 73
End: 2023-04-14
Payer: MEDICARE

## 2023-04-17 ENCOUNTER — TELEPHONE (OUTPATIENT)
Dept: INTERNAL MEDICINE | Facility: CLINIC | Age: 73
End: 2023-04-17

## 2023-04-17 NOTE — TELEPHONE ENCOUNTER
"Caller: Altagracia Rothman \"Karen\"    Relationship: Self    Best call back number: [phone]    What is the medical concern/diagnosis: PLACE THAT LOOKS LIKE CANCER GENITAL AREA.    What specialty or service is being requested: ANYWHERE    Any additional details: PATIENT WANTING TO KNOW ABOUT REFERRAL THAT WAS PUT IN FOR A PLACE THAT LOOKS LIKE CANCER AT HER LAST VISIT.   "

## 2023-04-17 NOTE — TELEPHONE ENCOUNTER
Informed patient that the referral team is still working on getting her set up with an appointment . Pt verbalized understanding

## 2023-04-27 ENCOUNTER — OUTSIDE FACILITY SERVICE (OUTPATIENT)
Dept: INTERNAL MEDICINE | Facility: CLINIC | Age: 73
End: 2023-04-27
Payer: MEDICARE

## 2023-04-27 DIAGNOSIS — I10 ESSENTIAL HYPERTENSION: ICD-10-CM

## 2023-04-27 RX ORDER — SPIRONOLACTONE 50 MG/1
TABLET, FILM COATED ORAL
Qty: 30 TABLET | Refills: 1 | Status: SHIPPED | OUTPATIENT
Start: 2023-04-27

## 2023-05-01 DIAGNOSIS — I10 ESSENTIAL HYPERTENSION: ICD-10-CM

## 2023-05-01 RX ORDER — PINDOLOL 5 MG/1
2.5 TABLET ORAL 3 TIMES DAILY
Qty: 45 TABLET | Refills: 1 | Status: SHIPPED | OUTPATIENT
Start: 2023-05-01

## 2023-05-10 ENCOUNTER — TELEPHONE (OUTPATIENT)
Dept: INTERNAL MEDICINE | Facility: CLINIC | Age: 73
End: 2023-05-10

## 2023-05-11 NOTE — TELEPHONE ENCOUNTER
PATIENT WOULD LIKE TO KNOW IF ANY OTHER PLACES HAVE SOONER APPOINTMENTS, SHE CAN NOT GET IN UNTIL June 29.    HUB

## 2023-05-11 NOTE — TELEPHONE ENCOUNTER
She has not seen them yet. I provided the phone number to [facility] to schedule an appt

## 2023-05-12 DIAGNOSIS — I10 ESSENTIAL HYPERTENSION: ICD-10-CM

## 2023-05-12 RX ORDER — SPIRONOLACTONE 50 MG/1
50 TABLET, FILM COATED ORAL DAILY
Qty: 90 TABLET | Refills: 1 | Status: SHIPPED | OUTPATIENT
Start: 2023-05-12

## 2023-05-12 RX ORDER — FLUTICASONE PROPIONATE AND SALMETEROL 250; 50 UG/1; UG/1
POWDER RESPIRATORY (INHALATION)
Qty: 60 EACH | Refills: 1 | Status: SHIPPED | OUTPATIENT
Start: 2023-05-12

## 2023-05-12 NOTE — TELEPHONE ENCOUNTER
Rx Refill Note  Requested Prescriptions     Pending Prescriptions Disp Refills   • spironolactone (ALDACTONE) 50 MG tablet 90 tablet 1     Sig: Take 1 tablet by mouth Daily.      Last office visit with prescribing clinician: 3/31/2023   Last telemedicine visit with prescribing clinician: 5/12/2023   Next office visit with prescribing clinician: Visit date not found                             Laya Garcias RN  05/12/23, 11:10 EDT

## 2023-05-24 ENCOUNTER — TELEPHONE (OUTPATIENT)
Dept: INTERNAL MEDICINE | Facility: CLINIC | Age: 73
End: 2023-05-24
Payer: MEDICARE

## 2023-05-24 NOTE — TELEPHONE ENCOUNTER
AMISHA FROM [facility] CALLED AND STATES THEY HAVE DISCHARGED THE PT BUT PT NEEDS AN ORDER SENT TO [facility] PHYSICAL THERAPY.  PT IS STILL PAIN.    IF ANY QUESTIONS PLEASE CALL AMISHA BACK [phone]

## 2023-05-25 DIAGNOSIS — M54.41 ACUTE LOW BACK PAIN WITH RIGHT-SIDED SCIATICA, UNSPECIFIED BACK PAIN LATERALITY: Primary | ICD-10-CM

## 2023-05-25 NOTE — TELEPHONE ENCOUNTER
Kalpana with [facility] returned call    She states that it is in her buttocks, right where her sits bone/ischial tuberosity is    She believes it may be a little bit of bursitis or sciatic pain from her back

## 2023-05-25 NOTE — TELEPHONE ENCOUNTER
Spoke with pt. She is aware of the PT referral. Let home health know via voicemail this as well

## 2023-06-01 ENCOUNTER — TELEPHONE (OUTPATIENT)
Dept: INTERNAL MEDICINE | Facility: CLINIC | Age: 73
End: 2023-06-01

## 2023-06-01 NOTE — TELEPHONE ENCOUNTER
Caller: [facility]    Best call back number: [phone]    What form or medical record are you requesting: MOST RECENT OFFICE NOTES    Who is requesting this form or medical record from you: [facility]     How would you like to receive the form or medical records (pick-up, mail, fax): FAX  If fax, what is the fax number: [phone]    Timeframe paperwork needed: ASAP    Additional notes: SHE IS HAVING SURGERY ON 08.02.23 FOR A TOTAL HIP REPLACEMENT AND THEY ARE NEEDING TO MAKE SURE THE MEDICAL HISTORY MATCHES.

## 2023-06-01 NOTE — TELEPHONE ENCOUNTER
FAXED 03/2023 OFFICE NOTE TO DR. ARIANE HURLEY ([facility]), PHONE [phone]

## 2023-07-31 DIAGNOSIS — F51.01 PRIMARY INSOMNIA: ICD-10-CM

## 2023-07-31 RX ORDER — NORTRIPTYLINE HYDROCHLORIDE 50 MG/1
CAPSULE ORAL
Qty: 270 CAPSULE | Refills: 0 | Status: SHIPPED | OUTPATIENT
Start: 2023-07-31

## 2023-08-07 DIAGNOSIS — E03.9 HYPOTHYROIDISM, UNSPECIFIED TYPE: ICD-10-CM

## 2023-08-07 RX ORDER — LEVOTHYROXINE SODIUM 88 UG/1
TABLET ORAL
Qty: 90 TABLET | Refills: 0 | Status: SHIPPED | OUTPATIENT
Start: 2023-08-07

## 2023-08-07 RX ORDER — TETRACYCLINE HYDROCHLORIDE 500 MG/1
CAPSULE ORAL
Qty: 180 CAPSULE | Refills: 1 | Status: SHIPPED | OUTPATIENT
Start: 2023-08-07

## 2023-08-07 NOTE — TELEPHONE ENCOUNTER
Rx Refill Note  Requested Prescriptions     Pending Prescriptions Disp Refills    tetracycline (ACHROMYCIN,SUMYCIN) 500 MG capsule [Pharmacy Med Name: TETRACYCLINE 500 MG CAPSULE] 180 capsule 1     Sig: TAKE 1 CAPSULE BY MOUTH TWICE A DAY      Last office visit with prescribing clinician: 3/31/2023   Last telemedicine visit with prescribing clinician: Visit date not found   Next office visit with prescribing clinician: Visit date not found                         Would you like a call back once the refill request has been completed: [] Yes [] No    If the office needs to give you a call back, can they leave a voicemail: [] Yes [] No    Laya Garcias RN  08/07/23, 13:39 EDT

## 2023-08-08 DIAGNOSIS — F51.01 PRIMARY INSOMNIA: ICD-10-CM

## 2023-08-09 RX ORDER — NORTRIPTYLINE HYDROCHLORIDE 50 MG/1
CAPSULE ORAL
Qty: 270 CAPSULE | Refills: 0 | Status: SHIPPED | OUTPATIENT
Start: 2023-08-09

## 2023-08-23 DIAGNOSIS — E03.9 HYPOTHYROIDISM, UNSPECIFIED TYPE: ICD-10-CM

## 2023-08-23 RX ORDER — LEVOTHYROXINE SODIUM 88 UG/1
TABLET ORAL
Qty: 90 TABLET | Refills: 0 | OUTPATIENT
Start: 2023-08-23

## 2023-09-01 DIAGNOSIS — I10 ESSENTIAL HYPERTENSION: ICD-10-CM

## 2023-09-01 RX ORDER — PINDOLOL 5 MG/1
2.5 TABLET ORAL 3 TIMES DAILY
Qty: 45 TABLET | Refills: 1 | Status: SHIPPED | OUTPATIENT
Start: 2023-09-01

## 2023-10-09 DIAGNOSIS — I10 ESSENTIAL HYPERTENSION: ICD-10-CM

## 2023-10-09 RX ORDER — PINDOLOL 5 MG/1
2.5 TABLET ORAL 3 TIMES DAILY
Qty: 135 TABLET | Refills: 1 | Status: SHIPPED | OUTPATIENT
Start: 2023-10-09

## 2023-10-09 NOTE — TELEPHONE ENCOUNTER
Rx Refill Note  Requested Prescriptions     Pending Prescriptions Disp Refills    pindolol (VISKEN) 5 MG tablet 135 tablet 1     Sig: Take 0.5 tablets by mouth 3 (Three) Times a Day.      Last office visit with prescribing clinician: 3/31/2023   Last telemedicine visit with prescribing clinician: Visit date not found   Next office visit with prescribing clinician: Visit date not found                         Would you like a call back once the refill request has been completed: [] Yes [] No    If the office needs to give you a call back, can they leave a voicemail: [] Yes [] No    Shari Kimble MA  10/09/23, 15:59 EDT

## 2023-10-11 ENCOUNTER — OFFICE VISIT (OUTPATIENT)
Dept: ENDOCRINOLOGY | Facility: CLINIC | Age: 73
End: 2023-10-11
Payer: MEDICARE

## 2023-10-11 VITALS
OXYGEN SATURATION: 95 % | BODY MASS INDEX: 33.38 KG/M2 | HEIGHT: 60 IN | DIASTOLIC BLOOD PRESSURE: 74 MMHG | SYSTOLIC BLOOD PRESSURE: 128 MMHG | WEIGHT: 170 LBS | HEART RATE: 70 BPM

## 2023-10-11 DIAGNOSIS — E03.9 HYPOTHYROIDISM, UNSPECIFIED TYPE: Primary | ICD-10-CM

## 2023-10-11 DIAGNOSIS — E27.40 ADRENAL INSUFFICIENCY: ICD-10-CM

## 2023-10-11 DIAGNOSIS — Z86.018 HISTORY OF PHEOCHROMOCYTOMA: ICD-10-CM

## 2023-10-11 DIAGNOSIS — Z79.52 LONG TERM (CURRENT) USE OF SYSTEMIC STEROIDS: ICD-10-CM

## 2023-10-11 DIAGNOSIS — R73.09 ABNORMAL GLUCOSE: ICD-10-CM

## 2023-10-11 LAB
ANION GAP SERPL CALCULATED.3IONS-SCNC: 11.9 MMOL/L (ref 5–15)
BUN SERPL-MCNC: 28 MG/DL (ref 8–23)
BUN/CREAT SERPL: 28.3 (ref 7–25)
CALCIUM SPEC-SCNC: 9.2 MG/DL (ref 8.6–10.5)
CHLORIDE SERPL-SCNC: 102 MMOL/L (ref 98–107)
CO2 SERPL-SCNC: 27.1 MMOL/L (ref 22–29)
CREAT SERPL-MCNC: 0.99 MG/DL (ref 0.57–1)
EGFRCR SERPLBLD CKD-EPI 2021: 60 ML/MIN/1.73
GLUCOSE SERPL-MCNC: 114 MG/DL (ref 65–99)
HBA1C MFR BLD: 5.8 % (ref 4.8–5.6)
POTASSIUM SERPL-SCNC: 4.2 MMOL/L (ref 3.5–5.2)
SODIUM SERPL-SCNC: 141 MMOL/L (ref 136–145)
T4 FREE SERPL-MCNC: 1.28 NG/DL (ref 0.93–1.7)
TSH SERPL DL<=0.05 MIU/L-ACNC: 0.82 UIU/ML (ref 0.27–4.2)

## 2023-10-11 PROCEDURE — 99214 OFFICE O/P EST MOD 30 MIN: CPT | Performed by: INTERNAL MEDICINE

## 2023-10-11 PROCEDURE — 83036 HEMOGLOBIN GLYCOSYLATED A1C: CPT | Performed by: INTERNAL MEDICINE

## 2023-10-11 PROCEDURE — 3078F DIAST BP <80 MM HG: CPT | Performed by: INTERNAL MEDICINE

## 2023-10-11 PROCEDURE — 80048 BASIC METABOLIC PNL TOTAL CA: CPT | Performed by: INTERNAL MEDICINE

## 2023-10-11 PROCEDURE — 36415 COLL VENOUS BLD VENIPUNCTURE: CPT | Performed by: INTERNAL MEDICINE

## 2023-10-11 PROCEDURE — 83835 ASSAY OF METANEPHRINES: CPT | Performed by: INTERNAL MEDICINE

## 2023-10-11 PROCEDURE — 84443 ASSAY THYROID STIM HORMONE: CPT | Performed by: INTERNAL MEDICINE

## 2023-10-11 PROCEDURE — 84439 ASSAY OF FREE THYROXINE: CPT | Performed by: INTERNAL MEDICINE

## 2023-10-11 PROCEDURE — 3074F SYST BP LT 130 MM HG: CPT | Performed by: INTERNAL MEDICINE

## 2023-10-11 RX ORDER — ABALOPARATIDE 2000 UG/ML
80 INJECTION, SOLUTION SUBCUTANEOUS DAILY
COMMUNITY
Start: 2023-09-18

## 2023-10-11 NOTE — PROGRESS NOTES
"Chief Complaint   Patient presents with    Adrenal insufficiency    Hypothyroidism        HPI   Altagracia Rothman is a 73 y.o. female had concerns including Adrenal insufficiency and Hypothyroidism.      Patient reports that she was considering hip surgery but ultimatately decided not to move forward with this this. She did have steroid injections in both the hips and knees.    Patient is currently hydrocortisone taking 25 mg daily, in the morning.  She reports she started taking entire dose in the morning as she believes she feels better when doing this.  She has attempted reduction to 20 mg daily but reports this is not tolerable.    Patient continues on levothyroxine 88 mcg daily for hypothyroidism.  She denies routine missed doses.    The following portions of the patient's history were reviewed and updated as appropriate: allergies, current medications, and past social history.    Review of Systems   Musculoskeletal:  Positive for arthralgias and gait problem.      /74 (BP Location: Left arm, Patient Position: Sitting, Cuff Size: Adult)   Pulse 70   Ht 152.4 cm (60\")   Wt 77.1 kg (170 lb)   SpO2 95%   BMI 33.20 kg/mý      Physical Exam      Constitutional:  well developed; well nourished  no acute distress  obese - Body mass index is 33.2 kg/mý.   ENT/Thyroid: not examined   Eyes: Conjunctiva: clear   Respiratory:  breathing is unlabored  clear to auscultation bilaterally   Cardiovascular:  regular rate and rhythm   Chest:  Not performed.   Abdomen: Not performed.   : Not performed.   Musculoskeletal: Not performed   Skin: not performed.   Neuro: mental status, speech normal   Psych: mood and affect are within normal limits       Labs/Imaging   Latest Reference Range & Units 10/13/22 14:04   TSH Baseline 0.270 - 4.200 uIU/mL 1.080   Free T4 0.93 - 1.70 ng/dL 1.28   T3, Free 2.00 - 4.40 pg/mL 2.93      Latest Reference Range & Units 07/01/22 18:10   Epinephrine, Urine Undefined ug/L 2 "   Metanephrines, Urine Undefined ug/L 27   Normetanephrine, Urine Undefined ug/L 201   Dopamine , 24H Ur 0 - 510 ug/24 hr 120     Diagnoses and all orders for this visit:    1. Hypothyroidism, unspecified type (Primary)  -     TSH; Future  -     T4, Free; Future  Currently taking levothyroxine 88 mcg daily  Update labs today and adjust medication as clinically indicated    2. Adrenal insufficiency  -     Basic Metabolic Panel; Future  Check BMP  Patient remains on supraphysiologic doses of steroids.  Patient is currently taking hydrocortisone 25 mg once daily.  Discussed that this is not physiologic and recommended resumption of twice daily dosing.  Patient will take 20 mg in the morning and 5 mg in the afternoon.  Discussed recommendation for further reduction as tolerated given long-term risks of supraphysiologic steroids.    Reviewed signs and symptoms of adrenal insufficiency and adrenal crisis and when to seek care.  Sick day rules and stress dosing reviewed.     3. History of pheochromocytoma  -     Metanephrines, Frac. Free, Plasma; Future  Discussed recommendation for annual monitoring.  Patient most recently completed urine collection for catecholamines and metanephrines during hospitalization in July 2022.  This was normal.  Patient states that she cannot logistically complete a 24-hour urine collection at this time.  We did discuss potential for nortriptyline to impact serum testing.  Despite this, patient opted to move forward with serum screening.    4. Long term (current) use of systemic steroids  -     Hemoglobin A1c; Future  5. Abnormal glucose  -     Hemoglobin A1c; Future  Discussed risks associated with long-term supraphysiologic use of steroids.  Patient voiced understanding.  She has had prior abnormal glucose on CMP.  She has not had recent hemoglobin A1c to screen for diabetes, will obtain today.      Return in about 6 months (around 4/11/2024) for Next scheduled follow up. The patient was  instructed to contact the clinic with any interval questions or concerns.    Heather Nelson MD   Endocrinologist    Dictated Utilizing Dragon Dictation

## 2023-10-12 ENCOUNTER — TELEPHONE (OUTPATIENT)
Dept: ENDOCRINOLOGY | Facility: CLINIC | Age: 73
End: 2023-10-12
Payer: MEDICARE

## 2023-10-12 NOTE — TELEPHONE ENCOUNTER
Called the pt and she is aware that one lab is still pending and may take several days to get the results.    Please advise on labs.

## 2023-10-12 NOTE — TELEPHONE ENCOUNTER
Please let patient know that hemoglobin A1c is slightly elevated at 5.8%.  This is within the prediabetic range.    Thyroid function testing is normal, she should continue current thyroid hormone    BMP shows borderline renal function with EGFR 60, she does have slight elevation of BUN and glucose.  She should be sure to maintain hydration.    Metanephrines are still pending.

## 2023-10-12 NOTE — TELEPHONE ENCOUNTER
CALL BACK [phone]    PATIENT IS REQUESTING A RETURN CALL REGARDING RECENT LAB RESULTS. SHE HAS RECEIVED A MESSAGE THAT SHE HAS RESULTS VIA [facility] BUT IS UNABLE TO LOG IN.

## 2023-10-15 LAB
METANEPH FREE SERPL-MCNC: <10 PG/ML (ref 0–88)
NORMETANEPHRINE SERPL-MCNC: 191.9 PG/ML (ref 0–285.2)

## 2023-10-17 DIAGNOSIS — E03.9 HYPOTHYROIDISM, UNSPECIFIED TYPE: ICD-10-CM

## 2023-10-17 RX ORDER — LEVOTHYROXINE SODIUM 88 UG/1
TABLET ORAL
Qty: 90 TABLET | Refills: 0 | Status: SHIPPED | OUTPATIENT
Start: 2023-10-17

## 2023-11-18 DIAGNOSIS — E03.9 HYPOTHYROIDISM, UNSPECIFIED TYPE: ICD-10-CM

## 2023-11-18 DIAGNOSIS — F51.01 PRIMARY INSOMNIA: ICD-10-CM

## 2023-11-20 RX ORDER — NORTRIPTYLINE HYDROCHLORIDE 50 MG/1
CAPSULE ORAL
Qty: 270 CAPSULE | Refills: 0 | Status: SHIPPED | OUTPATIENT
Start: 2023-11-20

## 2023-11-20 RX ORDER — LEVOTHYROXINE SODIUM 88 UG/1
TABLET ORAL
Qty: 90 TABLET | Refills: 1 | Status: SHIPPED | OUTPATIENT
Start: 2023-11-20

## 2023-11-20 NOTE — TELEPHONE ENCOUNTER
Rx Refill Note    Requested Prescriptions     Pending Prescriptions Disp Refills    levothyroxine (SYNTHROID, LEVOTHROID) 88 MCG tablet [Pharmacy Med Name: LEVOTHYROXINE 88 MCG TABLET] 90 tablet 0     Sig: TAKE 1 TABLET BY MOUTH EVERY DAY        Last office visit with prescribing clinician: 10/11/2023       Next office visit with prescribing clinician: 4/17/2024     {    Joycelyn Adame MA  11/20/23, 09:53 EST

## 2023-12-26 RX ORDER — FLUTICASONE PROPIONATE AND SALMETEROL 250; 50 UG/1; UG/1
1 POWDER RESPIRATORY (INHALATION) 2 TIMES DAILY
Qty: 60 EACH | Refills: 1 | Status: SHIPPED | OUTPATIENT
Start: 2023-12-26

## 2024-03-08 DIAGNOSIS — E27.40 ADRENAL INSUFFICIENCY: ICD-10-CM

## 2024-03-08 RX ORDER — HYDROCORTISONE 10 MG/1
25 TABLET ORAL DAILY
Qty: 225 TABLET | Refills: 0 | Status: SHIPPED | OUTPATIENT
Start: 2024-03-08

## 2024-03-08 NOTE — TELEPHONE ENCOUNTER
Rx Refill Note    Requested Prescriptions     Pending Prescriptions Disp Refills    hydrocortisone (CORTEF) 10 MG tablet [Pharmacy Med Name: HYDROCORTISONE 10 MG TABLET] 270 tablet 1     Sig: TAKE 1.5 TABLETS BY MOUTH 2 (TWO) TIMES A DAY.        Last office visit with prescribing clinician: 10/11/2023       Next office visit with prescribing clinician: 4/17/2024     {    Joycelyn Adame MA  03/08/24, 08:47 EST

## 2024-03-08 NOTE — TELEPHONE ENCOUNTER
Called the pt and she is taking 25 mg daily.    Last office visit with prescribing clinician: 10/11/2023       Next office visit with prescribing clinician: 4/17/2024     {

## 2024-03-25 DIAGNOSIS — F51.01 PRIMARY INSOMNIA: ICD-10-CM

## 2024-03-25 NOTE — TELEPHONE ENCOUNTER
Rx Refill Note  Requested Prescriptions     Pending Prescriptions Disp Refills    nortriptyline (PAMELOR) 50 MG capsule 270 capsule 0     Sig: Take 1 capsule by mouth 3 (Three) Times a Day.      Last office visit with prescribing clinician: 3/31/2023   Last telemedicine visit with prescribing clinician: Visit date not found   Next office visit with prescribing clinician: Visit date not found                         Would you like a call back once the refill request has been completed: [] Yes [] No    If the office needs to give you a call back, can they leave a voicemail: [] Yes [] No    Laya Garcias RN  03/25/24, 15:42 EDT

## 2024-03-26 RX ORDER — NORTRIPTYLINE HYDROCHLORIDE 50 MG/1
50 CAPSULE ORAL 3 TIMES DAILY
Qty: 90 CAPSULE | Refills: 1 | Status: SHIPPED | OUTPATIENT
Start: 2024-03-26

## 2024-04-05 RX ORDER — TETRACYCLINE HYDROCHLORIDE 500 MG/1
CAPSULE ORAL
Qty: 180 CAPSULE | Refills: 1 | Status: SHIPPED | OUTPATIENT
Start: 2024-04-05

## 2024-04-05 NOTE — TELEPHONE ENCOUNTER
Rx Refill Note  Requested Prescriptions     Pending Prescriptions Disp Refills    tetracycline (ACHROMYCIN,SUMYCIN) 500 MG capsule [Pharmacy Med Name: TETRACYCLINE 500 MG CAPSULE] 180 capsule 1     Sig: TAKE 1 CAPSULE BY MOUTH TWICE A DAY      Last office visit with prescribing clinician: 3/31/2023   Next office visit with prescribing clinician: Visit date not found                         Would you like a call back once the refill request has been completed: [] Yes [] No    If the office needs to give you a call back, can they leave a voicemail: [] Yes [] No    Lynn Carpio LPN  04/05/24, 16:13 EDT

## 2024-04-15 ENCOUNTER — OFFICE VISIT (OUTPATIENT)
Dept: INTERNAL MEDICINE | Facility: CLINIC | Age: 74
End: 2024-04-15
Payer: MEDICARE

## 2024-04-15 ENCOUNTER — LAB (OUTPATIENT)
Dept: LAB | Facility: HOSPITAL | Age: 74
End: 2024-04-15
Payer: MEDICARE

## 2024-04-15 VITALS
SYSTOLIC BLOOD PRESSURE: 128 MMHG | BODY MASS INDEX: 33.2 KG/M2 | DIASTOLIC BLOOD PRESSURE: 66 MMHG | TEMPERATURE: 95.9 F | HEART RATE: 52 BPM | HEIGHT: 60 IN

## 2024-04-15 DIAGNOSIS — R26.2 DIFFICULTY WALKING: ICD-10-CM

## 2024-04-15 DIAGNOSIS — I10 ESSENTIAL HYPERTENSION: ICD-10-CM

## 2024-04-15 DIAGNOSIS — Z13.220 LIPID SCREENING: ICD-10-CM

## 2024-04-15 DIAGNOSIS — M25.561 ARTHRALGIA OF BOTH KNEES: ICD-10-CM

## 2024-04-15 DIAGNOSIS — G89.29 CHRONIC BILATERAL LOW BACK PAIN WITHOUT SCIATICA: ICD-10-CM

## 2024-04-15 DIAGNOSIS — E27.40 ADRENAL INSUFFICIENCY: Primary | ICD-10-CM

## 2024-04-15 DIAGNOSIS — E03.9 HYPOTHYROIDISM, UNSPECIFIED TYPE: ICD-10-CM

## 2024-04-15 DIAGNOSIS — Z11.59 ENCOUNTER FOR HEPATITIS C SCREENING TEST FOR LOW RISK PATIENT: ICD-10-CM

## 2024-04-15 DIAGNOSIS — R73.09 ABNORMAL GLUCOSE: ICD-10-CM

## 2024-04-15 DIAGNOSIS — M54.50 CHRONIC BILATERAL LOW BACK PAIN WITHOUT SCIATICA: ICD-10-CM

## 2024-04-15 DIAGNOSIS — Z98.890 S/P HARDWARE REMOVAL: ICD-10-CM

## 2024-04-15 DIAGNOSIS — M25.562 ARTHRALGIA OF BOTH KNEES: ICD-10-CM

## 2024-04-15 PROCEDURE — 84439 ASSAY OF FREE THYROXINE: CPT

## 2024-04-15 PROCEDURE — 3078F DIAST BP <80 MM HG: CPT | Performed by: PHYSICIAN ASSISTANT

## 2024-04-15 PROCEDURE — 3074F SYST BP LT 130 MM HG: CPT | Performed by: PHYSICIAN ASSISTANT

## 2024-04-15 PROCEDURE — 36415 COLL VENOUS BLD VENIPUNCTURE: CPT

## 2024-04-15 PROCEDURE — 85025 COMPLETE CBC W/AUTO DIFF WBC: CPT

## 2024-04-15 PROCEDURE — 83036 HEMOGLOBIN GLYCOSYLATED A1C: CPT

## 2024-04-15 PROCEDURE — 99214 OFFICE O/P EST MOD 30 MIN: CPT | Performed by: PHYSICIAN ASSISTANT

## 2024-04-15 PROCEDURE — 80053 COMPREHEN METABOLIC PANEL: CPT

## 2024-04-15 PROCEDURE — 84443 ASSAY THYROID STIM HORMONE: CPT

## 2024-04-15 PROCEDURE — 1160F RVW MEDS BY RX/DR IN RCRD: CPT | Performed by: PHYSICIAN ASSISTANT

## 2024-04-15 PROCEDURE — 90677 PCV20 VACCINE IM: CPT | Performed by: PHYSICIAN ASSISTANT

## 2024-04-15 PROCEDURE — 80061 LIPID PANEL: CPT

## 2024-04-15 PROCEDURE — 1159F MED LIST DOCD IN RCRD: CPT | Performed by: PHYSICIAN ASSISTANT

## 2024-04-15 PROCEDURE — G0009 ADMIN PNEUMOCOCCAL VACCINE: HCPCS | Performed by: PHYSICIAN ASSISTANT

## 2024-04-15 PROCEDURE — 86803 HEPATITIS C AB TEST: CPT

## 2024-04-15 PROCEDURE — 84481 FREE ASSAY (FT-3): CPT

## 2024-04-15 NOTE — LETTER
[facility]  Vaccine Consent Form    Patient Name:  Altagracia Rothman  Patient :  1950     Vaccine(s) Ordered    Pneumococcal Conjugate Vaccine 20-Valent All        Screening Checklist  The following questions should be completed prior to vaccination. If you answer “yes” to any question, it does not necessarily mean you should not be vaccinated. It just means we may need to clarify or ask more questions. If a question is unclear, please ask your healthcare provider to explain it.    Yes No   Any fever or moderate to severe illness today (mild illness and/or antibiotic treatment are not contraindications)?     Do you have a history of a serious reaction to any previous vaccinations, such as anaphylaxis, encephalopathy within 7 days, Guillain-Winnetoon syndrome within 6 weeks, seizure?     Have you received any live vaccine(s) (e.g MMR, TAMAR) or any other vaccines in the last month (to ensure duplicate doses aren't given)?     Do you have an anaphylactic allergy to latex (DTaP, DTaP-IPV, Hep A, Hep B, MenB, RV, Td, Tdap), baker’s yeast (Hep B, HPV), polysorbates (RSV, nirsevimab, PCV 20, Rotavirrus, Tdap, Shingrix), or gelatin (TAMAR, MMR)?     Do you have an anaphylactic allergy to neomycin (Rabies, TAMAR, MMR, IPV, Hep A), polymyxin B (IPV), or streptomycin (IPV)?      Any cancer, leukemia, AIDS, or other immune system disorder? (TAMAR, MMR, RV)     Do you have a parent, brother, or sister with an immune system problem (if immune competence of vaccine recipient clinically verified, can proceed)? (MMR, TAMAR)     Any recent steroid treatments for >2 weeks, chemotherapy, or radiation treatment? (TAMAR, MMR)     Have you received antibody-containing blood transfusions or IVIG in the past 11 months (recommended interval is dependent on product)? (MMR, TAMAR)     Have you taken antiviral drugs (acyclovir, famciclovir, valacyclovir for TAMAR) in the last 24 or 48 hours, respectively?      Are you pregnant or planning to become  "pregnant within 1 month? (TAMAR, MMR, HPV, IPV, MenB, Abrexvy; For Hep B- refer to Engerix-B; For RSV - Abrysvo is indicated for 32-36 weeks of pregnancy from September to January)     For infants, have you ever been told your child has had intussusception or a medical emergency involving obstruction of the intestine (Rotavirus)? If not for an infant, can skip this question.         *Ordering Physicians/APC should be consulted if \"yes\" is checked by the patient or guardian above.  I have received, read, and understand the Vaccine Information Statement (VIS) for each vaccine ordered.  I have considered my or my child's health status as well as the health status of my close contacts.  I have taken the opportunity to discuss my vaccine questions with my or my child's health care provider.   I have requested that the ordered vaccine(s) be given to me or my child.  I understand the benefits and risks of the vaccines.  I understand that I should remain in the clinic for 15 minutes after receiving the vaccine(s).  _________________________________________________________  Signature of Patient or Parent/Legal Guardian ____________________  Date     "

## 2024-04-15 NOTE — PROGRESS NOTES
[facility], [place], operated by [facility]    Altagracia Rothman  1950   [id number]      Patient Care Team:  Alvina Lloyd PA-C as PCP - General (Internal Medicine)  Heather Nelosn MD as Consulting Physician (Endocrinology)  Lashawn Michele MD as Consulting Physician (Rheumatology)    Chief Complaint::   Chief Complaint   Patient presents with    Mass     Below collar bone right side, soft to touch no pain.noticed a week ago        HPI  Altagracia Jones is a 74 year old female. She has not been seen in the last year.  Past medical history significant for adrenal insufficiency, hypothyroidism, hypertension, chronic low back pain, osteoporosis, and status post hardware removal of right shoulder.    She lives alone, she orders groceries online. She rarely gets out of the house.   She sees Dr. Matute and has been to physical therapy.  She reports getting injured at physical therapy, requiring steroid shot, and she has not been back to the PT in 6 weeks.  She denies falls in her home.    She walks on crutches throughout the home. Her back pain is so significant, she cannot walk.  She reports multiple fractures.  She cannot stand up straight. Pain management Dr. Alex, every other month.  She feels this helps some or she would not be able to function at all.    She has recurring lesion on groin. She believes Monistat has helped.   She has noticed palpable area of concern below right clavicle.  There is no pain.    She denies chest pain, shortness of breath, palpitations, or headaches.    Patient Active Problem List   Diagnosis    Adrenal insufficiency    Hypothyroidism    Essential hypertension    H/O pheochromocytoma    Chronic bilateral low back pain without sciatica    Encounter for chronic pain management    Osteoporosis    Tobacco abuse    BMI 35.0-35.9,adult    Bronchitis    Moderate asthma without complication    Paresthesia    Medicare annual wellness visit, subsequent    Routine medical exam    Lipid screening    Hair  loss    Closed displaced comminuted supracondylar fracture of right humerus without intercondylar fracture    Acute UTI (urinary tract infection)    Primary insomnia    Escherichia coli infection    Painful orthopaedic hardware    Obesity    S/P hardware removal, right shoulder    Arthralgia of both knees    Primary osteoarthritis of both hips    Difficulty walking    Labial lesion    Acute cystitis without hematuria    Encounter for hepatitis C screening test for low risk patient    Abnormal glucose        Past Medical History:   Diagnosis Date    Adrenal insufficiency     Fibromyalgia     Hypertension     Hypothyroidism     Skin cancer        Past Surgical History:   Procedure Laterality Date    ADRENAL GLAND SURGERY      BREAST BIOPSY      x4    GALLBLADDER SURGERY      HARDWARE REMOVAL Right 10/4/2022    Procedure: SHOULDER HARDWARE REMOVAL- RIGHT;  Surgeon: Jamari Matute Jr., MD;  Location:  [facility];  Service: Orthopedics;  Laterality: Right;    HUMERUS IM NAILING/RODDING Right 7/14/2022    Procedure: INTRAMEDULLARY NAIL INSERTION PROXIMAL HUMERUS RIGHT;  Surgeon: Jamari Matute Jr., MD;  Location:  [facility];  Service: Orthopedics;  Laterality: Right;    HYSTERECTOMY      MRI ANGIOGRAM LOWER EXTREMITY UNILATERAL W CONTRAST      ORIF HUMERUS FRACTURE Right 7/14/2022    Procedure: HUMERUS DISTAL OPEN REDUCTION INTERNAL FIXATION RIGHT;  Surgeon: Jamari Matute Jr., MD;  Location:  [facility];  Service: Orthopedics;  Laterality: Right;    TONSILLECTOMY         Family History   Problem Relation Age of Onset    Thyroid disease Mother     Hypertension Mother     Heart disease Father        Social History     Socioeconomic History    Marital status:    Tobacco Use    Smoking status: Every Day     Current packs/day: 0.50     Average packs/day: 0.5 packs/day for 30.0 years (15.0 ttl pk-yrs)     Types: Cigarettes    Smokeless tobacco: Never   Vaping Use    Vaping status: Never Used   Substance and Sexual  "Activity    Alcohol use: Never    Drug use: Never    Sexual activity: Defer       Allergies   Allergen Reactions    Daypro [Oxaprozin] Anaphylaxis    Iodine Swelling    Keflex [Cephalexin] Hives     Tolerated ceftriaxone and cefazolin 7/2022    Levaquin [Levofloxacin] Hives    Lyrica [Pregabalin] Other (See Comments)     Sweating    Other Swelling     Tranormin    Penicillins Rash     Tolerated ceftriaxone & cefazolin 7/2022    Sulfa Antibiotics Hives and Rash       Review of Systems   Constitutional:  Positive for unexpected weight gain. Negative for activity change, appetite change, diaphoresis, fatigue and unexpected weight loss.   HENT:  Negative for hearing loss.    Eyes:  Negative for visual disturbance.   Respiratory:  Negative for chest tightness and shortness of breath.    Cardiovascular:  Negative for chest pain, palpitations and leg swelling.   Gastrointestinal:  Negative for abdominal pain, blood in stool, GERD and indigestion.   Endocrine: Negative for cold intolerance and heat intolerance.   Genitourinary:  Negative for dysuria and hematuria.   Musculoskeletal:  Positive for arthralgias. Negative for myalgias.   Skin:  Negative for skin lesions.   Neurological:  Negative for tremors, seizures, syncope, speech difficulty, weakness, headache, memory problem and confusion.   Hematological:  Does not bruise/bleed easily.   Psychiatric/Behavioral:  Negative for sleep disturbance and depressed mood. The patient is not nervous/anxious.         Vital Signs  Vitals:    04/15/24 0948   BP: 128/66   BP Location: Left arm   Patient Position: Sitting   Cuff Size: Adult   Pulse: 52   Temp: 95.9 °F (35.5 °C)   TempSrc: Infrared   Weight: Comment: unable to weigh   Height: 152.4 cm (60\")   PainSc: 0-No pain     Body mass index is 33.2 kg/m².        Advance Care Planning   ACP discussion was held with the patient during this visit. Patient does not have an advance directive, information provided.       Current " Outpatient Medications:     albuterol sulfate  (90 Base) MCG/ACT inhaler, Inhale 1 puff Every 4 (Four) Hours As Needed for Wheezing., Disp: 8.5 g, Rfl: 5    gabapentin (NEURONTIN) 800 MG tablet, Take 1 tablet by mouth Every 6 (Six) Hours., Disp: , Rfl:     hydrocortisone (CORTEF) 10 MG tablet, Take 2.5 tablets by mouth Daily. 2.5 tabs po daily, Disp: 225 tablet, Rfl: 0    levothyroxine (SYNTHROID, LEVOTHROID) 88 MCG tablet, TAKE 1 TABLET BY MOUTH EVERY DAY, Disp: 90 tablet, Rfl: 1    lidocaine (LIDODERM) 5 %, Place 1 patch on the skin as directed by provider Every 12 (Twelve) Hours. (12 hours on 12 hours off), Disp: , Rfl:     Morphine (MS CONTIN) 30 MG 12 hr tablet, Take 1 tablet by mouth 2 (Two) Times a Day., Disp: , Rfl:     nortriptyline (PAMELOR) 50 MG capsule, Take 1 capsule by mouth 3 (Three) Times a Day., Disp: 90 capsule, Rfl: 1    ondansetron ODT (Zofran ODT) 8 MG disintegrating tablet, Place 1 tablet on the tongue Every 8 (Eight) Hours As Needed for Nausea or Vomiting., Disp: 15 tablet, Rfl: 0    oxyCODONE-acetaminophen (PERCOCET)  MG per tablet, Take 1 tablet by mouth 3 (Three) Times a Day., Disp: , Rfl:     pindolol (VISKEN) 5 MG tablet, Take 0.5 tablets by mouth 3 (Three) Times a Day., Disp: 135 tablet, Rfl: 1    spironolactone (ALDACTONE) 50 MG tablet, Take 1 tablet by mouth Daily., Disp: 90 tablet, Rfl: 1    tetracycline (ACHROMYCIN,SUMYCIN) 500 MG capsule, TAKE 1 CAPSULE BY MOUTH TWICE A DAY, Disp: 180 capsule, Rfl: 1    Wixela Inhub 250-50 MCG/ACT DISKUS, INHALE 1 PUFF BY MOUTH TWICE A DAY, Disp: 60 each, Rfl: 1    Tymlos 3120 MCG/1.56ML solution pen-injector, Inject 0.04 mL under the skin into the appropriate area as directed Daily. (Patient not taking: Reported on 4/15/2024), Disp: , Rfl:     Physical Exam  Vitals and nursing note reviewed.   Constitutional:       Appearance: She is obese.   HENT:      Head: Normocephalic and atraumatic.      Right Ear: Tympanic membrane, ear canal  and external ear normal.      Left Ear: Tympanic membrane, ear canal and external ear normal.      Nose: Nose normal.      Mouth/Throat:      Mouth: Mucous membranes are moist.      Pharynx: Oropharynx is clear.   Eyes:      Extraocular Movements: Extraocular movements intact.      Conjunctiva/sclera: Conjunctivae normal.      Pupils: Pupils are equal, round, and reactive to light.   Cardiovascular:      Rate and Rhythm: Normal rate and regular rhythm.      Pulses: Normal pulses.      Heart sounds: Normal heart sounds.   Pulmonary:      Effort: Pulmonary effort is normal.      Breath sounds: Normal breath sounds.   Abdominal:      General: Abdomen is flat. Bowel sounds are normal.   Musculoskeletal:         General: Tenderness present.      Right shoulder: Decreased range of motion.      Left shoulder: Normal range of motion.      Lumbar back: Tenderness present.        Back:       Right lower leg: No edema.      Left lower leg: No edema.   Skin:     General: Skin is warm and dry.   Neurological:      General: No focal deficit present.      Mental Status: She is alert and oriented to person, place, and time.   Psychiatric:         Mood and Affect: Mood normal.         Behavior: Behavior normal.          ACE III MINI            Results Review:    No results found for this or any previous visit (from the past 672 hour(s)).  Procedures    Medication Review: Medications reviewed and noted    Social History     Socioeconomic History    Marital status:    Tobacco Use    Smoking status: Every Day     Current packs/day: 0.50     Average packs/day: 0.5 packs/day for 30.0 years (15.0 ttl pk-yrs)     Types: Cigarettes    Smokeless tobacco: Never   Vaping Use    Vaping status: Never Used   Substance and Sexual Activity    Alcohol use: Never    Drug use: Never    Sexual activity: Defer        Assessment/Plan:    Diagnoses and all orders for this visit:    1. Adrenal insufficiency (Primary)  Overview:  Continue  hydrocortisone 10mg tablets as directed.  Follow up with Dr. Nelson.      2. Chronic bilateral low back pain without sciatica  Overview:  She is seeing Dr. Alex for pain management. Continue  gabapentin 800mg qid,  percocet 10-325mg tid, MS Contin 30mg BID.           3. Encounter for hepatitis C screening test for low risk patient  -     Hepatitis C antibody; Future    4. Hypothyroidism, unspecified type  Overview:    Continue levothyroxine 88mcg daily.  Follow up with Dr. Nelson    Orders:  -     TSH; Future  -     T4, Free; Future  -     T3, Free; Future    5. Essential hypertension  Overview:  History of essential hypertension on Visken 5 mg tablets.   Has prn furosemide 20mg BID. Spironolactone at 50mg daily.      Orders:  -     CBC & Differential; Future  -     Comprehensive Metabolic Panel; Future  -     MicroAlbumin, Urine, Random - Urine, Clean Catch; Future    6. Lipid screening  -     Lipid Panel; Future    7. Abnormal glucose  -     Hemoglobin A1c; Future    8. S/P hardware removal, right shoulder  Overview:  Surgery performed by Dr. Matute. She has went to PT, but has not been back in 6 weeks.  Discussed water therapy as an option.  She will consider.        9. Difficulty walking  Overview:  No new falls in the home.      10. Arthralgia of both knees  Overview:  Seen Dr. Lashawn Michele in rheumatology.  Failed colchicine trial and Plaquenil.  Has responded well to tetracycline.        Other orders  -     Pneumococcal Conjugate Vaccine 20-Valent All         Patient Instructions   BMI for Adults  Body mass index (BMI) is a number found using a person's weight and height. BMI can help tell how much of a person's weight is made up of fat. BMI does not measure body fat directly. It is used instead of tests that directly measure body fat, which can be difficult and expensive.  What are BMI measurements used for?  BMI is useful to:  Find out if your weight puts you at higher risk for medical problems.  Help  "recommend changes, such as in diet and exercise. This can help you reach a healthy weight. BMI screening can be done again to see if these changes are working.  How is BMI calculated?  Your height and weight are measured. The BMI is found from those numbers. This can be done with U.[place] or metric measurements. Note that charts and online BMI calculators are available to help you find your BMI quickly and easily without doing these calculations.  To calculate your BMI in U.[place] measurements:  Measure your weight in pounds (lb).  Multiply the number of pounds by 703.  So, for an adult who weighs 150 lb, multiply that number by 703: 150 x 703, which equals 105,450.  Measure your height in inches. Then multiply that number by itself to get a measurement called \"inches squared.\"  So, for an adult who is 70 inches tall, the \"inches squared\" measurement is 70 inches x 70 inches, which equals 4,900 inches squared.  Divide the total from step 2 (number of lb x 703) by the total from step 3 (inches squared): 105,450 ÷ 4,900 = 21.5. This is your BMI.  To calculate your BMI in metric measurements:    Measure your weight in kilograms (kg).  For this example, the weight is 70 kg.  Measure your height in meters (m). Then multiply that number by itself to get a measurement called \"meters squared.\"  So, for an adult who is 1.75 m tall, the \"meters squared\" measurement is 1.75 m x 1.75 m, which equals 3.1 meters squared.  Divide the number of kilograms (your weight) by the meters squared number. In this example: 70 ÷ 3.1 = 22.6. This is your BMI.  What do the results mean?  BMI charts are used to see if you are underweight, normal weight, overweight, or obese. The following guidelines will be used:  Underweight: BMI less than 18.5.  Normal weight: BMI between 18.5 and 24.9.  Overweight: BMI between 25 and 29.9.  Obese: BMI of 30 or above.  BMI is a tool and cannot diagnose a condition. Talk with your health care provider about what your " BMI means for you. Keep these notes in mind:  Weight includes fat and muscle. Someone with a muscular build, such as an athlete, may have a BMI that is higher than 24.9. In cases like these, BMI is not a correct measure of body fat.  If you have a BMI of 25 or higher, your provider may need to do more testing to find out if excess body fat is the cause.  BMI is measured the same way for males and females. Females usually have more body fat than males of the same height and weight.  Where to find more information  For more information about BMI, including tools to quickly find your BMI, go to:  Centers for Disease Control and Prevention: cdc.gov  American Heart Association: heart.org  National Heart, Lung, and Blood [place]: nhlbi.nih.gov  This information is not intended to replace advice given to you by your health care provider. Make sure you discuss any questions you have with your health care provider.  Document Revised: 09/07/2023 Document Reviewed: 08/31/2023  [employer] Patient Education © 2023 [employer]  Advance Directive    Advance directives are legal documents that allow you to make decisions about your health care and medical treatment in case you become unable to communicate for yourself. Advance directives let your wishes be known to family, friends, and health care providers.  Discussing and writing advance directives should happen over time rather than all at once. Advance directives can be changed and updated at any time. There are different types of advance directives, such as:  Medical power of .  Living will.  Do not resuscitate (DNR) order or do not attempt resuscitation (DNAR) order.  Health care proxy and medical power of   A health care proxy is also called a health care agent. This person is appointed to make medical decisions for you when you are unable to make decisions for yourself. Generally, people ask a trusted friend or family member to act as their proxy and  represent their preferences. Make sure you have an agreement with your trusted person to act as your proxy. A proxy may have to make a medical decision on your behalf if your wishes are not known.  A medical power of , also called a durable power of  for health care, is a legal document that names your health care proxy. Depending on the laws in your state, the document may need to be:  Signed.  Notarized.  Dated.  Copied.  Witnessed.  Incorporated into your medical record.  You may also want to appoint a trusted person to manage your money in the event you are unable to do so. This is called a durable power of  for finances. It is a separate legal document from the durable power of  for health care. You may choose your health care proxy or someone different to act as your agent in money matters.  If you do not appoint a proxy, or there is a concern that the proxy is not acting in your best interest, a court may appoint a guardian to act on your behalf.  Living will  A living will is a set of instructions that state your wishes about medical care when you cannot express them yourself. Health care providers should keep a copy of your living will in your medical record. You may want to give a copy to family members or friends. To alert caregivers in case of an emergency, you can place a card in your wallet to let them know that you have a living will and where they can find it. A living will is used if you become:  Terminally ill.  Disabled.  Unable to communicate or make decisions.  The following decisions should be included in your living will:  To use or not to use life support equipment, such as dialysis machines and breathing machines (ventilators).  Whether you want a DNR or DNAR order. This tells health care providers not to use cardiopulmonary resuscitation (CPR) if breathing or heartbeat stops.  To use or not to use tube feeding.  To be given or not to be given food and  fluids.  Whether you want comfort (palliative) care when the goal becomes comfort rather than a cure.  Whether you want to donate your organs and tissues.  A living will does not give instructions for distributing your money and property if you should pass away.  DNR or DNAR  A DNR or DNAR order is a request not to have CPR in the event that your heart stops beating or you stop breathing. If a DNR or DNAR order has not been made and shared, a health care provider will try to help any patient whose heart has stopped or who has stopped breathing. If you plan to have surgery, talk with your health care provider about how your DNR or DNAR order will be followed if problems occur.  What if I do not have an advance directive?  Some states assign family decision makers to act on your behalf if you do not have an advance directive. Each state has its own laws about advance directives. You may want to check with your health care provider, , or state representative about the laws in your state.  Summary  Advance directives are legal documents that allow you to make decisions about your health care and medical treatment in case you become unable to communicate for yourself.  The process of discussing and writing advance directives should happen over time. You can change and update advance directives at any time.  Advance directives may include a medical power of , a living will, and a DNR or DNAR order.  This information is not intended to replace advice given to you by your health care provider. Make sure you discuss any questions you have with your health care provider.  Document Revised: 09/21/2021 Document Reviewed: 09/21/2021  [employer] Patient Education © 2023 [employer]  Fall Prevention in the Home, Adult  Falls can cause injuries and can happen to people of all ages. There are many things you can do to make your home safer and to help prevent falls.  What actions can I take to prevent falls?  General  information  Use good lighting in all rooms. Make sure to:  Replace any light bulbs that burn out.  Turn on the lights in dark areas and use night-lights.  Keep items that you use often in easy-to-reach places. Lower the shelves around your home if needed.  Move furniture so that there are clear paths around it.  Do not use throw rugs or other things on the floor that can make you trip.  If any of your floors are uneven, fix them.  Add color or contrast paint or tape to clearly whitley and help you see:  Grab bars or handrails.  First and last steps of staircases.  Where the edge of each step is.  If you use a ladder or stepladder:  Make sure that it is fully opened. Do not climb a closed ladder.  Make sure the sides of the ladder are locked in place.  Have someone hold the ladder while you use it.  Know where your pets are as you move through your home.  What can I do in the bathroom?         Keep the floor dry. Clean up any water on the floor right away.  Remove soap buildup in the bathtub or shower. Buildup makes bathtubs and showers slippery.  Use non-skid mats or decals on the floor of the bathtub or shower.  Attach bath mats securely with double-sided, non-slip rug tape.  If you need to sit down in the shower, use a non-slip stool.  Install grab bars by the toilet and in the bathtub and shower. Do not use towel bars as grab bars.  What can I do in the bedroom?  Make sure that you have a light by your bed that is easy to reach.  Do not use any sheets or blankets on your bed that hang to the floor.  Have a firm chair or bench with side arms that you can use for support when you get dressed.  What can I do in the kitchen?  Clean up any spills right away.  If you need to reach something above you, use a step stool with a grab bar.  Keep electrical cords out of the way.  Do not use floor polish or wax that makes floors slippery.  What can I do with my stairs?  Do not leave anything on the stairs.  Make sure that you  have a light switch at the top and the bottom of the stairs.  Make sure that there are handrails on both sides of the stairs. Fix handrails that are broken or loose.  Install non-slip stair treads on all your stairs if they do not have carpet.  Avoid having throw rugs at the top or bottom of the stairs.  Choose a carpet that does not hide the edge of the steps on the stairs. Make sure that the carpet is firmly attached to the stairs. Fix carpet that is loose or worn.  What can I do on the outside of my home?  Use bright outdoor lighting.  Fix the edges of walkways and driveways and fix any cracks. Clear paths of anything that can make you trip, such as tools or rocks.  Add color or contrast paint or tape to clearly whitley and help you see anything that might make you trip as you walk through a door, such as a raised step or threshold.  Trim any bushes or trees on paths to your home.  Check to see if handrails are loose or broken and that both sides of all steps have handrails. Install guardrails along the edges of any raised decks and porches.  Have leaves, snow, or ice cleared regularly. Use sand, salt, or ice melter on paths if you live where there is ice and snow during the winter.  Clean up any spills in your garage right away. This includes grease or oil spills.  What other actions can I take?  Review your medicines with your doctor. Some medicines can cause dizziness or changes in blood pressure, which increase your risk of falling.  Wear shoes that:  Have a low heel. Do not wear high heels.  Have rubber bottoms and are closed at the toe.  Feel good on your feet and fit well.  Use tools that help you move around if needed. These include:  Canes.  Walkers.  Scooters.  Crutches.  Ask your doctor what else you can do to help prevent falls. This may include seeing a physical therapist to learn to do exercises to move better and get stronger.  Where to find more information  Centers for Disease Control and  Prevention, STEAD[place]: cdc.gov  National [place] on Aging: bro.nih.gov  National [place] on Aging: bro.nih.gov  Contact a doctor if:  You are afraid of falling at home.  You feel weak, drowsy, or dizzy at home.  You fall at home.  Get help right away if you:  Lose consciousness or have trouble moving after a fall.  Have a fall that causes a head injury.  These symptoms may be an emergency. Get help right away. Call 911.  Do not wait to see if the symptoms will go away.  Do not drive yourself to the hospital.  This information is not intended to replace advice given to you by your health care provider. Make sure you discuss any questions you have with your health care provider.  Document Revised: 08/21/2023 Document Reviewed: 08/21/2023  [employer] Patient Education © 2023 [employer]       Plan of care reviewed with patient at the conclusion of today's visit. Education was provided regarding diagnosis, management, and any prescribed or recommended OTC medications.Patient verbalizes understanding of and agreement with management plan.       I  have seen Altagracia on this date of service:preparing for the visit, reviewing tests, obtaining and/or reviewing a separately obtained history, performing a medically appropriate examination and/or evaluation , counseling and educating the patient/family/caregiver, ordering medications, tests, or procedures, referring and communicating with other health care professionals , and documenting information in the medical record    Alvina Lloyd PA-C      Note: Part of this note may be an electronic transcription/translation of spoken language to printed text using the Dragon Dictation system.

## 2024-04-16 LAB
ALBUMIN SERPL-MCNC: 3.8 G/DL (ref 3.5–5.2)
ALBUMIN/GLOB SERPL: 1.7 G/DL
ALP SERPL-CCNC: 123 U/L (ref 39–117)
ALT SERPL W P-5'-P-CCNC: 14 U/L (ref 1–33)
ANION GAP SERPL CALCULATED.3IONS-SCNC: 9.8 MMOL/L (ref 5–15)
AST SERPL-CCNC: 12 U/L (ref 1–32)
BASOPHILS # BLD AUTO: 0.03 10*3/MM3 (ref 0–0.2)
BASOPHILS NFR BLD AUTO: 0.4 % (ref 0–1.5)
BILIRUB SERPL-MCNC: 0.3 MG/DL (ref 0–1.2)
BUN SERPL-MCNC: 25 MG/DL (ref 8–23)
BUN/CREAT SERPL: 33.3 (ref 7–25)
CALCIUM SPEC-SCNC: 8.9 MG/DL (ref 8.6–10.5)
CHLORIDE SERPL-SCNC: 103 MMOL/L (ref 98–107)
CHOLEST SERPL-MCNC: 157 MG/DL (ref 0–200)
CO2 SERPL-SCNC: 29.2 MMOL/L (ref 22–29)
CREAT SERPL-MCNC: 0.75 MG/DL (ref 0.57–1)
DEPRECATED RDW RBC AUTO: 46.9 FL (ref 37–54)
EGFRCR SERPLBLD CKD-EPI 2021: 83.1 ML/MIN/1.73
EOSINOPHIL # BLD AUTO: 0.1 10*3/MM3 (ref 0–0.4)
EOSINOPHIL NFR BLD AUTO: 1.5 % (ref 0.3–6.2)
ERYTHROCYTE [DISTWIDTH] IN BLOOD BY AUTOMATED COUNT: 12.5 % (ref 12.3–15.4)
GLOBULIN UR ELPH-MCNC: 2.2 GM/DL
GLUCOSE SERPL-MCNC: 94 MG/DL (ref 65–99)
HBA1C MFR BLD: 5.8 % (ref 4.8–5.6)
HCT VFR BLD AUTO: 42.1 % (ref 34–46.6)
HCV AB SER DONR QL: NORMAL
HDLC SERPL-MCNC: 56 MG/DL (ref 40–60)
HGB BLD-MCNC: 13.7 G/DL (ref 12–15.9)
IMM GRANULOCYTES # BLD AUTO: 0.03 10*3/MM3 (ref 0–0.05)
IMM GRANULOCYTES NFR BLD AUTO: 0.4 % (ref 0–0.5)
LDLC SERPL CALC-MCNC: 78 MG/DL (ref 0–100)
LDLC/HDLC SERPL: 1.33 {RATIO}
LYMPHOCYTES # BLD AUTO: 0.99 10*3/MM3 (ref 0.7–3.1)
LYMPHOCYTES NFR BLD AUTO: 14.5 % (ref 19.6–45.3)
MCH RBC QN AUTO: 33.2 PG (ref 26.6–33)
MCHC RBC AUTO-ENTMCNC: 32.5 G/DL (ref 31.5–35.7)
MCV RBC AUTO: 101.9 FL (ref 79–97)
MONOCYTES # BLD AUTO: 0.49 10*3/MM3 (ref 0.1–0.9)
MONOCYTES NFR BLD AUTO: 7.2 % (ref 5–12)
NEUTROPHILS NFR BLD AUTO: 5.2 10*3/MM3 (ref 1.7–7)
NEUTROPHILS NFR BLD AUTO: 76 % (ref 42.7–76)
NRBC BLD AUTO-RTO: 0 /100 WBC (ref 0–0.2)
PLATELET # BLD AUTO: 259 10*3/MM3 (ref 140–450)
PMV BLD AUTO: 10.8 FL (ref 6–12)
POTASSIUM SERPL-SCNC: 4.4 MMOL/L (ref 3.5–5.2)
PROT SERPL-MCNC: 6 G/DL (ref 6–8.5)
RBC # BLD AUTO: 4.13 10*6/MM3 (ref 3.77–5.28)
SODIUM SERPL-SCNC: 142 MMOL/L (ref 136–145)
T3FREE SERPL-MCNC: 2.86 PG/ML (ref 2–4.4)
T4 FREE SERPL-MCNC: 1.18 NG/DL (ref 0.93–1.7)
TRIGL SERPL-MCNC: 134 MG/DL (ref 0–150)
TSH SERPL DL<=0.05 MIU/L-ACNC: 1.1 UIU/ML (ref 0.27–4.2)
VLDLC SERPL-MCNC: 23 MG/DL (ref 5–40)
WBC NRBC COR # BLD AUTO: 6.84 10*3/MM3 (ref 3.4–10.8)

## 2024-04-17 ENCOUNTER — OFFICE VISIT (OUTPATIENT)
Dept: ENDOCRINOLOGY | Facility: CLINIC | Age: 74
End: 2024-04-17
Payer: MEDICARE

## 2024-04-17 VITALS
SYSTOLIC BLOOD PRESSURE: 128 MMHG | HEIGHT: 60 IN | OXYGEN SATURATION: 95 % | DIASTOLIC BLOOD PRESSURE: 72 MMHG | BODY MASS INDEX: 33.2 KG/M2 | HEART RATE: 74 BPM

## 2024-04-17 DIAGNOSIS — E03.9 HYPOTHYROIDISM, UNSPECIFIED TYPE: ICD-10-CM

## 2024-04-17 DIAGNOSIS — R73.03 PREDIABETES: ICD-10-CM

## 2024-04-17 DIAGNOSIS — Z86.018 H/O PHEOCHROMOCYTOMA: ICD-10-CM

## 2024-04-17 DIAGNOSIS — E27.40 ADRENAL INSUFFICIENCY: Primary | ICD-10-CM

## 2024-04-17 PROCEDURE — 99214 OFFICE O/P EST MOD 30 MIN: CPT | Performed by: INTERNAL MEDICINE

## 2024-04-17 PROCEDURE — 3074F SYST BP LT 130 MM HG: CPT | Performed by: INTERNAL MEDICINE

## 2024-04-17 PROCEDURE — 3078F DIAST BP <80 MM HG: CPT | Performed by: INTERNAL MEDICINE

## 2024-04-17 RX ORDER — HYDROCORTISONE 10 MG/1
TABLET ORAL
Qty: 225 TABLET | Refills: 1 | Status: SHIPPED | OUTPATIENT
Start: 2024-04-17

## 2024-04-17 RX ORDER — LEVOTHYROXINE SODIUM 88 UG/1
88 TABLET ORAL DAILY
Qty: 90 TABLET | Refills: 1 | Status: SHIPPED | OUTPATIENT
Start: 2024-04-17

## 2024-04-17 NOTE — PROGRESS NOTES
"Chief Complaint   Patient presents with    Hypothyroidism    Adrenal insufficiency        HPI   Altagracia Rothman is a 74 y.o. female had concerns including Hypothyroidism and Adrenal insufficiency.      Patient reports she does feel somewhat better since changing dosing of hydrocortisone at last visit.  She is currently taking 20 mg in the morning and 5 to 10 mg in the afternoon.  She does have ongoing significant pain in her hips and knees but has elected not to undergo surgery.  She has required steroid injections for pain relief.    Patient continues on levothyroxine 88 mcg daily for hypothyroidism.  She denies routine missed doses.  Recent labs completed with PCP were reviewed.    Discussed diagnosis of prediabetes.  Patient is not currently taking medication to alter glycemic control.      The following portions of the patient's history were reviewed and updated as appropriate: allergies, current medications, past family history, past medical history, past social history, past surgical history, and problem list.    Review of Systems   Musculoskeletal:  Positive for arthralgias and gait problem.      /72 (BP Location: Right arm, Patient Position: Sitting, Cuff Size: Adult)   Pulse 74   Ht 152.4 cm (60\")   SpO2 95%   BMI 33.20 kg/m²      Physical Exam      Constitutional:  well developed; well nourished  no acute distress   ENT/Thyroid: Not examined   Eyes: Conjunctiva: clear   Respiratory:  breathing is unlabored  clear to auscultation bilaterally   Cardiovascular:  regular rate and rhythm   Chest:  Not performed.   Abdomen: Not performed.   : Not performed.   Musculoskeletal: Not performed   Skin: dry and warm   Neuro: normal without focal findings and mental status, speech normal   Psych: oriented to time, place and person, mood and affect are within normal limits       Labs/Imaging   Latest Reference Range & Units 04/15/24 11:14   Sodium 136 - 145 mmol/L 142   Potassium 3.5 - 5.2 mmol/L 4.4 "   Chloride 98 - 107 mmol/L 103   CO2 22.0 - 29.0 mmol/L 29.2 (H)   Anion Gap 5.0 - 15.0 mmol/L 9.8   BUN 8 - 23 mg/dL 25 (H)   Creatinine 0.57 - 1.00 mg/dL 0.75   BUN/Creatinine Ratio 7.0 - 25.0  33.3 (H)   eGFR >60.0 mL/min/1.73 83.1   Glucose 65 - 99 mg/dL 94   Calcium 8.6 - 10.5 mg/dL 8.9   Alkaline Phosphatase 39 - 117 U/L 123 (H)   Total Protein 6.0 - 8.5 g/dL 6.0   Albumin 3.5 - 5.2 g/dL 3.8   Globulin gm/dL 2.2   A/G Ratio g/dL 1.7   AST (SGOT) 1 - 32 U/L 12   ALT (SGPT) 1 - 33 U/L 14   Total Bilirubin 0.0 - 1.2 mg/dL 0.3   Hemoglobin A1C 4.80 - 5.60 % 5.80 (H)   TSH Baseline 0.270 - 4.200 uIU/mL 1.100   Free T4 0.93 - 1.70 ng/dL 1.18   T3, Free 2.00 - 4.40 pg/mL 2.86   (H): Data is abnormally high    Diagnoses and all orders for this visit:    1. Adrenal insufficiency (Primary)  Recent CMP reviewed.  Blood pressure normal during visit today.  Patient remains on supraphysiologic doses of steroids.    Patient will continue hydrocortisone 20 mg in the morning and 5 mg in the afternoon.  Discussed recommendation for further reduction as tolerated given long-term risks of supraphysiologic steroids.  She has previously not tolerated hydrocortisone at physiologic dose.  Reviewed signs and symptoms of adrenal insufficiency and adrenal crisis and when to seek care.  Sick day rules and stress dosing reviewed.     2. H/O pheochromocytoma  Serum catecholamines were normal in October 2023, continue with annual screening.    3. Hypothyroidism, unspecified type  TSH, free T4, free T3 reviewed from April 2024.  Thyroid function testing is normal, patient will continue levothyroxine 88 mcg daily    4. Prediabetes  Hemoglobin A1c was repeated and April 2024 and was again abnormal, 5.8%.  Discussed utilization of medical therapy.  Patient would like to avoid this, if possible.  We discussed recommended dietary changes.  We reviewed symptoms of hypoglycemia and when to contact the clinic.    Return in about 6 months (around  10/17/2024) for Next scheduled follow up. The patient was instructed to contact the clinic with any interval questions or concerns.    Electronically signed by: Heather Nelson MD   Endocrinologist    Dictated Utilizing Dragon Dictation

## 2024-05-03 ENCOUNTER — TELEPHONE (OUTPATIENT)
Dept: INTERNAL MEDICINE | Facility: CLINIC | Age: 74
End: 2024-05-03
Payer: MEDICARE

## 2024-05-08 ENCOUNTER — TELEPHONE (OUTPATIENT)
Dept: ENDOCRINOLOGY | Facility: CLINIC | Age: 74
End: 2024-05-08
Payer: MEDICARE

## 2024-05-08 NOTE — TELEPHONE ENCOUNTER
Pt called states she is running out of hydrocortisone 10 mg pt is taking more than 2 1/2 tablets someday's. Pt needing a new prescription sent in th [facility] pharmacy

## 2024-05-09 NOTE — TELEPHONE ENCOUNTER
Mail box full no message left.    Script sent to [facility] on 4-17-24 for 225 tabs and 1 refill.

## 2024-05-13 NOTE — TELEPHONE ENCOUNTER
PATIENTS DAUGHTER RETURNED OUR CALL. INFORMED HER PRESCRIPTION HAS BEEN SENT TO PHARMACY BACK ON 4/17/24   Well controlled

## 2024-05-21 DIAGNOSIS — F51.01 PRIMARY INSOMNIA: ICD-10-CM

## 2024-05-21 RX ORDER — NORTRIPTYLINE HYDROCHLORIDE 50 MG/1
50 CAPSULE ORAL 3 TIMES DAILY
Qty: 90 CAPSULE | Refills: 1 | Status: SHIPPED | OUTPATIENT
Start: 2024-05-21

## 2024-05-21 NOTE — TELEPHONE ENCOUNTER
Rx Refill Note  Requested Prescriptions     Pending Prescriptions Disp Refills    nortriptyline (PAMELOR) 50 MG capsule 90 capsule 1     Sig: Take 1 capsule by mouth 3 (Three) Times a Day.      Last office visit with prescribing clinician: 4/15/2024   Last telemedicine visit with prescribing clinician: Visit date not found   Next office visit with prescribing clinician: 10/15/2024                         Would you like a call back once the refill request has been completed: [] Yes [] No    If the office needs to give you a call back, can they leave a voicemail: [] Yes [] No    Shari Kimble MA  05/21/24, 13:25 EDT

## 2024-06-03 DIAGNOSIS — I10 ESSENTIAL HYPERTENSION: ICD-10-CM

## 2024-06-03 RX ORDER — SPIRONOLACTONE 50 MG/1
50 TABLET, FILM COATED ORAL DAILY
Qty: 90 TABLET | Refills: 1 | Status: SHIPPED | OUTPATIENT
Start: 2024-06-03

## 2024-06-08 DIAGNOSIS — E27.40 ADRENAL INSUFFICIENCY: ICD-10-CM

## 2024-06-10 RX ORDER — HYDROCORTISONE 10 MG/1
TABLET ORAL
Qty: 270 TABLET | Refills: 1 | OUTPATIENT
Start: 2024-06-10

## 2024-06-10 NOTE — TELEPHONE ENCOUNTER
A 3 month rx and refill of correct dose of hydrocortisone 20 mg qam and 5 mg qpm was sent in on 4/17/24 by Dr. Nelson. Patient should not need refills at this time. Please make sure pharmacy has correct prescription.

## 2024-06-10 NOTE — TELEPHONE ENCOUNTER
Last office visit with prescribing clinician: 4/17/2024       Next office visit with prescribing clinician: 10/18/2024     {

## 2024-08-10 DIAGNOSIS — E03.9 HYPOTHYROIDISM, UNSPECIFIED TYPE: ICD-10-CM

## 2024-08-12 NOTE — TELEPHONE ENCOUNTER
Rx Refill Note  Requested Prescriptions     Pending Prescriptions Disp Refills    levothyroxine (SYNTHROID, LEVOTHROID) 88 MCG tablet [Pharmacy Med Name: LEVOTHYROXINE 88 MCG TABLET] 90 tablet 1     Sig: TAKE 1 TABLET BY MOUTH EVERY DAY      Last office visit with prescribing clinician: 4/17/2024   Last telemedicine visit with prescribing clinician: Visit date not found   Next office visit with prescribing clinician: 10/18/2024                         Would you like a call back once the refill request has been completed: [] Yes [] No    If the office needs to give you a call back, can they leave a voicemail: [] Yes [] No    Lula Tobias MA  08/12/24, 14:51 EDT

## 2024-08-13 RX ORDER — LEVOTHYROXINE SODIUM 88 UG/1
88 TABLET ORAL DAILY
Qty: 90 TABLET | Refills: 1 | Status: SHIPPED | OUTPATIENT
Start: 2024-08-13

## 2024-08-26 DIAGNOSIS — F51.01 PRIMARY INSOMNIA: ICD-10-CM

## 2024-08-26 RX ORDER — NORTRIPTYLINE HYDROCHLORIDE 50 MG/1
50 CAPSULE ORAL 3 TIMES DAILY
Qty: 90 CAPSULE | Refills: 1 | Status: SHIPPED | OUTPATIENT
Start: 2024-08-26

## 2024-08-26 NOTE — TELEPHONE ENCOUNTER
Rx Refill Note  Requested Prescriptions     Pending Prescriptions Disp Refills    nortriptyline (PAMELOR) 50 MG capsule 90 capsule 1     Sig: Take 1 capsule by mouth 3 (Three) Times a Day.      Last office visit with prescribing clinician: 4/15/2024   Last telemedicine visit with prescribing clinician: Visit date not found   Next office visit with prescribing clinician: 10/15/2024                         Would you like a call back once the refill request has been completed: [] Yes [] No    If the office needs to give you a call back, can they leave a voicemail: [] Yes [] No    Shari Kimble MA  08/26/24, 11:15 EDT

## 2024-08-26 NOTE — TELEPHONE ENCOUNTER
"    Caller: Altagracia Rothman \"Karen\"    Relationship: Self    Best call back number: [phone]     Requested Prescriptions:   Requested Prescriptions     Pending Prescriptions Disp Refills    nortriptyline (PAMELOR) 50 MG capsule 90 capsule 1     Sig: Take 1 capsule by mouth 3 (Three) Times a Day.        Pharmacy where request should be sent: [facility] #[id number] - [place], [place] - [address] - [phone]  - [phone]     Last office visit with prescribing clinician: 4/15/2024   Last telemedicine visit with prescribing clinician: Visit date not found   Next office visit with prescribing clinician: 10/15/2024     Additional details provided by patient: PATIENT WILL BE OUT 8/27    Does the patient have less than a 3 day supply:  [x] Yes  [] No      Allan Lezama Rep   08/26/24 11:04 EDT       "

## 2024-09-05 RX ORDER — FUROSEMIDE 20 MG
20 TABLET ORAL 2 TIMES DAILY
Qty: 180 TABLET | Refills: 1 | Status: SHIPPED | OUTPATIENT
Start: 2024-09-05

## 2024-09-05 NOTE — TELEPHONE ENCOUNTER
Rx Refill Note  Requested Prescriptions     Pending Prescriptions Disp Refills    furosemide (LASIX) 20 MG tablet [Pharmacy Med Name: FUROSEMIDE 20 MG TABLET] 180 tablet 1     Sig: TAKE 1 TABLET BY MOUTH TWICE A DAY      Last office visit with prescribing clinician: 4/15/2024   Last telemedicine visit with prescribing clinician: Visit date not found   Next office visit with prescribing clinician: 10/15/2024                         Would you like a call back once the refill request has been completed: [] Yes [] No    If the office needs to give you a call back, can they leave a voicemail: [] Yes [] No    NOT ON MED LIST    Shari Kimble MA  09/05/24, 14:39 EDT

## 2024-10-09 DIAGNOSIS — F51.01 PRIMARY INSOMNIA: ICD-10-CM

## 2024-10-09 DIAGNOSIS — I10 ESSENTIAL HYPERTENSION: ICD-10-CM

## 2024-10-09 RX ORDER — PINDOLOL 5 MG/1
2.5 TABLET ORAL 3 TIMES DAILY
Qty: 135 TABLET | Refills: 1 | Status: SHIPPED | OUTPATIENT
Start: 2024-10-09

## 2024-10-09 RX ORDER — NORTRIPTYLINE HYDROCHLORIDE 50 MG/1
50 CAPSULE ORAL 3 TIMES DAILY
Qty: 90 CAPSULE | Refills: 1 | Status: SHIPPED | OUTPATIENT
Start: 2024-10-09

## 2024-10-18 ENCOUNTER — OFFICE VISIT (OUTPATIENT)
Dept: ENDOCRINOLOGY | Facility: CLINIC | Age: 74
End: 2024-10-18
Payer: MEDICARE

## 2024-10-18 VITALS
SYSTOLIC BLOOD PRESSURE: 152 MMHG | HEART RATE: 88 BPM | DIASTOLIC BLOOD PRESSURE: 88 MMHG | OXYGEN SATURATION: 96 % | BODY MASS INDEX: 33.2 KG/M2 | HEIGHT: 60 IN

## 2024-10-18 DIAGNOSIS — R73.03 PREDIABETES: ICD-10-CM

## 2024-10-18 DIAGNOSIS — Z86.018 H/O PHEOCHROMOCYTOMA: ICD-10-CM

## 2024-10-18 DIAGNOSIS — E27.40 ADRENAL INSUFFICIENCY: ICD-10-CM

## 2024-10-18 DIAGNOSIS — E03.9 HYPOTHYROIDISM, UNSPECIFIED TYPE: Primary | ICD-10-CM

## 2024-10-18 LAB
ANION GAP SERPL CALCULATED.3IONS-SCNC: 9 MMOL/L (ref 5–15)
BUN SERPL-MCNC: 25 MG/DL (ref 8–23)
BUN/CREAT SERPL: 19.7 (ref 7–25)
CALCIUM SPEC-SCNC: 8.8 MG/DL (ref 8.6–10.5)
CHLORIDE SERPL-SCNC: 98 MMOL/L (ref 98–107)
CO2 SERPL-SCNC: 29 MMOL/L (ref 22–29)
CREAT SERPL-MCNC: 1.27 MG/DL (ref 0.57–1)
EGFRCR SERPLBLD CKD-EPI 2021: 44.2 ML/MIN/1.73
GLUCOSE SERPL-MCNC: 98 MG/DL (ref 65–99)
HBA1C MFR BLD: 5.5 % (ref 4.8–5.6)
POTASSIUM SERPL-SCNC: 4.4 MMOL/L (ref 3.5–5.2)
SODIUM SERPL-SCNC: 136 MMOL/L (ref 136–145)
T4 FREE SERPL-MCNC: 1.23 NG/DL (ref 0.92–1.68)
TSH SERPL DL<=0.05 MIU/L-ACNC: 1.72 UIU/ML (ref 0.27–4.2)

## 2024-10-18 PROCEDURE — 84443 ASSAY THYROID STIM HORMONE: CPT | Performed by: INTERNAL MEDICINE

## 2024-10-18 PROCEDURE — 83036 HEMOGLOBIN GLYCOSYLATED A1C: CPT | Performed by: INTERNAL MEDICINE

## 2024-10-18 PROCEDURE — 80048 BASIC METABOLIC PNL TOTAL CA: CPT | Performed by: INTERNAL MEDICINE

## 2024-10-18 PROCEDURE — 3077F SYST BP >= 140 MM HG: CPT | Performed by: INTERNAL MEDICINE

## 2024-10-18 PROCEDURE — 84439 ASSAY OF FREE THYROXINE: CPT | Performed by: INTERNAL MEDICINE

## 2024-10-18 PROCEDURE — 99214 OFFICE O/P EST MOD 30 MIN: CPT | Performed by: INTERNAL MEDICINE

## 2024-10-18 PROCEDURE — 3079F DIAST BP 80-89 MM HG: CPT | Performed by: INTERNAL MEDICINE

## 2024-10-18 PROCEDURE — 36415 COLL VENOUS BLD VENIPUNCTURE: CPT | Performed by: INTERNAL MEDICINE

## 2024-10-18 PROCEDURE — 83835 ASSAY OF METANEPHRINES: CPT | Performed by: INTERNAL MEDICINE

## 2024-10-18 RX ORDER — HYDROCORTISONE 10 MG/1
TABLET ORAL
Qty: 300 TABLET | Refills: 1 | Status: SHIPPED | OUTPATIENT
Start: 2024-10-18

## 2024-10-18 NOTE — PROGRESS NOTES
"Chief Complaint   Patient presents with    Adrenal insufficiency    Hypothyroidism        HPI   Altagracia Rothman is a 74 y.o. female had concerns including Adrenal insufficiency and Hypothyroidism.      For adrenal insufficiency, patient is currently taking hydrocortisone 20 mg in the morning and 10 mg in the afternoon. She has not recently needed to stress dose. She did have gel injections in her knees which were helpful for approximately 3 months.  She is going to follow-up with Ortho to see what other options are available as she is not due for repeat injections.  She has not had recent steroid injections.  Blood pressure noted to be slightly high during office visit, patient reports this is atypical for her.    Patient is taking levothyroxine 88 mcg daily for hypothyroidism.    Patient is not currently on medication for prediabetes.    The following portions of the patient's history were reviewed and updated as appropriate: allergies, current medications, and past social history.    Review of Systems   Musculoskeletal:  Positive for arthralgias and gait problem.      /88   Pulse 88   Ht 152.4 cm (60\")   SpO2 96%   BMI 33.20 kg/m²      Physical Exam      Constitutional:  well developed; well nourished  no acute distress   ENT/Thyroid: not examined   Eyes: Conjunctiva: clear   Respiratory:  breathing is unlabored  clear to auscultation bilaterally   Cardiovascular:  regular rate and rhythm   Chest:  Not performed.   Abdomen: Not performed.   : Not performed.   Musculoskeletal: Not performed   Skin: not performed.   Neuro: mental status, speech normal   Psych: mood and affect are within normal limits       Labs/Imaging   Latest Reference Range & Units 04/15/24 11:14   Sodium 136 - 145 mmol/L 142   Potassium 3.5 - 5.2 mmol/L 4.4   Chloride 98 - 107 mmol/L 103   CO2 22.0 - 29.0 mmol/L 29.2 (H)   Anion Gap 5.0 - 15.0 mmol/L 9.8   BUN 8 - 23 mg/dL 25 (H)   Creatinine 0.57 - 1.00 mg/dL 0.75   BUN/Creatinine " Ratio 7.0 - 25.0  33.3 (H)   eGFR >60.0 mL/min/1.73 83.1   Glucose 65 - 99 mg/dL 94   Calcium 8.6 - 10.5 mg/dL 8.9   Alkaline Phosphatase 39 - 117 U/L 123 (H)   Total Protein 6.0 - 8.5 g/dL 6.0   Albumin 3.5 - 5.2 g/dL 3.8   Globulin gm/dL 2.2   A/G Ratio g/dL 1.7   AST (SGOT) 1 - 32 U/L 12   ALT (SGPT) 1 - 33 U/L 14   Total Bilirubin 0.0 - 1.2 mg/dL 0.3   Hemoglobin A1C 4.80 - 5.60 % 5.80 (H)   TSH Baseline 0.270 - 4.200 uIU/mL 1.100   Free T4 0.93 - 1.70 ng/dL 1.18   T3, Free 2.00 - 4.40 pg/mL 2.86   (H): Data is abnormally high    Diagnoses and all orders for this visit:    1. Hypothyroidism, unspecified type (Primary)  -     TSH; Future  -     T4, Free; Future  Patient is currently taking levothyroxine 88 mcg daily.  Update labs today and adjust therapy as clinically indicated.    2. Adrenal insufficiency  -     Basic Metabolic Panel; Future  Check BMP for evaluation of electrolytes.  Patient is currently taking hydrocortisone 20 mg in the morning and 10 mg in the afternoon.  She has previously not tolerated attempts to wean hydrocortisone.  Patient is aware of risk of ongoing utilization of supraphysiologic steroid doses.  Reviewed signs and symptoms of adrenal insufficiency and adrenal crisis and when to seek care.  Sick day rules and stress dosing reviewed.     3. H/O pheochromocytoma  -     Metanephrines, Frac. Free, Plasma; Future  Serum catecholamines were normal in October 2023.  Patient is aware of potential false positive value in the setting of nortriptyline use but states she cannot complete urine collection.  Repeat with labs today.    4. Prediabetes  -     Hemoglobin A1c; Future  Patient not currently taking medication to alter glycemic control.  Discussed threshold for initiation of medical therapy.  Update hemoglobin A1c with labs today.         Return in about 6 months (around 4/18/2025). The patient was instructed to contact the clinic with any interval questions or concerns.    Electronically  signed by: Heather Nelson MD   Endocrinologist    Dictated Utilizing Dragon Dictation

## 2024-10-24 LAB
METANEPH FREE SERPL-MCNC: 28.9 PG/ML (ref 0–88)
NORMETANEPHRINE SERPL-MCNC: 454.8 PG/ML (ref 0–285.2)

## 2024-11-11 DIAGNOSIS — F51.01 PRIMARY INSOMNIA: ICD-10-CM

## 2024-11-11 RX ORDER — NORTRIPTYLINE HYDROCHLORIDE 50 MG/1
50 CAPSULE ORAL 3 TIMES DAILY
Qty: 90 CAPSULE | Refills: 1 | Status: SHIPPED | OUTPATIENT
Start: 2024-11-11

## 2024-11-19 ENCOUNTER — APPOINTMENT (OUTPATIENT)
Dept: LAB | Facility: HOSPITAL | Age: 74
End: 2024-11-19
Payer: MEDICARE

## 2024-11-19 ENCOUNTER — LAB (OUTPATIENT)
Dept: LAB | Facility: HOSPITAL | Age: 74
End: 2024-11-19
Payer: MEDICARE

## 2024-11-19 ENCOUNTER — OFFICE VISIT (OUTPATIENT)
Dept: INTERNAL MEDICINE | Facility: CLINIC | Age: 74
End: 2024-11-19
Payer: MEDICARE

## 2024-11-19 VITALS
DIASTOLIC BLOOD PRESSURE: 64 MMHG | HEIGHT: 60 IN | SYSTOLIC BLOOD PRESSURE: 110 MMHG | TEMPERATURE: 98.7 F | BODY MASS INDEX: 33.2 KG/M2

## 2024-11-19 DIAGNOSIS — E55.9 VITAMIN D DEFICIENCY: ICD-10-CM

## 2024-11-19 DIAGNOSIS — R73.09 ABNORMAL GLUCOSE: ICD-10-CM

## 2024-11-19 DIAGNOSIS — R26.2 DIFFICULTY WALKING: ICD-10-CM

## 2024-11-19 DIAGNOSIS — Z13.21 ENCOUNTER FOR VITAMIN DEFICIENCY SCREENING: ICD-10-CM

## 2024-11-19 DIAGNOSIS — I10 ESSENTIAL HYPERTENSION: ICD-10-CM

## 2024-11-19 DIAGNOSIS — Z13.220 LIPID SCREENING: ICD-10-CM

## 2024-11-19 DIAGNOSIS — Z98.890 S/P HARDWARE REMOVAL: ICD-10-CM

## 2024-11-19 DIAGNOSIS — E03.9 HYPOTHYROIDISM, UNSPECIFIED TYPE: ICD-10-CM

## 2024-11-19 DIAGNOSIS — Z00.00 MEDICARE ANNUAL WELLNESS VISIT, SUBSEQUENT: Primary | ICD-10-CM

## 2024-11-19 DIAGNOSIS — Z72.0 TOBACCO ABUSE: ICD-10-CM

## 2024-11-19 DIAGNOSIS — F51.01 PRIMARY INSOMNIA: ICD-10-CM

## 2024-11-19 DIAGNOSIS — Z59.82 TRANSPORTATION UNAVAILABLE: ICD-10-CM

## 2024-11-19 DIAGNOSIS — M54.50 CHRONIC BILATERAL LOW BACK PAIN WITHOUT SCIATICA: ICD-10-CM

## 2024-11-19 DIAGNOSIS — G89.29 CHRONIC BILATERAL LOW BACK PAIN WITHOUT SCIATICA: ICD-10-CM

## 2024-11-19 DIAGNOSIS — J45.909 MODERATE ASTHMA WITHOUT COMPLICATION, UNSPECIFIED WHETHER PERSISTENT: ICD-10-CM

## 2024-11-19 LAB
25(OH)D3 SERPL-MCNC: 41.2 NG/ML (ref 30–100)
BASOPHILS # BLD AUTO: 0.03 10*3/MM3 (ref 0–0.2)
BASOPHILS NFR BLD AUTO: 0.6 % (ref 0–1.5)
DEPRECATED RDW RBC AUTO: 42.4 FL (ref 37–54)
EOSINOPHIL # BLD AUTO: 0.04 10*3/MM3 (ref 0–0.4)
EOSINOPHIL NFR BLD AUTO: 0.8 % (ref 0.3–6.2)
ERYTHROCYTE [DISTWIDTH] IN BLOOD BY AUTOMATED COUNT: 11.5 % (ref 12.3–15.4)
HBA1C MFR BLD: 5.4 % (ref 4.8–5.6)
HCT VFR BLD AUTO: 44.4 % (ref 34–46.6)
HGB BLD-MCNC: 15 G/DL (ref 12–15.9)
IMM GRANULOCYTES # BLD AUTO: 0.02 10*3/MM3 (ref 0–0.05)
IMM GRANULOCYTES NFR BLD AUTO: 0.4 % (ref 0–0.5)
LYMPHOCYTES # BLD AUTO: 1.19 10*3/MM3 (ref 0.7–3.1)
LYMPHOCYTES NFR BLD AUTO: 22.4 % (ref 19.6–45.3)
MCH RBC QN AUTO: 33.9 PG (ref 26.6–33)
MCHC RBC AUTO-ENTMCNC: 33.8 G/DL (ref 31.5–35.7)
MCV RBC AUTO: 100.2 FL (ref 79–97)
MONOCYTES # BLD AUTO: 0.44 10*3/MM3 (ref 0.1–0.9)
MONOCYTES NFR BLD AUTO: 8.3 % (ref 5–12)
NEUTROPHILS NFR BLD AUTO: 3.6 10*3/MM3 (ref 1.7–7)
NEUTROPHILS NFR BLD AUTO: 67.5 % (ref 42.7–76)
NRBC BLD AUTO-RTO: 0 /100 WBC (ref 0–0.2)
PLATELET # BLD AUTO: 305 10*3/MM3 (ref 140–450)
PMV BLD AUTO: 10.8 FL (ref 6–12)
RBC # BLD AUTO: 4.43 10*6/MM3 (ref 3.77–5.28)
VIT B12 BLD-MCNC: 289 PG/ML (ref 211–946)
WBC NRBC COR # BLD AUTO: 5.32 10*3/MM3 (ref 3.4–10.8)

## 2024-11-19 PROCEDURE — 3078F DIAST BP <80 MM HG: CPT | Performed by: PHYSICIAN ASSISTANT

## 2024-11-19 PROCEDURE — 3074F SYST BP LT 130 MM HG: CPT | Performed by: PHYSICIAN ASSISTANT

## 2024-11-19 PROCEDURE — 1160F RVW MEDS BY RX/DR IN RCRD: CPT | Performed by: PHYSICIAN ASSISTANT

## 2024-11-19 PROCEDURE — 84443 ASSAY THYROID STIM HORMONE: CPT

## 2024-11-19 PROCEDURE — 83036 HEMOGLOBIN GLYCOSYLATED A1C: CPT

## 2024-11-19 PROCEDURE — 85025 COMPLETE CBC W/AUTO DIFF WBC: CPT

## 2024-11-19 PROCEDURE — 84439 ASSAY OF FREE THYROXINE: CPT

## 2024-11-19 PROCEDURE — 84481 FREE ASSAY (FT-3): CPT

## 2024-11-19 PROCEDURE — 82306 VITAMIN D 25 HYDROXY: CPT

## 2024-11-19 PROCEDURE — G0439 PPPS, SUBSEQ VISIT: HCPCS | Performed by: PHYSICIAN ASSISTANT

## 2024-11-19 PROCEDURE — 82607 VITAMIN B-12: CPT

## 2024-11-19 PROCEDURE — 80053 COMPREHEN METABOLIC PANEL: CPT

## 2024-11-19 PROCEDURE — 1159F MED LIST DOCD IN RCRD: CPT | Performed by: PHYSICIAN ASSISTANT

## 2024-11-19 PROCEDURE — 99214 OFFICE O/P EST MOD 30 MIN: CPT | Performed by: PHYSICIAN ASSISTANT

## 2024-11-19 PROCEDURE — 1125F AMNT PAIN NOTED PAIN PRSNT: CPT | Performed by: PHYSICIAN ASSISTANT

## 2024-11-19 PROCEDURE — 1170F FXNL STATUS ASSESSED: CPT | Performed by: PHYSICIAN ASSISTANT

## 2024-11-19 RX ORDER — SPIRONOLACTONE 50 MG/1
50 TABLET, FILM COATED ORAL DAILY
Qty: 90 TABLET | Refills: 1 | Status: SHIPPED | OUTPATIENT
Start: 2024-11-19

## 2024-11-19 RX ORDER — NORTRIPTYLINE HYDROCHLORIDE 50 MG/1
50 CAPSULE ORAL 3 TIMES DAILY
Qty: 90 CAPSULE | Refills: 1 | Status: SHIPPED | OUTPATIENT
Start: 2024-11-19

## 2024-11-19 RX ORDER — TETRACYCLINE HYDROCHLORIDE 500 MG/1
500 CAPSULE ORAL 2 TIMES DAILY
Qty: 180 CAPSULE | Refills: 1 | Status: SHIPPED | OUTPATIENT
Start: 2024-11-19

## 2024-11-19 RX ORDER — ALBUTEROL SULFATE 90 UG/1
1 INHALANT RESPIRATORY (INHALATION) EVERY 4 HOURS PRN
Qty: 8.5 G | Refills: 5 | Status: SHIPPED | OUTPATIENT
Start: 2024-11-19

## 2024-11-19 RX ORDER — FLUTICASONE PROPIONATE AND SALMETEROL 250; 50 UG/1; UG/1
1 POWDER RESPIRATORY (INHALATION) 2 TIMES DAILY
Qty: 60 EACH | Refills: 1 | Status: SHIPPED | OUTPATIENT
Start: 2024-11-19

## 2024-11-19 SDOH — ECONOMIC STABILITY - TRANSPORTATION SECURITY: TRANSPORTATION INSECURITY: Z59.82

## 2024-11-19 NOTE — PROGRESS NOTES
Subjective   The ABCs of the Annual Wellness Visit  Medicare Wellness Visit      Altagracia Rothman is a 74 y.o. patient who presents for a Medicare Wellness Visit.    The following portions of the patient's history were reviewed and   updated as appropriate: allergies, current medications, past family history, past medical history, past social history, past surgical history, and problem list.    Compared to one year ago, the patient's physical   health is the same.  Compared to one year ago, the patient's mental   health is the same.    Recent Hospitalizations:  She was not admitted within the past 365 days at a hospital.     Current Medical Providers:  Patient Care Team:  Alvina Lloyd PA-C as PCP - General (Internal Medicine)  Heather Nelson MD as Consulting Physician (Endocrinology)  Lashawn Michele MD as Consulting Physician (Rheumatology)    Outpatient Medications Prior to Visit   Medication Sig Dispense Refill    furosemide (LASIX) 20 MG tablet TAKE 1 TABLET BY MOUTH TWICE A DAY (Patient taking differently: Take 1 tablet by mouth 2 (Two) Times a Day As Needed.) 180 tablet 1    gabapentin (NEURONTIN) 800 MG tablet Take 1 tablet by mouth Every 6 (Six) Hours.      hydrocortisone (CORTEF) 10 MG tablet 2 tablets in the morning and 1 tablet in the afternoon. Dispense 30 extra tablets to use with stress dosing. 300 tablet 1    levothyroxine (SYNTHROID, LEVOTHROID) 88 MCG tablet TAKE 1 TABLET BY MOUTH EVERY DAY 90 tablet 1    lidocaine (LIDODERM) 5 % Place 1 patch on the skin as directed by provider Every 12 (Twelve) Hours. (12 hours on 12 hours off)      Morphine (MS CONTIN) 30 MG 12 hr tablet Take 1 tablet by mouth 2 (Two) Times a Day.      ondansetron ODT (Zofran ODT) 8 MG disintegrating tablet Place 1 tablet on the tongue Every 8 (Eight) Hours As Needed for Nausea or Vomiting. 15 tablet 0    oxyCODONE-acetaminophen (PERCOCET)  MG per tablet Take 1 tablet by mouth 3 (Three) Times a Day.       pindolol (VISKEN) 5 MG tablet TAKE 0.5 TABLETS BY MOUTH 3 (THREE) TIMES A DAY. (Patient taking differently: Take 0.5 tablets by mouth 3 (Three) Times a Day. Taking quarter of a tablet 3 times daily.) 135 tablet 1    albuterol sulfate  (90 Base) MCG/ACT inhaler Inhale 1 puff Every 4 (Four) Hours As Needed for Wheezing. 8.5 g 5    nortriptyline (PAMELOR) 50 MG capsule Take 1 capsule by mouth 3 (Three) Times a Day. 90 capsule 1    spironolactone (ALDACTONE) 50 MG tablet TAKE 1 TABLET BY MOUTH EVERY DAY 90 tablet 1    tetracycline (ACHROMYCIN,SUMYCIN) 500 MG capsule TAKE 1 CAPSULE BY MOUTH TWICE A  capsule 1    Wixela Inhub 250-50 MCG/ACT DISKUS INHALE 1 PUFF BY MOUTH TWICE A DAY 60 each 1     No facility-administered medications prior to visit.     Opioid medication/s are on active medication list.  and I have evaluated her active treatment plan and pain score trends (see table).  Vitals:    11/19/24 1433   PainSc:   3   PainLoc: Neck     I have reviewed the chart for potential of high risk medication and harmful drug interactions in the elderly.        Aspirin is not on active medication list.  Aspirin use is contraindicated for this patient due to: history of bleeding.  .    Patient Active Problem List   Diagnosis    Adrenal insufficiency    Hypothyroidism    Essential hypertension    H/O pheochromocytoma    Chronic bilateral low back pain without sciatica    Encounter for chronic pain management    Osteoporosis    Tobacco abuse    BMI 35.0-35.9,adult    Bronchitis    Moderate asthma without complication    Paresthesia    Medicare annual wellness visit, subsequent    Routine medical exam    Lipid screening    Hair loss    Closed displaced comminuted supracondylar fracture of right humerus without intercondylar fracture    Acute UTI (urinary tract infection)    Primary insomnia    Escherichia coli infection    Painful orthopaedic hardware    Obesity    S/P hardware removal, right shoulder    Arthralgia  "of both knees    Primary osteoarthritis of both hips    Difficulty walking    Labial lesion    Acute cystitis without hematuria    Encounter for hepatitis C screening test for low risk patient    Abnormal glucose     Advance Care Planning Advance Directive is not on file.  ACP discussion was held with the patient during this visit. Patient does not have an advance directive, information provided.            Objective   Vitals:    11/19/24 1433   BP: 110/64   BP Location: Left arm   Patient Position: Sitting   Cuff Size: Adult   Temp: 98.7 °F (37.1 °C)   TempSrc: Temporal   Weight: Comment: unsble to stand/weigh   Height: 152.4 cm (60\")   PainSc:   3   PainLoc: Neck       Estimated body mass index is 33.2 kg/m² as calculated from the following:    Height as of this encounter: 152.4 cm (60\").    Weight as of 10/11/23: 77.1 kg (170 lb).    BMI is >= 30 and <35. (Class 1 Obesity). The following options were offered after discussion;: exercise counseling/recommendations       Does the patient have evidence of cognitive impairment? No  Lab Results   Component Value Date    HGBA1C 5.50 10/18/2024                                                                                                Health  Risk Assessment    Smoking Status:  Social History     Tobacco Use   Smoking Status Some Days    Current packs/day: 0.50    Average packs/day: 0.5 packs/day for 79.4 years (39.7 ttl pk-yrs)    Types: Cigarettes    Start date: 6/15/1975   Smokeless Tobacco Never   Tobacco Comments    Have quit for a year or two many times     Alcohol Consumption:  Social History     Substance and Sexual Activity   Alcohol Use Never       Fall Risk Screen  STEADI Fall Risk Assessment was completed, and patient is at LOW risk for falls.Assessment completed on:11/19/2024    Depression Screening   Little interest or pleasure in doing things? Not at all   Feeling down, depressed, or hopeless? Not at all   PHQ-2 Total Score 0      Health Habits and " Functional and Cognitive Screenin/19/2024     2:43 PM   Functional & Cognitive Status   Do you have difficulty preparing food and eating? Yes   Do you have difficulty bathing yourself, getting dressed or grooming yourself? No   Do you have difficulty using the toilet? No   Do you have difficulty moving around from place to place? Yes   Do you have trouble with steps or getting out of a bed or a chair? Yes   Current Diet Well Balanced Diet   Dental Exam Not up to date   Eye Exam Not up to date   Exercise (times per week) 1 times per week   Current Exercises Include Other   Do you need help using the phone?  No   Are you deaf or do you have serious difficulty hearing?  No   Do you need help to go to places out of walking distance? Yes   Do you need help shopping? No   Do you need help preparing meals?  No   Do you need help with housework?  Yes   Do you need help with laundry? No   Do you need help taking your medications? No   Do you need help managing money? No   Do you ever drive or ride in a car without wearing a seat belt? No   Have you felt unusual stress, anger or loneliness in the last month? No   Who do you live with? Alone   If you need help, do you have trouble finding someone available to you? No   Have you been bothered in the last four weeks by sexual problems? No   Do you have difficulty concentrating, remembering or making decisions? No           Age-appropriate Screening Schedule:  Refer to the list below for future screening recommendations based on patient's age, sex and/or medical conditions. Orders for these recommended tests are listed in the plan section. The patient has been provided with a written plan.    Health Maintenance List  Health Maintenance   Topic Date Due    LUNG CANCER SCREENING  Never done    ZOSTER VACCINE (1 of 2) 2024 (Originally 2/15/1999)    RSV Vaccine - Adults (1 - 1-dose 75+ series) 2024 (Originally 2/15/2024)    DXA SCAN  2025    ANNUAL  WELLNESS VISIT  11/19/2025    TDAP/TD VACCINES (3 - Td or Tdap) 06/17/2032    HEPATITIS C SCREENING  Completed    COVID-19 Vaccine  Completed    INFLUENZA VACCINE  Completed    Pneumococcal Vaccine 65+  Completed    MAMMOGRAM  Discontinued    COLORECTAL CANCER SCREENING  Discontinued                                                                                                                                                CMS Preventative Services Quick Reference  Risk Factors Identified During Encounter  Chronic Pain: Chronic Pain Educational material Discussed and Shared in After Visit Summary for Patient.    The above risks/problems have been discussed with the patient.  Pertinent information has been shared with the patient in the After Visit Summary.  An After Visit Summary and PPPS were made available to the patient.    Follow Up:   Next Medicare Wellness visit to be scheduled in 1 year.         Additional E&M Note during same encounter follows:  Patient has additional, significant, and separately identifiable condition(s)/problem(s) that require work above and beyond the Medicare Wellness Visit     Chief Complaint  Medicare Wellness-subsequent    Subjective    HPI  Karen is also being seen today for an annual adult preventative physical exam.        The patient presents for an annual wellness visit.    She reports no recent EKGs and uses crutches for mobility, being unable to stand without them. She has experienced falls but has not hit the floor. Her daughter, who also uses a walker, checks on her. She has not been hospitalized in the past year and does not take aspirin. She does not have an advance care directive.    She is currently under the care of an endocrinologist and has stopped seeing a rheumatologist. She is also seeing a [facility] and Dr. Matute. For pain management, she is under the care of Dr. Júnior Alex. Her pain management regimen includes  "morphine, oxycodone, gabapentin, and lidocaine patches. She visits the pain management clinic every 8 weeks. She has undergone physical therapy and received gel injections in her knees and cortisone in her hips. She reports that her shoulder hurt significantly during therapy, requiring cortisone injections. She was in physical therapy until a couple of months ago.    She denies any urinary tract infections but mentions that her creatinine levels were abnormal during a recent visit to Dr. Nelson. She has a history of low vitamin D levels. She reports regular bowel movements.    She denies any shortness of breath and takes furosemide as needed. She denies any chest pain, shortness of breath, or palpitations. She has not received her RSV or Shingrix vaccine but has had her flu shot. She denies any sore throat, pain when swallowing, or choking on food. She denies any bleeding.    She reports that her physical health is worse than before her fall and arm fracture, but her mental health remains the same. She experiences pain when moving her arm and has been using crutches for years.    She is requesting refills on her ProAir, nortriptyline, and spironolactone prescriptions.    SOCIAL HISTORY  She smokes off and on.    FAMILY HISTORY  Her father had heart disease and he had a heart attack when he was 50 years old and then he had defibrillator and pacemaker.  Review of Systems   Constitutional:  Positive for fatigue. Negative for fever.   Musculoskeletal:  Positive for arthralgias, back pain, gait problem and myalgias.   Psychiatric/Behavioral:  The patient is nervous/anxious.           Objective   Vital Signs:  /64 (BP Location: Left arm, Patient Position: Sitting, Cuff Size: Adult)   Temp 98.7 °F (37.1 °C) (Temporal)   Ht 152.4 cm (60\")   BMI 33.20 kg/m²     The following data was reviewed by: Alvina Lloyd PA-C on 11/19/2024:    Common labs          4/15/2024    11:14 10/18/2024    10:39   Common Labs "   Glucose 94  98    BUN 25  25    Creatinine 0.75  1.27    Sodium 142  136    Potassium 4.4  4.4    Chloride 103  98    Calcium 8.9  8.8    Albumin 3.8     Total Bilirubin 0.3     Alkaline Phosphatase 123     AST (SGOT) 12     ALT (SGPT) 14     WBC 6.84     Hemoglobin 13.7     Hematocrit 42.1     Platelets 259     Total Cholesterol 157     Triglycerides 134     HDL Cholesterol 56     LDL Cholesterol  78     Hemoglobin A1C 5.80  5.50      Physical Exam  Vitals and nursing note reviewed.   Constitutional:       Appearance: She is obese.   HENT:      Head: Normocephalic.      Right Ear: Tympanic membrane, ear canal and external ear normal.      Left Ear: Tympanic membrane, ear canal and external ear normal.      Nose: Nose normal.      Mouth/Throat:      Pharynx: Posterior oropharyngeal erythema present.   Cardiovascular:      Rate and Rhythm: Normal rate and regular rhythm.   Abdominal:      Tenderness: There is no abdominal tenderness. There is no guarding.   Musculoskeletal:         General: Tenderness and deformity present.      Right shoulder: Deformity present. Decreased range of motion.        Arms:       Cervical back: Normal range of motion.      Lumbar back: Tenderness and bony tenderness present. Decreased range of motion.      Right lower leg: No edema.      Left lower leg: No edema.   Lymphadenopathy:      Cervical: No cervical adenopathy.   Neurological:      Mental Status: She is alert.   Psychiatric:         Mood and Affect: Mood normal.         Behavior: Behavior normal.                  Assessment and Plan Additional age appropriate preventative wellness advice topics were discussed during today's preventative wellness exam(some topics already addressed during AWV portion of the note above):    Physical Activity: Advised cardiovascular activity 150 minutes per week as tolerated. (example brisk walk for 30 minutes, 5 days a week).            1. Annual wellness visit.  A referral to a  will  be made to assist with transportation arrangements. Fasting labs will be ordered. Home physical therapy will be arranged. Prescriptions for ProAir, nortriptyline, and spironolactone will be refilled.    2. Difficulty walking.  The patient uses crutches to move around the house and has had a few near falls but has not hit the floor. She will be referred to home physical therapy to help strengthen her upper body and improve mobility. If transportation can be arranged, she will attend physical therapy sessions outside the home.    3. Right shoulder pain.  The patient continues to experience pain in her right shoulder following a fall and hardware removal. She has undergone physical therapy and received cortisone injections. She will be referred to physical therapy again, pending transportation arrangements.    4. Hypothyroidism.  Levothyroxine will be refilled after checking the thyroid function.    5. Pain management.  The patient is currently on morphine MS Contin 30 mg twice daily, oxycodone 10/325 mg three times a day, gabapentin 800 mg every 6 hours, and lidocaine patches. She follows up with pain management every 8 weeks.    6. Urinary tract concerns.  The patient reported a high creatinine level during a recent visit with Dr. Nelson. She believes it may have been due to swelling and not taking her diuretic. A repeat creatinine test will be included in the lab orders.    7. Smoking.  The patient smokes off and on, up to half a pack a day. Smoking cessation should be encouraged.    8. Health Maintenance.  She received her flu shot but has not received the RSV or Shingrix vaccines. A low-dose CT scan of the chest for lung cancer screening will be scheduled.            I have seen Altagracia on this date of service:preparing for the visit, reviewing tests, obtaining and/or reviewing a separately obtained history, performing a medically appropriate examination and/or evaluation , counseling and educating the  patient/family/caregiver, ordering medications, tests, or procedures, referring and communicating with other health care professionals , and documenting information in the medical record  Follow Up   No follow-ups on file.  Patient was given instructions and counseling regarding her condition or for health maintenance advice. Please see specific information pulled into the AVS if appropriate.  Patient or patient representative verbalized consent for the use of Ambient Listening during the visit with  Alvina Lloyd PA-C for chart documentation. 11/19/2024  16:45 EST

## 2024-11-20 ENCOUNTER — REFERRAL TRIAGE (OUTPATIENT)
Dept: CASE MANAGEMENT | Facility: OTHER | Age: 74
End: 2024-11-20
Payer: MEDICARE

## 2024-11-20 LAB
ALBUMIN SERPL-MCNC: 3.4 G/DL (ref 3.5–5.2)
ALBUMIN/GLOB SERPL: 1.3 G/DL
ALP SERPL-CCNC: 176 U/L (ref 39–117)
ALT SERPL W P-5'-P-CCNC: 11 U/L (ref 1–33)
ANION GAP SERPL CALCULATED.3IONS-SCNC: 10 MMOL/L (ref 5–15)
AST SERPL-CCNC: 18 U/L (ref 1–32)
BILIRUB SERPL-MCNC: 0.6 MG/DL (ref 0–1.2)
BUN SERPL-MCNC: 19 MG/DL (ref 8–23)
BUN/CREAT SERPL: 24.7 (ref 7–25)
CALCIUM SPEC-SCNC: 8.7 MG/DL (ref 8.6–10.5)
CHLORIDE SERPL-SCNC: 98 MMOL/L (ref 98–107)
CO2 SERPL-SCNC: 30 MMOL/L (ref 22–29)
CREAT SERPL-MCNC: 0.77 MG/DL (ref 0.57–1)
EGFRCR SERPLBLD CKD-EPI 2021: 80.6 ML/MIN/1.73
GLOBULIN UR ELPH-MCNC: 2.7 GM/DL
GLUCOSE SERPL-MCNC: 94 MG/DL (ref 65–99)
POTASSIUM SERPL-SCNC: 3.9 MMOL/L (ref 3.5–5.2)
PROT SERPL-MCNC: 6.1 G/DL (ref 6–8.5)
SODIUM SERPL-SCNC: 138 MMOL/L (ref 136–145)
T3FREE SERPL-MCNC: 3.37 PG/ML (ref 2–4.4)
T4 FREE SERPL-MCNC: 1.45 NG/DL (ref 0.92–1.68)
TSH SERPL DL<=0.05 MIU/L-ACNC: 0.96 UIU/ML (ref 0.27–4.2)

## 2024-11-25 ENCOUNTER — TELEPHONE (OUTPATIENT)
Dept: INTERNAL MEDICINE | Facility: CLINIC | Age: 74
End: 2024-11-25
Payer: MEDICARE

## 2024-11-25 NOTE — TELEPHONE ENCOUNTER
"    Caller: Altagracia Rothman \"Karen\"    Relationship: Self    Best call back number: [phone]       What test was performed: BLOOD WORK    When was the test performed: 11/19/24    Where was the test performed: [facility] DO[facility]    Additional notes: PATIENT STATES SHE WOULD LIKE A CALL BACK TO DISCUSS HER BLOOD WORK RESULTS.       "

## 2024-11-26 ENCOUNTER — PATIENT OUTREACH (OUTPATIENT)
Dept: CASE MANAGEMENT | Facility: OTHER | Age: 74
End: 2024-11-26
Payer: MEDICARE

## 2024-11-26 DIAGNOSIS — M54.50 CHRONIC BILATERAL LOW BACK PAIN WITHOUT SCIATICA: ICD-10-CM

## 2024-11-26 DIAGNOSIS — I10 ESSENTIAL HYPERTENSION: Primary | ICD-10-CM

## 2024-11-26 DIAGNOSIS — G89.29 CHRONIC BILATERAL LOW BACK PAIN WITHOUT SCIATICA: ICD-10-CM

## 2024-11-26 NOTE — OUTREACH NOTE
"[facility] Interim Update    RN-ACM made outreach to patient regarding provider referral. Role and reason for call explained. Patient stated that she has used crutches to ambulate for several years. Patient is unable to drive very far, and when she does arrive to medical appointments, she has a hard time getting inside of the building and to the office. Patient states that she needs a transport wheelchair, but she will need assistance putting it inside the car, and getting it out. ACM placed a MSW referral to assist patient with assistive devices. Patient stated that she would also like to get a stair stepper to help with muscle tone, strength and endurance.    F/u outreach scheduled.    AMBULATORY CASE MANAGEMENT NOTE    Names and Relationships of Patient/Support Persons: Contact: Altagracia Rothman \"Karen\"; Relationship: Self -         Education Documentation  No documentation found.        Renea GRAYSON  Ambulatory Case Management    11/26/2024, 16:10 EST  "

## 2024-11-26 NOTE — TELEPHONE ENCOUNTER
Her endocrinologist is at [facility] so she will be able to see the results.

## 2024-11-27 ENCOUNTER — PATIENT OUTREACH (OUTPATIENT)
Age: 74
End: 2024-11-27
Payer: MEDICARE

## 2024-11-27 NOTE — OUTREACH NOTE
SW attempted an outreach and left a voicemail with callback information. SW will attempt again in two days.  Josie HURTADO -   Ambulatory Case Management    11/27/2024, 13:28 EST

## 2024-12-09 ENCOUNTER — PATIENT OUTREACH (OUTPATIENT)
Age: 74
End: 2024-12-09
Payer: MEDICARE

## 2024-12-09 NOTE — OUTREACH NOTE
SW made three attempts to contact pt SW left voicemail messages with contact information. SW will close referral but will re-open should pt call back.  Josie HURTADO -   Ambulatory Case Management    12/9/2024, 14:46 EST

## 2024-12-26 ENCOUNTER — PATIENT OUTREACH (OUTPATIENT)
Dept: CASE MANAGEMENT | Facility: OTHER | Age: 74
End: 2024-12-26
Payer: MEDICARE

## 2024-12-26 DIAGNOSIS — M80.00XA OSTEOPOROSIS WITH CURRENT PATHOLOGICAL FRACTURE, UNSPECIFIED OSTEOPOROSIS TYPE, INITIAL ENCOUNTER: ICD-10-CM

## 2024-12-26 DIAGNOSIS — G89.29 CHRONIC BILATERAL LOW BACK PAIN WITHOUT SCIATICA: ICD-10-CM

## 2024-12-26 DIAGNOSIS — I10 ESSENTIAL HYPERTENSION: Primary | ICD-10-CM

## 2024-12-26 DIAGNOSIS — M54.50 CHRONIC BILATERAL LOW BACK PAIN WITHOUT SCIATICA: ICD-10-CM

## 2024-12-26 NOTE — PLAN OF CARE
Problem: Chronic Pain  Goal: Manage Pain  Outcome: Not Progressing     Problem: Fall Risk  Goal: Optimal Care Coordination of a Patient Experiencing Fall Risk  Outcome: Progressing

## 2024-12-26 NOTE — OUTREACH NOTE
"[facility] Interim Update    RN-ACM made outreach to patient. Patient was pleasant and appreciative of call. Patient inquired about the resources that she qualifies for related to exercise equipment inside of the home. ACM explained to patient that referral was made to MSW, and 3 attempts were made to reach patient. Patient stated that she never listens to her voicemail. ACM gave patient MSW contact information.     F/u outreach scheduled.    AMBULATORY CASE MANAGEMENT NOTE    Names and Relationships of Patient/Support Persons: Contact: Altagracia Rothman \"Karen\"; Relationship: Self -         Education Documentation  No documentation found.        Renea GRAYSON  Ambulatory Case Management    12/26/2024, 16:46 EST  "

## 2024-12-28 DIAGNOSIS — I10 ESSENTIAL HYPERTENSION: ICD-10-CM

## 2024-12-30 RX ORDER — SPIRONOLACTONE 50 MG/1
50 TABLET, FILM COATED ORAL DAILY
Qty: 90 TABLET | Refills: 1 | Status: SHIPPED | OUTPATIENT
Start: 2024-12-30

## 2024-12-31 ENCOUNTER — PATIENT OUTREACH (OUTPATIENT)
Dept: CASE MANAGEMENT | Facility: OTHER | Age: 74
End: 2024-12-31
Payer: MEDICARE

## 2024-12-31 DIAGNOSIS — M54.50 CHRONIC BILATERAL LOW BACK PAIN WITHOUT SCIATICA: ICD-10-CM

## 2024-12-31 DIAGNOSIS — M80.00XA OSTEOPOROSIS WITH CURRENT PATHOLOGICAL FRACTURE, UNSPECIFIED OSTEOPOROSIS TYPE, INITIAL ENCOUNTER: ICD-10-CM

## 2024-12-31 DIAGNOSIS — I10 ESSENTIAL HYPERTENSION: Primary | ICD-10-CM

## 2024-12-31 DIAGNOSIS — G89.29 CHRONIC BILATERAL LOW BACK PAIN WITHOUT SCIATICA: ICD-10-CM

## 2024-12-31 NOTE — OUTREACH NOTE
[facility] End of Month Documentation    This Chronic Medical Management Care Plan for Altagracia Rothman, 74 y.o. female, has been monitored and managed; established and a new plan of care implemented for the month of December.  A cumulative time of 28  minutes was spent on this patient record this month, including phone call with patient.    Regarding the patient's problems: has Adrenal insufficiency; Hypothyroidism; Essential hypertension; H/O pheochromocytoma; Chronic bilateral low back pain without sciatica; Encounter for chronic pain management; Osteoporosis; Tobacco abuse; BMI 35.0-35.9,adult; Bronchitis; Moderate asthma without complication; Paresthesia; Medicare annual wellness visit, subsequent; Routine medical exam; Lipid screening; Hair loss; Closed displaced comminuted supracondylar fracture of right humerus without intercondylar fracture; Acute UTI (urinary tract infection); Primary insomnia; Escherichia coli infection; Painful orthopaedic hardware; Obesity; S/P hardware removal, right shoulder; Arthralgia of both knees; Primary osteoarthritis of both hips; Difficulty walking; Labial lesion; Acute cystitis without hematuria; Encounter for hepatitis C screening test for low risk patient; and Abnormal glucose on their problem list., the following items were addressed: medications and any changes can be found within the plan section of the note.  A detailed listing of time spent for chronic care management is tracked within each outreach encounter.  Current medications include:  has a current medication list which includes the following prescription(s): albuterol sulfate hfa, fluticasone-salmeterol, furosemide, gabapentin, hydrocortisone, levothyroxine, lidocaine, morphine, nortriptyline, ondansetron odt, oxycodone-acetaminophen, pindolol, spironolactone, and tetracycline. and the patient is reported to be patient is compliant with medication protocol,  Medications are reported to be effective.  Regarding these  diagnoses, referrals were made to the following provider(s):  .  All notes on chart for PCP to review.    The patient was monitored remotely for activity level; medications.    The patient's physical needs include:  medication education.     The patient's mental support needs include:  continued support    The patient's cognitive support needs include:  medication    The patient's psychosocial support needs include:  continued support    The patient's functional needs include: medication education    The patient's environmental needs include:  not applicable    Care Plan overall comments:  No data recorded    Refer to previous outreach notes for more information on the areas listed above.    Monthly Billing Diagnoses  (I10) Essential hypertension    (M54.50,  G89.29) Chronic bilateral low back pain without sciatica    (M80.00XA) Osteoporosis with current pathological fracture, unspecified osteoporosis type, initial encounter    Medications   Medications have been reconciled    Care Plan progress this month:      Recently Modified Care Plans Updates made since 11/30/2024 12:00 AM      No recently modified care plans.             Chronic Pain       Manage Pain (Not Progressing)       Start:  11/26/24    Expected End:  11/27/28         My Pain Management To Do List       Start:  11/26/24         Why is this important?  Day-to-day life can be hard when you have chronic pain.  Pain medicine is just one part of your treatment plan.  You can try these action steps to help you manage your pain.             Fall Risk       Prevent Falls and Injury       Start:  11/26/24         My Fall Prevention To Do List       Start:  11/26/24         Why is this important?  Most falls happen when it is hard for you to walk safely. Your balance may be off because of an illness. You may have pain in your knees, hip or other joints.  You may be overly tired or taking medicines that make you sleepy. You may not be able to see or hear  clearly.  Falls can lead to broken bones, bruises or other injuries.  There are things you can do to help prevent falling.              Optimal Care Coordination of a Patient Experiencing Fall Risk (Progressing)       Start:  11/26/24         Identify and Manage Contributors to Fall Risk       Start:  11/26/24              HP Blank Problem       Patient uses crutches to ambulate     Hypertension       Track and Manage My Blood Pressure       Start:  11/26/24         My Blood Pressure Management To Do List       Start:  11/26/24         Why is this important?  You may not be able to feel high blood pressure, but it can still hurt your body.  High blood pressure can cause heart or kidney problems. It can also cause a stroke.  Checking your blood pressure at home and at different times of the day can help to control blood pressure.               Current Specialty Plan of Care Status signed by both patient and provider    Instructions   Patient was provided an electronic copy of care plan  CCM services were explained and offered and patient has accepted these services.  Patient has given their written consent to receive CCM services and understands that this includes the authorization of electronic communication of medical information with the other treating providers.  Patient understands that they may stop CCM services at any time and these changes will be effective at the end of the calendar month and will effectively revocate the agreement of CCM services.  Patient understands that only one practitioner can furnish and be paid for CCM services during one calendar month.  Patient also understands that there may be co-payment or deductible fees in association with CCM services.  Patient will continue with at least monthly follow-up calls with the Ambulatory .    Renea GRAYSON  Ambulatory Case Management    12/31/2024, 16:01 EST

## 2025-01-08 DIAGNOSIS — F51.01 PRIMARY INSOMNIA: ICD-10-CM

## 2025-01-08 NOTE — TELEPHONE ENCOUNTER
Rx Refill Note  Requested Prescriptions     Pending Prescriptions Disp Refills    nortriptyline (PAMELOR) 50 MG capsule 270 capsule 1     Sig: Take 1 capsule by mouth 3 (Three) Times a Day.      Last office visit with prescribing clinician: 11/19/2024   Last telemedicine visit with prescribing clinician: Visit date not found   Next office visit with prescribing clinician: Visit date not found                         Would you like a call back once the refill request has been completed: [] Yes [] No    If the office needs to give you a call back, can they leave a voicemail: [] Yes [] No    Leanna Reddy LPN  01/08/25, 11:05 EST

## 2025-01-09 RX ORDER — NORTRIPTYLINE HYDROCHLORIDE 50 MG/1
50 CAPSULE ORAL 3 TIMES DAILY
Qty: 270 CAPSULE | Refills: 1 | Status: SHIPPED | OUTPATIENT
Start: 2025-01-09

## 2025-02-10 ENCOUNTER — PATIENT OUTREACH (OUTPATIENT)
Dept: CASE MANAGEMENT | Facility: OTHER | Age: 75
End: 2025-02-10
Payer: MEDICARE

## 2025-02-10 DIAGNOSIS — M80.00XA OSTEOPOROSIS WITH CURRENT PATHOLOGICAL FRACTURE, UNSPECIFIED OSTEOPOROSIS TYPE, INITIAL ENCOUNTER: ICD-10-CM

## 2025-02-10 DIAGNOSIS — M54.50 CHRONIC BILATERAL LOW BACK PAIN WITHOUT SCIATICA: ICD-10-CM

## 2025-02-10 DIAGNOSIS — I10 ESSENTIAL HYPERTENSION: Primary | ICD-10-CM

## 2025-02-10 DIAGNOSIS — G89.29 CHRONIC BILATERAL LOW BACK PAIN WITHOUT SCIATICA: ICD-10-CM

## 2025-02-10 NOTE — OUTREACH NOTE
"[facility] Interim Update    RN-ACM made outreach to patient. Patient was pleasant and appreciative of call, and explained that she had a  at her pain management clinic. Patient stated that since she has had that , that she wanted to use her. ACM will close program at this time.    AMBULATORY CASE MANAGEMENT NOTE    Names and Relationships of Patient/Support Persons: Contact: Altagracia Rothman \"Karen\"; Relationship: Self -         Education Documentation  No documentation found.        Renea GRAYSON  Ambulatory Case Management    2/10/2025, 12:04 EST  "

## 2025-04-16 DIAGNOSIS — E03.9 HYPOTHYROIDISM, UNSPECIFIED TYPE: ICD-10-CM

## 2025-04-16 RX ORDER — LEVOTHYROXINE SODIUM 88 UG/1
88 TABLET ORAL DAILY
Qty: 30 TABLET | Refills: 0 | Status: SHIPPED | OUTPATIENT
Start: 2025-04-16

## 2025-04-16 NOTE — TELEPHONE ENCOUNTER
Rx Refill Note  Requested Prescriptions     Pending Prescriptions Disp Refills    levothyroxine (SYNTHROID, LEVOTHROID) 88 MCG tablet [Pharmacy Med Name: LEVOTHYROXINE 88 MCG TABLET] 90 tablet 1     Sig: TAKE 1 TABLET BY MOUTH EVERY DAY        Last office visit with prescribing clinician: 10/18/24     Next office visit with prescribing clinician: Visit date not found       Indiana Baron (Jodi)  04/16/25, 14:33 EDT     To f/u in 6 mths  Noted needs appt for refills

## 2025-04-21 RX ORDER — FUROSEMIDE 20 MG/1
20 TABLET ORAL EVERY 12 HOURS SCHEDULED
Qty: 180 TABLET | Refills: 1 | Status: SHIPPED | OUTPATIENT
Start: 2025-04-21

## 2025-04-21 NOTE — TELEPHONE ENCOUNTER
Spoke with patient and she states she is not sure if she can make it in within the next 30 days, she is currently admitted to [facility] with 3 fractures. She would not give anymore details and is not sure when she will be able to make it in. Advised that she should call back within the next 30 days once she gets an update on her plan of care due to her fractures so she can continue receiving refills. Advised also that I will let Dr. Nelson know. She voiced understanding and will keep us updated.

## 2025-04-22 NOTE — PATIENT INSTRUCTIONS
"BMI for Adults  Body mass index (BMI) is a number found using a person's weight and height. BMI can help tell how much of a person's weight is made up of fat. BMI does not measure body fat directly. It is used instead of tests that directly measure body fat, which can be difficult and expensive.  What are BMI measurements used for?  BMI is useful to:  Find out if your weight puts you at higher risk for medical problems.  Help recommend changes, such as in diet and exercise. This can help you reach a healthy weight. BMI screening can be done again to see if these changes are working.  How is BMI calculated?  Your height and weight are measured. The BMI is found from those numbers. This can be done with U.S[place] or metric measurements. Note that charts and online BMI calculators are available to help you find your BMI quickly and easily without doing these calculations.  To calculate your BMI in U.S[place] measurements:  Measure your weight in pounds (lb).  Multiply the number of pounds by 703.  So, for an adult who weighs 150 lb, multiply that number by 703: 150 x 703, which equals 105,450.  Measure your height in inches. Then multiply that number by itself to get a measurement called \"inches squared.\"  So, for an adult who is 70 inches tall, the \"inches squared\" measurement is 70 inches x 70 inches, which equals 4,900 inches squared.  Divide the total from step 2 (number of lb x 703) by the total from step 3 (inches squared): 105,450 ÷ 4,900 = 21.5. This is your BMI.  To calculate your BMI in metric measurements:    Measure your weight in kilograms (kg).  For this example, the weight is 70 kg.  Measure your height in meters (m). Then multiply that number by itself to get a measurement called \"meters squared.\"  So, for an adult who is 1.75 m tall, the \"meters squared\" measurement is 1.75 m x 1.75 m, which equals 3.1 meters squared.  Divide the number of kilograms (your weight) by the meters squared number. In this example: 70 " ÷ 3.1 = 22.6. This is your BMI.  What do the results mean?  BMI charts are used to see if you are underweight, normal weight, overweight, or obese. The following guidelines will be used:  Underweight: BMI less than 18.5.  Normal weight: BMI between 18.5 and 24.9.  Overweight: BMI between 25 and 29.9.  Obese: BMI of 30 or above.  BMI is a tool and cannot diagnose a condition. Talk with your health care provider about what your BMI means for you. Keep these notes in mind:  Weight includes fat and muscle. Someone with a muscular build, such as an athlete, may have a BMI that is higher than 24.9. In cases like these, BMI is not a correct measure of body fat.  If you have a BMI of 25 or higher, your provider may need to do more testing to find out if excess body fat is the cause.  BMI is measured the same way for males and females. Females usually have more body fat than males of the same height and weight.  Where to find more information  For more information about BMI, including tools to quickly find your BMI, go to:  Centers for Disease Control and Prevention: cdc.gov  American Heart Association: heart.org  National Heart, Lung, and Blood [place]: nhlbi.nih.gov  This information is not intended to replace advice given to you by your health care provider. Make sure you discuss any questions you have with your health care provider.  Document Revised: 09/07/2023 Document Reviewed: 08/31/2023  [employer] Patient Education © 2023 [employer]  Advance Directive    Advance directives are legal documents that allow you to make decisions about your health care and medical treatment in case you become unable to communicate for yourself. Advance directives let your wishes be known to family, friends, and health care providers.  Discussing and writing advance directives should happen over time rather than all at once. Advance directives can be changed and updated at any time. There are different types of advance directives,  such as:  Medical power of .  Living will.  Do not resuscitate (DNR) order or do not attempt resuscitation (DNAR) order.  Health care proxy and medical power of   A health care proxy is also called a health care agent. This person is appointed to make medical decisions for you when you are unable to make decisions for yourself. Generally, people ask a trusted friend or family member to act as their proxy and represent their preferences. Make sure you have an agreement with your trusted person to act as your proxy. A proxy may have to make a medical decision on your behalf if your wishes are not known.  A medical power of , also called a durable power of  for health care, is a legal document that names your health care proxy. Depending on the laws in your state, the document may need to be:  Signed.  Notarized.  Dated.  Copied.  Witnessed.  Incorporated into your medical record.  You may also want to appoint a trusted person to manage your money in the event you are unable to do so. This is called a durable power of  for finances. It is a separate legal document from the durable power of  for health care. You may choose your health care proxy or someone different to act as your agent in money matters.  If you do not appoint a proxy, or there is a concern that the proxy is not acting in your best interest, a court may appoint a guardian to act on your behalf.  Living will  A living will is a set of instructions that state your wishes about medical care when you cannot express them yourself. Health care providers should keep a copy of your living will in your medical record. You may want to give a copy to family members or friends. To alert caregivers in case of an emergency, you can place a card in your wallet to let them know that you have a living will and where they can find it. A living will is used if you become:  Terminally ill.  Disabled.  Unable to communicate or  make decisions.  The following decisions should be included in your living will:  To use or not to use life support equipment, such as dialysis machines and breathing machines (ventilators).  Whether you want a DNR or DNAR order. This tells health care providers not to use cardiopulmonary resuscitation (CPR) if breathing or heartbeat stops.  To use or not to use tube feeding.  To be given or not to be given food and fluids.  Whether you want comfort (palliative) care when the goal becomes comfort rather than a cure.  Whether you want to donate your organs and tissues.  A living will does not give instructions for distributing your money and property if you should pass away.  DNR or DNAR  A DNR or DNAR order is a request not to have CPR in the event that your heart stops beating or you stop breathing. If a DNR or DNAR order has not been made and shared, a health care provider will try to help any patient whose heart has stopped or who has stopped breathing. If you plan to have surgery, talk with your health care provider about how your DNR or DNAR order will be followed if problems occur.  What if I do not have an advance directive?  Some states assign family decision makers to act on your behalf if you do not have an advance directive. Each state has its own laws about advance directives. You may want to check with your health care provider, , or state representative about the laws in your state.  Summary  Advance directives are legal documents that allow you to make decisions about your health care and medical treatment in case you become unable to communicate for yourself.  The process of discussing and writing advance directives should happen over time. You can change and update advance directives at any time.  Advance directives may include a medical power of , a living will, and a DNR or DNAR order.  This information is not intended to replace advice given to you by your health care provider.  Make sure you discuss any questions you have with your health care provider.  Document Revised: 09/21/2021 Document Reviewed: 09/21/2021  Elsevier Patient Education © 2023 [employer]  Fall Prevention in the Home, Adult  Falls can cause injuries and can happen to people of all ages. There are many things you can do to make your home safer and to help prevent falls.  What actions can I take to prevent falls?  General information  Use good lighting in all rooms. Make sure to:  Replace any light bulbs that burn out.  Turn on the lights in dark areas and use night-lights.  Keep items that you use often in easy-to-reach places. Lower the shelves around your home if needed.  Move furniture so that there are clear paths around it.  Do not use throw rugs or other things on the floor that can make you trip.  If any of your floors are uneven, fix them.  Add color or contrast paint or tape to clearly whitley and help you see:  Grab bars or handrails.  First and last steps of staircases.  Where the edge of each step is.  If you use a ladder or stepladder:  Make sure that it is fully opened. Do not climb a closed ladder.  Make sure the sides of the ladder are locked in place.  Have someone hold the ladder while you use it.  Know where your pets are as you move through your home.  What can I do in the bathroom?         Keep the floor dry. Clean up any water on the floor right away.  Remove soap buildup in the bathtub or shower. Buildup makes bathtubs and showers slippery.  Use non-skid mats or decals on the floor of the bathtub or shower.  Attach bath mats securely with double-sided, non-slip rug tape.  If you need to sit down in the shower, use a non-slip stool.  Install grab bars by the toilet and in the bathtub and shower. Do not use towel bars as grab bars.  What can I do in the bedroom?  Make sure that you have a light by your bed that is easy to reach.  Do not use any sheets or blankets on your bed that hang to the  floor.  Have a firm chair or bench with side arms that you can use for support when you get dressed.  What can I do in the kitchen?  Clean up any spills right away.  If you need to reach something above you, use a step stool with a grab bar.  Keep electrical cords out of the way.  Do not use floor polish or wax that makes floors slippery.  What can I do with my stairs?  Do not leave anything on the stairs.  Make sure that you have a light switch at the top and the bottom of the stairs.  Make sure that there are handrails on both sides of the stairs. Fix handrails that are broken or loose.  Install non-slip stair treads on all your stairs if they do not have carpet.  Avoid having throw rugs at the top or bottom of the stairs.  Choose a carpet that does not hide the edge of the steps on the stairs. Make sure that the carpet is firmly attached to the stairs. Fix carpet that is loose or worn.  What can I do on the outside of my home?  Use bright outdoor lighting.  Fix the edges of walkways and driveways and fix any cracks. Clear paths of anything that can make you trip, such as tools or rocks.  Add color or contrast paint or tape to clearly whitley and help you see anything that might make you trip as you walk through a door, such as a raised step or threshold.  Trim any bushes or trees on paths to your home.  Check to see if handrails are loose or broken and that both sides of all steps have handrails. Install guardrails along the edges of any raised decks and porches.  Have leaves, snow, or ice cleared regularly. Use sand, salt, or ice melter on paths if you live where there is ice and snow during the winter.  Clean up any spills in your garage right away. This includes grease or oil spills.  What other actions can I take?  Review your medicines with your doctor. Some medicines can cause dizziness or changes in blood pressure, which increase your risk of falling.  Wear shoes that:  Have a low heel. Do not wear high  heels.  Have rubber bottoms and are closed at the toe.  Feel good on your feet and fit well.  Use tools that help you move around if needed. These include:  Canes.  Walkers.  Scooters.  Crutches.  Ask your doctor what else you can do to help prevent falls. This may include seeing a physical therapist to learn to do exercises to move better and get stronger.  Where to find more information  Centers for Disease Control and Prevention, [place]: cdc.gov  National [place] on Aging: bro.nih.gov  National [place] on Aging: bro.nih.gov  Contact a doctor if:  You are afraid of falling at home.  You feel weak, drowsy, or dizzy at home.  You fall at home.  Get help right away if you:  Lose consciousness or have trouble moving after a fall.  Have a fall that causes a head injury.  These symptoms may be an emergency. Get help right away. Call 911.  Do not wait to see if the symptoms will go away.  Do not drive yourself to the hospital.  This information is not intended to replace advice given to you by your health care provider. Make sure you discuss any questions you have with your health care provider.  Document Revised: 08/21/2023 Document Reviewed: 08/21/2023  Elsevier Patient Education © 2023 Elsevier Inc.     0 = Absent

## 2025-05-12 DIAGNOSIS — E03.9 HYPOTHYROIDISM, UNSPECIFIED TYPE: ICD-10-CM

## 2025-05-12 RX ORDER — LEVOTHYROXINE SODIUM 88 UG/1
88 TABLET ORAL DAILY
Qty: 30 TABLET | Refills: 0 | OUTPATIENT
Start: 2025-05-12

## 2025-05-12 NOTE — TELEPHONE ENCOUNTER
Rx Refill Note  Requested Prescriptions     Pending Prescriptions Disp Refills    levothyroxine (SYNTHROID, LEVOTHROID) 88 MCG tablet 30 tablet 0     Sig: Take 1 tablet by mouth Daily. NEEDS AN APPOINTMENT FOR REFILL      Last office visit with prescribing clinician: 10/18/2024     Next office visit with prescribing clinician: Visit date not found     Katia Sheth MA  05/12/25, 12:01 EDT

## 2025-05-14 DIAGNOSIS — E03.9 HYPOTHYROIDISM, UNSPECIFIED TYPE: ICD-10-CM

## 2025-05-14 RX ORDER — LEVOTHYROXINE SODIUM 88 UG/1
88 TABLET ORAL DAILY
Qty: 30 TABLET | Refills: 0 | OUTPATIENT
Start: 2025-05-14

## 2025-05-14 NOTE — TELEPHONE ENCOUNTER
Rx Refill Note  Requested Prescriptions     Pending Prescriptions Disp Refills    levothyroxine (SYNTHROID, LEVOTHROID) 88 MCG tablet 30 tablet 0     Sig: Take 1 tablet by mouth Daily. NEEDS AN APPOINTMENT FOR REFILL      Last office visit with prescribing clinician: 10/18/2024     Next office visit with prescribing clinician: Visit date not found     Katia Sheth MA  05/14/25, 13:12 EDT

## 2025-05-19 ENCOUNTER — TELEPHONE (OUTPATIENT)
Dept: INTERNAL MEDICINE | Facility: CLINIC | Age: 75
End: 2025-05-19
Payer: MEDICARE

## 2025-05-21 ENCOUNTER — TELEPHONE (OUTPATIENT)
Dept: INTERNAL MEDICINE | Facility: CLINIC | Age: 75
End: 2025-05-21
Payer: MEDICARE

## 2025-05-21 NOTE — TELEPHONE ENCOUNTER
I called the number provided and it is the patient's phone number.  I don't know how to reach Raz with [facility].

## 2025-05-21 NOTE — TELEPHONE ENCOUNTER
Raz with [facility] called to get verbal orders for Occupational Therapy. Frequency is once a week for 8 weeks.    She can be reached at [phone] if she does not answer please leave a detailed message, her vm is secure.

## 2025-05-29 ENCOUNTER — TELEPHONE (OUTPATIENT)
Dept: INTERNAL MEDICINE | Facility: CLINIC | Age: 75
End: 2025-05-29
Payer: MEDICARE

## 2025-06-03 ENCOUNTER — TELEPHONE (OUTPATIENT)
Dept: INTERNAL MEDICINE | Facility: CLINIC | Age: 75
End: 2025-06-03

## 2025-06-03 NOTE — TELEPHONE ENCOUNTER
Patient notified.  She is very appreciative.    Please see resident note for full details regarding the service.    PE: A+Ox2 (person, place), no murmurs, decreased breath sounds bilateral bases, abd distended, positive fluid wave, no lower extremity swelling    Assessment:  - Dyspnea secondary to moderate to large right pleural effusion (possibly malignant effusion vs. cirrhosis?)   - Decompensated cirrhosis and portal hypertension   - Moderate to large ascites   - Cholelithiasis   - Tachycardia   - Normocytic anemia   - Elevated alkaline phosphatase   - Elevated BNP   - Hypoalbuminemia   - 3.8 cm infrarenal abdominal aortic aneurysm   - History of HLD, liver cirrhosis 2/2 ETOH use disorder (stopped drinking 1-2 yeas ago), positive FIT test (cancelled colonoscopy 1/2025), Hpylori + 11/16/24 (did not complete treatment), GERD, hyperlipidemia, hypothyroidism     Plan:  - ER course: -115, 91% on room -> % on 2-4L nasal cannula. Labs: Hb 10.8, INR 1.56, Na 134, albumin 1.3, alkaline phosphatase 218, .   - Outpatient imaging showed: multiple mediastinal and bilateral hilar lymph nodes, largest lesion adjacent to descending thoracic aorta, rght level 2 neck lymph node, 2 periportal lymph nodes, 3.8 cm infrarenal abdominal aortic aneurysm   - 1st EKG: significant artifact, accelerated junctional rhythm?   - 2nd EKG: Sinus tachycardia with  bpm, normal intervals, no ST changes (personally reviewed).   - CXR: moderate to large right pleural effusion, no pneumothorax, no consolidation (personally reviewed).   - Lower extremity dopplers: No evidence of deep venous thrombosis in either lower extremity.  - CT chest/abd/pelvis (prelim):  No obvious PE to the level of the segmental pulmonary arteries. Difficult to assess subsegmental pulmonary arteries at the left lung base. Large right and moderate to large left pleural effusion with atelectasis. Nonspecific interlobar septal thickening. Cirrhosis and portal hypertension. Moderate to large ascites. Cholelithiasis. Nonspecific small and large bowel wall thickening in the setting of ascites/cirrhosis. Official report to follow.  - Follow up CT chest/abd/pelvis official read, abdominal ultrasound, B12, folate, TSH, lipid panel   - Started on Zosyn (Day 2)   - s/p 40 mg IVP lasix in the ER -> continue 40 mg PO daily   - Add on Spironolactone 100 mg daily   - Add on Nadolol/Propanolol   - Strict I+Os, daily weights  - Ordered CT head   - Will need paracentesis/possible thoracentesis    - GI consult   - Pulmonology consult  - DVT ppx: SCDs   - Code status: Full code   - Dispo: Active     I spent a total of 50 minutes on the date of this encounter coordinating the patient's care, excluding resident teaching time. This includes reviewing results/imaging and discussions with specialists, nursing, case management/social work. Further tests, medications, and procedures have been ordered as indicated. Results and the plan of care were communicated to the patient and/or their family member. Supporting documentation was completed and added to the patient's chart.

## 2025-06-03 NOTE — TELEPHONE ENCOUNTER
"Caller: Altagracia Rothman \"Karen\"    Relationship: Self    Best call back number: [phone]     What is the best time to reach you:  BEFORE 4 OR AT 5     Who are you requesting to speak with (clinical staff, provider,  specific staff member): CLINICAL    PATIENT MISSED A CALL AND WOULD LIKE A CALL BACK  "

## 2025-06-05 ENCOUNTER — OUTSIDE FACILITY SERVICE (OUTPATIENT)
Dept: INTERNAL MEDICINE | Facility: CLINIC | Age: 75
End: 2025-06-05
Payer: MEDICARE

## 2025-06-17 ENCOUNTER — LAB (OUTPATIENT)
Dept: LAB | Facility: HOSPITAL | Age: 75
End: 2025-06-17
Payer: MEDICARE

## 2025-06-17 ENCOUNTER — OFFICE VISIT (OUTPATIENT)
Dept: INTERNAL MEDICINE | Facility: CLINIC | Age: 75
End: 2025-06-17
Payer: MEDICARE

## 2025-06-17 VITALS
HEART RATE: 89 BPM | SYSTOLIC BLOOD PRESSURE: 136 MMHG | OXYGEN SATURATION: 95 % | DIASTOLIC BLOOD PRESSURE: 86 MMHG | WEIGHT: 142 LBS | BODY MASS INDEX: 27.73 KG/M2

## 2025-06-17 DIAGNOSIS — M80.00XS OSTEOPOROSIS WITH CURRENT PATHOLOGICAL FRACTURE, UNSPECIFIED OSTEOPOROSIS TYPE, SEQUELA: ICD-10-CM

## 2025-06-17 DIAGNOSIS — E03.9 ACQUIRED HYPOTHYROIDISM: ICD-10-CM

## 2025-06-17 DIAGNOSIS — E27.40 ADRENAL INSUFFICIENCY: ICD-10-CM

## 2025-06-17 DIAGNOSIS — R73.09 ABNORMAL GLUCOSE: ICD-10-CM

## 2025-06-17 DIAGNOSIS — I10 ESSENTIAL HYPERTENSION: ICD-10-CM

## 2025-06-17 DIAGNOSIS — Z86.018 H/O PHEOCHROMOCYTOMA: ICD-10-CM

## 2025-06-17 DIAGNOSIS — Z91.81 AT MAXIMUM RISK FOR FALL: Primary | ICD-10-CM

## 2025-06-17 DIAGNOSIS — R26.89 LOSS OF BALANCE: ICD-10-CM

## 2025-06-17 LAB
ALBUMIN SERPL-MCNC: 3.7 G/DL (ref 3.5–5.2)
ALBUMIN/GLOB SERPL: 1 G/DL
ALP SERPL-CCNC: 163 U/L (ref 39–117)
ALT SERPL W P-5'-P-CCNC: 8 U/L (ref 1–33)
ANION GAP SERPL CALCULATED.3IONS-SCNC: 16.9 MMOL/L (ref 5–15)
AST SERPL-CCNC: 13 U/L (ref 1–32)
BASOPHILS # BLD AUTO: 0.03 10*3/MM3 (ref 0–0.2)
BASOPHILS NFR BLD AUTO: 0.5 % (ref 0–1.5)
BILIRUB SERPL-MCNC: 0.3 MG/DL (ref 0–1.2)
BUN SERPL-MCNC: 21 MG/DL (ref 8–23)
BUN/CREAT SERPL: 25.3 (ref 7–25)
CALCIUM SPEC-SCNC: 9.6 MG/DL (ref 8.6–10.5)
CHLORIDE SERPL-SCNC: 99 MMOL/L (ref 98–107)
CO2 SERPL-SCNC: 27.1 MMOL/L (ref 22–29)
CREAT SERPL-MCNC: 0.83 MG/DL (ref 0.57–1)
DEPRECATED RDW RBC AUTO: 41.9 FL (ref 37–54)
EGFRCR SERPLBLD CKD-EPI 2021: 73.2 ML/MIN/1.73
EOSINOPHIL # BLD AUTO: 0.06 10*3/MM3 (ref 0–0.4)
EOSINOPHIL NFR BLD AUTO: 1 % (ref 0.3–6.2)
ERYTHROCYTE [DISTWIDTH] IN BLOOD BY AUTOMATED COUNT: 11.4 % (ref 12.3–15.4)
GLOBULIN UR ELPH-MCNC: 3.6 GM/DL
GLUCOSE SERPL-MCNC: 128 MG/DL (ref 65–99)
HBA1C MFR BLD: 5.7 % (ref 4.8–5.6)
HCT VFR BLD AUTO: 43 % (ref 34–46.6)
HGB BLD-MCNC: 14.3 G/DL (ref 12–15.9)
IMM GRANULOCYTES # BLD AUTO: 0.02 10*3/MM3 (ref 0–0.05)
IMM GRANULOCYTES NFR BLD AUTO: 0.3 % (ref 0–0.5)
LYMPHOCYTES # BLD AUTO: 1.21 10*3/MM3 (ref 0.7–3.1)
LYMPHOCYTES NFR BLD AUTO: 19.2 % (ref 19.6–45.3)
MCH RBC QN AUTO: 33 PG (ref 26.6–33)
MCHC RBC AUTO-ENTMCNC: 33.3 G/DL (ref 31.5–35.7)
MCV RBC AUTO: 99.3 FL (ref 79–97)
MONOCYTES # BLD AUTO: 0.53 10*3/MM3 (ref 0.1–0.9)
MONOCYTES NFR BLD AUTO: 8.4 % (ref 5–12)
NEUTROPHILS NFR BLD AUTO: 4.45 10*3/MM3 (ref 1.7–7)
NEUTROPHILS NFR BLD AUTO: 70.6 % (ref 42.7–76)
NRBC BLD AUTO-RTO: 0 /100 WBC (ref 0–0.2)
PLATELET # BLD AUTO: 374 10*3/MM3 (ref 140–450)
PMV BLD AUTO: 10.2 FL (ref 6–12)
POTASSIUM SERPL-SCNC: 3.4 MMOL/L (ref 3.5–5.2)
PROT SERPL-MCNC: 7.3 G/DL (ref 6–8.5)
RBC # BLD AUTO: 4.33 10*6/MM3 (ref 3.77–5.28)
SODIUM SERPL-SCNC: 143 MMOL/L (ref 136–145)
WBC NRBC COR # BLD AUTO: 6.3 10*3/MM3 (ref 3.4–10.8)

## 2025-06-17 PROCEDURE — 3079F DIAST BP 80-89 MM HG: CPT | Performed by: PHYSICIAN ASSISTANT

## 2025-06-17 PROCEDURE — 80053 COMPREHEN METABOLIC PANEL: CPT

## 2025-06-17 PROCEDURE — 85025 COMPLETE CBC W/AUTO DIFF WBC: CPT

## 2025-06-17 PROCEDURE — 3075F SYST BP GE 130 - 139MM HG: CPT | Performed by: PHYSICIAN ASSISTANT

## 2025-06-17 PROCEDURE — 99214 OFFICE O/P EST MOD 30 MIN: CPT | Performed by: PHYSICIAN ASSISTANT

## 2025-06-17 PROCEDURE — 1160F RVW MEDS BY RX/DR IN RCRD: CPT | Performed by: PHYSICIAN ASSISTANT

## 2025-06-17 PROCEDURE — 83036 HEMOGLOBIN GLYCOSYLATED A1C: CPT

## 2025-06-17 PROCEDURE — 1159F MED LIST DOCD IN RCRD: CPT | Performed by: PHYSICIAN ASSISTANT

## 2025-06-17 PROCEDURE — G2211 COMPLEX E/M VISIT ADD ON: HCPCS | Performed by: PHYSICIAN ASSISTANT

## 2025-06-17 PROCEDURE — 1125F AMNT PAIN NOTED PAIN PRSNT: CPT | Performed by: PHYSICIAN ASSISTANT

## 2025-06-17 RX ORDER — PINDOLOL 5 MG/1
2.5 TABLET ORAL 3 TIMES DAILY
Qty: 135 TABLET | Refills: 1 | Status: SHIPPED | OUTPATIENT
Start: 2025-06-17

## 2025-06-17 RX ORDER — SPIRONOLACTONE 50 MG/1
50 TABLET, FILM COATED ORAL DAILY
Qty: 90 TABLET | Refills: 1 | Status: SHIPPED | OUTPATIENT
Start: 2025-06-17

## 2025-06-17 NOTE — PROGRESS NOTES
Office Note     Name: Altagracia Rothman    : 1950     MRN: [id number]     Chief Complaint  Primary Care Follow-Up    Subjective     History of Present Illness:  Altagracia Rothman is a 75 y.o. female who presents today for fall.  Past medical history significant for adrenal insufficiency, hypothyroidism, hypertension, history of pheochromocytoma, asthma, osteoporosis.    History of Present Illness  The patient presents for evaluation following a fall.    She experienced a fall in 2025 on her daughter's front porch, resulting in multiple fractures, including a closed head injury and fractures involving the left superior pubic ramus, left inferior pubic ramus, left inferior sacral ala, and possibly the left iliac crest. She was hospitalized for 9 days, during which she was administered Dilaudid and gabapentin. She did not require surgical intervention for her fractures. After discharge, she was transferred to the [facility] for rehabilitation until 2025. She has been using crutches for mobility and reports increased pain compared to her pre-fall state. She has lost weight and feels weak. She has been receiving home health services, including nursing and occupational therapy, and is scheduled for a reevaluation on Friday. She is seeking a new stepper for home use as it aids her arthritis. She has an upcoming appointment with her osteoporosis specialist. She has been advised against using an electric wheelchair. She has been taking vitamin D supplements but discontinued them upon discovering that her calcium supplement also contained vitamin D. She has been taking cortisone, which has elevated her blood sugar levels.    She reports a productive cough but no sore throat or chest congestion. She resumed using her inhaler 2 days ago, which has improved her condition. She reports inadequate water intake due to frequent urination.    She is currently taking pindolol and requires a refill. She also needs  refills of spironolactone and furosemide. She reports satisfactory sleep quality.    Review of Systems:   Review of Systems   Constitutional:  Positive for activity change. Negative for appetite change, unexpected weight gain and unexpected weight loss.   Cardiovascular:  Negative for chest pain, palpitations and leg swelling.   Endocrine: Negative for cold intolerance and heat intolerance.   Musculoskeletal:  Positive for arthralgias and gait problem. Negative for joint swelling.   Neurological:  Positive for weakness.       Past Medical History:   Past Medical History:   Diagnosis Date    Adrenal insufficiency     Allergic 1955    Anemia 1980    Not now but in my history    Arthritis 1978    Asthma 1955    Cholelithiasis 2007    Clotting disorder     Colon polyp 1984    Deep vein thrombosis     Depression 1984    Fibromyalgia     Fibromyalgia, primary 1991    Hypertension     Hypothyroidism     Kidney stone ???????????    Low back pain 2003    Obesity 2000    Became really heavy about the same time the adrenal tumor was found.    Osteopenia 2000    Scoliosis     Skin cancer        Past Surgical History:   Past Surgical History:   Procedure Laterality Date    ADENOIDECTOMY      ADRENAL GLAND SURGERY      BREAST BIOPSY      x4    CHOLECYSTECTOMY  2007    COLONOSCOPY  2008    FRACTURE SURGERY  2022    GALLBLADDER SURGERY      HARDWARE REMOVAL Right 10/04/2022    Procedure: SHOULDER HARDWARE REMOVAL- RIGHT;  Surgeon: Jamari Matute Jr., MD;  Location: [facility] OR;  Service: Orthopedics;  Laterality: Right;    HUMERUS IM NAILING/RODDING Right 07/14/2022    Procedure: INTRAMEDULLARY NAIL INSERTION PROXIMAL HUMERUS RIGHT;  Surgeon: Jamari Matute Jr., MD;  Location: [facility] OR;  Service: Orthopedics;  Laterality: Right;    HYSTERECTOMY      MRI ANGIOGRAM LOWER EXTREMITY UNILATERAL W CONTRAST      ORIF HUMERUS FRACTURE Right 07/14/2022    Procedure: HUMERUS DISTAL OPEN REDUCTION INTERNAL FIXATION RIGHT;  Surgeon:  Jamari Matute Jr., MD;  Location: [facility];  Service: Orthopedics;  Laterality: Right;    SUBTOTAL HYSTERECTOMY  1982    TONSILLECTOMY         Family History:   Family History   Problem Relation Age of Onset    Thyroid disease Mother     Hypertension Mother     Arthritis Mother     Stroke Mother         Several small strokes    Heart disease Father         My grandfather also    Cancer Sister     Drug abuse Daughter        Social History:   Social History     Socioeconomic History    Marital status:    Tobacco Use    Smoking status: Some Days     Current packs/day: 0.50     Average packs/day: 0.5 packs/day for 80.0 years (40.0 ttl pk-yrs)     Types: Cigarettes     Start date: 6/15/1975    Smokeless tobacco: Never    Tobacco comments:     Have quit for a year or two many times   Vaping Use    Vaping status: Never Used   Substance and Sexual Activity    Alcohol use: Never    Drug use: Never    Sexual activity: Never       Immunizations:   Immunization History   Administered Date(s) Administered    COVID-19 (MODERNA) 12YRS+ (SPIKEVAX) 10/13/2023, 09/16/2024    COVID-19 (PFIZER) BIVALENT 12+YRS 10/13/2022    COVID-19 (PFIZER) Purple Cap Monovalent 02/25/2021, 03/18/2021, 08/23/2021    Covid-19 (Pfizer) Gray Cap Monovalent 01/24/2022    FLUAD TRI 65YR+ 11/16/2022    Flu Vaccine Quad PF 6-35MO 10/19/2015    Fluad Quad 65+ 10/13/2023    Fluzone  >6mos 10/24/2016    Fluzone High-Dose 65+YRS 11/29/2017, 10/10/2018, 11/21/2019, 09/16/2024    Fluzone High-Dose 65+yrs 11/13/2020, 09/26/2021, 11/16/2022    INFLUENZA SPLIT TRI 10/19/2015    Pneumococcal Conjugate 13-Valent (PCV13) 11/16/2017    Pneumococcal Conjugate 20-Valent (PCV20) 04/15/2024    Tdap 04/13/2015, 06/17/2022        Medications:     Current Outpatient Medications:     furosemide (LASIX) 20 MG tablet, TAKE 1 TABLET BY MOUTH TWICE A DAY, Disp: 180 tablet, Rfl: 1    gabapentin (NEURONTIN) 800 MG tablet, Take 1 tablet by mouth Every 6 (Six) Hours., Disp:  , Rfl:     hydrocortisone (CORTEF) 10 MG tablet, 2 tablets in the morning and 1 tablet in the afternoon. Dispense 30 extra tablets to use with stress dosing., Disp: 300 tablet, Rfl: 1    levothyroxine (SYNTHROID, LEVOTHROID) 88 MCG tablet, Take 1 tablet by mouth Daily. NEEDS AN APPOINTMENT FOR REFILL, Disp: 30 tablet, Rfl: 0    nortriptyline (PAMELOR) 50 MG capsule, Take 1 capsule by mouth 3 (Three) Times a Day., Disp: 270 capsule, Rfl: 1    oxyCODONE-acetaminophen (PERCOCET)  MG per tablet, Take 1 tablet by mouth 3 (Three) Times a Day., Disp: , Rfl:     pindolol (VISKEN) 5 MG tablet, Take 0.5 tablets by mouth 3 (Three) Times a Day., Disp: 135 tablet, Rfl: 1    spironolactone (ALDACTONE) 50 MG tablet, Take 1 tablet by mouth Daily., Disp: 90 tablet, Rfl: 1    tetracycline (ACHROMYCIN,SUMYCIN) 500 MG capsule, Take 1 capsule by mouth 2 (Two) Times a Day., Disp: 180 capsule, Rfl: 1    albuterol sulfate  (90 Base) MCG/ACT inhaler, Inhale 1 puff Every 4 (Four) Hours As Needed for Wheezing., Disp: 8.5 g, Rfl: 5    Fluticasone-Salmeterol (Wixela Inhub) 250-50 MCG/ACT DISKUS, Inhale 1 puff 2 (Two) Times a Day., Disp: 60 each, Rfl: 1    lidocaine (LIDODERM) 5 %, Place 1 patch on the skin as directed by provider Every 12 (Twelve) Hours. (12 hours on 12 hours off), Disp: , Rfl:     Morphine (MS CONTIN) 30 MG 12 hr tablet, Take 1 tablet by mouth 2 (Two) Times a Day., Disp: , Rfl:     ondansetron ODT (Zofran ODT) 8 MG disintegrating tablet, Place 1 tablet on the tongue Every 8 (Eight) Hours As Needed for Nausea or Vomiting., Disp: 15 tablet, Rfl: 0    Allergies:   Allergies   Allergen Reactions    Oxaprozin Anaphylaxis    Povidone Iodine Swelling    Atenolol Swelling and Unknown (See Comments)    Cephalexin Hives     Tolerated ceftriaxone and cefazolin 7/2022    Other reaction(s): hives    Fentanyl Other (See Comments) and Swelling     sweating    sweating   sweating      Other reaction(s): extreme sweating     Pregabalin Other (See Comments) and Unknown - Low Severity     Sweating    Sweating   Sweating      Other reaction(s): extreme sweating    Aspirin Unknown - Low Severity    Benzoyl Peroxide Irritability    Erythromycin Unknown - Low Severity    Iodine Swelling    Molds & Smuts Unknown - Low Severity    Moxifloxacin Itching     rash, itching    Nsaids Unknown - Low Severity    Other Swelling     Tranormin    Penicillin G Irritability    Azithromycin Unknown (See Comments)    Cephalosporins Unknown (See Comments)    Clindamycin Unknown (See Comments)    Iron Unknown (See Comments)     Note: had symptoms of heart attack    Levofloxacin Hives    Nitrofurantoin Unknown (See Comments)    Penicillin G Benzathine & Proc Rash    Penicillins Rash and Unknown - Low Severity     Tolerated ceftriaxone & cefazolin 7/2022    Sulfa Antibiotics Hives, Rash and Unknown - Low Severity       Objective     Vital Signs  /86   Pulse 89   Wt 64.4 kg (142 lb) Comment: per patient  SpO2 95%   BMI 27.73 kg/m²   Body mass index is 27.73 kg/m².     BMI is >= 25 and <30. (Overweight) The following options were offered after discussion;: weight loss educational material (shared in after visit summary), exercise counseling/recommendations, and nutrition counseling/recommendations       Physical Exam  Vitals and nursing note reviewed.   Constitutional:       Appearance: Normal appearance.   HENT:      Head: Normocephalic and atraumatic.      Nose: Nose normal.   Eyes:      Extraocular Movements: Extraocular movements intact.      Conjunctiva/sclera: Conjunctivae normal.      Pupils: Pupils are equal, round, and reactive to light.   Cardiovascular:      Rate and Rhythm: Normal rate and regular rhythm.      Pulses: Normal pulses.      Heart sounds: Normal heart sounds.   Pulmonary:      Effort: Pulmonary effort is normal.      Breath sounds: Normal breath sounds.   Musculoskeletal:      Right lower leg: No edema.      Left lower leg: No  edema.   Skin:     General: Skin is warm and dry.   Neurological:      General: No focal deficit present.      Mental Status: She is alert.      Motor: Weakness present.      Coordination: Coordination normal.   Psychiatric:         Mood and Affect: Mood normal.         Behavior: Behavior normal.          Procedures     Results:  No results found for this or any previous visit (from the past 24 hours).     Assessment and Plan     Assessment/Plan:  Diagnoses and all orders for this visit:    1. At maximum risk for fall (Primary)  -     Ambulatory Referral to Physical Therapy for Evaluation & Treatment    2. Loss of balance  -     Ambulatory Referral to Physical Therapy for Evaluation & Treatment    3. Osteoporosis with current pathological fracture, unspecified osteoporosis type, sequela    4. Essential hypertension  -     pindolol (VISKEN) 5 MG tablet; Take 0.5 tablets by mouth 3 (Three) Times a Day.  Dispense: 135 tablet; Refill: 1  -     spironolactone (ALDACTONE) 50 MG tablet; Take 1 tablet by mouth Daily.  Dispense: 90 tablet; Refill: 1  -     CBC & Differential; Future  -     Comprehensive Metabolic Panel; Future    5. Abnormal glucose  -     Hemoglobin A1c; Future    6. H/O pheochromocytoma  Overview:  2003      7. Acquired hypothyroidism    8. Adrenal insufficiency  Overview:  Continue hydrocortisone 10mg tablets as directed.  Follow up with Dr. Nelson.          Assessment & Plan  1. Fall-related injuries.  - Experienced a fall in 04/2025, resulting in multiple fractures: closed displaced fracture of the pelvis, acute mildly displaced fracture involving the left superior pubic ramus, acute nondisplaced fracture involving the left inferior pubic ramus, acute mildly displaced fracture involving the left inferior sacral ala adjacent to the left sacroiliac joint, and a questionable subtle nondisplaced fracture along the left iliac crest.  - Hospitalized for 9 days and underwent rehabilitation at [facility];  currently using crutches for mobility and reports increased pain compared to before the fall.  - Referral for home physical therapy and occupational therapy initiated to address balance issues and fall risk.  - Laboratory tests ordered today to assess blood counts, sodium, potassium, and A1c levels.    2. Productive cough.  - Reports a productive cough and had not been using her inhaler since returning home; resumed using inhaler 2 days ago, which improved symptoms.  - Advised to continue using inhaler regularly.  - Examination revealed movement of sputum and no chest congestion.    3. Medication management.  - Requested refills for pindolol and spironolactone.  - New prescriptions for pindolol and spironolactone sent to pharmacy.  - Recent 90-day supply of furosemide noted.    4. Osteoporosis follow-up.  - Scheduled follow-up appointment for osteoporosis management.  - Emphasis on bone health and strength; discussed the importance of maintaining bone health to prevent further falls.  - Encouraged to keep the upcoming appointment for osteoporosis management.      Follow Up  No follow-ups on file.    Patient or patient representative verbalized consent for the use of Ambient Listening during the visit with  Alvina Lloyd PA-C for chart documentation. 6/17/2025  17:03 EDT      Alvina Lloyd PA-C   [facility] [place] [id number]

## 2025-06-17 NOTE — PATIENT INSTRUCTIONS
"BMI for Adults  Body mass index (BMI) is a number found using a person's weight and height. BMI can help tell how much of a person's weight is made up of fat. BMI does not measure body fat directly. It is used instead of tests that directly measure body fat, which can be difficult and expensive.  What are BMI measurements used for?  BMI is useful to:  Find out if your weight puts you at higher risk for medical problems.  Help recommend changes, such as in diet and exercise. This can help you reach a healthy weight. BMI screening can be done again to see if these changes are working.  How is BMI calculated?  Your height and weight are measured. The BMI is found from those numbers. This can be done with U.S[place] or metric measurements. Note that charts and online BMI calculators are available to help you find your BMI quickly and easily without doing these calculations.  To calculate your BMI in U.S. measurements:  Measure your weight in pounds (lb).  Multiply the number of pounds by 703.  So, for an adult who weighs 150 lb, multiply that number by 703: 150 x 703, which equals 105,450.  Measure your height in inches. Then multiply that number by itself to get a measurement called \"inches squared.\"  So, for an adult who is 70 inches tall, the \"inches squared\" measurement is 70 inches x 70 inches, which equals 4,900 inches squared.  Divide the total from step 2 (number of lb x 703) by the total from step 3 (inches squared): 105,450 ÷ 4,900 = 21.5. This is your BMI.  To calculate your BMI in metric measurements:    Measure your weight in kilograms (kg).  For this example, the weight is 70 kg.  Measure your height in meters (m). Then multiply that number by itself to get a measurement called \"meters squared.\"  So, for an adult who is 1.75 m tall, the \"meters squared\" measurement is 1.75 m x 1.75 m, which equals 3.1 meters squared.  Divide the number of kilograms (your weight) by the meters squared number. In this example: 70 " ÷ 3.1 = 22.6. This is your BMI.  What do the results mean?  BMI charts are used to see if you are underweight, normal weight, overweight, or obese. The following guidelines will be used:  Underweight: BMI less than 18.5.  Normal weight: BMI between 18.5 and 24.9.  Overweight: BMI between 25 and 29.9.  Obese: BMI of 30 or above.  BMI is a tool and cannot diagnose a condition. Talk with your health care provider about what your BMI means for you. Keep these notes in mind:  Weight includes fat and muscle. Someone with a muscular build, such as an athlete, may have a BMI that is higher than 24.9. In cases like these, BMI is not a correct measure of body fat.  If you have a BMI of 25 or higher, your provider may need to do more testing to find out if excess body fat is the cause.  BMI is measured the same way for males and females. Females usually have more body fat than males of the same height and weight.  Where to find more information  For more information about BMI, including tools to quickly find your BMI, go to:  Centers for Disease Control and Prevention: cdc.gov  American Heart Association: heart.org  National Heart, Lung, and Blood Triangle: nhlbi.nih.gov  This information is not intended to replace advice given to you by your health care provider. Make sure you discuss any questions you have with your health care provider.  Document Revised: 09/07/2023 Document Reviewed: 08/31/2023  [employer] Patient Education © 2024 [employer]Advance Directive    Advance directives are legal papers that state your wishes about health care decisions. They let your wishes be known to family, friends, and health care providers if you become unable to speak for yourself.   You should write these papers out over time rather than all at once. They can be changed and updated at any time.  The types of advance directives include:  Medical power of  (POA).  Living gracia.  Do not resuscitate (DNR) or do not attempt  resuscitation (DNAR) orders.  What are a health care proxy and medical POA?  A health care proxy is also called a health care agent. It's a person you choose to make medical decisions for you when you can't make them for yourself. In most cases, a proxy is a trusted friend or family member.  A medical POA is legal paperwork that names your proxy. It may need to be:  Signed.  Notarized.  Dated.  Copied.  Witnessed.  Added to your medical record.  You may also want to choose someone to handle your money if you can't do so. This is called a durable POA for finances. It's separate from a medical POA. You may choose your health care proxy or someone else to act as your agent in money matters.  If you don't have a proxy, or if the proxy may not be acting in your best interest, a court may choose a guardian to act on your behalf.  What is a living will?  A living will is legal paperwork that states your wishes about medical care. Providers should keep a copy of it in your medical record. You may want to give a copy to family members or friends. You can also keep a card in your wallet to let loved ones know you have a living will and where they can find it. A living will may be used if:  You're very sick with something that will end your life.  You become disabled.  You can't make decisions or speak for yourself.  Your living will should include whether:  To use or not use life support equipment. This may include machines to filter your blood or to help you breathe.  You want a DNR or DNAR order. This tells providers not to use CPR if your heart or breathing stops.  To use or not use tube feeding.  You want to be given foods and fluids.  You want a type of comfort care called palliative care. This may be given when the goal for treatment becomes comfort rather than a cure.  You want to donate your organs and tissues.  A living will doesn't say what to do with your money and property if you pass away.  What is a DNR or  DNAR?  A DNR or DNAR order is a request not to have CPR. If you don't have one of these orders, a provider will try to help you if your heart stops or you stop breathing.   If you plan to have surgery, talk with your provider about your DNR or DNAR order.  What happens if I don't have an advance directive?  Each state has its own laws about advance directives. Some states assign family decision makers to act on your behalf if you don't have an advance directive.   Check with your provider, , or state representative about the laws in your state.  Where to find more information  Each state has its own laws about advance directives. You can look up these laws at: [facility]  This information is not intended to replace advice given to you by your health care provider. Make sure you discuss any questions you have with your health care provider.  Document Revised: 05/06/2024 Document Reviewed: 05/06/2024  [employer] Patient Education © 2024 [employer]

## 2025-06-18 DIAGNOSIS — F51.01 PRIMARY INSOMNIA: ICD-10-CM

## 2025-06-19 RX ORDER — NORTRIPTYLINE HYDROCHLORIDE 50 MG/1
50 CAPSULE ORAL 3 TIMES DAILY
Qty: 270 CAPSULE | Refills: 1 | Status: SHIPPED | OUTPATIENT
Start: 2025-06-19

## 2025-06-22 DIAGNOSIS — E27.40 ADRENAL INSUFFICIENCY: ICD-10-CM

## 2025-06-23 NOTE — TELEPHONE ENCOUNTER
Rx Refill Note  Requested Prescriptions     Pending Prescriptions Disp Refills    hydrocortisone (CORTEF) 10 MG tablet [Pharmacy Med Name: HYDROCORTISONE 10 MG TABLET] 300 tablet 1     Sig: TAKE 2 TABS BY MOUTH IN THE MORNING, TAKE 1 TAB IN THE AFTERNOON & 30 EXTRA TABS FOR STRESS DOSING.      Last office visit with prescribing clinician: 10/18/2024     Next office visit with prescribing clinician: Visit date not found

## 2025-06-24 RX ORDER — HYDROCORTISONE 10 MG/1
TABLET ORAL
Qty: 120 TABLET | Refills: 0 | Status: SHIPPED | OUTPATIENT
Start: 2025-06-24

## 2025-06-24 NOTE — TELEPHONE ENCOUNTER
Rx Refill Note  Requested Prescriptions     Pending Prescriptions Disp Refills    hydrocortisone (CORTEF) 10 MG tablet [Pharmacy Med Name: HYDROCORTISONE 10 MG TABLET] 120 tablet 0     Sig: TAKE 2 TABS BY MOUTH IN THE MORNING, TAKE 1 TAB IN THE AFTERNOON & 30 EXTRA TABS FOR STRESS DOSING.      Last office visit with prescribing clinician: 10/18/2024   Last telemedicine visit with prescribing clinician: Visit date not found   Next office visit with prescribing clinician: Visit date not found                         Would you like a call back once the refill request has been completed: [] Yes [] No    If the office needs to give you a call back, can they leave a voicemail: [] Yes [] No    Hyacinth Esparza MA  06/24/25, 08:22 EDT

## 2025-06-25 ENCOUNTER — RESULTS FOLLOW-UP (OUTPATIENT)
Dept: LAB | Facility: HOSPITAL | Age: 75
End: 2025-06-25
Payer: MEDICARE

## 2025-06-27 DIAGNOSIS — E27.40 ADRENAL INSUFFICIENCY: ICD-10-CM

## 2025-06-27 DIAGNOSIS — E03.9 HYPOTHYROIDISM, UNSPECIFIED TYPE: ICD-10-CM

## 2025-06-27 RX ORDER — HYDROCORTISONE 10 MG/1
TABLET ORAL
Qty: 120 TABLET | Refills: 0 | Status: CANCELLED | OUTPATIENT
Start: 2025-06-27

## 2025-06-27 RX ORDER — LEVOTHYROXINE SODIUM 88 UG/1
88 TABLET ORAL DAILY
Qty: 30 TABLET | Refills: 0 | Status: SHIPPED | OUTPATIENT
Start: 2025-06-27

## 2025-06-27 NOTE — TELEPHONE ENCOUNTER
"  Caller: Altagracia Rothman \"Karen\"    Relationship: Self    Best call back number: [phone]    Requested Prescriptions:   Requested Prescriptions     Pending Prescriptions Disp Refills    levothyroxine (SYNTHROID, LEVOTHROID) 88 MCG tablet 30 tablet 0     Sig: Take 1 tablet by mouth Daily. NEEDS AN APPOINTMENT FOR REFILL    hydrocortisone (CORTEF) 10 MG tablet     Pharmacy where request should be sent:  [facility] #[id number] - [place], [place] - [address] - [phone]  - [phone] FX [[id number]]     Last office visit with prescribing clinician: 10/18/2024   Last telemedicine visit with prescribing clinician: Visit date not found   Next office visit with prescribing clinician: Visit date not found     Additional details provided by patient:     Does the patient have less than a 3 day supply:  [x] Yes  [] No    Would you like a call back once the refill request has been completed: [] Yes [] No    If the office needs to give you a call back, can they leave a voicemail: [] Yes [] No    Allan Parrish   06/27/25 10:02 EDT           "

## 2025-07-21 DIAGNOSIS — E03.9 HYPOTHYROIDISM, UNSPECIFIED TYPE: ICD-10-CM

## 2025-07-21 NOTE — TELEPHONE ENCOUNTER
Rx Refill Note  Requested Prescriptions     Pending Prescriptions Disp Refills    levothyroxine (SYNTHROID, LEVOTHROID) 88 MCG tablet 90 tablet 0     Sig: Take 1 tablet by mouth Daily. NEEDS AN APPOINTMENT FOR REFILL      Last office visit with prescribing clinician: 10/18/2024     Next office visit with prescribing clinician: 10/22/2025     Georgette Delgado MA  07/21/25, 14:11 EDT

## 2025-07-22 RX ORDER — ONDANSETRON 8 MG/1
8 TABLET, ORALLY DISINTEGRATING ORAL EVERY 8 HOURS PRN
Qty: 15 TABLET | Refills: 0 | Status: SHIPPED | OUTPATIENT
Start: 2025-07-22

## 2025-07-22 RX ORDER — LEVOTHYROXINE SODIUM 88 UG/1
88 TABLET ORAL DAILY
Qty: 90 TABLET | Refills: 0 | Status: SHIPPED | OUTPATIENT
Start: 2025-07-22

## 2025-07-22 NOTE — TELEPHONE ENCOUNTER
"Caller: Altagracia Rothman \"Karen\"    Relationship: Self    Best call back number: [phone]     Requested Prescriptions:   Requested Prescriptions     Pending Prescriptions Disp Refills    ondansetron ODT (Zofran ODT) 8 MG disintegrating tablet 15 tablet 0     Sig: Place 1 tablet on the tongue Every 8 (Eight) Hours As Needed for Nausea or Vomiting.        Pharmacy where request should be sent: [facility]Y #[id number] - [place], [place] - [address] - [phone]  - [phone] FX     Last office visit with prescribing clinician: 6/17/2025   Last telemedicine visit with prescribing clinician: Visit date not found   Next office visit with prescribing clinician: Visit date not found     Additional details provided by patient: PATIENT'S MEDICATION GOT RUINED. SHE HAD IT IN STORAGE AND A MOUSE GOT INTO IT. SHE'S OUT AND NEEDS A REFILL.    Does the patient have less than a 3 day supply:  [x] Yes  [] No    Would you like a call back once the refill request has been completed: [] Yes [x] No    If the office needs to give you a call back, can they leave a voicemail: [] Yes [x] No    Allan Nicole Rep   07/22/25 14:13 EDT   "
Good tone

## 2025-08-15 DIAGNOSIS — E27.40 ADRENAL INSUFFICIENCY: ICD-10-CM

## 2025-08-15 RX ORDER — HYDROCORTISONE 10 MG/1
TABLET ORAL
Qty: 120 TABLET | Refills: 1 | Status: SHIPPED | OUTPATIENT
Start: 2025-08-15

## 2025-08-19 RX ORDER — TETRACYCLINE HYDROCHLORIDE 500 MG/1
500 CAPSULE ORAL 2 TIMES DAILY
Qty: 180 CAPSULE | Refills: 1 | Status: SHIPPED | OUTPATIENT
Start: 2025-08-19

## (undated) DEVICE — SNAP KOVER: Brand: UNBRANDED

## (undated) DEVICE — TBG PENCL TELESCP MEGADYNE SMOKE EVAC 10FT

## (undated) DEVICE — MEDI-VAC TUBING CONNECTOR 5-IN-1 STRAIGHT: Brand: CARDINAL HEALTH

## (undated) DEVICE — STCKNT IMPERV 12IN STRL

## (undated) DEVICE — Device

## (undated) DEVICE — IMPLANTABLE DEVICE
Type: IMPLANTABLE DEVICE | Site: HUMERUS | Status: NON-FUNCTIONAL
Removed: 2022-07-14

## (undated) DEVICE — GLV SURG PREMIERPRO GAMMEX NEOPRN PF SZ8 GRN

## (undated) DEVICE — SYS CLS SKIN PREMIERPRO EXOFINFUSION 22CM

## (undated) DEVICE — T-MAX DISPOSABLE FACE MASK 8 PER BOX

## (undated) DEVICE — GLV SURG PREMIERPRO MIC LTX PF SZ8 BRN

## (undated) DEVICE — BLANKT WARM LOWR/BDY 100X120CM

## (undated) DEVICE — Device: Brand: AFFIXUS® NATURAL NAIL®

## (undated) DEVICE — 3M™ STERI-DRAPE™ U-DRAPE 1015: Brand: STERI-DRAPE™

## (undated) DEVICE — PAD ARMBRD SURG CONVOL 7.5X20X2IN

## (undated) DEVICE — BNDG ELAS CO-FLEX SLF ADHR 4IN5YD LF STRL

## (undated) DEVICE — PATIENT RETURN ELECTRODE, SINGLE-USE, CONTACT QUALITY MONITORING, ADULT, WITH 9FT CORD, FOR PATIENTS WEIGING OVER 33LBS. (15KG): Brand: MEGADYNE

## (undated) DEVICE — ANTIBACTERIAL UNDYED BRAIDED (POLYGLACTIN 910), SYNTHETIC ABSORBABLE SUTURE: Brand: COATED VICRYL

## (undated) DEVICE — TRAP FLD MINIVAC MEGADYNE 100ML

## (undated) DEVICE — DRVR QC UNIV T10

## (undated) DEVICE — BNDG ESMARK 4IN 9FT LF STRL BLU

## (undated) DEVICE — COVER,MAYO STAND,STERILE: Brand: MEDLINE

## (undated) DEVICE — IMPLANTABLE DEVICE
Type: IMPLANTABLE DEVICE | Site: HUMERUS | Status: NON-FUNCTIONAL
Brand: AFFIXUS® NATURAL NAIL®
Removed: 2022-07-14

## (undated) DEVICE — UNDERCAST PADDING: Brand: DEROYAL

## (undated) DEVICE — BIT DRL COCR 2.7X50MM 1P/U NS

## (undated) DEVICE — ARM SLING: Brand: DEROYAL

## (undated) DEVICE — DRN PENRS SIL 1/4X18IN LF STRL

## (undated) DEVICE — PK MAJ SHLDR SPLT 10

## (undated) DEVICE — C-ARM: Brand: UNBRANDED

## (undated) DEVICE — GLV SURG SENSICARE PI LF PF 7.0

## (undated) DEVICE — GLV SURG PREMIERPRO MIC LTX PF SZ8.5 BRN

## (undated) DEVICE — GLV SURG SENSICARE PI MIC PF SZ7 LF STRL

## (undated) DEVICE — BNDG ELAS ELITE V/CLOSE 4IN 5YD LF STRL

## (undated) DEVICE — ELECTRD BLD EZ CLN STD 4IN

## (undated) DEVICE — GLV SURG PREMIERPRO MIC LTX PF SZ7 BRN

## (undated) DEVICE — GLV SURG SENSICARE PI LF PF 7.5

## (undated) DEVICE — GLV SURG PREMIERPRO MIC LTX PF SZ7.5 BRN

## (undated) DEVICE — GOWN,REINFORCE,POLY,SIRUS,BREATH SLV,XLG: Brand: MEDLINE

## (undated) DEVICE — BANDAGE,GAUZE,BULKEE II,4.5"X4.1YD,STRL: Brand: MEDLINE

## (undated) DEVICE — COATED BRAIDED POLYESTER: Brand: TI-CRON

## (undated) DEVICE — GLV SURG SENSICARE PI LF PF 6.5

## (undated) DEVICE — GLV SURG PREMIERPRO MIC LTX PF SZ6.5 BRN

## (undated) DEVICE — INTENT TO BE USED WITH SUTURE MATERIAL FOR TISSUE CLOSURE: Brand: RICHARD-ALLAN® NEEDLE 1/2 CIRCLE TAPER